# Patient Record
Sex: MALE | Race: WHITE | NOT HISPANIC OR LATINO | Employment: OTHER | URBAN - METROPOLITAN AREA
[De-identification: names, ages, dates, MRNs, and addresses within clinical notes are randomized per-mention and may not be internally consistent; named-entity substitution may affect disease eponyms.]

---

## 2017-09-17 ENCOUNTER — HOSPITAL ENCOUNTER (EMERGENCY)
Facility: HOSPITAL | Age: 82
Discharge: HOME | End: 2017-09-17
Attending: EMERGENCY MEDICINE
Payer: MEDICARE

## 2017-09-17 VITALS
RESPIRATION RATE: 18 BRPM | TEMPERATURE: 98 F | DIASTOLIC BLOOD PRESSURE: 76 MMHG | OXYGEN SATURATION: 92 % | WEIGHT: 187 LBS | HEIGHT: 68 IN | SYSTOLIC BLOOD PRESSURE: 167 MMHG | BODY MASS INDEX: 28.34 KG/M2 | HEART RATE: 56 BPM

## 2017-09-17 DIAGNOSIS — I95.1 ORTHOSTATIC HYPOTENSION: Primary | ICD-10-CM

## 2017-09-17 DIAGNOSIS — R55 NEAR SYNCOPE: ICD-10-CM

## 2017-09-17 LAB
ALBUMIN SERPL BCP-MCNC: 3.6 G/DL
ALP SERPL-CCNC: 63 U/L
ALT SERPL W/O P-5'-P-CCNC: 19 U/L
ANION GAP SERPL CALC-SCNC: 11 MMOL/L
AST SERPL-CCNC: 15 U/L
BASOPHILS # BLD AUTO: 0.02 K/UL
BASOPHILS NFR BLD: 0.2 %
BILIRUB SERPL-MCNC: 0.4 MG/DL
BILIRUB UR QL STRIP: NEGATIVE
BNP SERPL-MCNC: 33 PG/ML
BUN SERPL-MCNC: 17 MG/DL
CALCIUM SERPL-MCNC: 9.2 MG/DL
CHLORIDE SERPL-SCNC: 104 MMOL/L
CLARITY UR: CLEAR
CO2 SERPL-SCNC: 22 MMOL/L
COLOR UR: ABNORMAL
CREAT SERPL-MCNC: 1.2 MG/DL
DIFFERENTIAL METHOD: ABNORMAL
EOSINOPHIL # BLD AUTO: 0.3 K/UL
EOSINOPHIL NFR BLD: 2.8 %
ERYTHROCYTE [DISTWIDTH] IN BLOOD BY AUTOMATED COUNT: 13.4 %
EST. GFR  (AFRICAN AMERICAN): >60 ML/MIN/1.73 M^2
EST. GFR  (NON AFRICAN AMERICAN): 57 ML/MIN/1.73 M^2
GLUCOSE SERPL-MCNC: 116 MG/DL
GLUCOSE UR QL STRIP: NEGATIVE
HCT VFR BLD AUTO: 42 %
HGB BLD-MCNC: 14.6 G/DL
HGB UR QL STRIP: ABNORMAL
KETONES UR QL STRIP: NEGATIVE
LACTATE SERPL-SCNC: 1.4 MMOL/L
LEUKOCYTE ESTERASE UR QL STRIP: NEGATIVE
LYMPHOCYTES # BLD AUTO: 2.1 K/UL
LYMPHOCYTES NFR BLD: 20.9 %
MAGNESIUM SERPL-MCNC: 1.9 MG/DL
MCH RBC QN AUTO: 31.1 PG
MCHC RBC AUTO-ENTMCNC: 34.8 G/DL
MCV RBC AUTO: 90 FL
MICROSCOPIC COMMENT: NORMAL
MONOCYTES # BLD AUTO: 0.9 K/UL
MONOCYTES NFR BLD: 9 %
NEUTROPHILS # BLD AUTO: 6.6 K/UL
NEUTROPHILS NFR BLD: 67.1 %
NITRITE UR QL STRIP: NEGATIVE
PH UR STRIP: 6 [PH] (ref 5–8)
PLATELET # BLD AUTO: 243 K/UL
PMV BLD AUTO: 9 FL
POTASSIUM SERPL-SCNC: 3.8 MMOL/L
PROT SERPL-MCNC: 6.9 G/DL
PROT UR QL STRIP: NEGATIVE
RBC # BLD AUTO: 4.69 M/UL
RBC #/AREA URNS HPF: 1 /HPF (ref 0–4)
SODIUM SERPL-SCNC: 137 MMOL/L
SP GR UR STRIP: 1 (ref 1–1.03)
SQUAMOUS #/AREA URNS HPF: NORMAL /HPF
TROPONIN I SERPL DL<=0.01 NG/ML-MCNC: <0.006 NG/ML
URN SPEC COLLECT METH UR: ABNORMAL
UROBILINOGEN UR STRIP-ACNC: NEGATIVE EU/DL
WBC # BLD AUTO: 9.8 K/UL
WBC #/AREA URNS HPF: 0 /HPF (ref 0–5)

## 2017-09-17 PROCEDURE — 84484 ASSAY OF TROPONIN QUANT: CPT

## 2017-09-17 PROCEDURE — 83735 ASSAY OF MAGNESIUM: CPT

## 2017-09-17 PROCEDURE — 83880 ASSAY OF NATRIURETIC PEPTIDE: CPT

## 2017-09-17 PROCEDURE — 87086 URINE CULTURE/COLONY COUNT: CPT

## 2017-09-17 PROCEDURE — 81000 URINALYSIS NONAUTO W/SCOPE: CPT

## 2017-09-17 PROCEDURE — 93010 ELECTROCARDIOGRAM REPORT: CPT | Mod: ,,, | Performed by: INTERNAL MEDICINE

## 2017-09-17 PROCEDURE — 83605 ASSAY OF LACTIC ACID: CPT

## 2017-09-17 PROCEDURE — 85025 COMPLETE CBC W/AUTO DIFF WBC: CPT

## 2017-09-17 PROCEDURE — 93005 ELECTROCARDIOGRAM TRACING: CPT

## 2017-09-17 PROCEDURE — 80053 COMPREHEN METABOLIC PANEL: CPT

## 2017-09-17 PROCEDURE — 99285 EMERGENCY DEPT VISIT HI MDM: CPT

## 2017-09-17 NOTE — ED TRIAGE NOTES
Patient felt as if he was going to pass out. Had a similar experience 2 weeks ago following a new medication prescribed from the dermatologist. These meds have not been taken in 2 weeks but had a repeat experience tonight.

## 2017-09-17 NOTE — DISCHARGE INSTRUCTIONS
"Return to the Emergency Department of any acute worsening of your symptoms or for any other concern.     You should return to the ED for fever/chills, shortness of breath, chest pain, weakness or "passing out".     Pt should take all medications as prescribed.    Pt should follow up with PCP as soon as possible.    The risks associated with not taking your medications as prescribed and not following up with your Primary Care doctor or sub specialist includes worsening of your condition, pain, disability, loss of function or livelihood, and death      FLOYD Guevara M.D. 5:54 AM 9/17/2017      Our goal in the emergency department is to always give you outstanding care and exceptional service. You may receive a survey by mail or e-mail in the next week regarding your experience in our ED. We would greatly appreciate your completing and returning the survey. Your feedback provides us with a way to recognize our staff who give very good care and it helps us learn how to improve when your experience was below our aspiration of excellence.     "

## 2017-09-17 NOTE — ED PROVIDER NOTES
"Encounter Date: 2017    SCRIBE #1 NOTE: I, Rosa Lupe, am scribing for, and in the presence of,  Robert Guevara MD. I have scribed the following portions of the note - Other sections scribed: HPI/ROS.       History     Chief Complaint   Patient presents with    Near syncope     EMS reports pt had a near syncopal episode after feeling weak times 1 hour.  Pt denies chest pain.  Pt does report taking a nitro prior to calling EMS.     CC: Dizziness    HPI: 82 y.o. M with a PMHx of DM presents to the ED c/o acute onset of dizziness beginning PTA while standing in the bathroom. Pt states, "I was getting ready to walk out of the restroom when I felt like I was going to pass out." Pt sat himself down on a chair and felt the onset of dizziness again when he got up and walked to get a glass of water. No fall or LOC. Pt reports he also felt a similar dizziness once before after starting a new medication by his dermatologist. Pt has not taken that medication recently. Pt took a nitro prior to EMS arrival with no change in his dizziness. Pt was not actively dizzy while at rest in the exam room. Pt is concerned because his sister  of a sudden heart attack. Pt has not been hypoglycemic lately. Pt otherwise denies fever, chills, N/V/D, abdominal pain, CP, SOB, and any other symptoms.      The history is provided by the patient.     Review of patient's allergies indicates:  No Known Allergies  Past Medical History:   Diagnosis Date    Diabetes mellitus     High cholesterol     Hypertension     Prostate cancer      Past Surgical History:   Procedure Laterality Date    TOTAL KNEE ARTHROPLASTY       History reviewed. No pertinent family history.  Social History   Substance Use Topics    Smoking status: Former Smoker     Packs/day: 0.00     Types: Cigarettes    Smokeless tobacco: Never Used      Comment: pt states quit smoking 6 months ago    Alcohol use No     Review of Systems   Constitutional: Negative for " chills and fever.   HENT: Negative for congestion, rhinorrhea and sore throat.    Eyes: Negative for pain and visual disturbance.   Respiratory: Negative for cough and shortness of breath.    Cardiovascular: Negative for chest pain.   Gastrointestinal: Negative for abdominal pain, diarrhea, nausea and vomiting.   Genitourinary: Negative for dysuria and hematuria.   Musculoskeletal: Negative for back pain and neck pain.   Skin: Negative for rash and wound.   Neurological: Positive for dizziness. Negative for syncope and headaches.       Physical Exam     Initial Vitals [09/17/17 0134]   BP Pulse Resp Temp SpO2   (!) 172/82 86 18 97.8 °F (36.6 °C) 95 %      MAP       112         Physical Exam    Vitals reviewed.  Constitutional: He appears well-developed and well-nourished.   HENT:   Head: Normocephalic and atraumatic.   Eyes: EOM are normal. Pupils are equal, round, and reactive to light.   Neck: Normal range of motion. Neck supple.   Cardiovascular: Normal rate, regular rhythm, normal heart sounds and intact distal pulses.   Pulmonary/Chest: Breath sounds normal. No respiratory distress. He has no wheezes. He has no rhonchi. He has no rales.   Abdominal: Soft. Bowel sounds are normal. He exhibits no distension. There is no tenderness.   Musculoskeletal: Normal range of motion.   Neurological: He is alert and oriented to person, place, and time.   Skin: Skin is warm and dry. Capillary refill takes less than 2 seconds. No rash noted.   Psychiatric: He has a normal mood and affect.         ED Course   Procedures  Labs Reviewed   CBC W/ AUTO DIFFERENTIAL - Abnormal; Notable for the following:        Result Value    MCH 31.1 (*)     MPV 9.0 (*)     All other components within normal limits   COMPREHENSIVE METABOLIC PANEL - Abnormal; Notable for the following:     CO2 22 (*)     Glucose 116 (*)     eGFR if non  57 (*)     All other components within normal limits   URINALYSIS - Abnormal; Notable for the  following:     Occult Blood UA 2+ (*)     All other components within normal limits   CULTURE, URINE   TROPONIN I   B-TYPE NATRIURETIC PEPTIDE   MAGNESIUM   LACTIC ACID, PLASMA   URINALYSIS MICROSCOPIC             Medical Decision Making:   History:   Old Medical Records: I decided to obtain old medical records.  Initial Assessment:   Medical decision-making:    The patient received a medical screening exam. If performed, the EKG was independently evaluated by me and is pending final cardiology evaluation.  If performed, all radiographic studies were independently evaluated by me and are pending final radiology evaluation. If labs were ordered, they were reviewed. Vital signs are independently assessed by me.  If performed, the pulse oximetry was independently evaluated by me.  I decided to obtain the patient's past medical record.  If available, I reviewed the patient's past medical record, including most recent labs and radiology reports.      This is an emergent evaluation for patient who presented to the emergency department after a PRE-syncopal episode.  My differential diagnosis includes stroke, arrhythmia, pulmonary embolism, metabolic abnormalities, dehydration, orthostatic hypotension, and hypoglycemia.    Decided to obtain and review the patient's medical record.    Clinically the patient is IS dehydrated. Pt given IVF  The patient's orthostatics vital signs were ABnormal.  After IVF- NO SYMPTOMS.   The patient's labs did not show any severe metabolic derangements.   Patient was not hypoglycemic.  The patient has a Wells Score of zero- making PE less likely.  CT scan of the head was negative for ischemic stroke, intracranial hemorrhage, or skull fracture.  The EKG and cardiac monitor did not show any arrhythmias or sign of ischemia. Troponin is negative. No chest pain. Doubt ACS.     Patient is low risk for serious mortality and morbidity based upon the [place], syncope rule.  Patient does not have  congestive heart failure; patient does not have a hematocrit less than 30; there are no abnormal EKG findings; there is no shortness of breath; there is no systolic blood pressure less than 90.    I believe this patient stable for close outpatient work up and evaluation.     The results and physical exam findings were reviewed with the patient. Pt agrees with assessment, disposition and treatment plan and has no further questions or complaints at this time.    FLOYD Guevara M.D. 9:05 PM 9/17/2017                Scribe Attestation:   Scribe #1: I performed the above scribed service and the documentation accurately describes the services I performed. I attest to the accuracy of the note.    Attending Attestation:           Physician Attestation for Scribe:  Physician Attestation Statement for Scribe #1: I, Robert Guevara MD, reviewed documentation, as scribed by Rosa Andrade in my presence, and it is both accurate and complete.                 ED Course      Clinical Impression:   The primary encounter diagnosis was Orthostatic hypotension. A diagnosis of Near syncope was also pertinent to this visit.                           Robert Guevara MD  09/17/17 7249

## 2017-09-19 LAB — BACTERIA UR CULT: NORMAL

## 2017-12-14 ENCOUNTER — LAB VISIT (OUTPATIENT)
Dept: LAB | Facility: HOSPITAL | Age: 82
End: 2017-12-14
Attending: UROLOGY
Payer: MEDICARE

## 2017-12-14 DIAGNOSIS — Z85.46 HISTORY OF PROSTATE CANCER: ICD-10-CM

## 2017-12-14 LAB — COMPLEXED PSA SERPL-MCNC: 0.02 NG/ML

## 2017-12-14 PROCEDURE — 36415 COLL VENOUS BLD VENIPUNCTURE: CPT

## 2017-12-14 PROCEDURE — 84153 ASSAY OF PSA TOTAL: CPT

## 2018-01-02 ENCOUNTER — OFFICE VISIT (OUTPATIENT)
Dept: UROLOGY | Facility: CLINIC | Age: 83
End: 2018-01-02
Payer: MEDICARE

## 2018-01-02 VITALS
DIASTOLIC BLOOD PRESSURE: 72 MMHG | BODY MASS INDEX: 29.29 KG/M2 | HEIGHT: 69 IN | HEART RATE: 62 BPM | WEIGHT: 197.75 LBS | RESPIRATION RATE: 16 BRPM | SYSTOLIC BLOOD PRESSURE: 118 MMHG

## 2018-01-02 DIAGNOSIS — R35.1 NOCTURIA MORE THAN TWICE PER NIGHT: ICD-10-CM

## 2018-01-02 DIAGNOSIS — N52.9 ED (ERECTILE DYSFUNCTION) OF ORGANIC ORIGIN: ICD-10-CM

## 2018-01-02 DIAGNOSIS — Z85.46 HISTORY OF PROSTATE CANCER: Primary | ICD-10-CM

## 2018-01-02 PROCEDURE — 99213 OFFICE O/P EST LOW 20 MIN: CPT | Mod: S$GLB,,, | Performed by: UROLOGY

## 2018-01-02 PROCEDURE — 99999 PR PBB SHADOW E&M-EST. PATIENT-LVL III: CPT | Mod: PBBFAC,,, | Performed by: UROLOGY

## 2018-01-02 RX ORDER — LUBIPROSTONE 24 UG/1
24 CAPSULE, GELATIN COATED ORAL 2 TIMES DAILY WITH MEALS
Status: ON HOLD | COMMUNITY
Start: 2017-12-07 | End: 2022-08-17 | Stop reason: HOSPADM

## 2018-01-02 RX ORDER — SIMVASTATIN 20 MG/1
TABLET, FILM COATED ORAL
Status: ON HOLD | COMMUNITY
Start: 2017-10-30 | End: 2019-05-07 | Stop reason: HOSPADM

## 2018-01-02 RX ORDER — HYDROCHLOROTHIAZIDE 12.5 MG/1
CAPSULE ORAL
COMMUNITY
Start: 2017-12-20 | End: 2022-10-04 | Stop reason: SDUPTHER

## 2018-01-02 NOTE — PROGRESS NOTES
Subjective:       Patient ID: Juan Martínez Jr. is a 82 y.o. male who was last seen in this office Visit date not found    Chief Complaint:   Chief Complaint   Patient presents with    Prostate Cancer     annual visit with psa         History of Present Illness  Prostate Cancer  He underwent brachytherapy in 2003 by Dr. Brown.  He voids with a good stream.  He has nocturia x 1.  He denies hematuria, dysuria, pain, weight loss, or UTI.    ACTIVE MEDICAL ISSUES:  Patient Active Problem List   Diagnosis    Essential hypertension, benign    Type 2 diabetes mellitus    Hyperlipidemia    Body mass index 28.0-28.9, adult    History of prostate cancer    Orthostatic hypotension       ALLERGIES AND MEDICATIONS: updated and reviewed.  Review of patient's allergies indicates:  No Known Allergies  Current Outpatient Prescriptions   Medication Sig    AMITIZA 24 mcg Cap     aspirin (ECOTRIN) 81 MG EC tablet Take 81 mg by mouth once daily.    furosemide (LASIX) 40 MG tablet Take 1 tablet (40 mg total) by mouth once daily.    GENERLAC 10 gram/15 mL solution TAKE 30 ml BY MOUTH TWICE A DAY FOR 7 days    lisinopril 10 MG tablet Take 1 tablet (10 mg total) by mouth once daily.    metformin (GLUCOPHAGE) 1000 MG tablet Take 1 tablet (1,000 mg total) by mouth 2 (two) times daily with meals.    metoprolol succinate (TOPROL-XL) 25 MG 24 hr tablet Take 1 tablet (25 mg total) by mouth once daily.    ezetimibe (ZETIA) 10 mg tablet Take 1 tablet (10 mg total) by mouth every evening.    hydroCHLOROthiazide (MICROZIDE) 12.5 mg capsule     simvastatin (ZOCOR) 20 MG tablet      No current facility-administered medications for this visit.        Review of Systems   Constitutional: Negative for activity change, fatigue, fever and unexpected weight change.   HENT: Negative for congestion.    Eyes: Negative for redness.   Respiratory: Negative for chest tightness and shortness of breath.    Cardiovascular: Negative for chest pain  "and leg swelling.   Gastrointestinal: Negative for abdominal pain, constipation, diarrhea, nausea and vomiting.   Genitourinary: Negative for dysuria, flank pain, frequency, hematuria, penile pain, penile swelling, scrotal swelling, testicular pain and urgency.   Musculoskeletal: Negative for arthralgias and back pain.   Neurological: Negative for dizziness and light-headedness.   Psychiatric/Behavioral: Negative for behavioral problems and confusion. The patient is not nervous/anxious.    All other systems reviewed and are negative.      Objective:      Vitals:    01/02/18 1445   BP: 118/72   Pulse: 62   Resp: 16   Weight: 89.7 kg (197 lb 12 oz)   Height: 5' 9" (1.753 m)     Physical Exam   Nursing note and vitals reviewed.  Constitutional: He is oriented to person, place, and time. He appears well-developed and well-nourished.   HENT:   Head: Normocephalic.   Eyes: Conjunctivae are normal.   Neck: Normal range of motion. Neck supple. No tracheal deviation present. No thyromegaly present.   Cardiovascular: Normal rate and normal heart sounds.    Pulmonary/Chest: Effort normal and breath sounds normal. No respiratory distress. He has no wheezes.   Abdominal: Soft. Bowel sounds are normal. There is no hepatosplenomegaly. There is no tenderness. There is no rebound and no CVA tenderness. No hernia.   Musculoskeletal: Normal range of motion. He exhibits no edema or tenderness.   Lymphadenopathy:     He has no cervical adenopathy.   Neurological: He is alert and oriented to person, place, and time.   Skin: Skin is warm and dry. No rash noted. No erythema.     Psychiatric: He has a normal mood and affect. His behavior is normal. Judgment and thought content normal.       Urine dipstick shows negative for all components.  Micro exam: negative for WBC's or RBC's.      PSA DIAGNOSTIC 0.00 - 4.00 ng/mL 0.02    Comments: PSA Expected levels:   Hormonal Therapy: <0.05 ng/ml   Prostatectomy: <0.01 ng/ml   Radiation Therapy: " <1.00 ng/ml    Resulting Agency  [facility]   Narrative     1 year      Specimen Collected: 12/14/17 06:46   Last Resulted: 12/14/17 16:03          Assessment:       1. History of prostate cancer    2. Nocturia more than twice per night    3. ED (erectile dysfunction) of organic origin          Plan:       1. History of prostate cancer    - Prostate Specific Antigen, Diagnostic; Future    2. Nocturia more than twice per night  Limit evening fluids    3. ED (erectile dysfunction) of organic origin  stable            Return in about 1 year (around 1/2/2019) for Follow up, Review PSA.

## 2018-06-12 ENCOUNTER — HOSPITAL ENCOUNTER (OUTPATIENT)
Dept: WOUND CARE | Facility: HOSPITAL | Age: 83
Discharge: HOME | End: 2018-06-12
Attending: FAMILY MEDICINE
Payer: MEDICARE

## 2018-06-12 DIAGNOSIS — E11.621 DIABETIC ULCER OF TOE OF LEFT FOOT ASSOCIATED WITH TYPE 2 DIABETES MELLITUS, WITH FAT LAYER EXPOSED: ICD-10-CM

## 2018-06-12 DIAGNOSIS — L97.522 DIABETIC ULCER OF TOE OF LEFT FOOT ASSOCIATED WITH TYPE 2 DIABETES MELLITUS, WITH FAT LAYER EXPOSED: ICD-10-CM

## 2018-06-12 PROCEDURE — 99204 OFFICE O/P NEW MOD 45 MIN: CPT | Mod: ,,, | Performed by: FAMILY MEDICINE

## 2018-06-12 PROCEDURE — 99214 OFFICE O/P EST MOD 30 MIN: CPT | Performed by: FAMILY MEDICINE

## 2018-06-19 ENCOUNTER — HOSPITAL ENCOUNTER (OUTPATIENT)
Dept: WOUND CARE | Facility: HOSPITAL | Age: 83
Discharge: HOME | End: 2018-06-19
Attending: FAMILY MEDICINE
Payer: MEDICARE

## 2018-06-19 DIAGNOSIS — L97.522 DIABETIC ULCER OF TOE OF LEFT FOOT ASSOCIATED WITH TYPE 2 DIABETES MELLITUS, WITH FAT LAYER EXPOSED: Primary | ICD-10-CM

## 2018-06-19 DIAGNOSIS — E11.621 DIABETIC ULCER OF TOE OF LEFT FOOT ASSOCIATED WITH TYPE 2 DIABETES MELLITUS, WITH FAT LAYER EXPOSED: Primary | ICD-10-CM

## 2018-06-19 PROCEDURE — 99214 OFFICE O/P EST MOD 30 MIN: CPT | Mod: ,,, | Performed by: FAMILY MEDICINE

## 2018-06-19 PROCEDURE — 97602 WOUND(S) CARE NON-SELECTIVE: CPT | Performed by: FAMILY MEDICINE

## 2018-06-26 ENCOUNTER — HOSPITAL ENCOUNTER (OUTPATIENT)
Dept: WOUND CARE | Facility: HOSPITAL | Age: 83
Discharge: HOME | End: 2018-06-26
Attending: FAMILY MEDICINE
Payer: MEDICARE

## 2018-06-26 DIAGNOSIS — E11.621 DIABETIC ULCER OF TOE OF LEFT FOOT ASSOCIATED WITH TYPE 2 DIABETES MELLITUS, WITH FAT LAYER EXPOSED: Primary | ICD-10-CM

## 2018-06-26 DIAGNOSIS — L97.522 DIABETIC ULCER OF TOE OF LEFT FOOT ASSOCIATED WITH TYPE 2 DIABETES MELLITUS, WITH FAT LAYER EXPOSED: Primary | ICD-10-CM

## 2018-06-26 PROCEDURE — 99212 OFFICE O/P EST SF 10 MIN: CPT | Performed by: FAMILY MEDICINE

## 2018-06-26 PROCEDURE — 99213 OFFICE O/P EST LOW 20 MIN: CPT | Mod: ,,, | Performed by: FAMILY MEDICINE

## 2018-11-13 ENCOUNTER — HOSPITAL ENCOUNTER (OUTPATIENT)
Dept: PREADMISSION TESTING | Facility: HOSPITAL | Age: 83
Discharge: HOME | End: 2018-11-13
Attending: PLASTIC SURGERY
Payer: MEDICARE

## 2018-11-13 VITALS
BODY MASS INDEX: 27.4 KG/M2 | WEIGHT: 185 LBS | TEMPERATURE: 97 F | HEIGHT: 69 IN | DIASTOLIC BLOOD PRESSURE: 71 MMHG | OXYGEN SATURATION: 99 % | SYSTOLIC BLOOD PRESSURE: 147 MMHG | RESPIRATION RATE: 18 BRPM | HEART RATE: 66 BPM

## 2018-11-13 DIAGNOSIS — Z01.818 PREOP TESTING: Primary | ICD-10-CM

## 2018-11-13 LAB
ANION GAP SERPL CALC-SCNC: 8 MMOL/L
BASOPHILS # BLD AUTO: 0.03 K/UL
BASOPHILS NFR BLD: 0.3 %
BUN SERPL-MCNC: 16 MG/DL
CALCIUM SERPL-MCNC: 9.7 MG/DL
CHLORIDE SERPL-SCNC: 101 MMOL/L
CO2 SERPL-SCNC: 29 MMOL/L
CREAT SERPL-MCNC: 1.1 MG/DL
DIFFERENTIAL METHOD: ABNORMAL
EOSINOPHIL # BLD AUTO: 0.2 K/UL
EOSINOPHIL NFR BLD: 2 %
ERYTHROCYTE [DISTWIDTH] IN BLOOD BY AUTOMATED COUNT: 13.6 %
EST. GFR  (AFRICAN AMERICAN): >60 ML/MIN/1.73 M^2
EST. GFR  (NON AFRICAN AMERICAN): >60 ML/MIN/1.73 M^2
GLUCOSE SERPL-MCNC: 143 MG/DL
HCT VFR BLD AUTO: 44.7 %
HGB BLD-MCNC: 15.3 G/DL
LYMPHOCYTES # BLD AUTO: 2.2 K/UL
LYMPHOCYTES NFR BLD: 23.3 %
MCH RBC QN AUTO: 30.8 PG
MCHC RBC AUTO-ENTMCNC: 34.2 G/DL
MCV RBC AUTO: 90 FL
MONOCYTES # BLD AUTO: 0.8 K/UL
MONOCYTES NFR BLD: 8.5 %
NEUTROPHILS # BLD AUTO: 6.2 K/UL
NEUTROPHILS NFR BLD: 65.9 %
PLATELET # BLD AUTO: 279 K/UL
PMV BLD AUTO: 9.1 FL
POTASSIUM SERPL-SCNC: 4.1 MMOL/L
RBC # BLD AUTO: 4.96 M/UL
SODIUM SERPL-SCNC: 138 MMOL/L
WBC # BLD AUTO: 9.41 K/UL

## 2018-11-13 PROCEDURE — 80048 BASIC METABOLIC PNL TOTAL CA: CPT

## 2018-11-13 PROCEDURE — 85025 COMPLETE CBC W/AUTO DIFF WBC: CPT

## 2018-11-13 PROCEDURE — 93010 ELECTROCARDIOGRAM REPORT: CPT | Mod: ,,, | Performed by: INTERNAL MEDICINE

## 2018-11-13 PROCEDURE — 93005 ELECTROCARDIOGRAM TRACING: CPT

## 2018-11-13 NOTE — DISCHARGE INSTRUCTIONS
Your surgery is scheduled for____11/15/2018___________.    Call [phone] between 2 pm and 5 pm _11/14/2018_________ to find out your arrival time for the day of surgery.    Report to SAME DAY SURGERY UNIT at ______am on the 2nd floor of the hospital.  Use the front entrance of the hospital.  The front doors of the hospital open promptly at 5:30 am.    If you need wheelchair assistance, call [phone] from your cell phone,  or call 0 from the courtesy phone in the hospital lobby.    Important instructions:   Do not eat or drink after 12 midnight, including water.  It is okay to brush your teeth.  Do not have gum, candy or mints.     Take only these medications with a small swallow of water on the morning of your surgery.___metoprolol__________    Do not take any diabetic medication on the morning of surgery unless instructed to do so by your doctor or pre op nurse.    Stop taking Aspirin, Ibuprofen, Motrin and Aleve , Fish oil, and Vitamin E for at least 7 days before your surgery. You may use Tylenol unless otherwise instructed by your doctor.           Please shower the night before and the morning of your surgery.        Use Hibiclens soap to your surgery site if instructed by your pre op nurse.   If your surgery is on your abdomen, be sure to wash your naval.  Be sure to rinse off Hibiclens after it is on your skin for several minutes.  Do not use Hibiclens on your face or genitals. Please place clean linens on your bed the night before surgery. Please wear fresh clean clothing after each shower.     No shaving of procedural area at least 4-5 days before surgery due to increased risk of skin irritation and/or possible infection.     You may wear deodorant only.      Do not wear powder, body lotion or cologne.     Do not wear any jewelry or have any metal on your body.     Please bring any documents given to you by your doctor.     If you are going home on the same day of surgery, you must arrange for a  family member or a friend to drive you home.  Public transportation is prohibited.    You will not be able to drive home if you were given anesthesia or sedation.     Wear loose fitting clothes allowing for bandages.     Please leave money and valuables home.       You may bring your cell phone.     Call the doctor if fever or illness should occur before your surgery.    Call [phone] to contact us here at [facility] if needed.

## 2018-11-14 ENCOUNTER — ANESTHESIA EVENT (OUTPATIENT)
Dept: SURGERY | Facility: HOSPITAL | Age: 83
End: 2018-11-14
Payer: MEDICARE

## 2018-11-15 ENCOUNTER — ANESTHESIA (OUTPATIENT)
Dept: SURGERY | Facility: HOSPITAL | Age: 83
End: 2018-11-15
Payer: MEDICARE

## 2018-11-15 ENCOUNTER — HOSPITAL ENCOUNTER (OUTPATIENT)
Facility: HOSPITAL | Age: 83
Discharge: HOME | End: 2018-11-15
Attending: PLASTIC SURGERY | Admitting: PLASTIC SURGERY
Payer: MEDICARE

## 2018-11-15 VITALS
OXYGEN SATURATION: 94 % | SYSTOLIC BLOOD PRESSURE: 137 MMHG | DIASTOLIC BLOOD PRESSURE: 71 MMHG | RESPIRATION RATE: 17 BRPM | HEART RATE: 64 BPM | TEMPERATURE: 98 F

## 2018-11-15 DIAGNOSIS — C44.92 SCC (SQUAMOUS CELL CARCINOMA): Primary | ICD-10-CM

## 2018-11-15 LAB — POCT GLUCOSE: 134 MG/DL (ref 70–110)

## 2018-11-15 PROCEDURE — 71000033 HC RECOVERY, INTIAL HOUR: Performed by: PLASTIC SURGERY

## 2018-11-15 PROCEDURE — 71000015 HC POSTOP RECOV 1ST HR: Performed by: PLASTIC SURGERY

## 2018-11-15 PROCEDURE — 88331 PATH CONSLTJ SURG 1 BLK 1SPC: CPT | Mod: 59 | Performed by: PATHOLOGY

## 2018-11-15 PROCEDURE — 63600175 PHARM REV CODE 636 W HCPCS: Performed by: NURSE ANESTHETIST, CERTIFIED REGISTERED

## 2018-11-15 PROCEDURE — D9220A PRA ANESTHESIA: Mod: CRNA,,, | Performed by: NURSE ANESTHETIST, CERTIFIED REGISTERED

## 2018-11-15 PROCEDURE — 88331 PATH CONSLTJ SURG 1 BLK 1SPC: CPT | Mod: 26,,, | Performed by: PATHOLOGY

## 2018-11-15 PROCEDURE — 25000003 PHARM REV CODE 250: Performed by: NURSE ANESTHETIST, CERTIFIED REGISTERED

## 2018-11-15 PROCEDURE — 88305 TISSUE EXAM BY PATHOLOGIST: CPT | Performed by: PATHOLOGY

## 2018-11-15 PROCEDURE — 63600175 PHARM REV CODE 636 W HCPCS: Performed by: PLASTIC SURGERY

## 2018-11-15 PROCEDURE — D9220A PRA ANESTHESIA: Mod: ANES,,, | Performed by: ANESTHESIOLOGY

## 2018-11-15 PROCEDURE — 82962 GLUCOSE BLOOD TEST: CPT | Performed by: PLASTIC SURGERY

## 2018-11-15 PROCEDURE — 25000003 PHARM REV CODE 250: Performed by: ANESTHESIOLOGY

## 2018-11-15 PROCEDURE — 25000003 PHARM REV CODE 250: Performed by: PLASTIC SURGERY

## 2018-11-15 PROCEDURE — 88341 IMHCHEM/IMCYTCHM EA ADD ANTB: CPT | Mod: 26,,, | Performed by: PATHOLOGY

## 2018-11-15 PROCEDURE — 88342 IMHCHEM/IMCYTCHM 1ST ANTB: CPT | Performed by: PATHOLOGY

## 2018-11-15 PROCEDURE — 36000707: Performed by: PLASTIC SURGERY

## 2018-11-15 PROCEDURE — 88305 TISSUE EXAM BY PATHOLOGIST: CPT | Mod: 26,,, | Performed by: PATHOLOGY

## 2018-11-15 PROCEDURE — 88342 IMHCHEM/IMCYTCHM 1ST ANTB: CPT | Mod: 26,,, | Performed by: PATHOLOGY

## 2018-11-15 PROCEDURE — 36000706: Performed by: PLASTIC SURGERY

## 2018-11-15 PROCEDURE — 71000016 HC POSTOP RECOV ADDL HR: Performed by: PLASTIC SURGERY

## 2018-11-15 PROCEDURE — 37000008 HC ANESTHESIA 1ST 15 MINUTES: Performed by: PLASTIC SURGERY

## 2018-11-15 PROCEDURE — 37000009 HC ANESTHESIA EA ADD 15 MINS: Performed by: PLASTIC SURGERY

## 2018-11-15 RX ORDER — PHENYLEPHRINE HYDROCHLORIDE 10 MG/ML
INJECTION INTRAVENOUS
Status: DISCONTINUED | OUTPATIENT
Start: 2018-11-15 | End: 2018-11-15

## 2018-11-15 RX ORDER — SUCCINYLCHOLINE CHLORIDE 20 MG/ML
INJECTION INTRAMUSCULAR; INTRAVENOUS
Status: DISCONTINUED | OUTPATIENT
Start: 2018-11-15 | End: 2018-11-15

## 2018-11-15 RX ORDER — SODIUM CHLORIDE, SODIUM LACTATE, POTASSIUM CHLORIDE, CALCIUM CHLORIDE 600; 310; 30; 20 MG/100ML; MG/100ML; MG/100ML; MG/100ML
INJECTION, SOLUTION INTRAVENOUS CONTINUOUS
Status: DISCONTINUED | OUTPATIENT
Start: 2018-11-15 | End: 2018-11-16 | Stop reason: HOSPADM

## 2018-11-15 RX ORDER — ONDANSETRON 2 MG/ML
INJECTION INTRAMUSCULAR; INTRAVENOUS
Status: DISCONTINUED | OUTPATIENT
Start: 2018-11-15 | End: 2018-11-15

## 2018-11-15 RX ORDER — LIDOCAINE HYDROCHLORIDE AND EPINEPHRINE 10; 10 MG/ML; UG/ML
INJECTION, SOLUTION INFILTRATION; PERINEURAL
Status: DISCONTINUED | OUTPATIENT
Start: 2018-11-15 | End: 2018-11-15 | Stop reason: HOSPADM

## 2018-11-15 RX ORDER — LIDOCAINE HCL/PF 100 MG/5ML
SYRINGE (ML) INTRAVENOUS
Status: DISCONTINUED | OUTPATIENT
Start: 2018-11-15 | End: 2018-11-15

## 2018-11-15 RX ORDER — CEFAZOLIN SODIUM 2 G/50ML
2 SOLUTION INTRAVENOUS
Status: DISCONTINUED | OUTPATIENT
Start: 2018-11-15 | End: 2018-11-16 | Stop reason: HOSPADM

## 2018-11-15 RX ORDER — BACITRACIN 500 [USP'U]/G
OINTMENT TOPICAL
Status: DISCONTINUED | OUTPATIENT
Start: 2018-11-15 | End: 2018-11-15 | Stop reason: HOSPADM

## 2018-11-15 RX ORDER — LIDOCAINE HYDROCHLORIDE 10 MG/ML
1 INJECTION, SOLUTION EPIDURAL; INFILTRATION; INTRACAUDAL; PERINEURAL ONCE
Status: DISCONTINUED | OUTPATIENT
Start: 2018-11-15 | End: 2018-11-16 | Stop reason: HOSPADM

## 2018-11-15 RX ORDER — HYDROMORPHONE HYDROCHLORIDE 2 MG/ML
0.2 INJECTION, SOLUTION INTRAMUSCULAR; INTRAVENOUS; SUBCUTANEOUS EVERY 5 MIN PRN
Status: DISCONTINUED | OUTPATIENT
Start: 2018-11-15 | End: 2018-11-16 | Stop reason: HOSPADM

## 2018-11-15 RX ORDER — FENTANYL CITRATE 50 UG/ML
INJECTION, SOLUTION INTRAMUSCULAR; INTRAVENOUS
Status: DISCONTINUED | OUTPATIENT
Start: 2018-11-15 | End: 2018-11-15

## 2018-11-15 RX ORDER — PROPOFOL 10 MG/ML
VIAL (ML) INTRAVENOUS
Status: DISCONTINUED | OUTPATIENT
Start: 2018-11-15 | End: 2018-11-15

## 2018-11-15 RX ORDER — LIDOCAINE HYDROCHLORIDE 20 MG/ML
INJECTION, SOLUTION EPIDURAL; INFILTRATION; INTRACAUDAL; PERINEURAL
Status: DISCONTINUED | OUTPATIENT
Start: 2018-11-15 | End: 2018-11-15 | Stop reason: HOSPADM

## 2018-11-15 RX ORDER — LIDOCAINE HYDROCHLORIDE 20 MG/ML
JELLY TOPICAL
Status: DISCONTINUED | OUTPATIENT
Start: 2018-11-15 | End: 2018-11-15

## 2018-11-15 RX ORDER — EPHEDRINE SULFATE 50 MG/ML
INJECTION, SOLUTION INTRAVENOUS
Status: DISCONTINUED | OUTPATIENT
Start: 2018-11-15 | End: 2018-11-15

## 2018-11-15 RX ORDER — EPINEPHRINE 1 MG/ML
INJECTION, SOLUTION INTRACARDIAC; INTRAMUSCULAR; INTRAVENOUS; SUBCUTANEOUS
Status: DISCONTINUED | OUTPATIENT
Start: 2018-11-15 | End: 2018-11-15 | Stop reason: HOSPADM

## 2018-11-15 RX ORDER — SODIUM CHLORIDE 0.9 % (FLUSH) 0.9 %
3 SYRINGE (ML) INJECTION
Status: DISCONTINUED | OUTPATIENT
Start: 2018-11-15 | End: 2018-11-16 | Stop reason: HOSPADM

## 2018-11-15 RX ORDER — ROCURONIUM BROMIDE 10 MG/ML
INJECTION, SOLUTION INTRAVENOUS
Status: DISCONTINUED | OUTPATIENT
Start: 2018-11-15 | End: 2018-11-15

## 2018-11-15 RX ORDER — GLYCOPYRROLATE 0.2 MG/ML
INJECTION INTRAMUSCULAR; INTRAVENOUS
Status: DISCONTINUED | OUTPATIENT
Start: 2018-11-15 | End: 2018-11-15

## 2018-11-15 RX ORDER — BACITRACIN 50000 [IU]/1
INJECTION, POWDER, FOR SOLUTION INTRAMUSCULAR
Status: DISCONTINUED | OUTPATIENT
Start: 2018-11-15 | End: 2018-11-15 | Stop reason: HOSPADM

## 2018-11-15 RX ADMIN — PHENYLEPHRINE HYDROCHLORIDE 200 MCG: 10 INJECTION INTRAVENOUS at 08:11

## 2018-11-15 RX ADMIN — ROCURONIUM BROMIDE 7.5 MG: 10 INJECTION, SOLUTION INTRAVENOUS at 07:11

## 2018-11-15 RX ADMIN — SODIUM CHLORIDE, SODIUM LACTATE, POTASSIUM CHLORIDE, AND CALCIUM CHLORIDE: .6; .31; .03; .02 INJECTION, SOLUTION INTRAVENOUS at 06:11

## 2018-11-15 RX ADMIN — PHENYLEPHRINE HYDROCHLORIDE 100 MCG: 10 INJECTION INTRAVENOUS at 09:11

## 2018-11-15 RX ADMIN — CEFAZOLIN SODIUM 2 G: 2 SOLUTION INTRAVENOUS at 07:11

## 2018-11-15 RX ADMIN — PROPOFOL 40 MG: 10 INJECTION, EMULSION INTRAVENOUS at 08:11

## 2018-11-15 RX ADMIN — PHENYLEPHRINE HYDROCHLORIDE 200 MCG: 10 INJECTION INTRAVENOUS at 09:11

## 2018-11-15 RX ADMIN — EPHEDRINE SULFATE 15 MG: 50 INJECTION, SOLUTION INTRAMUSCULAR; INTRAVENOUS; SUBCUTANEOUS at 08:11

## 2018-11-15 RX ADMIN — PHENYLEPHRINE HYDROCHLORIDE 100 MCG: 10 INJECTION INTRAVENOUS at 10:11

## 2018-11-15 RX ADMIN — SUCCINYLCHOLINE CHLORIDE 30 MG: 20 INJECTION, SOLUTION INTRAMUSCULAR; INTRAVENOUS at 08:11

## 2018-11-15 RX ADMIN — PHENYLEPHRINE HYDROCHLORIDE 400 MCG: 10 INJECTION INTRAVENOUS at 08:11

## 2018-11-15 RX ADMIN — GLYCOPYRROLATE 0.2 MG: 0.2 INJECTION, SOLUTION INTRAMUSCULAR; INTRAVENOUS at 07:11

## 2018-11-15 RX ADMIN — LIDOCAINE HYDROCHLORIDE 2 ML: 20 JELLY TOPICAL at 07:11

## 2018-11-15 RX ADMIN — PROPOFOL 160 MG: 10 INJECTION, EMULSION INTRAVENOUS at 07:11

## 2018-11-15 RX ADMIN — FENTANYL CITRATE 50 MCG: 50 INJECTION INTRAMUSCULAR; INTRAVENOUS at 07:11

## 2018-11-15 RX ADMIN — WHITE PETROLATUM MINERAL OIL 1 TUBE: 150; 830 OINTMENT OPHTHALMIC at 07:11

## 2018-11-15 RX ADMIN — EPHEDRINE SULFATE 10 MG: 50 INJECTION, SOLUTION INTRAMUSCULAR; INTRAVENOUS; SUBCUTANEOUS at 09:11

## 2018-11-15 RX ADMIN — EPHEDRINE SULFATE 5 MG: 50 INJECTION, SOLUTION INTRAMUSCULAR; INTRAVENOUS; SUBCUTANEOUS at 09:11

## 2018-11-15 RX ADMIN — ONDANSETRON 4 MG: 2 INJECTION, SOLUTION INTRAMUSCULAR; INTRAVENOUS at 10:11

## 2018-11-15 RX ADMIN — LIDOCAINE HYDROCHLORIDE 85 MG: 20 INJECTION, SOLUTION INTRAVENOUS at 07:11

## 2018-11-15 RX ADMIN — SUCCINYLCHOLINE CHLORIDE 140 MG: 20 INJECTION, SOLUTION INTRAMUSCULAR; INTRAVENOUS at 07:11

## 2018-11-15 RX ADMIN — EPHEDRINE SULFATE 10 MG: 50 INJECTION, SOLUTION INTRAMUSCULAR; INTRAVENOUS; SUBCUTANEOUS at 08:11

## 2018-11-15 NOTE — H&P
History of Present Illness    84 yo F referred from Dr. 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 they have been present for about 6 months and have enlarged and bleed. 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     Active Problems   · Diabetes mellitus (E11.9)   · Angina pectoris (I20.9)   · Hyperlipidemia (E78.5)   · Hypertension (I10)    Current Meds    Medication Name Instruction   Amitiza 24 MCG Oral Capsule TAKE 1 CAPSULE TWICE DAILY WITH FOOD.   Diclofenac Sodium 1 % Transdermal Gel    HydroCHLOROthiazide 12.5 MG Oral Tablet TAKE 1 TABLET DAILY   MetFORMIN HCl - 500 MG Oral Tablet TAKE 1 TABLET EVERY 12 HOURS WITH FOOD.   Metoprolol Succinate ER 25 MG Oral Tablet Extended Release 24 Hour TAKE 1 TABLET DAILY.   Nitrostat 0.4 MG Sublingual Tablet Sublingual PLACE 1 TABLET UNDER THE TONGUE EVERY 5 MINUTES FOR UP TO 3 DOSES AS NEEDED FOR CHEST PAIN.CALL 911 IF PAIN PERSISTS.   Oxycodone-Acetaminophen 5-325 MG Oral Tablet TAKE 1 TABLET EVERY 4 HOURS PRN pain   Simvastatin 20 MG Oral Tablet TAKE 1 TABLET DAILY AS DIRECTED.   Zetia 10 MG Oral Tablet TAKE 1 TABLET DAILY.     Allergies   · No Known Drug Allergies    Review of Systems    Systemic: feeling fine, no fever and no chills.   Head: no headache.   Eyes: no vision problems.   Cardiovascular: no chest pain or discomfort. no dyspnea   Gastrointestinal: no vomiting and no abdominal pain.   Genitourinary: no urinary loss of control and no dysuria.   Psychological: no depression.   Musculoskeletal: no localized joint pain.   Skin: skin lesion(s):.      Vitals   Recorded: 13Nov2018 09:39AM   Systolic 165   Diastolic 81   Temperature 97.3 F   Respiration 16   O2 Saturation 99     Physical Exam    Constitutional: General Appearance: Well-Appearing. [id number] [id number]   Lungs: respiration rhythm and depth was normal and no abnormal breath sounds/voice sounds were heard. QjxtdWuhvdyb7Mfk  EqarzRegsyta4Ftbez   Cardiovascular: heart rate and rhythm were normal, the heart sounds were normal, no murmurs were heard and edema was not present. YprmzEzfgeno2Tbz HevjzEvfuhvq3Scdsp PnwgqWvcfuyp4Hxg KhnidDegaaqm1Pymzw   Abdomen: was not distended, abdominal palpation revealed no abnormalities, abdominal non-tender, no abdominal mass was palpated, the abdomen was soft, no hernia was discovered.   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

## 2018-11-15 NOTE — BRIEF OP NOTE
[facility]  Brief Operative Note     SUMMARY     Surgery Date: 11/15/2018     Surgeon(s) and Role:     * Alan Arzola MD - Primary    Assisting Surgeon: None    Pre-op Diagnosis:  SCC (squamous cell carcinoma) [C44.92] right dorsal hand; Atypical Fibrous Xanthoma left  Lateral neck    Post-op Diagnosis:  Post-Op Diagnosis Codes:     * SCC (squamous cell carcinoma) [C44.92];  Atypical Fibrous Xanthoma    Operative Procedure:  1. Excision Squamous cell carcinoma 4.5 x 2.5 cm right hand      2. Split thickness skin graft from left thigh to right hand      3. Excision Atypical Fibrous Xanthoma left neck 3 x 3 cm.      4. Local Tissue rearrangement 3 x 5 cm left neck  Dict [id number]          Anesthesia: General    Description of the findings of the procedure: The hand lesion was excised with margns, which were clear.  The neck lesion was excised and submitted for permanent sections.      Findings/Key Components: Skin graft to hand, local tissue rearrangement for neck wound    Estimated Blood Loss: Negligable          Specimens:   Specimen (12h ago, onward)    Start     Ordered    11/15/18 0923  Specimen to Pathology - Surgery  Once     Comments:  Left posterior neck, atypical Fibroxanthoma stitch at 12 oclock     Start Status   11/15/18 0923 Collected (11/15/18 0900)       11/15/18 1019    11/15/18 0852  Specimen to Pathology - Surgery  Once     Comments:  Right hand 12-3 stitch at 12; true blueRight hand 3-6 stitch at 3; true blueRight hand 6-9 stitch at 6; true blueRight hand 9-12 stitch at 9; true blueRight hand main lesion;deep margin only;stitch at 12     Start Status   11/15/18 0852 Collected (11/15/18 0840)       11/15/18 0857          Discharge Note    SUMMARY     Admit Date: 11/15/2018    Discharge Date and Time:  11/15/2018 10:36 AM    Hospital Course:  Patient had biopsy proven SCC of the dorsum of the right hand.  It was excised to negative margins and a split thickness graft was applied.   He had a second lesion on the right posterior-mid neck which was excised and closed with local tissue rearrangement.  The preoperative diagnosis after special stains was an atypical fibrous xanthoma.    Final Diagnosis: Post-Op Diagnosis Codes:     * SCC (squamous cell carcinoma) [C44.92]; Atypical Firbrous Xanthoma  Disposition: Home or Self Care    Follow Up/Patient Instructions:   May shower and shampoo tomorrow.  If dressing on neck becomes loose, it can be removed.  After removal, cover sutures with antibiotic ointment.  (Bacitracin)  Leave hand dressing in place.  Keep hand elevated in sling.  Leave or reinforce thigh dressing as needed.    Medications:  Reconciled Home Medications:      Medication List      CONTINUE taking these medications    AMITIZA 24 MCG Cap  Generic drug:  lubiprostone  24 mcg 2 (two) times daily with meals.     aspirin 81 MG EC tablet  Commonly known as:  ECOTRIN  Take 81 mg by mouth once daily.     ezetimibe 10 mg tablet  Commonly known as:  ZETIA  Take 1 tablet (10 mg total) by mouth every evening.     hydroCHLOROthiazide 12.5 mg capsule  Commonly known as:  MICROZIDE     metFORMIN 1000 MG tablet  Commonly known as:  GLUCOPHAGE  Take 1 tablet (1,000 mg total) by mouth 2 (two) times daily with meals.     metoprolol succinate 25 MG 24 hr tablet  Commonly known as:  TOPROL-XL  Take 1 tablet (25 mg total) by mouth once daily.     simvastatin 20 MG tablet  Commonly known as:  ZOCOR          Discharge Procedure Orders   Diet Adult Regular     Notify your health care provider if you experience any of the following:  temperature >100.4     Notify your health care provider if you experience any of the following:  persistent nausea and vomiting or diarrhea     Notify your health care provider if you experience any of the following:  severe uncontrolled pain     Notify your health care provider if you experience any of the following:  redness, tenderness, or signs of infection (pain, swelling,  redness, odor or green/yellow discharge around incision site)     Leave dressing on - Keep it clean, dry, and intact until clinic visit     Activity as tolerated     Follow-up Information     Alan Arzola MD On 11/20/2018.    Specialty:  Plastic Surgery  Contact information:  [address] [place] [address]  [address]  [place] [address] [phone]

## 2018-11-15 NOTE — OP NOTE
DATE OF PROCEDURE:  11/15/2018.    PREOPERATIVE DOAGNOSES:  1.  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  2.  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    POSTOPERATIVE DIAGNOSES:  1.  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  2.  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    OPERATIVE PROCEDURES:  1.  Closure of the post-excisional wound of the neck with local tissue   rearrangements (V-Y advancement flaps) 3 x 5 cm.  2.  Split thickness skin graft from the left thigh to the right hand.  The graft   size was 4.5 x 2.5 cm.  3.  Excision of malignant lesion of the hand (squamous cell carcinoma) and   atypical fibroxanthoma of the neck (3 cm x 3 cm).    SURGEON:  Alan Arzola M.D.    ASSISTANT:  Parminder.    The patient had excision by dermatologist of a lesion of the dorsum of the hand,   which proved to be a squamous cell carcinoma and completely excised.  He also   had a protrusive lesion of the posterior neck, which was shaved.  The neck   lesion on initial pathology was squamous cell carcinoma, but the initial   pathology on the neck lesion was a malignant tumor of unknown etiology and   special stains were required.  The addendum pathology report indicated that the   lesion proved to be and it was called atypical fibroxanthoma.    PROCEDURE IN DETAIL:  The patient was taken to the Operating Room, placed under   adequate general endotracheal anesthesia, turned to the right lateral decubitus   position.    The left thigh, left neck and right hand were then prepped with Betadine scrub   and solution, draped in a customary fashion.  The hand lesion was excised with a   5-mm margin.  It was excised in 5 pieces with the 12 to 3, 3 to 6, 6 to 9 and 9   to 12 and the  main tumor being submitted separately.  These were all tagged and   marked.  The lesion on the neck was then marked with a 1 cm margin in order to   attempt to prevent recurrence.    Incision was carried down through the skin and subcutaneous tissue on to the   superficial fascia of the neck and the lesion was tagged at 12 o'clock.  It was   submitted for permanent section only.  Hemostasis was achieved in both wounds.    The neck wound, which was 3 cm in diameter was dressed with local tissue   rearrangement (V-Y advancement flap).  This flap was 3 x 5 cm, it was incised   down to the deep fascia over the neck without interrupting the fascia and was   easily able to be moved into position to repair the defect.  It was then   repaired with a combination of 3-0 Vicryl in the deep tissue and 4-0 Vicryl on   the skin.  Tegaderm was applied.  A split-thickness skin graft with dermatome   set at 12 thousandth of an inch was then used to harvest a full-thickness graft   from the left lateral thigh, the donor site was closed with Adaptic and Tegaderm   and the skin graft was pie crusted and then applied to the dorsal surface of   the hand wound with a tie over bolster dressing.  Once the dressing had been   placed, a mild compressive dressing was placed on the hand and wrist and the   patient was placed in a sling.  The patient tolerated the procedure well.  The   pathologist said that the frozen sections on the hand lesion are negative.    Estimated blood loss is negligible.      CLD/IN  dd: 11/15/2018 10:50:21 (CST)  td: 11/15/2018 11:31:40 (CST)  Doc ID   #[id number]  Job ID #[id number]    CC:

## 2018-11-15 NOTE — ANESTHESIA PREPROCEDURE EVALUATION
11/15/2018  Juan Martínez Jr. is a 83 y.o., male.    Anesthesia Evaluation    I have reviewed the Patient Summary Reports.    I have reviewed the Nursing Notes.   I have reviewed the Medications.     Review of Systems  Anesthesia Hx:  No problems with previous Anesthesia Denies Hx of Anesthetic complications  Neg history of prior surgery. Denies Family Hx of Anesthesia complications.   Denies Personal Hx of Anesthesia complications.   Social:  No Alcohol Use, Former Smoker    Hematology/Oncology:  Hematology Normal   Oncology Normal     EENT/Dental:EENT/Dental Normal   Cardiovascular:   Exercise tolerance: good Hypertension    Pulmonary:  Pulmonary Normal    Renal/:  Renal/ Normal     Hepatic/GI:  Hepatic/GI Normal    Musculoskeletal:  Musculoskeletal Normal    Neurological:  Neurology Normal    Endocrine:   Diabetes, type 2    Dermatological:  Skin Normal    Psych:  Psychiatric Normal           Physical Exam  General:  Well nourished    Airway/Jaw/Neck:  Airway Findings: Mouth Opening: Normal General Airway Assessment: Adult  Mallampati: II  TM Distance: Normal, at least 6 cm         Dental:  DENTAL FINDINGS: Normal   Chest/Lungs:  Chest/Lungs Clear    Heart/Vascular:  Heart Findings: Normal Heart murmur: negative       Mental Status:  Mental Status Findings:  Cooperative, Alert and Oriented         Anesthesia Plan  Type of Anesthesia, risks & benefits discussed:  Anesthesia Type:  general  Patient's Preference:   Intra-op Monitoring Plan:   Intra-op Monitoring Plan Comments:   Post Op Pain Control Plan:   Post Op Pain Control Plan Comments:   Induction:   IV  Beta Blocker:  Patient is not currently on a Beta-Blocker (No further documentation required).       Informed Consent: Patient understands risks and agrees with Anesthesia plan.  Questions answered. Anesthesia consent signed with patient.  ASA  Score: 2     Day of Surgery Review of History & Physical:            Ready For Surgery From Anesthesia Perspective.

## 2018-11-15 NOTE — DISCHARGE INSTRUCTIONS
May shower and shampoo tomorrow.  If dressing on neck becomes loose, it can be removed.     After removal, cover sutures with antibiotic ointment.  (Bacitracin)  Leave hand dressing in place.      Keep hand elevated in sling.      Leave or reinforce thigh dressing as needed.       DIET: You may resume your home diet. If nausea is present, increase your diet gradually with fluids and bland foods    ACTIVITY LEVEL: You have received sedation or an anesthetic, you may feel sleepy for several hours. Rest until you are more awake. Gradually resume your normal activities    Medications: Pain medication should be taken only if needed and as directed. If antibiotics are prescribed, the medication should be taken until completed. You will be given an updated list of you medications.    No driving, alcoholic beverages or signing legal documents for next 24 hours or while taking pain medication.       CALL THE DOCTOR:    For any obvious bleeding (some dried blood over the incision is normal).      Redness, swelling, foul smell around incision or fever over 101.   Shortness of breath, Coughing up Bloody sputum, Pains or Swelling in your Calves .   Persistent pain or nausea not relieved by medication.    If any unusual problems or difficulties occur contact your doctor. If you cannot contact your doctor but feel your signs and symptoms warrant a physicians attention return to the emergency room.        Fall Prevention  Millions of people fall every year and injure themselves. You may have had anesthesia or sedation which may increase your risk of falling. You may have health issues that put you at an increased risk of falling.     Here are ways to reduce your risk of falling.  ·   · Make your home safe by keeping walkways clear of objects you may trip over.  · Use non-slip pads under rugs. Do not use area rugs or small throw rugs.  · Use non-slip mats in bathtubs and showers.  · Install handrails and lights on  staircases.  · Do not walk in poorly lit areas.  · Do not stand on chairs or wobbly ladders.  · Use caution when reaching overhead or looking upward. This position can cause a loss of balance.  · Be sure your shoes fit properly, have non-slip bottoms and are in good condition.   · Wear shoes both inside and out. Avoid going barefoot or wearing slippers.  · Be cautious when going up and down stairs, curbs, and when walking on uneven sidewalks.  · If your balance is poor, consider using a cane or walker.  · If your fall was related to alcohol use, stop or limit alcohol intake.   · If your fall was related to use of sleeping medicines, talk to your doctor about this. You may need to reduce your dosage at bedtime if you awaken during the night to go to the bathroom.    · To reduce the need for nighttime bathroom trips:  ¨ Avoid drinking fluids for several hours before going to bed  ¨ Empty your bladder before going to bed  ¨ Men can keep a urinal at the bedside  · Stay as active as you can. Balance, flexibility, strength, and endurance all come from exercise. They all play a role in preventing falls. Ask your healthcare provider which types of activity are right for you.  · Get your vision checked on a regular basis.  · If you have pets, know where they are before you stand up or walk so you don't trip over them.  · Use night lights.

## 2018-11-15 NOTE — TRANSFER OF CARE
Anesthesia Transfer of Care Note    Patient: Juan Martínez Jr.    Procedure(s) Performed: Procedure(s) (LRB):  EXCISION, CARCINOMA, SQUAMOUS CELL @ RIGHT HAND (Right)  APPLICATION, GRAFT, SKIN, FULL-THICKNESS, TO UPPER EXTREMITY VS STSG WITH FROZEN SECTIONS (Right)  EXCISION LEFT POST AURICULAR SQUAMOUS CELL CANCER  WITH LOCAL TISSUE ARRANGEMENT (Left)    Patient location: PACU    Anesthesia Type: general    Transport from OR: Transported from OR on room air with adequate spontaneous ventilation    Post pain: adequate analgesia    Post assessment: no apparent anesthetic complications    Post vital signs: stable    Level of consciousness: awake    Nausea/Vomiting: no nausea/vomiting    Complications: none    Transfer of care protocol was followed      Last vitals:   Visit Vitals  BP (!) 140/65 (BP Location: Left arm, Patient Position: Lying)   Pulse 78   Temp 36.7 °C (98.1 °F) (Axillary)   Resp 16   SpO2 100%

## 2018-11-19 NOTE — ANESTHESIA POSTPROCEDURE EVALUATION
Anesthesia Post Evaluation    Patient: Juan Martínez     Procedure(s) Performed: Procedure(s) (LRB):  EXCISION, CARCINOMA, SQUAMOUS CELL @ RIGHT HAND (Right)  APPLICATION, GRAFT, SKIN, FULL-THICKNESS, TO UPPER EXTREMITY VS STSG WITH FROZEN SECTIONS (Right)  EXCISION LEFT POST AURICULAR SQUAMOUS CELL CANCER  WITH LOCAL TISSUE ARRANGEMENT (Left)    Final Anesthesia Type: general  Patient location during evaluation: PACU  Patient participation: Yes- Able to Participate  Level of consciousness: awake and alert, oriented and awake  Post-procedure vital signs: reviewed and stable  Airway patency: patent  PONV status at discharge: No PONV  Anesthetic complications: no      Cardiovascular status: blood pressure returned to baseline  Respiratory status: unassisted, spontaneous ventilation and room air  Hydration status: euvolemic  Follow-up not needed.        Visit Vitals  /71 (BP Location: Left arm, Patient Position: Sitting)   Pulse 64   Temp 36.4 °C (97.6 °F) (Axillary)   Resp 17   SpO2 (!) 94%       Pain/Pablo Score: No Data Recorded

## 2018-12-17 ENCOUNTER — LAB VISIT (OUTPATIENT)
Dept: LAB | Facility: HOSPITAL | Age: 83
End: 2018-12-17
Attending: UROLOGY
Payer: MEDICARE

## 2018-12-17 ENCOUNTER — TELEPHONE (OUTPATIENT)
Dept: UROLOGY | Facility: CLINIC | Age: 83
End: 2018-12-17

## 2018-12-17 DIAGNOSIS — N40.0 BENIGN PROSTATIC HYPERPLASIA, UNSPECIFIED WHETHER LOWER URINARY TRACT SYMPTOMS PRESENT: Primary | ICD-10-CM

## 2018-12-17 DIAGNOSIS — N40.0 BENIGN PROSTATIC HYPERPLASIA, UNSPECIFIED WHETHER LOWER URINARY TRACT SYMPTOMS PRESENT: ICD-10-CM

## 2018-12-17 LAB — COMPLEXED PSA SERPL-MCNC: 0.01 NG/ML

## 2018-12-17 PROCEDURE — 84153 ASSAY OF PSA TOTAL: CPT

## 2018-12-17 PROCEDURE — 36415 COLL VENOUS BLD VENIPUNCTURE: CPT

## 2019-01-30 ENCOUNTER — OFFICE VISIT (OUTPATIENT)
Dept: UROLOGY | Facility: CLINIC | Age: 84
End: 2019-01-30
Payer: MEDICARE

## 2019-01-30 VITALS
BODY MASS INDEX: 28.33 KG/M2 | SYSTOLIC BLOOD PRESSURE: 130 MMHG | HEIGHT: 68 IN | HEART RATE: 100 BPM | DIASTOLIC BLOOD PRESSURE: 82 MMHG | WEIGHT: 186.94 LBS

## 2019-01-30 DIAGNOSIS — R35.1 NOCTURIA MORE THAN TWICE PER NIGHT: ICD-10-CM

## 2019-01-30 DIAGNOSIS — N52.9 ED (ERECTILE DYSFUNCTION) OF ORGANIC ORIGIN: ICD-10-CM

## 2019-01-30 DIAGNOSIS — Z85.46 HISTORY OF PROSTATE CANCER: Primary | Chronic | ICD-10-CM

## 2019-01-30 DIAGNOSIS — R33.9 INCOMPLETE EMPTYING OF BLADDER: ICD-10-CM

## 2019-01-30 LAB
BILIRUB SERPL-MCNC: NORMAL MG/DL
BLOOD URINE, POC: NORMAL
COLOR, POC UA: YELLOW
GLUCOSE UR QL STRIP: NORMAL
KETONES UR QL STRIP: NORMAL
LEUKOCYTE ESTERASE URINE, POC: NORMAL
NITRITE, POC UA: NORMAL
PH, POC UA: 6
PROTEIN, POC: NORMAL
SPECIFIC GRAVITY, POC UA: 1000
UROBILINOGEN, POC UA: NORMAL

## 2019-01-30 PROCEDURE — 99999 PR PBB SHADOW E&M-EST. PATIENT-LVL III: ICD-10-PCS | Mod: PBBFAC,,, | Performed by: UROLOGY

## 2019-01-30 PROCEDURE — 3079F DIAST BP 80-89 MM HG: CPT | Mod: CPTII,S$GLB,, | Performed by: UROLOGY

## 2019-01-30 PROCEDURE — 3075F PR MOST RECENT SYSTOLIC BLOOD PRESS GE 130-139MM HG: ICD-10-PCS | Mod: CPTII,S$GLB,, | Performed by: UROLOGY

## 2019-01-30 PROCEDURE — 99214 OFFICE O/P EST MOD 30 MIN: CPT | Mod: 25,S$GLB,, | Performed by: UROLOGY

## 2019-01-30 PROCEDURE — 99999 PR PBB SHADOW E&M-EST. PATIENT-LVL III: CPT | Mod: PBBFAC,,, | Performed by: UROLOGY

## 2019-01-30 PROCEDURE — 81001 POCT URINALYSIS, DIPSTICK OR TABLET REAGENT, AUTOMATED, WITH MICROSCOP: ICD-10-PCS | Mod: S$GLB,,, | Performed by: UROLOGY

## 2019-01-30 PROCEDURE — 81001 URINALYSIS AUTO W/SCOPE: CPT | Mod: S$GLB,,, | Performed by: UROLOGY

## 2019-01-30 PROCEDURE — 3075F SYST BP GE 130 - 139MM HG: CPT | Mod: CPTII,S$GLB,, | Performed by: UROLOGY

## 2019-01-30 PROCEDURE — 1101F PT FALLS ASSESS-DOCD LE1/YR: CPT | Mod: CPTII,S$GLB,, | Performed by: UROLOGY

## 2019-01-30 PROCEDURE — 99214 PR OFFICE/OUTPT VISIT, EST, LEVL IV, 30-39 MIN: ICD-10-PCS | Mod: 25,S$GLB,, | Performed by: UROLOGY

## 2019-01-30 PROCEDURE — 1101F PR PT FALLS ASSESS DOC 0-1 FALLS W/OUT INJ PAST YR: ICD-10-PCS | Mod: CPTII,S$GLB,, | Performed by: UROLOGY

## 2019-01-30 PROCEDURE — 3079F PR MOST RECENT DIASTOLIC BLOOD PRESSURE 80-89 MM HG: ICD-10-PCS | Mod: CPTII,S$GLB,, | Performed by: UROLOGY

## 2019-01-30 NOTE — PROGRESS NOTES
Subjective:       Patient ID: Juan Martínez Jr. is a 83 y.o. male who was last seen in this office Visit date not found    Chief Complaint:   Chief Complaint   Patient presents with    Prostate Cancer     annual visit with psa results     History of Present Illness  Prostate Cancer  He underwent brachytherapy in 2003 by Dr. Brown.  He voids with a good stream.  He has nocturia x 1.  He denies hematuria, dysuria, pain, weight loss, or UTI.  He has ED but does not want treatment.      ACTIVE MEDICAL ISSUES:  Patient Active Problem List   Diagnosis    Essential hypertension, benign    Type 2 diabetes mellitus    Hyperlipidemia    Body mass index 28.0-28.9, adult    History of prostate cancer    Orthostatic hypotension    Diabetic ulcer of toe of left foot associated with type 2 diabetes mellitus, with fat layer exposed    SCC (squamous cell carcinoma)       ALLERGIES AND MEDICATIONS: updated and reviewed.  Review of patient's allergies indicates:  No Known Allergies  Current Outpatient Medications   Medication Sig    AMITIZA 24 mcg Cap 24 mcg 2 (two) times daily with meals.     aspirin (ECOTRIN) 81 MG EC tablet Take 81 mg by mouth once daily.    hydroCHLOROthiazide (MICROZIDE) 12.5 mg capsule     metformin (GLUCOPHAGE) 1000 MG tablet Take 1 tablet (1,000 mg total) by mouth 2 (two) times daily with meals.    metoprolol succinate (TOPROL-XL) 25 MG 24 hr tablet Take 1 tablet (25 mg total) by mouth once daily.    simvastatin (ZOCOR) 20 MG tablet     ezetimibe (ZETIA) 10 mg tablet Take 1 tablet (10 mg total) by mouth every evening.     No current facility-administered medications for this visit.        Review of Systems   Constitutional: Negative for activity change, fatigue, fever and unexpected weight change.   HENT: Negative for congestion.    Eyes: Negative for redness.   Respiratory: Negative for chest tightness and shortness of breath.    Cardiovascular: Negative for chest pain and leg swelling.  "  Gastrointestinal: Negative for abdominal pain, constipation, diarrhea, nausea and vomiting.   Genitourinary: Negative for dysuria, flank pain, frequency, hematuria, penile pain, penile swelling, scrotal swelling, testicular pain and urgency.   Musculoskeletal: Negative for arthralgias and back pain.   Neurological: Negative for dizziness and light-headedness.   Psychiatric/Behavioral: Negative for behavioral problems and confusion. The patient is not nervous/anxious.    All other systems reviewed and are negative.      Objective:      Vitals:    01/30/19 1317   BP: 130/82   Pulse: 100   Weight: 84.8 kg (186 lb 15.2 oz)   Height: 5' 8" (1.727 m)     Physical Exam   Nursing note and vitals reviewed.  Constitutional: He is oriented to person, place, and time. He appears well-developed and well-nourished.   HENT:   Head: Normocephalic.   Eyes: Conjunctivae are normal.   Neck: Normal range of motion. Neck supple. No tracheal deviation present. No thyromegaly present.   Cardiovascular: Normal rate and normal heart sounds.    Pulmonary/Chest: Effort normal and breath sounds normal. No respiratory distress. He has no wheezes.   Abdominal: Soft. Bowel sounds are normal. There is no hepatosplenomegaly. There is no tenderness. There is no rebound and no CVA tenderness. No hernia.   Musculoskeletal: Normal range of motion. He exhibits no edema or tenderness.   Lymphadenopathy:     He has no cervical adenopathy.   Neurological: He is alert and oriented to person, place, and time.   Skin: Skin is warm and dry. No rash noted. No erythema.     Psychiatric: He has a normal mood and affect. His behavior is normal. Judgment and thought content normal.       Urine dipstick shows negative for all components.  Micro exam: negative for WBC's or RBC's.    PSA DIAGNOSTIC 0.00 - 4.00 ng/mL 0.01    Comment: PSA Expected levels:   Hormonal Therapy: <0.05 ng/ml   Prostatectomy: <0.01 ng/ml   Radiation Therapy: <1.00 ng/ml    Resulting Agency  " [facility]         Specimen Collected: 12/17/18 11:25   Last Resulted: 12/17/18 19:16            Assessment:       1. History of prostate cancer    2. Nocturia more than twice per night    3. Incomplete emptying of bladder    4. ED (erectile dysfunction) of organic origin          Plan:       1. History of prostate cancer  stable  - POCT urinalysis, dipstick or tablet reag  - Prostate Specific Antigen, Diagnostic; Future    2. Nocturia more than twice per night  Limit evening fluids    3. Incomplete emptying of bladder  stable    4. ED (erectile dysfunction) of organic origin  Declined treatment            Follow-up in about 1 year (around 1/30/2020) for Follow up, Review PSA.

## 2019-02-27 ENCOUNTER — TELEPHONE (OUTPATIENT)
Dept: OTOLARYNGOLOGY | Facility: CLINIC | Age: 84
End: 2019-02-27

## 2019-02-27 NOTE — TELEPHONE ENCOUNTER
Spoke with pt. Offered him an appt. With  for first available  04-11-19 and pt. refused. Appt. To far away will call back if he wants to make an appt.

## 2019-02-27 NOTE — TELEPHONE ENCOUNTER
----- Message from Jennifer Lalo sent at 2/27/2019 11:48 AM CST -----  Contact: pt  Name of Who is Calling: pt      What is the request in detail: pt is requesting to have an ear cleaning. Will be new patient. Has hearing aids. Nothing available to schedule. Call pt      Can the clinic reply by [facility]: no      [facility]: [phone]

## 2019-02-28 ENCOUNTER — TELEPHONE (OUTPATIENT)
Dept: OTOLARYNGOLOGY | Facility: CLINIC | Age: 84
End: 2019-02-28

## 2019-02-28 NOTE — TELEPHONE ENCOUNTER
Called patient to make him an appointment from a referral we received from his primary care doctor.  Patient said he will call to schedule when ready.

## 2019-05-06 ENCOUNTER — HOSPITAL ENCOUNTER (INPATIENT)
Facility: HOSPITAL | Age: 84
LOS: 1 days | Discharge: HOME | DRG: 282 | End: 2019-05-07
Attending: EMERGENCY MEDICINE | Admitting: HOSPITALIST
Payer: MEDICARE

## 2019-05-06 DIAGNOSIS — R07.9 CHEST PAIN: ICD-10-CM

## 2019-05-06 DIAGNOSIS — I10 ESSENTIAL HYPERTENSION, BENIGN: ICD-10-CM

## 2019-05-06 DIAGNOSIS — I21.4 NSTEMI (NON-ST ELEVATED MYOCARDIAL INFARCTION): Primary | ICD-10-CM

## 2019-05-06 DIAGNOSIS — R07.9 CHEST PAIN, UNSPECIFIED TYPE: ICD-10-CM

## 2019-05-06 DIAGNOSIS — E78.5 HYPERLIPIDEMIA, UNSPECIFIED HYPERLIPIDEMIA TYPE: ICD-10-CM

## 2019-05-06 DIAGNOSIS — R00.1 SINUS BRADYCARDIA: ICD-10-CM

## 2019-05-06 PROBLEM — I25.10 CAD (CORONARY ARTERY DISEASE): Status: ACTIVE | Noted: 2019-05-06

## 2019-05-06 LAB
ALBUMIN SERPL BCP-MCNC: 3.9 G/DL (ref 3.5–5.2)
ALP SERPL-CCNC: 72 U/L (ref 55–135)
ALT SERPL W/O P-5'-P-CCNC: 16 U/L (ref 10–44)
ANION GAP SERPL CALC-SCNC: 9 MMOL/L (ref 8–16)
AST SERPL-CCNC: 15 U/L (ref 10–40)
BASOPHILS # BLD AUTO: 0.01 K/UL (ref 0–0.2)
BASOPHILS # BLD AUTO: 0.02 K/UL (ref 0–0.2)
BASOPHILS NFR BLD: 0.1 % (ref 0–1.9)
BASOPHILS NFR BLD: 0.3 % (ref 0–1.9)
BILIRUB SERPL-MCNC: 0.6 MG/DL (ref 0.1–1)
BNP SERPL-MCNC: 94 PG/ML (ref 0–99)
BUN SERPL-MCNC: 13 MG/DL (ref 8–23)
CALCIUM SERPL-MCNC: 9.6 MG/DL (ref 8.7–10.5)
CHLORIDE SERPL-SCNC: 101 MMOL/L (ref 95–110)
CHOLEST SERPL-MCNC: 134 MG/DL (ref 120–199)
CHOLEST/HDLC SERPL: 3 {RATIO} (ref 2–5)
CO2 SERPL-SCNC: 28 MMOL/L (ref 23–29)
CREAT SERPL-MCNC: 1.2 MG/DL (ref 0.5–1.4)
DIFFERENTIAL METHOD: ABNORMAL
DIFFERENTIAL METHOD: ABNORMAL
EOSINOPHIL # BLD AUTO: 0.1 K/UL (ref 0–0.5)
EOSINOPHIL # BLD AUTO: 0.1 K/UL (ref 0–0.5)
EOSINOPHIL NFR BLD: 0.6 % (ref 0–8)
EOSINOPHIL NFR BLD: 1.8 % (ref 0–8)
ERYTHROCYTE [DISTWIDTH] IN BLOOD BY AUTOMATED COUNT: 13.7 % (ref 11.5–14.5)
ERYTHROCYTE [DISTWIDTH] IN BLOOD BY AUTOMATED COUNT: 13.7 % (ref 11.5–14.5)
EST. GFR  (AFRICAN AMERICAN): >60 ML/MIN/1.73 M^2
EST. GFR  (NON AFRICAN AMERICAN): 56 ML/MIN/1.73 M^2
GLUCOSE SERPL-MCNC: 238 MG/DL (ref 70–110)
HCT VFR BLD AUTO: 42.1 % (ref 40–54)
HCT VFR BLD AUTO: 44.2 % (ref 40–54)
HDLC SERPL-MCNC: 45 MG/DL (ref 40–75)
HDLC SERPL: 33.6 % (ref 20–50)
HGB BLD-MCNC: 14 G/DL (ref 14–18)
HGB BLD-MCNC: 15.1 G/DL (ref 14–18)
LDLC SERPL CALC-MCNC: 73 MG/DL (ref 63–159)
LYMPHOCYTES # BLD AUTO: 1.2 K/UL (ref 1–4.8)
LYMPHOCYTES # BLD AUTO: 1.5 K/UL (ref 1–4.8)
LYMPHOCYTES NFR BLD: 12.1 % (ref 18–48)
LYMPHOCYTES NFR BLD: 19.1 % (ref 18–48)
MCH RBC QN AUTO: 30.6 PG (ref 27–31)
MCH RBC QN AUTO: 31.3 PG (ref 27–31)
MCHC RBC AUTO-ENTMCNC: 33.3 G/DL (ref 32–36)
MCHC RBC AUTO-ENTMCNC: 34.2 G/DL (ref 32–36)
MCV RBC AUTO: 92 FL (ref 82–98)
MCV RBC AUTO: 92 FL (ref 82–98)
MONOCYTES # BLD AUTO: 0.5 K/UL (ref 0.3–1)
MONOCYTES # BLD AUTO: 0.8 K/UL (ref 0.3–1)
MONOCYTES NFR BLD: 6.9 % (ref 4–15)
MONOCYTES NFR BLD: 8.3 % (ref 4–15)
NEUTROPHILS # BLD AUTO: 5.5 K/UL (ref 1.8–7.7)
NEUTROPHILS # BLD AUTO: 7.5 K/UL (ref 1.8–7.7)
NEUTROPHILS NFR BLD: 71.9 % (ref 38–73)
NEUTROPHILS NFR BLD: 78.9 % (ref 38–73)
NONHDLC SERPL-MCNC: 89 MG/DL
PLATELET # BLD AUTO: 259 K/UL (ref 150–350)
PLATELET # BLD AUTO: 282 K/UL (ref 150–350)
PMV BLD AUTO: 9.3 FL (ref 9.2–12.9)
PMV BLD AUTO: 9.4 FL (ref 9.2–12.9)
POCT GLUCOSE: 120 MG/DL (ref 70–110)
POCT GLUCOSE: 128 MG/DL (ref 70–110)
POCT GLUCOSE: 152 MG/DL (ref 70–110)
POTASSIUM SERPL-SCNC: 3.8 MMOL/L (ref 3.5–5.1)
PROT SERPL-MCNC: 7.2 G/DL (ref 6–8.4)
RBC # BLD AUTO: 4.57 M/UL (ref 4.6–6.2)
RBC # BLD AUTO: 4.82 M/UL (ref 4.6–6.2)
SODIUM SERPL-SCNC: 138 MMOL/L (ref 136–145)
TRIGL SERPL-MCNC: 80 MG/DL (ref 30–150)
TROPONIN I SERPL DL<=0.01 NG/ML-MCNC: 0.07 NG/ML (ref 0–0.03)
TROPONIN I SERPL DL<=0.01 NG/ML-MCNC: 0.09 NG/ML (ref 0–0.03)
TROPONIN I SERPL DL<=0.01 NG/ML-MCNC: 0.1 NG/ML (ref 0–0.03)
TROPONIN I SERPL DL<=0.01 NG/ML-MCNC: 0.11 NG/ML (ref 0–0.03)
TROPONIN I SERPL DL<=0.01 NG/ML-MCNC: 0.11 NG/ML (ref 0–0.03)
WBC # BLD AUTO: 7.68 K/UL (ref 3.9–12.7)
WBC # BLD AUTO: 9.55 K/UL (ref 3.9–12.7)

## 2019-05-06 PROCEDURE — 93458 PR CATH PLACE/CORON ANGIO, IMG SUPER/INTERP,W LEFT HEART VENTRICULOGRAPHY: ICD-10-PCS | Mod: 26,,, | Performed by: INTERNAL MEDICINE

## 2019-05-06 PROCEDURE — 63600175 PHARM REV CODE 636 W HCPCS: Performed by: INTERNAL MEDICINE

## 2019-05-06 PROCEDURE — 93572 IV DOP VEL&/PRESS C FLO EA: CPT | Mod: 52 | Performed by: INTERNAL MEDICINE

## 2019-05-06 PROCEDURE — C1887 CATHETER, GUIDING: HCPCS | Performed by: INTERNAL MEDICINE

## 2019-05-06 PROCEDURE — 93571 IV DOP VEL&/PRESS C FLO 1ST: CPT | Mod: 26,52,LD, | Performed by: INTERNAL MEDICINE

## 2019-05-06 PROCEDURE — 93458 L HRT ARTERY/VENTRICLE ANGIO: CPT | Mod: 26,,, | Performed by: INTERNAL MEDICINE

## 2019-05-06 PROCEDURE — 94761 N-INVAS EAR/PLS OXIMETRY MLT: CPT

## 2019-05-06 PROCEDURE — C1725 CATH, TRANSLUMIN NON-LASER: HCPCS | Performed by: INTERNAL MEDICINE

## 2019-05-06 PROCEDURE — 99291 PR CRITICAL CARE, E/M 30-74 MINUTES: ICD-10-PCS | Mod: 25,,, | Performed by: INTERNAL MEDICINE

## 2019-05-06 PROCEDURE — 80053 COMPREHEN METABOLIC PANEL: CPT

## 2019-05-06 PROCEDURE — C1894 INTRO/SHEATH, NON-LASER: HCPCS | Performed by: INTERNAL MEDICINE

## 2019-05-06 PROCEDURE — 25000003 PHARM REV CODE 250: Performed by: INTERNAL MEDICINE

## 2019-05-06 PROCEDURE — 84484 ASSAY OF TROPONIN QUANT: CPT | Mod: 91

## 2019-05-06 PROCEDURE — 80061 LIPID PANEL: CPT

## 2019-05-06 PROCEDURE — 93572 IV DOP VEL&/PRESS C FLO EA: CPT | Mod: 26,52,RC, | Performed by: INTERNAL MEDICINE

## 2019-05-06 PROCEDURE — 85025 COMPLETE CBC W/AUTO DIFF WBC: CPT

## 2019-05-06 PROCEDURE — 25500020 PHARM REV CODE 255: Performed by: INTERNAL MEDICINE

## 2019-05-06 PROCEDURE — 99153 MOD SED SAME PHYS/QHP EA: CPT | Performed by: INTERNAL MEDICINE

## 2019-05-06 PROCEDURE — S5571 INSULIN DISPOS PEN 3 ML: HCPCS | Performed by: HOSPITALIST

## 2019-05-06 PROCEDURE — 83880 ASSAY OF NATRIURETIC PEPTIDE: CPT

## 2019-05-06 PROCEDURE — 99291 CRITICAL CARE FIRST HOUR: CPT | Mod: 25,,, | Performed by: INTERNAL MEDICINE

## 2019-05-06 PROCEDURE — C1769 GUIDE WIRE: HCPCS | Performed by: INTERNAL MEDICINE

## 2019-05-06 PROCEDURE — 93572 PR HEART FLOW RESERV MEASURE,ADDN VESSL: ICD-10-PCS | Mod: 26,52,RC, | Performed by: INTERNAL MEDICINE

## 2019-05-06 PROCEDURE — 93571 IV DOP VEL&/PRESS C FLO 1ST: CPT | Mod: 52 | Performed by: INTERNAL MEDICINE

## 2019-05-06 PROCEDURE — 21400001 HC TELEMETRY ROOM

## 2019-05-06 PROCEDURE — 93458 L HRT ARTERY/VENTRICLE ANGIO: CPT | Performed by: INTERNAL MEDICINE

## 2019-05-06 PROCEDURE — 93571 PR HEART FLOW RESERV MEASURE,INIT VESSL: ICD-10-PCS | Mod: 26,52,LD, | Performed by: INTERNAL MEDICINE

## 2019-05-06 PROCEDURE — C1760 CLOSURE DEV, VASC: HCPCS | Performed by: INTERNAL MEDICINE

## 2019-05-06 PROCEDURE — 99152 MOD SED SAME PHYS/QHP 5/>YRS: CPT | Performed by: INTERNAL MEDICINE

## 2019-05-06 PROCEDURE — 99152 PR MOD CONSCIOUS SEDATION, SAME PHYS, 5+ YRS, FIRST 15 MIN: ICD-10-PCS | Mod: ,,, | Performed by: INTERNAL MEDICINE

## 2019-05-06 PROCEDURE — 93005 ELECTROCARDIOGRAM TRACING: CPT

## 2019-05-06 PROCEDURE — 93010 ELECTROCARDIOGRAM REPORT: CPT | Mod: 59,,, | Performed by: INTERNAL MEDICINE

## 2019-05-06 PROCEDURE — 99285 EMERGENCY DEPT VISIT HI MDM: CPT | Mod: 25

## 2019-05-06 PROCEDURE — 25000003 PHARM REV CODE 250: Performed by: EMERGENCY MEDICINE

## 2019-05-06 PROCEDURE — 36415 COLL VENOUS BLD VENIPUNCTURE: CPT

## 2019-05-06 PROCEDURE — 93010 ELECTROCARDIOGRAM REPORT: CPT | Mod: ,,, | Performed by: INTERNAL MEDICINE

## 2019-05-06 PROCEDURE — 93010 EKG 12-LEAD: ICD-10-PCS | Mod: ,,, | Performed by: INTERNAL MEDICINE

## 2019-05-06 PROCEDURE — 99152 MOD SED SAME PHYS/QHP 5/>YRS: CPT | Mod: ,,, | Performed by: INTERNAL MEDICINE

## 2019-05-06 PROCEDURE — 63600175 PHARM REV CODE 636 W HCPCS: Performed by: HOSPITALIST

## 2019-05-06 RX ORDER — NAPROXEN SODIUM 220 MG/1
243 TABLET, FILM COATED ORAL
Status: COMPLETED | OUTPATIENT
Start: 2019-05-06 | End: 2019-05-06

## 2019-05-06 RX ORDER — SODIUM CHLORIDE 9 MG/ML
INJECTION, SOLUTION INTRAVENOUS CONTINUOUS
Status: DISCONTINUED | OUTPATIENT
Start: 2019-05-06 | End: 2019-05-07

## 2019-05-06 RX ORDER — ACETAMINOPHEN 325 MG/1
650 TABLET ORAL EVERY 4 HOURS PRN
Status: DISCONTINUED | OUTPATIENT
Start: 2019-05-06 | End: 2019-05-07 | Stop reason: HOSPADM

## 2019-05-06 RX ORDER — CEFAZOLIN SODIUM 1 G/50ML
SOLUTION INTRAVENOUS
Status: DISCONTINUED | OUTPATIENT
Start: 2019-05-06 | End: 2019-05-07

## 2019-05-06 RX ORDER — SODIUM CHLORIDE 0.9 % (FLUSH) 0.9 %
10 SYRINGE (ML) INJECTION
Status: DISCONTINUED | OUTPATIENT
Start: 2019-05-06 | End: 2019-05-07

## 2019-05-06 RX ORDER — INSULIN ASPART 100 [IU]/ML
1-10 INJECTION, SOLUTION INTRAVENOUS; SUBCUTANEOUS
Status: DISCONTINUED | OUTPATIENT
Start: 2019-05-06 | End: 2019-05-07 | Stop reason: HOSPADM

## 2019-05-06 RX ORDER — SIMVASTATIN 10 MG/1
20 TABLET, FILM COATED ORAL NIGHTLY
Status: DISCONTINUED | OUTPATIENT
Start: 2019-05-06 | End: 2019-05-06

## 2019-05-06 RX ORDER — PHENYLEPHRINE HCL IN 0.9% NACL 1 MG/10 ML
SYRINGE (ML) INTRAVENOUS
Status: DISCONTINUED | OUTPATIENT
Start: 2019-05-06 | End: 2019-05-06 | Stop reason: HOSPADM

## 2019-05-06 RX ORDER — ONDANSETRON 2 MG/ML
4 INJECTION INTRAMUSCULAR; INTRAVENOUS EVERY 8 HOURS PRN
Status: DISCONTINUED | OUTPATIENT
Start: 2019-05-06 | End: 2019-05-07 | Stop reason: HOSPADM

## 2019-05-06 RX ORDER — SODIUM CHLORIDE 9 MG/ML
INJECTION, SOLUTION INTRAVENOUS CONTINUOUS
Status: ACTIVE | OUTPATIENT
Start: 2019-05-06 | End: 2019-05-06

## 2019-05-06 RX ORDER — ENOXAPARIN SODIUM 100 MG/ML
40 INJECTION SUBCUTANEOUS EVERY 24 HOURS
Status: DISCONTINUED | OUTPATIENT
Start: 2019-05-06 | End: 2019-05-07 | Stop reason: HOSPADM

## 2019-05-06 RX ORDER — METOPROLOL SUCCINATE 25 MG/1
25 TABLET, EXTENDED RELEASE ORAL DAILY
Status: DISCONTINUED | OUTPATIENT
Start: 2019-05-07 | End: 2019-05-07

## 2019-05-06 RX ORDER — IBUPROFEN 200 MG
16 TABLET ORAL
Status: DISCONTINUED | OUTPATIENT
Start: 2019-05-06 | End: 2019-05-07 | Stop reason: HOSPADM

## 2019-05-06 RX ORDER — MIDAZOLAM HYDROCHLORIDE 1 MG/ML
INJECTION, SOLUTION INTRAMUSCULAR; INTRAVENOUS
Status: DISCONTINUED | OUTPATIENT
Start: 2019-05-06 | End: 2019-05-06 | Stop reason: HOSPADM

## 2019-05-06 RX ORDER — ISOSORBIDE MONONITRATE 30 MG/1
30 TABLET, EXTENDED RELEASE ORAL DAILY
Status: DISCONTINUED | OUTPATIENT
Start: 2019-05-07 | End: 2019-05-07 | Stop reason: HOSPADM

## 2019-05-06 RX ORDER — NITROGLYCERIN 20 MG/100ML
INJECTION INTRAVENOUS
Status: DISCONTINUED | OUTPATIENT
Start: 2019-05-06 | End: 2019-05-07

## 2019-05-06 RX ORDER — ASPIRIN 325 MG
325 TABLET ORAL
Status: DISCONTINUED | OUTPATIENT
Start: 2019-05-06 | End: 2019-05-06

## 2019-05-06 RX ORDER — FENTANYL CITRATE 50 UG/ML
INJECTION, SOLUTION INTRAMUSCULAR; INTRAVENOUS
Status: DISCONTINUED | OUTPATIENT
Start: 2019-05-06 | End: 2019-05-06 | Stop reason: HOSPADM

## 2019-05-06 RX ORDER — LIDOCAINE HYDROCHLORIDE AND EPINEPHRINE 10; 10 MG/ML; UG/ML
1 INJECTION, SOLUTION INFILTRATION; PERINEURAL ONCE
Status: DISCONTINUED | OUTPATIENT
Start: 2019-05-06 | End: 2019-05-07 | Stop reason: HOSPADM

## 2019-05-06 RX ORDER — MORPHINE SULFATE 10 MG/ML
3 INJECTION INTRAMUSCULAR; INTRAVENOUS; SUBCUTANEOUS
Status: DISCONTINUED | OUTPATIENT
Start: 2019-05-06 | End: 2019-05-07 | Stop reason: HOSPADM

## 2019-05-06 RX ORDER — NITROGLYCERIN 0.4 MG/1
0.4 TABLET SUBLINGUAL EVERY 5 MIN PRN
Status: DISCONTINUED | OUTPATIENT
Start: 2019-05-06 | End: 2019-05-07 | Stop reason: HOSPADM

## 2019-05-06 RX ORDER — ASPIRIN 81 MG/1
81 TABLET ORAL DAILY
Status: DISCONTINUED | OUTPATIENT
Start: 2019-05-07 | End: 2019-05-07 | Stop reason: HOSPADM

## 2019-05-06 RX ORDER — OXYCODONE HYDROCHLORIDE 5 MG/1
5 TABLET ORAL EVERY 4 HOURS PRN
Status: DISCONTINUED | OUTPATIENT
Start: 2019-05-06 | End: 2019-05-07 | Stop reason: HOSPADM

## 2019-05-06 RX ORDER — LIDOCAINE HYDROCHLORIDE 10 MG/ML
INJECTION, SOLUTION EPIDURAL; INFILTRATION; INTRACAUDAL; PERINEURAL
Status: DISCONTINUED | OUTPATIENT
Start: 2019-05-06 | End: 2019-05-06 | Stop reason: HOSPADM

## 2019-05-06 RX ORDER — GLUCAGON 1 MG
1 KIT INJECTION
Status: DISCONTINUED | OUTPATIENT
Start: 2019-05-06 | End: 2019-05-07 | Stop reason: HOSPADM

## 2019-05-06 RX ORDER — IBUPROFEN 200 MG
24 TABLET ORAL
Status: DISCONTINUED | OUTPATIENT
Start: 2019-05-06 | End: 2019-05-07 | Stop reason: HOSPADM

## 2019-05-06 RX ORDER — HEPARIN SODIUM 1000 [USP'U]/ML
INJECTION, SOLUTION INTRAVENOUS; SUBCUTANEOUS
Status: DISCONTINUED | OUTPATIENT
Start: 2019-05-06 | End: 2019-05-06 | Stop reason: HOSPADM

## 2019-05-06 RX ADMIN — TICAGRELOR 180 MG: 90 TABLET ORAL at 12:05

## 2019-05-06 RX ADMIN — SODIUM CHLORIDE: 0.9 INJECTION, SOLUTION INTRAVENOUS at 02:05

## 2019-05-06 RX ADMIN — ASPIRIN 81 MG 243 MG: 81 TABLET ORAL at 09:05

## 2019-05-06 RX ADMIN — INSULIN DETEMIR 15 UNITS: 100 INJECTION, SOLUTION SUBCUTANEOUS at 09:05

## 2019-05-06 NOTE — CONSULTS
[facility]  Cardiology  Consult Note    Patient Name: Juan Martínez Jr.  MRN: [id number]  Admission Date: 5/6/2019  Hospital Length of Stay: 0 days  Code Status: Full Code   Attending Provider: No att. providers found   Consulting Provider: Rashad Nieto MD  Primary Care Physician: Lissy Gavin MD  Principal Problem:<principal problem not specified>    Patient information was obtained from patient and ER records.     Inpatient consult to Cardiology  Consult performed by: Rashad Nieto MD  Consult ordered by: Solo Nash MD        Subjective:     Chief Complaint:  Chest pain     HPI:   Patient is a very pleasant 83-year-old man who states that for the past 2 weeks he had been having chest pains.  He states the chest pains were intermittent.  He tried to get into his cardiologist but could not get an appointment.  Every time they occurred they got relieved by nitroglycerin.  He had another episode of chest pain today morning at rest, felt like heaviness decided to come to the emergency room.  The in the emergency room 1st troponin was found to be elevated at 0.1.  He is currently chest pain-free.        Past Medical History:   Diagnosis Date    Diabetes mellitus     High cholesterol     Hypertension     Prostate cancer        Past Surgical History:   Procedure Laterality Date    APPLICATION, GRAFT, SKIN, FULL-THICKNESS, TO UPPER EXTREMITY VS STSG WITH FROZEN SECTIONS Right 11/15/2018    Performed by [facility] OR    EXCISION LEFT POST AURICULAR SQUAMOUS CELL CANCER  WITH LOCAL TISSUE ARRANGEMENT Left 11/15/2018    [facility] by [facility] at [facility] OR    EXCISION, CARCINOMA, SQUAMOUS CELL @ RIGHT HAND Right 11/15/2018    [facility] at [facility] OR    JOINT REPLACEMENT      TONSILLECTOMY      TOTAL KNEE ARTHROPLASTY         Review of patient's allergies indicates:  No Known Allergies    No current facility-administered medications on  file prior to encounter.      Current Outpatient Medications on File Prior to Encounter   Medication Sig    AMITIZA 24 mcg Cap 24 mcg 2 (two) times daily with meals.     aspirin (ECOTRIN) 81 MG EC tablet Take 81 mg by mouth once daily.    ezetimibe (ZETIA) 10 mg tablet Take 1 tablet (10 mg total) by mouth every evening.    hydroCHLOROthiazide (MICROZIDE) 12.5 mg capsule     metformin (GLUCOPHAGE) 1000 MG tablet Take 1 tablet (1,000 mg total) by mouth 2 (two) times daily with meals.    metoprolol succinate (TOPROL-XL) 25 MG 24 hr tablet Take 1 tablet (25 mg total) by mouth once daily.    simvastatin (ZOCOR) 20 MG tablet      Family History     None        Tobacco Use    Smoking status: Former Smoker     Packs/day: 0.00     Types: Cigarettes     Last attempt to quit: 2016     Years since quittin.7    Smokeless tobacco: Never Used    Tobacco comment: 2016   Substance and Sexual Activity    Alcohol use: No    Drug use: No    Sexual activity: Not Currently     Review of Systems   Constitution: Negative.   HENT: Negative.    Eyes: Negative.    Cardiovascular: Positive for chest pain.   Respiratory: Negative.    Endocrine: Negative.    Hematologic/Lymphatic: Negative.    Skin: Negative.    Musculoskeletal: Negative.    Gastrointestinal: Negative.    Genitourinary: Negative.    Neurological: Negative.    Psychiatric/Behavioral: Negative.    Allergic/Immunologic: Negative.      Objective:     Vital Signs (Most Recent):  Temp: 98.6 °F (37 °C) (19 1105)  Pulse: 73 (19 1105)  Resp: 18 (19 1105)  BP: (!) 188/78 (19 1101)  SpO2: 97 % (19 1105) Vital Signs (24h Range):  Temp:  [97.7 °F (36.5 °C)-98.6 °F (37 °C)] 98.6 °F (37 °C)  Pulse:  [63-89] 73  Resp:  [11-25] 18  SpO2:  [96 %-98 %] 97 %  BP: (126-188)/(60-78) 188/78     Weight: 81.6 kg (180 lb)  Body mass index is 26.58 kg/m².    SpO2: 97 %  O2 Device (Oxygen Therapy): room air    No intake or output data in the 24 hours  ending 05/06/19 1132    Lines/Drains/Airways     Peripheral Intravenous Line                 Peripheral IV - Single Lumen 05/06/19 0735 20 G Right Forearm less than 1 day                Physical Exam   Constitutional: He is oriented to person, place, and time. He appears well-developed and well-nourished.   HENT:   Head: Normocephalic.   Eyes: Pupils are equal, round, and reactive to light. Conjunctivae are normal.   Neck: Normal range of motion. Neck supple.   Cardiovascular: Normal rate, regular rhythm and normal heart sounds.   Pulmonary/Chest: Effort normal and breath sounds normal.   Abdominal: Soft. Bowel sounds are normal.   Neurological: He is alert and oriented to person, place, and time.   Skin: Skin is warm.   Nursing note and vitals reviewed.      Significant Labs:   BMP:   Recent Labs   Lab 05/06/19  0735   *      K 3.8      CO2 28   BUN 13   CREATININE 1.2   CALCIUM 9.6   , CMP   Recent Labs   Lab 05/06/19  0735      K 3.8      CO2 28   *   BUN 13   CREATININE 1.2   CALCIUM 9.6   PROT 7.2   ALBUMIN 3.9   BILITOT 0.6   ALKPHOS 72   AST 15   ALT 16   ANIONGAP 9   ESTGFRAFRICA >60   EGFRNONAA 56*   , CBC   Recent Labs   Lab 05/06/19  0735   WBC 7.68   HGB 15.1   HCT 44.2      , Lipid Panel No results for input(s): CHOL, HDL, LDLCALC, TRIG, CHOLHDL in the last 48 hours., Troponin   Recent Labs   Lab 05/06/19  0735 05/06/19  1045   TROPONINI 0.109* 0.095*    and All pertinent lab results from the last 24 hours have been reviewed.    Significant Imaging:  EKG:  Normal sinus rhythm, left axis deviation      Assessment and Plan:     Essential hypertension, benign  Currently hypertensive.  We will restart home meds after cardiac catheterization.    Hyperlipidemia  Statin    Type 2 diabetes mellitus  Management per primary care    NSTEMI (non-ST elevated myocardial infarction)  Patient with non-STEMI and chest pains for the past 2 weeks.  He has multiple risk factors  for coronary artery disease including dyslipidemia, hypertension, diabetes. Risks, benefits and alternatives of the catheterization procedure were discussed with the patient which include but are not limited to: bleeding, infection, death, heart attack, arrhythmia, kidney failure, stroke, need for emergency surgery, hematoma, pseudoaneurysm etc.  Patient understands and and agrees to proceed.  Patient has been given aspirin.  Give Brilinta 180 mg now.  Prepare for cardiac catheterization    History of prostate cancer            VTE Risk Mitigation (From admission, onward)        Ordered     enoxaparin injection 40 mg  Daily      05/06/19 1137     IP VTE HIGH RISK PATIENT  Once      05/06/19 1137     Place sequential compression device  Until discontinued      05/06/19 1137          Thank you for your consult. I will follow-up with patient. Please contact us if you have any additional questions.    Rashad Nieto MD  Cardiology   [facility]    Critical care time 45 mins

## 2019-05-06 NOTE — ASSESSMENT & PLAN NOTE
Pt with presentation and troponin consistent with NSTEMI  S/p ASA, statin and ticagrelor  Cardiology consulted and s/p LHC today remarkable for the following:  Dominance: Right Left  LM:  No significant stenosis  LAD:  Mid LAD 60-70% stenosis.  Distal LAD 50% stenosis.  IFR 0.92 (nonischemic).  Diagonal 1 is a tortuous vessel with mid 80% stenosis.  Will medically manage this diagonal stenosis  LCx :  Mild nonobstructive CAD  RCA:  50% distal RCA/proximal PDA lesion.  IFR 1 (nonischemic)  Will observe overnight on telemetry  Continue maximal medical management  Will f/u on ECHO, and change statin to lipitor as per Cardiology   Appreciate Cardiology recommendations  Possible discharge tomorrow if patient continues to do well

## 2019-05-06 NOTE — NURSING
Patient arrived to unit via stretcher with transport. Telemetry monitor in place. Sister at bedside. Patient in no apparent distress. Will continue to monitor.

## 2019-05-06 NOTE — ASSESSMENT & PLAN NOTE
Hold home regimen  Start basal + SSI  Will check HgbA1C  Glucose goal of 140-180 during hospital stay

## 2019-05-06 NOTE — Clinical Note
mid   first diagonal artery. Angiography performed post intervention of the left coronary arteries.

## 2019-05-06 NOTE — H&P
[facility] Hospital Medicine  History & Physical    Patient Name: Juan Martínez Jr.  MRN: [id number]  Admission Date: 5/6/2019  Attending Physician: Vicki Meeks MD   Primary Care Provider: Lissy Gavin MD         Patient information was obtained from patient, past medical records and ER records.     Subjective:     Principal Problem:NSTEMI (non-ST elevated myocardial infarction)    Chief Complaint:   Chief Complaint   Patient presents with    Chest Pain     states 3 episodes of chest pain this past month . States yesterday and this morning took NTG and it resolved. EKG done on arrival no STEMI per dr Oseguera        HPI: 83-year-old male with HTN, HLP, DM2, prostate cancer who presented with complaint of chest pain.  Patient states the pain was intermittent over the past 2 weeks, lasting for few minutes at a time, midsternal, partially relieved with nitroglycerin.  No reported shortness of breath, nausea and vomiting or diaphoresis.  In the ER, initial troponin was elevated at 0.109 and 2nd troponin was 0.113, EKG with no ST elevation.  Cardiology was consulted and patient was taken for emergent angiogram with noted coronary artery disease.  He was also treated with aspirin and ticagelor.    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      Current Outpatient Medications on File Prior to Encounter   Medication Sig    AMITIZA 24 mcg Cap 24 mcg 2 (two) times daily with meals.     aspirin (ECOTRIN) 81 MG EC tablet Take 81 mg by mouth once daily.    ezetimibe (ZETIA) 10 mg tablet Take 1 tablet (10 mg total) by mouth every evening.    hydroCHLOROthiazide (MICROZIDE) 12.5 mg capsule     metformin (GLUCOPHAGE) 1000 MG tablet Take 1 tablet (1,000 mg total) by mouth 2 (two) times daily with meals.    metoprolol succinate (TOPROL-XL) 25 MG 24 hr tablet Take 1 tablet (25 mg total) by mouth once daily.    simvastatin (ZOCOR) 20 MG tablet      Family History     Problem Relation (Age of Onset)    Heart disease Mother    Stroke Father        Tobacco Use    Smoking status: Former Smoker     Packs/day: 0.00     Types: Cigarettes     Last attempt to quit: 2016     Years since quittin.7    Smokeless tobacco: Never Used    Tobacco comment: 2016   Substance and Sexual Activity    Alcohol use: No    Drug use: No    Sexual activity: Not Currently     Review of Systems   Constitutional: Negative for chills and fever.   HENT: Negative for sore throat.    Respiratory: Negative for shortness of breath.    Cardiovascular: Positive for chest pain. Negative for palpitations and leg swelling.   Gastrointestinal: Negative for abdominal pain, nausea and vomiting.   Endocrine: Negative for polyuria.   Genitourinary: Negative for hematuria.   Musculoskeletal: Negative for back pain.   Skin: Negative for rash.   Neurological: Negative for syncope.   Hematological: Does not bruise/bleed easily.   Psychiatric/Behavioral: Negative for confusion.     Objective:     Vital Signs (Most Recent):  Temp: 97.5 °F (36.4 °C) (19 1601)  Pulse: (!) 47 (19 1601)  Resp: 16 (19 1601)  BP: (!) 170/70 (19 1601)  SpO2: 99 % (19  1601) Vital Signs (24h Range):  Temp:  [97.5 °F (36.4 °C)-98.6 °F (37 °C)] 97.5 °F (36.4 °C)  Pulse:  [40-89] 47  Resp:  [11-25] 16  SpO2:  [96 %-99 %] 99 %  BP: (102-188)/(52-78) 170/70     Weight: 81.6 kg (180 lb)  Body mass index is 26.58 kg/m².    Physical Exam   Constitutional: He appears well-developed and well-nourished. No distress.   HENT:   Head: Normocephalic and atraumatic.   Eyes: Pupils are equal, round, and reactive to light. EOM are normal.   Neck: Normal range of motion. Neck supple.   Cardiovascular: Regular rhythm.   Bradycardia   Pulmonary/Chest: Effort normal and breath sounds normal.   Abdominal: Soft. Bowel sounds are normal. He exhibits no distension and no mass. There is no tenderness. There is no guarding.   Musculoskeletal: Normal range of motion. He exhibits no edema.   Neurological: He is alert.   Skin: Skin is warm and dry. Capillary refill takes less than 2 seconds. He is not diaphoretic.   Psychiatric: He has a normal mood and affect. His behavior is normal. Thought content normal.         CRANIAL NERVES     CN III, IV, VI   Pupils are equal, round, and reactive to light.  Extraocular motions are normal.        Significant Labs: All pertinent labs within the past 24 hours have been reviewed.    Significant Imaging: I have reviewed and interpreted all pertinent imaging results/findings within the past 24 hours.    Assessment/Plan:     * NSTEMI (non-ST elevated myocardial infarction)  Pt with presentation and troponin consistent with NSTEMI  S/p ASA, statin and ticagrelor  Cardiology consulted and s/p LHC today remarkable for the following:  Dominance: Right Left  LM:  No significant stenosis  LAD:  Mid LAD 60-70% stenosis.  Distal LAD 50% stenosis.  IFR 0.92 (nonischemic).  Diagonal 1 is a tortuous vessel with mid 80% stenosis.  Will medically manage this diagonal stenosis  LCx :  Mild nonobstructive CAD  RCA:  50% distal RCA/proximal PDA lesion.  IFR 1 (nonischemic)  Will observe  overnight on telemetry  Continue maximal medical management  Will f/u on ECHO, and change statin to lipitor as per Cardiology   Appreciate Cardiology recommendations  Possible discharge tomorrow if patient continues to do well    CAD (coronary artery disease)  As discussed above under NSTEMI    Essential hypertension, benign  Continue metoprolol     Type 2 diabetes mellitus  Hold home regimen  Start basal + SSI  Will check HgbA1C  Glucose goal of 140-180 during hospital stay    Hyperlipidemia  Will change to atorvastatin    Chest pain  Resolved at this time  As discussed above    History of prostate cancer  Stable, no acute issues      VTE Risk Mitigation (From admission, onward)        Ordered     enoxaparin injection 40 mg  Daily      05/06/19 1137     IP VTE HIGH RISK PATIENT  Once      05/06/19 1137     Place sequential compression device  Until discontinued      05/06/19 1137             Vicki Meeks MD  Department of Hospital Medicine   [facility]

## 2019-05-06 NOTE — SUBJECTIVE & OBJECTIVE
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      Current Outpatient Medications on File Prior to Encounter   Medication Sig    AMITIZA 24 mcg Cap 24 mcg 2 (two) times daily with meals.     aspirin (ECOTRIN) 81 MG EC tablet Take 81 mg by mouth once daily.    ezetimibe (ZETIA) 10 mg tablet Take 1 tablet (10 mg total) by mouth every evening.    hydroCHLOROthiazide (MICROZIDE) 12.5 mg capsule     metformin (GLUCOPHAGE) 1000 MG tablet Take 1 tablet (1,000 mg total) by mouth 2 (two) times daily with meals.    metoprolol succinate (TOPROL-XL) 25 MG 24 hr tablet Take 1 tablet (25 mg total) by mouth once daily.    simvastatin (ZOCOR) 20 MG tablet      Family History     Problem Relation (Age of Onset)    Heart disease Mother    Stroke Father        Tobacco Use    Smoking status: Former Smoker     Packs/day: 0.00     Types: Cigarettes     Last attempt to quit: 2016     Years since quittin.7    Smokeless tobacco: Never Used    Tobacco comment: 2016   Substance and Sexual Activity    Alcohol use: No    Drug use: No    Sexual activity: Not Currently     Review of Systems   Constitutional: Negative for chills and fever.   HENT: Negative for  sore throat.    Respiratory: Negative for shortness of breath.    Cardiovascular: Positive for chest pain. Negative for palpitations and leg swelling.   Gastrointestinal: Negative for abdominal pain, nausea and vomiting.   Endocrine: Negative for polyuria.   Genitourinary: Negative for hematuria.   Musculoskeletal: Negative for back pain.   Skin: Negative for rash.   Neurological: Negative for syncope.   Hematological: Does not bruise/bleed easily.   Psychiatric/Behavioral: Negative for confusion.     Objective:     Vital Signs (Most Recent):  Temp: 97.5 °F (36.4 °C) (05/06/19 1601)  Pulse: (!) 47 (05/06/19 1601)  Resp: 16 (05/06/19 1601)  BP: (!) 170/70 (05/06/19 1601)  SpO2: 99 % (05/06/19 1601) Vital Signs (24h Range):  Temp:  [97.5 °F (36.4 °C)-98.6 °F (37 °C)] 97.5 °F (36.4 °C)  Pulse:  [40-89] 47  Resp:  [11-25] 16  SpO2:  [96 %-99 %] 99 %  BP: (102-188)/(52-78) 170/70     Weight: 81.6 kg (180 lb)  Body mass index is 26.58 kg/m².    Physical Exam   Constitutional: He appears well-developed and well-nourished. No distress.   HENT:   Head: Normocephalic and atraumatic.   Eyes: Pupils are equal, round, and reactive to light. EOM are normal.   Neck: Normal range of motion. Neck supple.   Cardiovascular: Regular rhythm.   Bradycardia   Pulmonary/Chest: Effort normal and breath sounds normal.   Abdominal: Soft. Bowel sounds are normal. He exhibits no distension and no mass. There is no tenderness. There is no guarding.   Musculoskeletal: Normal range of motion. He exhibits no edema.   Neurological: He is alert.   Skin: Skin is warm and dry. Capillary refill takes less than 2 seconds. He is not diaphoretic.   Psychiatric: He has a normal mood and affect. His behavior is normal. Thought content normal.         CRANIAL NERVES     CN III, IV, VI   Pupils are equal, round, and reactive to light.  Extraocular motions are normal.        Significant Labs: All pertinent labs within the past 24 hours have been  reviewed.    Significant Imaging: I have reviewed and interpreted all pertinent imaging results/findings within the past 24 hours.

## 2019-05-06 NOTE — ED TRIAGE NOTES
Pt reports having Right sided pain in ribs yesterday morning and having mid epigastric chest pain this morning.  Pt denies sob, f/c/n/v/d.  Denies that pain radiates to neck or extremity.  Placed on cardiac monitor, bp cuff, and pulse ox.  NAD, VSS.

## 2019-05-06 NOTE — HPI
Patient is a very pleasant 83-year-old man who states that for the past 2 weeks he had been having chest pains.  He states the chest pains were intermittent.  He tried to get into his cardiologist but could not get an appointment.  Every time they occurred they got relieved by nitroglycerin.  He had another episode of chest pain today morning at rest, felt like heaviness decided to come to the emergency room.  The in the emergency room 1st troponin was found to be elevated at 0.1.  He is currently chest pain-free.

## 2019-05-06 NOTE — HPI
83-year-old male with HTN, HLP, DM2, prostate cancer who presented with complaint of chest pain.  Patient states the pain was intermittent over the past 2 weeks, lasting for few minutes at a time, midsternal, partially relieved with nitroglycerin.  No reported shortness of breath, nausea and vomiting or diaphoresis.  In the ER, initial troponin was elevated at 0.109 and 2nd troponin was 0.113, EKG with no ST elevation.  Cardiology was consulted and patient was taken for emergent angiogram with noted coronary artery disease.  He was also treated with aspirin and ticagelor.

## 2019-05-06 NOTE — Clinical Note
Catheter is inserted into the ostium   left anterior descending. Hemodynamics performed. Angiography performed of the left coronary arteries in multiple views.

## 2019-05-06 NOTE — BRIEF OP NOTE
[facility]  Brief Operative Note     SUMMARY     Surgery Date: 5/6/2019     Surgeon(s) and Role:     * Rashad Nieto MD - Primary    Assisting Surgeon: None    Pre-op Diagnosis:  NSTEMI (non-ST elevated myocardial infarction) [I21.4]  Essential hypertension, benign [I10]    Post-op Diagnosis:  Post-Op Diagnosis Codes:     * NSTEMI (non-ST elevated myocardial infarction) [I21.4]     * Essential hypertension, benign [I10]    Procedure(s) (LRB):  Left heart cath (Left)  Fractional Flow [place] (FFR), Coronary    Anesthesia: RN IV Sedation    Description of the findings of the procedure:  No acute thrombotic lesion identified.  Coronary artery disease as described below    Findings/Key Components:  LVEDP: 12 mmHg      Dominance: Right Left  LM:  No significant stenosis  LAD:  Mid LAD 60-70% stenosis.  Distal LAD 50% stenosis.  IFR 0.92 (nonischemic).  Diagonal 1 is a tortuous vessel with mid 80% stenosis.  Will medically manage this diagonal stenosis  LCx :  Mild nonobstructive CAD  RCA:  50% distal RCA/proximal PDA lesion.  IFR 1 (nonischemic)    Hemostasis:    RFA Perclose    Impression / Plan  Coronary artery disease as described above.  Continue medical management with aggressive risk factor modification.  Continue aspirin 81 mg daily.  Change simvastatin to atorvastatin 80 mg daily.        Estimated Blood Loss:  Less than 20 cc         Specimens: None

## 2019-05-06 NOTE — ASSESSMENT & PLAN NOTE
Patient with non-STEMI and chest pains for the past 2 weeks.  He has multiple risk factors for coronary artery disease including dyslipidemia, hypertension, diabetes. Risks, benefits and alternatives of the catheterization procedure were discussed with the patient which include but are not limited to: bleeding, infection, death, heart attack, arrhythmia, kidney failure, stroke, need for emergency surgery, hematoma, pseudoaneurysm etc.  Patient understands and and agrees to proceed.  Patient has been given aspirin.  Give Brilinta 180 mg now.  Prepare for cardiac catheterization

## 2019-05-06 NOTE — SUBJECTIVE & OBJECTIVE
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      Current Outpatient Medications on File Prior to Encounter   Medication Sig    AMITIZA 24 mcg Cap 24 mcg 2 (two) times daily with meals.     aspirin (ECOTRIN) 81 MG EC tablet Take 81 mg by mouth once daily.    ezetimibe (ZETIA) 10 mg tablet Take 1 tablet (10 mg total) by mouth every evening.    hydroCHLOROthiazide (MICROZIDE) 12.5 mg capsule     metformin (GLUCOPHAGE) 1000 MG tablet Take 1 tablet (1,000 mg total) by mouth 2 (two) times daily with meals.    metoprolol succinate (TOPROL-XL) 25 MG 24 hr tablet Take 1 tablet (25 mg total) by mouth once daily.    simvastatin (ZOCOR) 20 MG tablet      Family History     None        Tobacco Use    Smoking status: Former Smoker     Packs/day: 0.00     Types: Cigarettes     Last attempt to quit: 2016     Years since quittin.7    Smokeless tobacco: Never Used    Tobacco comment: 2016   Substance and Sexual Activity    Alcohol use: No    Drug use: No    Sexual activity: Not Currently     Review of Systems   Constitution: Negative.   HENT: Negative.    Eyes: Negative.    Cardiovascular: Positive for chest pain.   Respiratory: Negative.     Endocrine: Negative.    Hematologic/Lymphatic: Negative.    Skin: Negative.    Musculoskeletal: Negative.    Gastrointestinal: Negative.    Genitourinary: Negative.    Neurological: Negative.    Psychiatric/Behavioral: Negative.    Allergic/Immunologic: Negative.      Objective:     Vital Signs (Most Recent):  Temp: 98.6 °F (37 °C) (05/06/19 1105)  Pulse: 73 (05/06/19 1105)  Resp: 18 (05/06/19 1105)  BP: (!) 188/78 (05/06/19 1101)  SpO2: 97 % (05/06/19 1105) Vital Signs (24h Range):  Temp:  [97.7 °F (36.5 °C)-98.6 °F (37 °C)] 98.6 °F (37 °C)  Pulse:  [63-89] 73  Resp:  [11-25] 18  SpO2:  [96 %-98 %] 97 %  BP: (126-188)/(60-78) 188/78     Weight: 81.6 kg (180 lb)  Body mass index is 26.58 kg/m².    SpO2: 97 %  O2 Device (Oxygen Therapy): room air    No intake or output data in the 24 hours ending 05/06/19 1132    Lines/Drains/Airways     Peripheral Intravenous Line                 Peripheral IV - Single Lumen 05/06/19 0735 20 G Right Forearm less than 1 day                Physical Exam   Constitutional: He is oriented to person, place, and time. He appears well-developed and well-nourished.   HENT:   Head: Normocephalic.   Eyes: Pupils are equal, round, and reactive to light. Conjunctivae are normal.   Neck: Normal range of motion. Neck supple.   Cardiovascular: Normal rate, regular rhythm and normal heart sounds.   Pulmonary/Chest: Effort normal and breath sounds normal.   Abdominal: Soft. Bowel sounds are normal.   Neurological: He is alert and oriented to person, place, and time.   Skin: Skin is warm.   Nursing note and vitals reviewed.      Significant Labs:   BMP:   Recent Labs   Lab 05/06/19  0735   *      K 3.8      CO2 28   BUN 13   CREATININE 1.2   CALCIUM 9.6   , CMP   Recent Labs   Lab 05/06/19  0735      K 3.8      CO2 28   *   BUN 13   CREATININE 1.2   CALCIUM 9.6   PROT 7.2   ALBUMIN 3.9   BILITOT 0.6   ALKPHOS 72   AST 15   ALT 16   ANIONGAP 9   ESTGFRAFRICA >60    EGFRNONAA 56*   , CBC   Recent Labs   Lab 05/06/19  0735   WBC 7.68   HGB 15.1   HCT 44.2      , Lipid Panel No results for input(s): CHOL, HDL, LDLCALC, TRIG, CHOLHDL in the last 48 hours., Troponin   Recent Labs   Lab 05/06/19  0735 05/06/19  1045   TROPONINI 0.109* 0.095*    and All pertinent lab results from the last 24 hours have been reviewed.    Significant Imaging:  EKG:  Normal sinus rhythm, left axis deviation

## 2019-05-06 NOTE — ED NOTES
Pt reports he took a nitro yesterday and another today for chest pain and reports that it seemed to help his chest pain.  Pt also reports that the nitro  in .

## 2019-05-06 NOTE — NURSING
Patient transported to cath lab with two cath lab nurses. Telemetry in place. Patient in no apparent distress, will continue to monitor upon return.

## 2019-05-06 NOTE — ED PROVIDER NOTES
Encounter Date: 5/6/2019    SCRIBE #1 NOTE: I, Eliane Camara, am scribing for, and in the presence of,  Solo Nash MD. I have scribed the following portions of the note - Other sections scribed: HPI, ROS, and PE.       History     Chief Complaint   Patient presents with    Chest Pain     states 3 episodes of chest pain this past month . States yesterday and this morning took NTG and it resolved. EKG done on arrival no STEMI per dr Oseguera     CC: Chest Pain    HPI: This 83 y.o male who has DM, Hypercholesteremia, HTN, and CAD presents to the ED for an evaluation for acute onset, episode of midsternal chest pain that began this morning.  Patient reports his chest pain was alleviated with taking nitroglycerin.  Patient reports 2 weeks ago, he had an episode of severe, burning midsternal chest pain, which he attributed to acid reflux.  He reports yesterday, while at [employer], he had an episode of right sided chest pain described as a dull ache.  He reports he initially attempted treatment for that episode with pain medication without relief and reports it was later relieved with taking nitroglycerin.  Patient reports attempting to schedule an appointment with his cardiologist.  Patient denies fever, chills, nausea, emesis, shortness of breath, diaphoresis, or any other associated symptoms.      The history is provided by the patient. No  was used.     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  WITH LOCAL TISSUE ARRANGEMENT Left 11/15/2018    Performed by [facility] at [facility] OR     EXCISION, CARCINOMA, SQUAMOUS CELL @ RIGHT HAND Right 11/15/2018    Performed by [facility] at [facility] OR    JOINT REPLACEMENT      TONSILLECTOMY      TOTAL KNEE ARTHROPLASTY       Family History   Problem Relation Age of Onset    Heart disease Mother     Stroke Father      Social History     Tobacco Use    Smoking status: Former Smoker     Packs/day: 0.00     Types: Cigarettes     Last attempt to quit: 2016     Years since quittin.7    Smokeless tobacco: Never Used    Tobacco comment: 2016   Substance Use Topics    Alcohol use: No    Drug use: No     Review of Systems   Constitutional: Negative for chills, diaphoresis and fever.   HENT: Negative for ear pain and sore throat.    Eyes: Negative for pain.   Respiratory: Negative for cough and shortness of breath.    Cardiovascular: Positive for chest pain. Negative for palpitations.   Gastrointestinal: Negative for abdominal pain, diarrhea, nausea and vomiting.   Genitourinary: Negative for dysuria.   Musculoskeletal: Negative for back pain.   Skin: Negative for rash.   Neurological: Negative for headaches.       Physical Exam     Initial Vitals [19 0723]   BP Pulse Resp Temp SpO2   (!) 140/75 89 16 97.7 °F (36.5 °C) 98 %      MAP       --         Physical Exam    Nursing note and vitals reviewed.  Constitutional: He is not diaphoretic. No distress.   HENT:   Head: Normocephalic and atraumatic.   Mouth/Throat: Oropharynx is clear and moist.   Eyes: Conjunctivae and EOM are normal. Pupils are equal, round, and reactive to light. No scleral icterus.   Neck: Normal range of motion. Neck supple. No JVD present.   Cardiovascular: Normal rate, regular rhythm and intact distal pulses.   Pulmonary/Chest: Breath sounds normal. No stridor. No respiratory distress.   Abdominal: Soft. Bowel sounds are normal. He exhibits no distension. There is no tenderness.   Musculoskeletal: Normal range of motion. He exhibits no edema or tenderness.    Neurological: He is alert and oriented to person, place, and time. He has normal strength. No cranial nerve deficit.   Skin: Skin is warm and dry. No rash noted.   Psychiatric: He has a normal mood and affect.         ED Course   Procedures  Labs Reviewed   CBC W/ AUTO DIFFERENTIAL - Abnormal; Notable for the following components:       Result Value    Mean Corpuscular Hemoglobin 31.3 (*)     All other components within normal limits   COMPREHENSIVE METABOLIC PANEL - Abnormal; Notable for the following components:    Glucose 238 (*)     eGFR if non  56 (*)     All other components within normal limits   TROPONIN I - Abnormal; Notable for the following components:    Troponin I 0.109 (*)     All other components within normal limits   TROPONIN I - Abnormal; Notable for the following components:    Troponin I 0.095 (*)     All other components within normal limits   B-TYPE NATRIURETIC PEPTIDE     EKG Readings: (Independently Interpreted)   Initial Reading: No STEMI. Rhythm: Normal Sinus Rhythm. Heart Rate: 93. Ectopy: No Ectopy. Conduction: Normal. ST Segments: Normal ST Segments. T Waves Flipped: AVL. Axis: Left Axis Deviation.     ECG Results          EKG 12-lead (In process)  Result time 05/06/19 14:07:27    In process by Interface, Lab In [facility] (05/06/19 14:07:27)                 Narrative:    Test Reason : R07.9,    Vent. Rate : 093 BPM     Atrial Rate : 093 BPM     P-R Int : 160 ms          QRS Dur : 090 ms      QT Int : 340 ms       P-R-T Axes : 072 -65 054 degrees     QTc Int : 422 ms    Normal sinus rhythm  Left axis deviation  Possible Anterior infarct ,age undetermined  Abnormal ECG  When compared with ECG of 13-NOV-2018 12:07,  Significant changes have occurred    Referred By: [email or web address]   SELF           Confirmed By:                   In process by Interface, Lab In [facility] (05/06/19 14:04:28)                 Narrative:    Test Reason : R07.9,    Vent. Rate : 093 BPM     Atrial Rate :  093 BPM     P-R Int : 160 ms          QRS Dur : 090 ms      QT Int : 340 ms       P-R-T Axes : 072 -65 054 degrees     QTc Int : 422 ms    Normal sinus rhythm  Left axis deviation  Possible Anterior infarct ,age undetermined  Abnormal ECG  When compared with ECG of 13-NOV-2018 12:07,  Significant changes have occurred    Referred By: [email or web address]   SELF           Confirmed By:                             Imaging Results          X-Ray Chest AP Portable (Final result)  Result time 05/06/19 10:10:35    Final result by Jenn Romero MD (05/06/19 10:10:35)                 Impression:      No acute abnormality.      Electronically signed by: Jenn Romero MD  Date:    05/06/2019  Time:    10:10             Narrative:    EXAMINATION:  XR CHEST AP PORTABLE    CLINICAL HISTORY:  Chest pain, unspecified    TECHNIQUE:  Single frontal view of the chest was performed.    COMPARISON:  None    FINDINGS:  The lungs are clear, with normal appearance of pulmonary vasculature and no pleural effusion or pneumothorax.    The cardiac silhouette is normal in size. The hilar and mediastinal contours are unremarkable.    Bones are intact.                              X-Rays:   Independently Interpreted Readings:   Chest X-Ray: No infiltrates.  No acute abnormalities.     Medical Decision Making:   History:   Old Medical Records: I decided to obtain old medical records.  Old Records Summarized: other records.       <> Summary of Records: Patient underwent nuclear stress test in 2013 that was clear of evidence of myocardial injury or ischemia  Differential Diagnosis:   GERD  Gastritis  Musculoskeletal pain  ACS  Anxiety  Pneumonia  Independently Interpreted Test(s):   I have ordered and independently interpreted X-rays - see prior notes.  I have ordered and independently interpreted EKG Reading(s) - see prior notes  Clinical Tests:   Lab Tests: Ordered and Reviewed  Radiological Study: Ordered and Reviewed  Medical Tests: Ordered and  Reviewed  ED Management:  Patient is afebrile and in no acute distress at time history and physical.  EKG is without definite acute ischemic changes.  Labs notable for troponin elevated at 0.109.  Patient given aspirin in the emergency department.    Consult: I have spoken with Dr. Nieto from the cardiology service regarding the patient's presentation and study results.  At this time, the recommendation is admission for serial troponins, cardiac monitoring patient will likely be taken to catheterization lab.    I spent a total of 60 minutes caring for and overseeing the critical care of this patient. Critical care time was spent treating or preventing major adverse outcome resulting from NSTEMI.  Patient was specifically observed and treated with physical exam, lab testing, interpretation lab testing, EKG, interpretation of EKG, aspirin, followed by discussion with Cardiology and admission to the hospital.     This chart was completed using dictation software, as a result there may be some transcription errors.             Scribe Attestation:   Scribe #1: I performed the above scribed service and the documentation accurately describes the services I performed. I attest to the accuracy of the note.    Attending Attestation:           Physician Attestation for Scribe:  Physician Attestation Statement for Scribe #1: I, Solo Nash MD, reviewed documentation, as scribed by Eliane Camara in my presence, and it is both accurate and complete.                    Clinical Impression:       ICD-10-CM ICD-9-CM   1. NSTEMI (non-ST elevated myocardial infarction) I21.4 410.70   2. Chest pain R07.9 786.50   3. Essential hypertension, benign I10 401.1   4. Chest pain, unspecified type R07.9 786.50   5. Hyperlipidemia, unspecified hyperlipidemia type E78.5 272.4   6. Sinus bradycardia R00.1 427.89         Disposition:   Disposition: Admitted  Condition: Fair                        Solo Nash MD  05/06/19 9403

## 2019-05-07 VITALS
RESPIRATION RATE: 20 BRPM | HEIGHT: 69 IN | WEIGHT: 180 LBS | OXYGEN SATURATION: 94 % | DIASTOLIC BLOOD PRESSURE: 63 MMHG | HEART RATE: 72 BPM | SYSTOLIC BLOOD PRESSURE: 137 MMHG | TEMPERATURE: 98 F | BODY MASS INDEX: 26.66 KG/M2

## 2019-05-07 PROBLEM — I21.4 NSTEMI (NON-ST ELEVATED MYOCARDIAL INFARCTION): Status: RESOLVED | Noted: 2019-05-06 | Resolved: 2019-05-07

## 2019-05-07 LAB
ANION GAP SERPL CALC-SCNC: 6 MMOL/L (ref 8–16)
AORTIC ROOT ANNULUS: 4.01 CM
AORTIC VALVE CUSP SEPERATION: 2.23 CM
AV INDEX (PROSTH): 0.84
AV MEAN GRADIENT: 2.51 MMHG
AV PEAK GRADIENT: 4.49 MMHG
AV VALVE AREA: 2.73 CM2
AV VELOCITY RATIO: 0.92
BASOPHILS # BLD AUTO: 0.01 K/UL (ref 0–0.2)
BASOPHILS NFR BLD: 0.1 % (ref 0–1.9)
BSA FOR ECHO PROCEDURE: 1.99 M2
BUN SERPL-MCNC: 13 MG/DL (ref 8–23)
CALCIUM SERPL-MCNC: 9.2 MG/DL (ref 8.7–10.5)
CHLORIDE SERPL-SCNC: 104 MMOL/L (ref 95–110)
CO2 SERPL-SCNC: 28 MMOL/L (ref 23–29)
CREAT SERPL-MCNC: 0.9 MG/DL (ref 0.5–1.4)
CV ECHO LV RWT: 0.37 CM
DIFFERENTIAL METHOD: NORMAL
DOP CALC AO PEAK VEL: 1.06 M/S
DOP CALC AO VTI: 20.9 CM
DOP CALC LVOT AREA: 3.23 CM2
DOP CALC LVOT DIAMETER: 2.03 CM
DOP CALC LVOT PEAK VEL: 0.97 M/S
DOP CALC LVOT STROKE VOLUME: 57.13 CM3
DOP CALCLVOT PEAK VEL VTI: 17.66 CM
E WAVE DECELERATION TIME: 439.11 MSEC
E/A RATIO: 0.59
E/E' RATIO: 7.07
ECHO LV POSTERIOR WALL: 1.03 CM (ref 0.6–1.1)
EOSINOPHIL # BLD AUTO: 0.1 K/UL (ref 0–0.5)
EOSINOPHIL NFR BLD: 1.4 % (ref 0–8)
ERYTHROCYTE [DISTWIDTH] IN BLOOD BY AUTOMATED COUNT: 13.9 % (ref 11.5–14.5)
EST. GFR  (AFRICAN AMERICAN): >60 ML/MIN/1.73 M^2
EST. GFR  (NON AFRICAN AMERICAN): >60 ML/MIN/1.73 M^2
ESTIMATED AVG GLUCOSE: 154 MG/DL (ref 68–131)
FRACTIONAL SHORTENING: 30 % (ref 28–44)
GLUCOSE SERPL-MCNC: 102 MG/DL (ref 70–110)
HBA1C MFR BLD HPLC: 7 % (ref 4–5.6)
HCT VFR BLD AUTO: 44.8 % (ref 40–54)
HGB BLD-MCNC: 14.7 G/DL (ref 14–18)
INTERVENTRICULAR SEPTUM: 0.83 CM (ref 0.6–1.1)
IVRT: 0.1 MSEC
LA MAJOR: 5.11 CM
LA MINOR: 5.11 CM
LA WIDTH: 3.33 CM
LEFT ATRIUM SIZE: 4.35 CM
LEFT ATRIUM VOLUME INDEX: 31.9 ML/M2
LEFT ATRIUM VOLUME: 62.92 CM3
LEFT INTERNAL DIMENSION IN SYSTOLE: 3.91 CM (ref 2.1–4)
LEFT VENTRICLE DIASTOLIC VOLUME INDEX: 76.81 ML/M2
LEFT VENTRICLE DIASTOLIC VOLUME: 151.71 ML
LEFT VENTRICLE MASS INDEX: 100.3 G/M2
LEFT VENTRICLE SYSTOLIC VOLUME INDEX: 33.5 ML/M2
LEFT VENTRICLE SYSTOLIC VOLUME: 66.15 ML
LEFT VENTRICULAR INTERNAL DIMENSION IN DIASTOLE: 5.57 CM (ref 3.5–6)
LEFT VENTRICULAR MASS: 198.09 G
LV LATERAL E/E' RATIO: 7.57
LV SEPTAL E/E' RATIO: 6.63
LYMPHOCYTES # BLD AUTO: 1.6 K/UL (ref 1–4.8)
LYMPHOCYTES NFR BLD: 19.9 % (ref 18–48)
MAGNESIUM SERPL-MCNC: 2 MG/DL (ref 1.6–2.6)
MCH RBC QN AUTO: 30.2 PG (ref 27–31)
MCHC RBC AUTO-ENTMCNC: 32.8 G/DL (ref 32–36)
MCV RBC AUTO: 92 FL (ref 82–98)
MONOCYTES # BLD AUTO: 0.9 K/UL (ref 0.3–1)
MONOCYTES NFR BLD: 10.6 % (ref 4–15)
MV PEAK A VEL: 0.9 M/S
MV PEAK E VEL: 0.53 M/S
NEUTROPHILS # BLD AUTO: 5.5 K/UL (ref 1.8–7.7)
NEUTROPHILS NFR BLD: 68 % (ref 38–73)
PISA TR MAX VEL: 1.34 M/S
PLATELET # BLD AUTO: 269 K/UL (ref 150–350)
PMV BLD AUTO: 9.3 FL (ref 9.2–12.9)
POCT GLUCOSE: 113 MG/DL (ref 70–110)
POCT GLUCOSE: 136 MG/DL (ref 70–110)
POTASSIUM SERPL-SCNC: 4 MMOL/L (ref 3.5–5.1)
PULM VEIN S/D RATIO: 1.74
PV PEAK D VEL: 0.27 M/S
PV PEAK S VEL: 0.47 M/S
PV PEAK VELOCITY: 0.94 CM/S
RA MAJOR: 4.62 CM
RA PRESSURE: 3 MMHG
RA WIDTH: 3.1 CM
RBC # BLD AUTO: 4.86 M/UL (ref 4.6–6.2)
RIGHT VENTRICULAR END-DIASTOLIC DIMENSION: 2.88 CM
RV TISSUE DOPPLER FREE WALL SYSTOLIC VELOCITY 1 (APICAL 4 CHAMBER VIEW): 16.52 M/S
SINUS: 3.64 CM
SODIUM SERPL-SCNC: 138 MMOL/L (ref 136–145)
STJ: 3.33 CM
TDI LATERAL: 0.07
TDI SEPTAL: 0.08
TDI: 0.08
TR MAX PG: 7.18 MMHG
TRICUSPID ANNULAR PLANE SYSTOLIC EXCURSION: 2.1 CM
TV REST PULMONARY ARTERY PRESSURE: 10 MMHG
WBC # BLD AUTO: 8.08 K/UL (ref 3.9–12.7)

## 2019-05-07 PROCEDURE — 83036 HEMOGLOBIN GLYCOSYLATED A1C: CPT

## 2019-05-07 PROCEDURE — 99233 SBSQ HOSP IP/OBS HIGH 50: CPT | Mod: ,,, | Performed by: INTERNAL MEDICINE

## 2019-05-07 PROCEDURE — 36415 COLL VENOUS BLD VENIPUNCTURE: CPT

## 2019-05-07 PROCEDURE — 25000003 PHARM REV CODE 250: Performed by: INTERNAL MEDICINE

## 2019-05-07 PROCEDURE — 80048 BASIC METABOLIC PNL TOTAL CA: CPT

## 2019-05-07 PROCEDURE — 99233 PR SUBSEQUENT HOSPITAL CARE,LEVL III: ICD-10-PCS | Mod: ,,, | Performed by: INTERNAL MEDICINE

## 2019-05-07 PROCEDURE — 85025 COMPLETE CBC W/AUTO DIFF WBC: CPT

## 2019-05-07 PROCEDURE — 83735 ASSAY OF MAGNESIUM: CPT

## 2019-05-07 RX ORDER — ISOSORBIDE MONONITRATE 30 MG/1
30 TABLET, EXTENDED RELEASE ORAL DAILY
Qty: 30 TABLET | Refills: 5 | Status: SHIPPED | OUTPATIENT
Start: 2019-05-07 | End: 2019-05-14

## 2019-05-07 RX ORDER — NITROGLYCERIN 0.4 MG/1
0.4 TABLET SUBLINGUAL EVERY 5 MIN PRN
Qty: 30 TABLET | Refills: 5 | Status: SHIPPED | OUTPATIENT
Start: 2019-05-07 | End: 2020-12-28

## 2019-05-07 RX ORDER — ATORVASTATIN CALCIUM 40 MG/1
40 TABLET, FILM COATED ORAL DAILY
Qty: 30 TABLET | Refills: 5 | Status: SHIPPED | OUTPATIENT
Start: 2019-05-07 | End: 2019-08-19 | Stop reason: SDUPTHER

## 2019-05-07 RX ADMIN — ASPIRIN 81 MG: 81 TABLET, COATED ORAL at 10:05

## 2019-05-07 RX ADMIN — ISOSORBIDE MONONITRATE 30 MG: 30 TABLET, EXTENDED RELEASE ORAL at 10:05

## 2019-05-07 NOTE — PROGRESS NOTES
[facility] Hospital Medicine  Progress Note    Patient Name: Juan Martínez Jr.  MRN: [id number]  Patient Class: IP- Inpatient   Admission Date: 5/6/2019  Length of Stay: 1 days  Attending Physician: Ming Miranda MD  Primary Care Provider: Lissy Gavin MD        Subjective:     Principal Problem:NSTEMI (non-ST elevated myocardial infarction)    HPI:  83-year-old male with HTN, HLP, DM2, prostate cancer who presented with complaint of chest pain.  Patient states the pain was intermittent over the past 2 weeks, lasting for few minutes at a time, midsternal, partially relieved with nitroglycerin.  No reported shortness of breath, nausea and vomiting or diaphoresis.  In the ER, initial troponin was elevated at 0.109 and 2nd troponin was 0.113, EKG with no ST elevation.  Cardiology was consulted and patient was taken for emergent angiogram with noted coronary artery disease.  He was also treated with aspirin and ticagelor.    Hospital Course:  83-year-old male with HTN, HLP, DM2, prostate cancer who presented with complaint of chest pain.  Patient states the pain was intermittent over the past 2 weeks, lasting for few minutes at a time, midsternal, partially relieved with nitroglycerin.  No reported shortness of breath, nausea and vomiting or diaphoresis.  In the ER, initial troponin was elevated at 0.109 and 2nd troponin was 0.113, EKG with no ST elevation.  Cardiology was consulted and patient was taken for emergent angiogram with noted coronary artery disease.  He was also treated with aspirin and ticagelor.  The patient was taken for [facility] on 5/6/19 without any intervention with plans for medical therapy. The rest of the hospital course was unremarkable. The patient will be discharged to home today. Activity as tolerated. Diet- low NA< ADA 2000 juve diet. Follow up with cards in one week       Interval History: No new issues. No CP. Would like to go home.       Review of Systems    Constitutional: Negative for activity change.   HENT: Negative for congestion.    Respiratory: Negative for chest tightness and shortness of breath.    Cardiovascular: Negative for chest pain.   Gastrointestinal: Negative for abdominal pain.   Genitourinary: Negative for difficulty urinating.     Objective:     Vital Signs (Most Recent):  Temp: 98.2 °F (36.8 °C) (05/07/19 0812)  Pulse: (!) 58 (05/07/19 0812)  Resp: 20 (05/07/19 0812)  BP: (!) 148/67 (05/07/19 0812)  SpO2: 98 % (05/07/19 0812) Vital Signs (24h Range):  Temp:  [97.5 °F (36.4 °C)-98.6 °F (37 °C)] 98.2 °F (36.8 °C)  Pulse:  [40-73] 58  Resp:  [16-25] 20  SpO2:  [96 %-99 %] 98 %  BP: (102-188)/(52-78) 148/67     Weight: 81.6 kg (180 lb)  Body mass index is 26.58 kg/m².    Intake/Output Summary (Last 24 hours) at 5/7/2019 1040  Last data filed at 5/7/2019 0850  Gross per 24 hour   Intake 580 ml   Output --   Net 580 ml      Physical Exam   Constitutional: He is oriented to person, place, and time. He appears well-developed and well-nourished. No distress.   HENT:   Head: Normocephalic and atraumatic.   Neurological: He is alert and oriented to person, place, and time.   Psychiatric: He has a normal mood and affect. His behavior is normal.   Nursing note and vitals reviewed.      Significant Labs:   BMP:   Recent Labs   Lab 05/07/19  0610         K 4.0      CO2 28   BUN 13   CREATININE 0.9   CALCIUM 9.2   MG 2.0     CBC:   Recent Labs   Lab 05/06/19  0735 05/06/19  2158 05/07/19  0610   WBC 7.68 9.55 8.08   HGB 15.1 14.0 14.7   HCT 44.2 42.1 44.8    259 269       Significant Imaging:    Assessment/Plan:      * NSTEMI (non-ST elevated myocardial infarction)  Pt with presentation and troponin consistent with NSTEMI  S/p ASA, statin and ticagrelor  Cardiology consulted and s/p LHC today remarkable for the following:  Dominance: Right Left  LM:  No significant stenosis  LAD:  Mid LAD 60-70% stenosis.  Distal LAD 50% stenosis.  IFR  0.92 (nonischemic).  Diagonal 1 is a tortuous vessel with mid 80% stenosis.  Will medically manage this diagonal stenosis  LCx :  Mild nonobstructive CAD  RCA:  50% distal RCA/proximal PDA lesion.  IFR 1 (nonischemic)  Will observe overnight on telemetry  Continue maximal medical management  Will f/u on ECHO, and change statin to lipitor as per Cardiology   Appreciate Cardiology recommendations  Possible discharge tomorrow if patient continues to do well    No intervention made. Med therapy. Will d/c to home.       CAD (coronary artery disease)  As discussed above under NSTEMI    Chest pain  Resolved at this time  As discussed above    History of prostate cancer  Stable, no acute issues    Hyperlipidemia  Will change to atorvastatin    Type 2 diabetes mellitus  Hold home regimen  Start basal + SSI  Will check HgbA1C  Glucose goal of 140-180 during hospital stay    Essential hypertension, benign  Continue metoprolol         VTE Risk Mitigation (From admission, onward)        Ordered     enoxaparin injection 40 mg  Daily      05/06/19 1137     IP VTE HIGH RISK PATIENT  Once      05/06/19 1137     Place sequential compression device  Until discontinued      05/06/19 1137        Will d/c to home.     Ming Ferreira MD  Department of Hospital Medicine   [facility]

## 2019-05-07 NOTE — ASSESSMENT & PLAN NOTE
Currently chest pain-free.  Coronary angiogram yesterday demonstrated a nonobstructive coronary artery disease in LAD and RCA and a  80% lesion in the diagonal.  No complications from the coronary angiogram.  Switched statin to atorvastatin 40 mg yesterday.  Will start him on Imdur 30 mg daily.  Follow-up in Cardiology Clinic with me.

## 2019-05-07 NOTE — NURSING
Patient arrived back to unit via wheelchair, no complaints, no acute distress noted. Will continue to monitor,.

## 2019-05-07 NOTE — ASSESSMENT & PLAN NOTE
Pt with presentation and troponin consistent with NSTEMI  S/p ASA, statin and ticagrelor  Cardiology consulted and s/p LHC today remarkable for the following:  Dominance: Right Left  LM:  No significant stenosis  LAD:  Mid LAD 60-70% stenosis.  Distal LAD 50% stenosis.  IFR 0.92 (nonischemic).  Diagonal 1 is a tortuous vessel with mid 80% stenosis.  Will medically manage this diagonal stenosis  LCx :  Mild nonobstructive CAD  RCA:  50% distal RCA/proximal PDA lesion.  IFR 1 (nonischemic)  Will observe overnight on telemetry  Continue maximal medical management  Will f/u on ECHO, and change statin to lipitor as per Cardiology   Appreciate Cardiology recommendations  Possible discharge tomorrow if patient continues to do well    No intervention made. Med therapy. Will d/c to home.

## 2019-05-07 NOTE — PROGRESS NOTES
Patient resting comfortably in bed skin warm and dry resp.unlabored,denies any discomfort at this time.Rt groin dressing dry and intact no bleeding noted at the site.will continue to monitor.

## 2019-05-07 NOTE — NURSING
Patient transported to cardiology via wheelchair for 2d echo. No complaints, no acute distress noted. Will continue to monitor upon return to unit.

## 2019-05-07 NOTE — PROGRESS NOTES
"Dr Nieto here to check on patient"s rt groin. No new order noted.Rt groin area remains dry and intact. Will continue to monitor.  "

## 2019-05-07 NOTE — NURSING
Report received from MABEL Jha. Patient resting comfortably, no complaints, no acute distress noted. Plan of care reviewed with patient. Instructed patient to call for assistance before ambulating, side rails up x3, bed alarm set, call light in reach, non skid socks in use. Patient verbalized understanding of instructions. Right groin site is clean, dry and intact. Patient denies pain or tenderness to site. Will continue to monitor.

## 2019-05-07 NOTE — PLAN OF CARE
Problem: Fall Injury Risk  Goal: Absence of Fall and Fall-Related Injury    Intervention: Identify and Manage Contributors to Fall Injury Risk     05/06/19 2000 05/07/19 0715   Identify and Manage Contributors to Fall Injury Risk   Self-Care Promotion BADL personal routines maintained;BADL personal objects within reach;independence encouraged  --    Manage Acute Allergic Reaction   Medication Review/Management  --  medications reviewed     Intervention: Promote Injury-Free Environment     05/07/19 1223   Optimize [place] and Functional Mobility   Environmental Safety Modification assistive device/personal items within reach;clutter free environment maintained;room organization consistent;room near unit station   Optimize Balance and Safe Activity   Safety Promotion/Fall Prevention bed alarm set;commode/urinal/bedpan at bedside;Fall Risk reviewed with patient/family;medications reviewed;side rails raised x 2;instructed to call staff for mobility         Problem: Diabetes Comorbidity  Goal: Blood Glucose Level Within Desired Range  Outcome: Ongoing (interventions implemented as appropriate)  Intervention: Maintain Glycemic Control     05/07/19 1223   Monitor and Manage Ketoacidosis   Glycemic Management blood glucose monitoring;supplemental insulin given;oral hydration promoted

## 2019-05-07 NOTE — NURSING
1540 Post Cath Lab patient with bradycardia (47-50), bleeding at right groin site, pressure held, then patient presented with vagal response (low blood pressure 84/48, lower heart rate ranging 34-37). NS bolus started, patients blood pressure and heart rate increased.       1600 Patient bleeding at site again, pressure held for 10 minutes, bleeding stopped, new dressing applied.       1640 Bleeding at right groin site noted. Patient states that his right foot feels numb. Left and right Dorsalis pedis palpated and audible with doppler. Dr. Nieto notified, he states that it is a slow capillary bleed, he ordered for a pressure dressing to be applied and he ordered epi/lidocaine mixture to inject at site.     1720 Medication arrived to unit, Dr. Nieto notified, he states he will come to floor to inject at site.

## 2019-05-07 NOTE — PROGRESS NOTES
[facility]  Cardiology  Progress Note    Patient Name: Juan Martínez Jr.  MRN: [id number]  Admission Date: 5/6/2019  Hospital Length of Stay: 1 days  Code Status: Full Code   Attending Physician: Ming Miranda MD   Primary Care Physician: Lissy Gavin MD  Expected Discharge Date:   Principal Problem:NSTEMI (non-ST elevated myocardial infarction)    Subjective:     Hospital Course:   5/7: chest pain free. Comfortable. Sinus renetta in 50- 60 bpm on tele. No pauses. No symptoms. No complications from cath. No bleeding / hematoma.     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      Current Outpatient Medications on File Prior to Encounter   Medication Sig    AMITIZA 24 mcg Cap 24 mcg 2 (two) times daily with meals.     aspirin (ECOTRIN) 81 MG EC tablet Take 81 mg by mouth once daily.    ezetimibe (ZETIA) 10 mg tablet Take 1 tablet (10 mg total) by mouth every evening.    hydroCHLOROthiazide (MICROZIDE) 12.5 mg capsule     metformin (GLUCOPHAGE) 1000 MG tablet Take 1 tablet (1,000 mg total) by mouth 2 (two) times daily with meals.    metoprolol succinate (TOPROL-XL) 25 MG 24 hr tablet Take 1 tablet (25 mg total) by mouth  once daily.    simvastatin (ZOCOR) 20 MG tablet      Family History     None        Tobacco Use    Smoking status: Former Smoker     Packs/day: 0.00     Types: Cigarettes     Last attempt to quit: 2016     Years since quittin.7    Smokeless tobacco: Never Used    Tobacco comment: 2016   Substance and Sexual Activity    Alcohol use: No    Drug use: No    Sexual activity: Not Currently     Review of Systems   Constitution: Negative.   HENT: Negative.    Eyes: Negative.    Respiratory: Negative.    Endocrine: Negative.    Hematologic/Lymphatic: Negative.    Skin: Negative.    Musculoskeletal: Negative.    Gastrointestinal: Negative.    Genitourinary: Negative.    Neurological: Negative.    Psychiatric/Behavioral: Negative.    Allergic/Immunologic: Negative.      Objective:     Vital Signs (Most Recent):  Temp: 98.2 °F (36.8 °C) (19)  Pulse: (!) 58 (19)  Resp: 20 (19)  BP: (!) 148/67 (19)  SpO2: 98 % (19) Vital Signs (24h Range):  Temp:  [97.5 °F (36.4 °C)-98.6 °F (37 °C)] 98.2 °F (36.8 °C)  Pulse:  [40-73] 58  Resp:  [16-25] 20  SpO2:  [96 %-99 %] 98 %  BP: (102-188)/(52-78) 148/67     Weight: 81.6 kg (180 lb)  Body mass index is 26.58 kg/m².    SpO2: 98 %  O2 Device (Oxygen Therapy): room air      Intake/Output Summary (Last 24 hours) at 2019 1034  Last data filed at 2019 0850  Gross per 24 hour   Intake 580 ml   Output --   Net 580 ml       Lines/Drains/Airways     Peripheral Intravenous Line                 Peripheral IV - Single Lumen 19 0735 20 G Right Forearm 1 day                Physical Exam   Constitutional: He is oriented to person, place, and time. He appears well-developed and well-nourished.   HENT:   Head: Normocephalic.   Eyes: Pupils are equal, round, and reactive to light. Conjunctivae are normal.   Neck: Normal range of motion. Neck supple.   Cardiovascular: Normal rate, regular rhythm and normal heart sounds.    Pulmonary/Chest: Effort normal and breath sounds normal.   Abdominal: Soft. Bowel sounds are normal.   Neurological: He is alert and oriented to person, place, and time.   Skin: Skin is warm.   Nursing note and vitals reviewed.      Significant Labs:   BMP:   Recent Labs   Lab 05/06/19  0735 05/07/19  0610   * 102    138   K 3.8 4.0    104   CO2 28 28   BUN 13 13   CREATININE 1.2 0.9   CALCIUM 9.6 9.2   MG  --  2.0   , CMP   Recent Labs   Lab 05/06/19  0735 05/07/19  0610    138   K 3.8 4.0    104   CO2 28 28   * 102   BUN 13 13   CREATININE 1.2 0.9   CALCIUM 9.6 9.2   PROT 7.2  --    ALBUMIN 3.9  --    BILITOT 0.6  --    ALKPHOS 72  --    AST 15  --    ALT 16  --    ANIONGAP 9 6*   ESTGFRAFRICA >60 >60   EGFRNONAA 56* >60   , CBC   Recent Labs   Lab 05/06/19  0735 05/06/19  2158 05/07/19  0610   WBC 7.68 9.55 8.08   HGB 15.1 14.0 14.7   HCT 44.2 42.1 44.8    259 269   , Lipid Panel   Recent Labs   Lab 05/06/19  1045   CHOL 134   HDL 45   LDLCALC 73.0   TRIG 80   CHOLHDL 33.6   , Troponin   Recent Labs   Lab 05/06/19  1045 05/06/19  1615 05/06/19  2158   TROPONINI 0.113*  0.095* 0.091* 0.075*    and All pertinent lab results from the last 24 hours have been reviewed.    Significant Imaging:  EKG:  Normal sinus rhythm, left axis deviation          Assessment and Plan:     Brief HPI:     * NSTEMI (non-ST elevated myocardial infarction)  Currently chest pain-free.  Coronary angiogram yesterday demonstrated a nonobstructive coronary artery disease in LAD and RCA and a  80% lesion in the diagonal.  No complications from the coronary angiogram.  Switched statin to atorvastatin 40 mg yesterday.  Will start him on Imdur 30 mg daily.  Follow-up in Cardiology Clinic with me.    Essential hypertension, benign  Currently hypertensive.  We will restart home meds after cardiac catheterization.    Hyperlipidemia  Statin    Type 2 diabetes mellitus  Management per primary  care    History of prostate cancer            VTE Risk Mitigation (From admission, onward)        Ordered     enoxaparin injection 40 mg  Daily      05/06/19 1137     IP VTE HIGH RISK PATIENT  Once      05/06/19 1137     Place sequential compression device  Until discontinued      05/06/19 1137          Rashad Nieto MD  Cardiology  [facility]

## 2019-05-07 NOTE — HOSPITAL COURSE
83-year-old male with HTN, HLP, DM2, prostate cancer who presented with complaint of chest pain.  Patient states the pain was intermittent over the past 2 weeks, lasting for few minutes at a time, midsternal, partially relieved with nitroglycerin.  No reported shortness of breath, nausea and vomiting or diaphoresis.  In the ER, initial troponin was elevated at 0.109 and 2nd troponin was 0.113, EKG with no ST elevation.  Cardiology was consulted and patient was taken for emergent angiogram with noted coronary artery disease.  He was also treated with aspirin and ticagelor.  The patient was taken for [facility] on 5/6/19 without any intervention with plans for medical therapy. The rest of the hospital course was unremarkable. The patient will be discharged to home today. Activity as tolerated. Diet- low NA< ADA 2000 juve diet. Follow up with cards in one week

## 2019-05-07 NOTE — SUBJECTIVE & OBJECTIVE
Interval History: No new issues. No CP. Would like to go home.       Review of Systems   Constitutional: Negative for activity change.   HENT: Negative for congestion.    Respiratory: Negative for chest tightness and shortness of breath.    Cardiovascular: Negative for chest pain.   Gastrointestinal: Negative for abdominal pain.   Genitourinary: Negative for difficulty urinating.     Objective:     Vital Signs (Most Recent):  Temp: 98.2 °F (36.8 °C) (05/07/19 0812)  Pulse: (!) 58 (05/07/19 0812)  Resp: 20 (05/07/19 0812)  BP: (!) 148/67 (05/07/19 0812)  SpO2: 98 % (05/07/19 0812) Vital Signs (24h Range):  Temp:  [97.5 °F (36.4 °C)-98.6 °F (37 °C)] 98.2 °F (36.8 °C)  Pulse:  [40-73] 58  Resp:  [16-25] 20  SpO2:  [96 %-99 %] 98 %  BP: (102-188)/(52-78) 148/67     Weight: 81.6 kg (180 lb)  Body mass index is 26.58 kg/m².    Intake/Output Summary (Last 24 hours) at 5/7/2019 1040  Last data filed at 5/7/2019 0850  Gross per 24 hour   Intake 580 ml   Output --   Net 580 ml      Physical Exam   Constitutional: He is oriented to person, place, and time. He appears well-developed and well-nourished. No distress.   HENT:   Head: Normocephalic and atraumatic.   Neurological: He is alert and oriented to person, place, and time.   Psychiatric: He has a normal mood and affect. His behavior is normal.   Nursing note and vitals reviewed.      Significant Labs:   BMP:   Recent Labs   Lab 05/07/19  0610         K 4.0      CO2 28   BUN 13   CREATININE 0.9   CALCIUM 9.2   MG 2.0     CBC:   Recent Labs   Lab 05/06/19  0735 05/06/19  2158 05/07/19  0610   WBC 7.68 9.55 8.08   HGB 15.1 14.0 14.7   HCT 44.2 42.1 44.8    259 269       Significant Imaging:

## 2019-05-07 NOTE — PROGRESS NOTES
Patient resting well no complaints at this time rt groin dressing dry and intact no drainage noted.

## 2019-05-07 NOTE — PLAN OF CARE
05/07/19 1125   Discharge Assessment   Assessment Type Discharge Planning Assessment  (pt with active d/c order prior to completion)

## 2019-05-07 NOTE — PROGRESS NOTES
WRITTEN HEALTHCARE DISCHARGE INFORMATION      Things that YOU are responsible for to Manage Your Care At Home:  1. Getting your prescriptions filled.  2. Taking you medications as directed. DO NOT MISS ANY DOSES!  3. Going to your follow-up doctor appointments. This is important because it allows the doctor to monitor your progress and to determine if any changes need to be made to your treatment plan.     If you are unable to make your follow up appointments, please call the number listed and reschedule this appointment.      After discharge, if you need assistance, you can call [facility] for 24/7 assistance at [phone]     If you are experience any signs or symptoms, Call your doctor or Call 911 and come to your nearest Emergency Room.     Thank you for choosing [facility] and allowing us to care for you.        You should receive a call from [facility] within 48-72 hours to help manage your care after discharge. Please try to make sure that you answer your phone for this important phone call.     Follow-up Information     Lissy Gavin MD On 6/25/2019.    Specialty:  Internal Medicine  Why:  PLEASE KEEP ALREADY SCHEDULED PCP FOLLOW UP FOR TUESDAY JUNE 25TH   Contact information:  [address] [place] [address]  [phone]             Rashad Nieto MD On 5/14/2019.    Specialties:  Cardiology, INTERVENTIONAL CARDIOLOGY  Why:  appointment scheduled for Tuesday May 14th at 10:40am  Contact information:  [address]  [address]  [place] [address]  [phone]

## 2019-05-07 NOTE — PROGRESS NOTES
Patient resting comfortably denies pain or discomfort,rt groin dressing dry and intact no bleeding noted .Will continue to monitor.

## 2019-05-07 NOTE — HOSPITAL COURSE
5/7: chest pain free. Comfortable. Sinus renetta in 50- 60 bpm on tele. No pauses. No symptoms. No complications from cath. No bleeding / hematoma.

## 2019-05-07 NOTE — MEDICAL/APP STUDENT
"[facility] Hospital Medicine  Progress Note    Patient Name: Juan Martínez Jr.  MRN: [id number]  Patient Class: IP- Inpatient   Admission Date: 5/6/2019  Length of Stay: 1 days  Attending Physician: Ming Miranda MD  Primary Care Provider: Lissy Gavin MD        Subjective:     Principal Problem:NSTEMI (non-ST elevated myocardial infarction)    HPI: Mr. Martínez is an 82 yo man with a history of HTN, T2DM, HLD and prostate cancer who presents with chest pain. He describes a sudden onset retrosternal pain associated with sensations of "burning" and "heaviness". The pain occurred while he was resting at home. He describes a similar episode on 5/5, which resolved after taking a "nitro" pill. He denies any associated syncope, nausea/vomiting, diaphoresis.    Hospital Course: In the ED, troponin was 0.113 and patient was taken for cardiac angiography. EKG did not reveal ST-segment elevation. While two lesions suggestive of coronary artery disease was appreciated, no flow-limiting stenosis was identified.    Interval History: Mr. Martínez tolerated cardiac angiography well and is experiencing no chest pain. Plan to update nitroglycerin prescription and change from simvastatin to atorvastatin.    Review of Systems   Constitutional: Negative.    HENT: Negative.    Eyes: Negative.    Respiratory: Negative.    Cardiovascular: Positive for chest pain.   Gastrointestinal: Negative.    Genitourinary: Negative.    Musculoskeletal: Negative.    Neurological: Negative.    Psychiatric/Behavioral: Negative.      Objective:     Vital Signs (Most Recent):  Temp: 98.2 °F (36.8 °C) (05/07/19 0812)  Pulse: (!) 58 (05/07/19 0812)  Resp: 20 (05/07/19 0812)  BP: (!) 148/67 (05/07/19 0812)  SpO2: 98 % (05/07/19 0812) Vital Signs (24h Range):  Temp:  [97.5 °F (36.4 °C)-98.6 °F (37 °C)] 98.2 °F (36.8 °C)  Pulse:  [40-73] 58  Resp:  [16-25] 20  SpO2:  [96 %-99 %] 98 %  BP: (102-188)/(52-78) 148/67     Weight: 81.6 kg " (180 lb)  Body mass index is 26.58 kg/m².    Intake/Output Summary (Last 24 hours) at 5/7/2019 1059  Last data filed at 5/7/2019 0850  Gross per 24 hour   Intake 580 ml   Output --   Net 580 ml      Physical Exam   Constitutional: He is oriented to person, place, and time.   Eyes: Pupils are equal, round, and reactive to light. EOM are normal.   Cardiovascular: Normal rate, regular rhythm and normal heart sounds.   Pulmonary/Chest: Effort normal and breath sounds normal.   Abdominal: Soft. Bowel sounds are normal.   Musculoskeletal: Normal range of motion.   Neurological: He is alert and oriented to person, place, and time.   Vitals reviewed.      Significant Labs: {Results:37020}    Significant Imaging: {Imaging Review:39974}    Assessment/Plan:      NSTEMI:  Findings from cardiac angiography  Dominance: Right Left  LM:  No significant stenosis  LAD:  Mid LAD 60-70% stenosis.  Distal LAD 50% stenosis.  IFR 0.92 (nonischemic).  Diagonal 1 is a tortuous vessel with mid 80% stenosis.  Will medically manage this diagonal stenosis  LCx :  Mild nonobstructive CAD  RCA:  50% distal RCA/proximal PDA lesion.  IFR 1 (nonischemic)  -substitute atorvastatin for simvastatin per cardiology recs  -renew nitroglycerin prescription    T2DM  -monitor a1c (7.3 6/19/18)    HTN  -continue metoprolol, hctz    HLD  -change from simvastatin to atorvastatin (as above)    Active Diagnoses:    Diagnosis Date Noted POA    CAD (coronary artery disease) [I25.10] 05/06/2019 Yes    History of prostate cancer [Z85.46] 11/20/2014 Not Applicable     Chronic    Essential hypertension, benign [I10] 09/25/2014 Yes    Type 2 diabetes mellitus [E11.9] 09/25/2014 Yes    Hyperlipidemia [E78.5] 09/25/2014 Yes      Problems Resolved During this Admission:    Diagnosis Date Noted Date Resolved POA    PRINCIPAL PROBLEM:  NSTEMI (non-ST elevated myocardial infarction) [I21.4] 05/06/2019 05/07/2019 Yes    Chest pain [R07.9]  05/07/2019 Yes     VTE Risk  Mitigation (From admission, onward)        Ordered     enoxaparin injection 40 mg  Daily      05/06/19 1137     IP VTE HIGH RISK PATIENT  Once      05/06/19 1137     Place sequential compression device  Until discontinued      05/06/19 1137             Naeem Brown  Department of Hospital Medicine   [facility]

## 2019-05-07 NOTE — DISCHARGE SUMMARY
[facility] Hospital Medicine  Discharge Summary      Patient Name: Juan Martínez Jr.  MRN: [id number]  Admission Date: 5/6/2019  Hospital Length of Stay: 1 days  Discharge Date and Time:  05/07/2019 10:44 AM  Attending Physician: Ming Miranda MD   Discharging Provider: Ming Miranda MD  Primary Care Provider: Lissy Gavin MD      HPI:   83-year-old male with HTN, HLP, DM2, prostate cancer who presented with complaint of chest pain.  Patient states the pain was intermittent over the past 2 weeks, lasting for few minutes at a time, midsternal, partially relieved with nitroglycerin.  No reported shortness of breath, nausea and vomiting or diaphoresis.  In the ER, initial troponin was elevated at 0.109 and 2nd troponin was 0.113, EKG with no ST elevation.  Cardiology was consulted and patient was taken for emergent angiogram with noted coronary artery disease.  He was also treated with aspirin and ticagelor.    Procedure(s) (LRB):  Left heart cath (Left)  Fractional Flow [place] (FFR), Coronary      Hospital Course:   83-year-old male with HTN, HLP, DM2, prostate cancer who presented with complaint of chest pain.  Patient states the pain was intermittent over the past 2 weeks, lasting for few minutes at a time, midsternal, partially relieved with nitroglycerin.  No reported shortness of breath, nausea and vomiting or diaphoresis.  In the ER, initial troponin was elevated at 0.109 and 2nd troponin was 0.113, EKG with no ST elevation.  Cardiology was consulted and patient was taken for emergent angiogram with noted coronary artery disease.  He was also treated with aspirin and ticagelor.  The patient was taken for [facility] on 5/6/19 without any intervention with plans for medical therapy. The rest of the hospital course was unremarkable. The patient will be discharged to home today. Activity as tolerated. Diet- low NA< ADA 2000 juve diet. Follow up with cards in one week        Consults:    Consults (From admission, onward)        Status Ordering Provider     Inpatient consult to Cardiology  Once     Provider:  Rashad Nieto MD    Completed JOSE J SEGURA     Inpatient consult to Cardiology  Once     Provider:  Rashad Nieto MD    Acknowledged JOSE J SEGURA            Final Active Diagnoses:    Diagnosis Date Noted POA    CAD (coronary artery disease) [I25.10] 05/06/2019 Yes    History of prostate cancer [Z85.46] 11/20/2014 Not Applicable     Chronic    Essential hypertension, benign [I10] 09/25/2014 Yes    Type 2 diabetes mellitus [E11.9] 09/25/2014 Yes    Hyperlipidemia [E78.5] 09/25/2014 Yes      Problems Resolved During this Admission:    Diagnosis Date Noted Date Resolved POA    PRINCIPAL PROBLEM:  NSTEMI (non-ST elevated myocardial infarction) [I21.4] 05/06/2019 05/07/2019 Yes    Chest pain [R07.9]  05/07/2019 Yes       Discharged Condition: good    Disposition: Home or Self Care    Follow Up:  Follow-up Information     Rashad Nieto MD.    Specialties:  Cardiology, INTERVENTIONAL CARDIOLOGY  Contact information:  [address]  [address] [place] [address]  [phone]                 Patient Instructions:   No discharge procedures on file.    Significant Diagnostic Studies:    Pending Diagnostic Studies:     Procedure Component Value Units Date/Time    EKG 12-LEAD starting tomorrow [[id number]]     Order Status:  Sent Lab Status:  No result     EKG 12-lead [[id number]] Collected:  05/06/19 1609    Order Status:  Sent Lab Status:  In process Updated:  05/07/19 0607    Narrative:       Test Reason : R00.1,    Vent. Rate : 050 BPM     Atrial Rate : 050 BPM     P-R Int : 182 ms          QRS Dur : 104 ms      QT Int : 444 ms       P-R-T Axes : 021 -48 018 degrees     QTc Int : 404 ms    Sinus bradycardia  Left anterior fascicular block  Abnormal ECG  When compared with ECG of 06-MAY-2019 07:19,  Significant changes have occurred    Referred By: [email or web address]   SELF           Confirmed  By:     Hemoglobin A1c if not done in past 3 months [[id number]] Collected:  05/07/19 0610    Order Status:  Sent Lab Status:  In process Updated:  05/07/19 0610    Specimen:  Blood     Transthoracic echo (TTE) 2D with Color Flow [[id number]] Resulted:  05/07/19 0950    Order Status:  Sent Lab Status:  In process Updated:  05/07/19 0951     BSA 1.99 m2      TDI SEPTAL 0.08     LV LATERAL E/E' RATIO 7.57     LV SEPTAL E/E' RATIO 6.63     AORTIC VALVE CUSP SEPERATION 2.23 cm      TDI LATERAL 0.07     PV PEAK VELOCITY 0.94 cm/s      LVIDD 5.57 cm      IVS 0.83 cm      PW 1.03 cm      Ao root annulus 4.01 cm      LVIDS 3.91 cm      FS 30 %      Sinus 3.64 cm      STJ 3.33 cm      LV mass 198.09 g      RVDD 2.88 cm      TAPSE 2.10 cm      RV S' 16.52 m/s      Left Ventricle Relative Wall Thickness 0.37 cm      AV mean gradient 2.51 mmHg      AV valve area 2.73 cm2      AV Velocity Ratio 0.92     AV index (prosthetic) 0.84     E/A ratio 0.59     Mean e' 0.08     E wave decelartion time 439.11 msec      IVRT 0.10 msec      Pulm vein S/D ratio 1.74     LVOT diameter 2.03 cm      LVOT area 3.23 cm2      LVOT peak bryan [id number] m/s      LVOT peak VTI 17.66 cm      Ao peak bryan 1.06 m/s      Ao VTI 20.90 cm      LVOT stroke volume 57.13 cm3      AV peak gradient 4.49 mmHg      E/E' ratio 7.07     MV Peak E Bryan 0.53 m/s      TR Max Bryan 1.34 m/s      MV Peak A Bryan 0.90 m/s      PV Peak S Bryan 0.47 m/s      PV Peak D Bryan 0.27 m/s      LV Systolic Volume 66.15 mL      LV Systolic Volume Index 33.5 mL/m2      LV Diastolic Volume 151.71 mL      LV Diastolic Volume Index 76.81 mL/m2      LV Mass Index 100.3 g/m2      Triscuspid Valve Regurgitation Peak Gradient 7.18 mmHg          Medications:  Reconciled Home Medications:      Medication List      START taking these medications    nitroGLYCERIN 0.4 MG SL tablet  Commonly known as:  NITROSTAT  Place 1 tablet (0.4 mg total) under the tongue every 5 (five) minutes as needed for Chest  pain.        CONTINUE taking these medications    AMITIZA 24 MCG Cap  Generic drug:  lubiprostone  24 mcg 2 (two) times daily with meals.     aspirin 81 MG EC tablet  Commonly known as:  ECOTRIN  Take 81 mg by mouth once daily.     ezetimibe 10 mg tablet  Commonly known as:  ZETIA  Take 1 tablet (10 mg total) by mouth every evening.     hydroCHLOROthiazide 12.5 mg capsule  Commonly known as:  MICROZIDE     metFORMIN 1000 MG tablet  Commonly known as:  GLUCOPHAGE  Take 1 tablet (1,000 mg total) by mouth 2 (two) times daily with meals.     metoprolol succinate 25 MG 24 hr tablet  Commonly known as:  TOPROL-XL  Take 1 tablet (25 mg total) by mouth once daily.     simvastatin 20 MG tablet  Commonly known as:  ZOCOR            Indwelling Lines/Drains at time of discharge:   Lines/Drains/Airways          None          Time spent on the discharge of patient:  < 30 minutes  Patient was seen and examined on the date of discharge and determined to be suitable for discharge.         Ming Ferreira MD  Department of Hospital Medicine  [facility]

## 2019-05-07 NOTE — NURSING
Patient arrived back to unit via wheelchair. No complaints, no acute distress noted. Will continue to monitor.

## 2019-05-07 NOTE — PLAN OF CARE
"   05/07/19 1222   Final Note   Assessment Type Final Discharge Note   Anticipated Discharge Disposition Home   Hospital Follow Up  Appt(s) scheduled? Yes   Discharge plans and expectations educations in teach back method with documentation complete? Yes   Right Care Referral Info   Post Acute Recommendation No Care   notified pts nurse Jessica that all CM needs have been addressed and can d/c from CM standpoint.    EDUCATION:  provided with educational information on NSTEMI.  Information reviewed and placed in :My Healthcare Packet" to be brought home to use as resource after discharge.  Information included:  signs and symptoms to look for and call the doctor if experiencing, and symptoms that may indicate a medical emergency: CALL 911.      All questions answered.  Teach back method used.    Patient stated, "macy go to follow up appointments and take medications as prescribed  ".        "

## 2019-05-07 NOTE — NURSING
End of shift bedside report given to MABEL Jha. Patient in no apparent distress.     12 hour chart check complete.

## 2019-05-09 ENCOUNTER — PATIENT OUTREACH (OUTPATIENT)
Dept: ADMINISTRATIVE | Facility: CLINIC | Age: 84
End: 2019-05-09

## 2019-05-09 NOTE — PATIENT INSTRUCTIONS
Discharge Instructions for Heart Attack  You have had a heart attack. Also known as acute myocardial infarction, or AMI, a heart attack occurs when a vessel supplying the heart with blood suddenly becomes blocked. Follow these guidelines for home care and lifestyle changes.  Home Care  Take your medications exactly as directed. Dont skip doses.  Remember that recovery after a heart attack takes time. Plan to rest for at least 4-8 weeks while you recover. Then return to normal activity when your doctor says its okay.  Ask your doctor about joining a heart rehabilitation program.  Tell your doctor if you are feeling depressed. Feelings of sadness are common after a heart attack, but it is important that you speak to someone if you are feeling overwhelmed by these feelings.  If you are having chest pain, call 911 for an ambulance. Do NOT drive yourself to the hospital.  Ask your family members to learn CPR.  Learn to take your own blood pressure and pulse. Keep a record of your results. Ask your doctor when you should seek emergency medical attention. He or she will tell you which blood pressure reading is dangerous.  Lifestyle Changes  Maintain a healthy weight. Get help to lose any extra pounds.  Cut back on salt.  Limit canned, dried, packaged, and fast foods.  Dont add salt to your food.  Season foods with herbs instead of salt when you cook.  Break the smoking habit. Enroll in a stop-smoking program to improve your chances of success.  Limit fatty foods.  Check your lipid levels regularly. (Your doctor can show you how to do this.)  Build up your activity according to your doctors recommendation.  Ask your doctor when its okay to resume sexual activity.  Tell your doctor about any erectile dysfunction (ED) medication you are taking. Some ED medications are not safe if you take certain heart medications.  Try to manage stress.  Follow-Up  Make a follow-up appointment as directed by our staff.    When to Seek  Medical Attention  Call 911 right away if you have:  Chest pain that is not relieved by medication.  Shortness of breath.  Otherwise, call your doctor immediately if you have:  Lightheadedness, dizziness, or fainting.  Feeling of irregular heartbeat or fast pulse.   © 2143-2470 [employer], [address] [place] [address]. All rights reserved. This information is not intended as a substitute for professional medical care. Always follow your healthcare professional's instructions.

## 2019-05-12 ENCOUNTER — HOSPITAL ENCOUNTER (OUTPATIENT)
Facility: HOSPITAL | Age: 84
Discharge: HOME | End: 2019-05-12
Attending: EMERGENCY MEDICINE | Admitting: EMERGENCY MEDICINE
Payer: MEDICARE

## 2019-05-12 VITALS
OXYGEN SATURATION: 96 % | TEMPERATURE: 98 F | SYSTOLIC BLOOD PRESSURE: 139 MMHG | BODY MASS INDEX: 26.6 KG/M2 | DIASTOLIC BLOOD PRESSURE: 65 MMHG | WEIGHT: 175.5 LBS | RESPIRATION RATE: 18 BRPM | HEART RATE: 59 BPM | HEIGHT: 68 IN

## 2019-05-12 DIAGNOSIS — E11.9 TYPE 2 DIABETES MELLITUS WITHOUT COMPLICATION, WITHOUT LONG-TERM CURRENT USE OF INSULIN: ICD-10-CM

## 2019-05-12 DIAGNOSIS — I16.0 HYPERTENSIVE URGENCY: ICD-10-CM

## 2019-05-12 DIAGNOSIS — I10 ESSENTIAL HYPERTENSION, BENIGN: ICD-10-CM

## 2019-05-12 DIAGNOSIS — E78.2 MIXED HYPERLIPIDEMIA: ICD-10-CM

## 2019-05-12 DIAGNOSIS — I25.10 CORONARY ARTERY DISEASE, ANGINA PRESENCE UNSPECIFIED, UNSPECIFIED VESSEL OR LESION TYPE, UNSPECIFIED WHETHER NATIVE OR TRANSPLANTED HEART: ICD-10-CM

## 2019-05-12 DIAGNOSIS — R07.9 CHEST PAIN: ICD-10-CM

## 2019-05-12 DIAGNOSIS — I20.0 UNSTABLE ANGINA: Primary | ICD-10-CM

## 2019-05-12 LAB
ALBUMIN SERPL BCP-MCNC: 3.6 G/DL (ref 3.5–5.2)
ALP SERPL-CCNC: 68 U/L (ref 55–135)
ALT SERPL W/O P-5'-P-CCNC: 17 U/L (ref 10–44)
ANION GAP SERPL CALC-SCNC: 9 MMOL/L (ref 8–16)
AST SERPL-CCNC: 16 U/L (ref 10–40)
BASOPHILS # BLD AUTO: 0.01 K/UL (ref 0–0.2)
BASOPHILS NFR BLD: 0.1 % (ref 0–1.9)
BILIRUB SERPL-MCNC: 0.3 MG/DL (ref 0.1–1)
BUN SERPL-MCNC: 13 MG/DL (ref 8–23)
CALCIUM SERPL-MCNC: 8.9 MG/DL (ref 8.7–10.5)
CHLORIDE SERPL-SCNC: 104 MMOL/L (ref 95–110)
CO2 SERPL-SCNC: 24 MMOL/L (ref 23–29)
CREAT SERPL-MCNC: 1.1 MG/DL (ref 0.5–1.4)
DIFFERENTIAL METHOD: ABNORMAL
EOSINOPHIL # BLD AUTO: 0.2 K/UL (ref 0–0.5)
EOSINOPHIL NFR BLD: 2.6 % (ref 0–8)
ERYTHROCYTE [DISTWIDTH] IN BLOOD BY AUTOMATED COUNT: 13.5 % (ref 11.5–14.5)
EST. GFR  (AFRICAN AMERICAN): >60 ML/MIN/1.73 M^2
EST. GFR  (NON AFRICAN AMERICAN): >60 ML/MIN/1.73 M^2
GLUCOSE SERPL-MCNC: 114 MG/DL (ref 70–110)
HCT VFR BLD AUTO: 40 % (ref 40–54)
HGB BLD-MCNC: 13.5 G/DL (ref 14–18)
LYMPHOCYTES # BLD AUTO: 1.8 K/UL (ref 1–4.8)
LYMPHOCYTES NFR BLD: 20.3 % (ref 18–48)
MAGNESIUM SERPL-MCNC: 1.9 MG/DL (ref 1.6–2.6)
MCH RBC QN AUTO: 30.8 PG (ref 27–31)
MCHC RBC AUTO-ENTMCNC: 33.8 G/DL (ref 32–36)
MCV RBC AUTO: 91 FL (ref 82–98)
MONOCYTES # BLD AUTO: 0.9 K/UL (ref 0.3–1)
MONOCYTES NFR BLD: 10 % (ref 4–15)
NEUTROPHILS # BLD AUTO: 5.8 K/UL (ref 1.8–7.7)
NEUTROPHILS NFR BLD: 67 % (ref 38–73)
PLATELET # BLD AUTO: 269 K/UL (ref 150–350)
PMV BLD AUTO: 9.1 FL (ref 9.2–12.9)
POCT GLUCOSE: 118 MG/DL (ref 70–110)
POTASSIUM SERPL-SCNC: 3.5 MMOL/L (ref 3.5–5.1)
PROT SERPL-MCNC: 6.5 G/DL (ref 6–8.4)
RBC # BLD AUTO: 4.38 M/UL (ref 4.6–6.2)
SODIUM SERPL-SCNC: 137 MMOL/L (ref 136–145)
TROPONIN I SERPL DL<=0.01 NG/ML-MCNC: 0.01 NG/ML (ref 0–0.03)
TROPONIN I SERPL DL<=0.01 NG/ML-MCNC: <0.006 NG/ML (ref 0–0.03)
WBC # BLD AUTO: 8.62 K/UL (ref 3.9–12.7)

## 2019-05-12 PROCEDURE — 93010 EKG 12-LEAD: ICD-10-PCS | Mod: ,,, | Performed by: INTERNAL MEDICINE

## 2019-05-12 PROCEDURE — G0378 HOSPITAL OBSERVATION PER HR: HCPCS

## 2019-05-12 PROCEDURE — 93005 ELECTROCARDIOGRAM TRACING: CPT

## 2019-05-12 PROCEDURE — 25000003 PHARM REV CODE 250: Performed by: NURSE PRACTITIONER

## 2019-05-12 PROCEDURE — 36415 COLL VENOUS BLD VENIPUNCTURE: CPT

## 2019-05-12 PROCEDURE — 85025 COMPLETE CBC W/AUTO DIFF WBC: CPT

## 2019-05-12 PROCEDURE — 25000003 PHARM REV CODE 250: Performed by: EMERGENCY MEDICINE

## 2019-05-12 PROCEDURE — 93010 ELECTROCARDIOGRAM REPORT: CPT | Mod: ,,, | Performed by: INTERNAL MEDICINE

## 2019-05-12 PROCEDURE — 99220 PR INITIAL OBSERVATION CARE,LEVL III: ICD-10-PCS | Mod: ,,, | Performed by: INTERNAL MEDICINE

## 2019-05-12 PROCEDURE — 80053 COMPREHEN METABOLIC PANEL: CPT

## 2019-05-12 PROCEDURE — 84484 ASSAY OF TROPONIN QUANT: CPT | Mod: 91

## 2019-05-12 PROCEDURE — 99220 PR INITIAL OBSERVATION CARE,LEVL III: CPT | Mod: ,,, | Performed by: INTERNAL MEDICINE

## 2019-05-12 PROCEDURE — 99285 EMERGENCY DEPT VISIT HI MDM: CPT | Mod: 25

## 2019-05-12 PROCEDURE — 83735 ASSAY OF MAGNESIUM: CPT

## 2019-05-12 PROCEDURE — 84484 ASSAY OF TROPONIN QUANT: CPT

## 2019-05-12 RX ORDER — ISOSORBIDE MONONITRATE 30 MG/1
30 TABLET, EXTENDED RELEASE ORAL DAILY
Status: DISCONTINUED | OUTPATIENT
Start: 2019-05-12 | End: 2019-05-12 | Stop reason: HOSPADM

## 2019-05-12 RX ORDER — ENOXAPARIN SODIUM 100 MG/ML
40 INJECTION SUBCUTANEOUS EVERY 24 HOURS
Status: DISCONTINUED | OUTPATIENT
Start: 2019-05-12 | End: 2019-05-12 | Stop reason: HOSPADM

## 2019-05-12 RX ORDER — NITROGLYCERIN 0.4 MG/1
0.4 TABLET SUBLINGUAL EVERY 5 MIN PRN
Status: DISCONTINUED | OUTPATIENT
Start: 2019-05-12 | End: 2019-05-12 | Stop reason: HOSPADM

## 2019-05-12 RX ORDER — METOPROLOL SUCCINATE 25 MG/1
25 TABLET, EXTENDED RELEASE ORAL DAILY
Status: DISCONTINUED | OUTPATIENT
Start: 2019-05-12 | End: 2019-05-12 | Stop reason: HOSPADM

## 2019-05-12 RX ORDER — HYDROCHLOROTHIAZIDE 12.5 MG/1
12.5 TABLET ORAL DAILY
Status: DISCONTINUED | OUTPATIENT
Start: 2019-05-12 | End: 2019-05-12 | Stop reason: HOSPADM

## 2019-05-12 RX ORDER — METOPROLOL TARTRATE 1 MG/ML
5 INJECTION, SOLUTION INTRAVENOUS EVERY 5 MIN PRN
Status: DISCONTINUED | OUTPATIENT
Start: 2019-05-12 | End: 2019-05-12

## 2019-05-12 RX ORDER — HYDRALAZINE HYDROCHLORIDE 20 MG/ML
10 INJECTION INTRAMUSCULAR; INTRAVENOUS EVERY 6 HOURS PRN
Status: DISCONTINUED | OUTPATIENT
Start: 2019-05-12 | End: 2019-05-12

## 2019-05-12 RX ORDER — ONDANSETRON 2 MG/ML
4 INJECTION INTRAMUSCULAR; INTRAVENOUS EVERY 8 HOURS PRN
Status: DISCONTINUED | OUTPATIENT
Start: 2019-05-12 | End: 2019-05-12 | Stop reason: HOSPADM

## 2019-05-12 RX ORDER — ACETAMINOPHEN 325 MG/1
650 TABLET ORAL EVERY 8 HOURS PRN
Status: DISCONTINUED | OUTPATIENT
Start: 2019-05-12 | End: 2019-05-12 | Stop reason: HOSPADM

## 2019-05-12 RX ORDER — SODIUM CHLORIDE 0.9 % (FLUSH) 0.9 %
10 SYRINGE (ML) INJECTION
Status: DISCONTINUED | OUTPATIENT
Start: 2019-05-12 | End: 2019-05-12 | Stop reason: HOSPADM

## 2019-05-12 RX ORDER — FAMOTIDINE 20 MG/1
20 TABLET, FILM COATED ORAL 2 TIMES DAILY
Status: DISCONTINUED | OUTPATIENT
Start: 2019-05-12 | End: 2019-05-12 | Stop reason: HOSPADM

## 2019-05-12 RX ORDER — HYDROCHLOROTHIAZIDE 12.5 MG/1
12.5 TABLET ORAL DAILY
Status: DISCONTINUED | OUTPATIENT
Start: 2019-05-12 | End: 2019-05-12

## 2019-05-12 RX ORDER — ISOSORBIDE MONONITRATE 30 MG/1
30 TABLET, EXTENDED RELEASE ORAL DAILY
Status: DISCONTINUED | OUTPATIENT
Start: 2019-05-12 | End: 2019-05-12

## 2019-05-12 RX ORDER — ASPIRIN 325 MG
325 TABLET ORAL
Status: COMPLETED | OUTPATIENT
Start: 2019-05-12 | End: 2019-05-12

## 2019-05-12 RX ORDER — METOPROLOL SUCCINATE 25 MG/1
25 TABLET, EXTENDED RELEASE ORAL DAILY
Status: DISCONTINUED | OUTPATIENT
Start: 2019-05-12 | End: 2019-05-12

## 2019-05-12 RX ORDER — AMLODIPINE BESYLATE 5 MG/1
5 TABLET ORAL
Status: COMPLETED | OUTPATIENT
Start: 2019-05-12 | End: 2019-05-12

## 2019-05-12 RX ADMIN — ASPIRIN 325 MG ORAL TABLET 325 MG: 325 PILL ORAL at 01:05

## 2019-05-12 RX ADMIN — FAMOTIDINE 20 MG: 20 TABLET ORAL at 08:05

## 2019-05-12 RX ADMIN — NITROGLYCERIN 1 INCH: 20 OINTMENT TOPICAL at 03:05

## 2019-05-12 RX ADMIN — ISOSORBIDE MONONITRATE 30 MG: 30 TABLET, EXTENDED RELEASE ORAL at 09:05

## 2019-05-12 RX ADMIN — HYDROCHLOROTHIAZIDE 12.5 MG: 12.5 TABLET ORAL at 09:05

## 2019-05-12 RX ADMIN — AMLODIPINE BESYLATE 5 MG: 5 TABLET ORAL at 03:05

## 2019-05-12 RX ADMIN — METOPROLOL SUCCINATE 25 MG: 25 TABLET, EXTENDED RELEASE ORAL at 09:05

## 2019-05-12 NOTE — ASSESSMENT & PLAN NOTE
Atypical in nature right-sided Ache nonradiating no associated symptoms with initial workup unremarkable.  Negative troponin 1 levels x2--- continue to trend troponin levels, Continuous cardiac monitoring, continue home Imdur   ASA 81 mg PO Daily  2D Echo  Cardiology consult

## 2019-05-12 NOTE — ASSESSMENT & PLAN NOTE
Patient's pressure was uncontrolled restart home meds including Imdur 30 mg daily HCTZ 12.5 mg daily metoprolol 25 mg daily  Monitor closely and adjust as warranted

## 2019-05-12 NOTE — CONSULTS
[facility]  Cardiology  Consult Note    Patient Name: Juan Martínez Jr.  MRN: [id number]  Admission Date: 5/12/2019  Hospital Length of Stay: 0 days  Code Status: Full Code   Attending Provider: Piyush Farfan MD   Consulting Provider: Deejay Duron MD  Primary Care Physician: Lissy Gavin MD  Principal Problem:Unstable angina    Patient information was obtained from patient and ER records.     Consults  Subjective:     Chief Complaint:  CP     HPI:   Patient presents with complaints of right-sided chest pain that started at 10:00 p.m. tonight while he is at rest.  States that was dull in nature.  Patient states he took 1 nitroglycerin with relief pain. Pain lasts less than 20 min.  Denies associated shortness of breath.  Denies radiation of pain. Denies abdominal pain.  Denies palpitations.  Denies dizziness.  Denies diaphoresis.  Patient was recently admitted and treated for NSTEMI.  He is currently under medical management.  He was told to report to the emergency room if he had a recurrence of chest pain. He is currently chest pain-free.  He had a cardiac catheterization 5/6/19:   Coronary Anatomy:  LM:  No significant stenosis  LAD:  Mid LAD 60-70% stenosis.  Distal LAD 50% stenosis.  IFR 0.92 (nonischemic).  Diagonal 1 is a tortuous vessel with mid 80% stenosis.  Will medically manage this diagonal stenosis  LCx :  Mild nonobstructive CAD  RCA:  50% distal RCA/proximal PDA lesion.  IFR 1 (nonischemic)       Currently CP free  Troponin negative x 2  EKG NSR LAD no acute STT changes    Saw Dr Nieto during recent admission     * NSTEMI (non-ST elevated myocardial infarction)  Currently chest pain-free.  Coronary angiogram yesterday demonstrated a nonobstructive coronary artery disease in LAD and RCA and a  80% lesion in the diagonal.  No complications from the coronary angiogram.  Switched statin to atorvastatin 40 mg yesterday.  Will start him on Imdur 30 mg daily.  Follow-up  in Cardiology Clinic with me.        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  WITH LOCAL TISSUE ARRANGEMENT Left 11/15/2018    Performed by [facility] at [facility] OR    EXCISION, CARCINOMA, SQUAMOUS CELL @ RIGHT HAND Right 11/15/2018    Performed by [facility] at [facility] OR    Fractional Flow [place] (FFR), Coronary  5/6/2019    Performed by [facility]ad [facility] at [facility] CATH LAB    JOINT REPLACEMENT      Left heart cath Left 5/6/2019    [facility] at [facility] CATH LAB    TONSILLECTOMY      TOTAL KNEE ARTHROPLASTY         Review of patient's allergies indicates:  No Known Allergies    No current facility-administered medications on file prior to encounter.      Current Outpatient Medications on File Prior to Encounter   Medication Sig    AMITIZA 24 mcg Cap 24 mcg 2 (two) times daily with meals.     aspirin (ECOTRIN) 81 MG EC tablet Take 81 mg by mouth once daily.    atorvastatin (LIPITOR) 40 MG tablet Take 1 tablet (40 mg total) by mouth once daily.    hydroCHLOROthiazide (MICROZIDE) 12.5 mg capsule     isosorbide mononitrate (IMDUR) 30 MG 24 hr tablet Take 1 tablet (30 mg total) by mouth once daily.    metformin (GLUCOPHAGE) 1000 MG tablet Take 1 tablet (1,000 mg total) by mouth 2 (two) times daily with meals.    metoprolol succinate (TOPROL-XL) 25 MG 24 hr tablet Take 1 tablet (25 mg total) by mouth once daily.    nitroGLYCERIN (NITROSTAT) 0.4 MG SL tablet Place 1 tablet (0.4 mg total) under the tongue every 5 (five) minutes as needed for Chest pain.     Family History     Problem Relation (Age of Onset)    Heart disease Mother     Stroke Father        Tobacco Use    Smoking status: Former Smoker     Packs/day: 0.00     Types: Cigarettes     Last attempt to quit: 2016     Years since quittin.7    Smokeless tobacco: Never Used    Tobacco comment: 2016   Substance and Sexual Activity    Alcohol use: No    Drug use: No    Sexual activity: Not Currently     Review of Systems   Constitution: Negative for decreased appetite.   HENT: Negative for ear discharge.    Eyes: Negative for blurred vision.   Endocrine: Negative for polyphagia.   Skin: Negative for nail changes.   Genitourinary: Negative for bladder incontinence.   Neurological: Negative for aphonia.   Psychiatric/Behavioral: Negative for hallucinations.   Allergic/Immunologic: Negative for hives.     Objective:     Vital Signs (Most Recent):  Temp: 97.5 °F (36.4 °C) (19 07)  Pulse: 71 (19 0930)  Resp: 18 (19)  BP: (!) 161/74 (19 07)  SpO2: 95 % (19) Vital Signs (24h Range):  Temp:  [97.5 °F (36.4 °C)-97.9 °F (36.6 °C)] 97.5 °F (36.4 °C)  Pulse:  [49-71] 71  Resp:  [16-24] 18  SpO2:  [93 %-97 %] 95 %  BP: (152-226)/(70-89) 161/74     Weight: 79.6 kg (175 lb 7.8 oz)  Body mass index is 26.68 kg/m².    SpO2: 95 %  O2 Device (Oxygen Therapy): room air    No intake or output data in the 24 hours ending 19 1104    Lines/Drains/Airways     Peripheral Intravenous Line                 Peripheral IV - Single Lumen 19 0140 20 G Left Forearm less than 1 day                Physical Exam   Constitutional: He is oriented to person, place, and time. He appears well-developed and well-nourished.   HENT:   Head: Normocephalic and atraumatic.   Eyes: Pupils are equal, round, and reactive to light. Conjunctivae are normal.   Neck: Normal range of motion. Neck supple.   Cardiovascular: Normal rate, normal heart sounds and intact distal pulses.   Pulmonary/Chest: Effort normal and breath sounds normal.   Abdominal: Soft. Bowel sounds  are normal.   Musculoskeletal: Normal range of motion.   Neurological: He is alert and oriented to person, place, and time.   Skin: Skin is warm and dry.       Significant Labs: All pertinent lab results from the last 24 hours have been reviewed.    Significant Imaging: Echocardiogram: 2D echo with color flow doppler: No results found for this or any previous visit.    Assessment and Plan:     * Unstable angina  Moderate CAD - medical Rx by recent [facility]. Current episode of CP somewhat atypical. No MI. Ok for d/c and f/u by Dr Nieto as scheduled    Hyperlipidemia  On statin    Type 2 diabetes mellitus  Per primary    Essential hypertension, benign  stable        VTE Risk Mitigation (From admission, onward)        Ordered     enoxaparin injection 40 mg  Daily      05/12/19 0823     IP VTE HIGH RISK PATIENT  Once      05/12/19 7160          Thank you for your consult. I will sign off. Please contact us if you have any additional questions.    Deejay Duron MD  Cardiology   [facility]

## 2019-05-12 NOTE — ASSESSMENT & PLAN NOTE
Continue home Lipitor -last lipid panel showed decent control      Results for ERIC HAGEN JR. (MRN [id number]) as of 5/12/2019 10:59   Ref. Range 5/6/2019 10:45   Cholesterol Latest Ref Range: 120 - 199 mg/dL 134   HDL Latest Ref Range: 40 - 75 mg/dL 45   Hdl/Cholesterol Ratio Latest Ref Range: 20.0 - 50.0 % 33.6   LDL Cholesterol Latest Ref Range: 63.0 - 159.0 mg/dL 73.0   Non-HDL Cholesterol Latest Units: mg/dL 89   Total Cholesterol/HDL Ratio Latest Ref Range: 2.0 - 5.0  3.0   Triglycerides Latest Ref Range: 30 - 150 mg/dL 80

## 2019-05-12 NOTE — DISCHARGE SUMMARY
[facility] Hospital Medicine  Discharge Summary      Patient Name: Juan Martínez Jr.  MRN: [id number]  Admission Date: 5/12/2019  Hospital Length of Stay: 0 days  Discharge Date and Time:  05/12/2019 1:34 PM  Attending Physician: Angela att. providers found   Discharging Provider: LA NEAN Umanzor  Primary Care Provider: Lissy Gavin MD      HPI:   Juan Martínez Jr. is a 83 y.o. male with PMHx including diabetes, hypertension, hyperlipidemia, prostate cancer.  Per patient he presented after he had 1 episode of right sided chest pain that felt like an ache that began at rest while he was watching TV in late evening.  Patient reports he called his sister who does have some cardiac disease and she advised him to take a nitroglycerin which he took and resolved after about 20 min.  He denies any other associated symptoms.  His sister devise and adjust come to the hospital.  Does not recall what he ate for dinner.  Patient denies any recent long trips, any lower extremity edema, upper respiratory infection, fever chills, headaches, numbness or tingling in any extremity, changes in bowel or bladder functions.  The patient denies any new episodes of the chest pain    His initial workup is essentially unchanged she has no white count or fever renal function electrolytes appear within normal limits, initial troponin negative; chest x-ray without acute processes.        * No surgery found *      Hospital Course:   Placed in observation for chest pain rule out. Patient's right sided chest achy pain was somewhat atypical and chart review noted recent workup up: His troponin 1 levels trended negative without new ischemic changes on EKG. Cardiology consulted due to risk factors - ok to follow up outpatient with Dr. Nieto as already scheduled. He is stable, has not had any new Chest pain since that time, vitals stable - discharge to home - fu in cardiology clinic    Heart Cath:  [facility] on 5/9/19 LM:  No  significant stenosis  LAD:  Mid LAD 60-70% stenosis.  Distal LAD 50% stenosis.  IFR 0.92 (nonischemic).  Diagonal 1 is a tortuous vessel with mid 80% stenosis.  Will medically manage this diagonal stenosis  LCx :  Mild nonobstructive CAD  RCA:  50% distal RCA/proximal PDA lesion.  IFR 1 (nonischemic)         Consults:   Consults (From admission, onward)        Status Ordering Provider     Inpatient consult to Cardiology  Once     Provider:  (Not yet assigned)    Acknowledged AR GASTON            Final Active Diagnoses:    Diagnosis Date Noted POA    PRINCIPAL PROBLEM:  Unstable angina [I20.0] 05/12/2019 Yes    Essential hypertension, benign [I10] 09/25/2014 Yes    Type 2 diabetes mellitus [E11.9] 09/25/2014 Yes    Hyperlipidemia [E78.5] 09/25/2014 Yes      Problems Resolved During this Admission:       Discharged Condition: stable    Disposition: Home or Self Care    Follow Up:  Follow-up Information     Lissy Gavin MD.    Specialty:  Internal Medicine  Contact information:  [address] [facility] [address]  [phone]             Rashad Nieto MD On 5/14/2019.    Specialties:  Cardiology, INTERVENTIONAL CARDIOLOGY  Why:  As already scheduled, For outpatient follow-up/post hospitalizaton  Contact information:  [address]  [address]  [facility] [address] [phone]                 Patient Instructions:      Diet Cardiac     Activity as tolerated       Significant Diagnostic Studies: Labs:   CMP   Recent Labs   Lab 05/12/19  0140      K 3.5      CO2 24   *   BUN 13   CREATININE 1.1   CALCIUM 8.9   PROT 6.5   ALBUMIN 3.6   BILITOT 0.3   ALKPHOS 68   AST 16   ALT 17   ANIONGAP 9   ESTGFRAFRICA >60   EGFRNONAA >60   , CBC   Recent Labs   Lab 05/12/19  0140   WBC 8.62   HGB 13.5*   HCT 40.0      , INR   Lab Results   Component Value Date    INR 1.0 01/30/2014    INR 0.9 11/20/2009   , Lipid Panel   Lab Results   Component Value Date    CHOL 134 05/06/2019    HDL 45  05/06/2019    LDLCALC 73.0 05/06/2019    TRIG 80 05/06/2019    CHOLHDL 33.6 05/06/2019   , Troponin   Recent Labs   Lab 05/12/19  0654   TROPONINI <0.006    and A1C:   Recent Labs   Lab 05/07/19  0610   HGBA1C 7.0*        Medications:  Reconciled Home Medications:      Medication List      CONTINUE taking these medications    AMITIZA 24 MCG Cap  Generic drug:  lubiprostone  24 mcg 2 (two) times daily with meals.     aspirin 81 MG EC tablet  Commonly known as:  ECOTRIN  Take 81 mg by mouth once daily.     atorvastatin 40 MG tablet  Commonly known as:  LIPITOR  Take 1 tablet (40 mg total) by mouth once daily.     hydroCHLOROthiazide 12.5 mg capsule  Commonly known as:  MICROZIDE     isosorbide mononitrate 30 MG 24 hr tablet  Commonly known as:  IMDUR  Take 1 tablet (30 mg total) by mouth once daily.     metFORMIN 1000 MG tablet  Commonly known as:  GLUCOPHAGE  Take 1 tablet (1,000 mg total) by mouth 2 (two) times daily with meals.     metoprolol succinate 25 MG 24 hr tablet  Commonly known as:  TOPROL-XL  Take 1 tablet (25 mg total) by mouth once daily.     nitroGLYCERIN 0.4 MG SL tablet  Commonly known as:  NITROSTAT  Place 1 tablet (0.4 mg total) under the tongue every 5 (five) minutes as needed for Chest pain.            Indwelling Lines/Drains at time of discharge:   Lines/Drains/Airways          None          Time spent on the discharge of patient: 35 minutes  Patient was seen and examined on the date of discharge and determined to be suitable for discharge.       GINNA Feliz, FNP-C  Hospitalist - Department of Hospital Medicine  [address], [place] [address]  Office [phone]; Pager [phone]

## 2019-05-12 NOTE — H&P
[facility] Medicine  History & Physical    Patient Name: Juan Martínze Jr.  MRN: [id number]  Admission Date: 5/12/2019  Attending Physician: Piyush Farfan MD   Primary Care Provider: Lissy Gavin MD         Patient information was obtained from patient and ER records.     Subjective:     Principal Problem:Unstable angina    Chief Complaint:   Chief Complaint   Patient presents with    Chest Pain     pt states shortly after 2200 hrs he had an episode of right sided chest pain. took one nitro and pain was releived. states he recently had a heart cath and was told he had some blockage and is treating it w/ meds        HPI: Juan Martínez Jr. is a 83 y.o. male with PMHx including diabetes, hypertension, hyperlipidemia, prostate cancer.  Per patient he presented after he had 1 episode of right sided chest pain that felt like an ache that began at rest while he was watching TV in late evening.  Patient reports he called his sister who does have some cardiac disease and she advised him to take a nitroglycerin which he took and resolved after about 20 min.  He denies any other associated symptoms.  His sister devise and adjust come to the hospital.  Does not recall what he ate for dinner.  Patient denies any recent long trips, any lower extremity edema, upper respiratory infection, fever chills, headaches, numbness or tingling in any extremity, changes in bowel or bladder functions.  The patient denies any new episodes of the chest pain    His initial workup is essentially unchanged she has no white count or fever renal function electrolytes appear within normal limits, initial troponin negative; chest x-ray without acute processes.        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 Left 11/15/2018    [facility] at [facility] OR    EXCISION, CARCINOMA, SQUAMOUS CELL @ RIGHT HAND Right 11/15/2018    [facility] at [facility] OR    Fractional Flow [place] (FFR), Coronary  2019    Performed by [facility]ad [facility]o [facility] at [facility] CATH LAB    JOINT REPLACEMENT      Left heart cath Left 2019    [facility] at [facility] CATH LAB    TONSILLECTOMY      TOTAL KNEE ARTHROPLASTY         Review of patient's allergies indicates:  No Known Allergies    No current facility-administered medications on file prior to encounter.      Current Outpatient Medications on File Prior to Encounter   Medication Sig    AMITIZA 24 mcg Cap 24 mcg 2 (two) times daily with meals.     aspirin (ECOTRIN) 81 MG EC tablet Take 81 mg by mouth once daily.    atorvastatin (LIPITOR) 40 MG tablet Take 1 tablet (40 mg total) by mouth once daily.    hydroCHLOROthiazide (MICROZIDE) 12.5 mg capsule     isosorbide mononitrate (IMDUR) 30 MG 24 hr tablet Take 1 tablet (30 mg total) by mouth once daily.    metformin (GLUCOPHAGE) 1000 MG tablet Take 1 tablet (1,000 mg total) by mouth 2 (two) times daily with meals.    metoprolol succinate (TOPROL-XL) 25 MG 24 hr tablet Take 1 tablet (25 mg total) by mouth once daily.    nitroGLYCERIN (NITROSTAT) 0.4 MG SL tablet Place 1 tablet (0.4 mg total) under the tongue every 5 (five) minutes as needed for Chest pain.     Family History     Problem Relation (Age of Onset)    Heart disease Mother    Stroke Father        Tobacco Use    Smoking status: Former Smoker     Packs/day: 0.00     Types: Cigarettes     Last attempt to quit: 2016     Years since quittin.7    Smokeless tobacco: Never Used    Tobacco comment: 2016   Substance and Sexual  Activity    Alcohol use: No    Drug use: No    Sexual activity: Not Currently     Review of Systems   Constitutional: Negative for appetite change, chills, diaphoresis and fever.   HENT: Negative for congestion, hearing loss, sore throat, tinnitus and trouble swallowing.    Eyes: Negative for photophobia, discharge, itching and visual disturbance.   Respiratory: Negative for apnea, cough, wheezing and stridor.    Cardiovascular: Positive for chest pain. Negative for palpitations and leg swelling.   Gastrointestinal: Negative for abdominal distention, abdominal pain, blood in stool, constipation, diarrhea and nausea.   Endocrine: Negative for polydipsia, polyphagia and polyuria.   Genitourinary: Negative for difficulty urinating, dysuria, flank pain and frequency.   Musculoskeletal: Negative for arthralgias, joint swelling and neck stiffness.   Skin: Negative for color change, rash and wound.   Neurological: Negative for dizziness, tremors, seizures, light-headedness, numbness and headaches.   Hematological: Negative for adenopathy.   Psychiatric/Behavioral: Negative for hallucinations and self-injury.     Objective:     Vital Signs (Most Recent):  Temp: 97.5 °F (36.4 °C) (05/12/19 0739)  Pulse: 71 (05/12/19 0930)  Resp: 18 (05/12/19 0739)  BP: (!) 161/74 (05/12/19 0739)  SpO2: 95 % (05/12/19 0739) Vital Signs (24h Range):  Temp:  [97.5 °F (36.4 °C)-97.9 °F (36.6 °C)] 97.5 °F (36.4 °C)  Pulse:  [49-71] 71  Resp:  [16-24] 18  SpO2:  [93 %-97 %] 95 %  BP: (152-226)/(70-89) 161/74     Weight: 79.6 kg (175 lb 7.8 oz)  Body mass index is 26.68 kg/m².    Physical Exam   Constitutional: He appears well-developed and well-nourished. He is cooperative.   HENT:   Head: Normocephalic and atraumatic.   Eyes: Conjunctivae and lids are normal.   Neck: Full passive range of motion without pain. Neck supple. No JVD present. No edema present. No thyroid mass present.   Cardiovascular: S1 normal, S2 normal and intact distal pulses.    No murmur heard.  Abdominal: Soft. Bowel sounds are normal. He exhibits no distension and no abdominal bruit. There is no splenomegaly or hepatomegaly. There is no tenderness. There is no CVA tenderness.   Lymphadenopathy:     He has no cervical adenopathy.     He has no axillary adenopathy.   Neurological: He is alert. He has normal reflexes. He displays no tremor. He displays no seizure activity.   Skin: Skin is warm, dry and intact.   Psychiatric: He has a normal mood and affect. His speech is normal. Thought content normal. Cognition and memory are normal.           Significant Labs:   CBC:   Recent Labs   Lab 05/12/19  0140   WBC 8.62   HGB 13.5*   HCT 40.0        CMP:   Recent Labs   Lab 05/12/19  0140      K 3.5      CO2 24   *   BUN 13   CREATININE 1.1   CALCIUM 8.9   PROT 6.5   ALBUMIN 3.6   BILITOT 0.3   ALKPHOS 68   AST 16   ALT 17   ANIONGAP 9   EGFRNONAA >60       Troponin:   Recent Labs   Lab 05/12/19  0140 05/12/19  0654   TROPONINI 0.012 <0.006       Significant Imaging:   Imaging Results          X-Ray Chest AP Portable (Final result)  Result time 05/12/19 02:15:10    Final result by Apoorva Suero MD (05/12/19 02:15:10)                 Impression:      No acute cardiopulmonary process identified.      Electronically signed by: Apoorva Suero MD  Date:    05/12/2019  Time:    02:15             Narrative:    EXAMINATION:  XR CHEST AP PORTABLE    CLINICAL HISTORY:  Chest Pain;    TECHNIQUE:  Single frontal view of the chest was performed.    COMPARISON:  05/06/2019.    FINDINGS:  Cardiac silhouette is normal in size.  Lungs are symmetrically expanded.  No evidence of focal consolidative process, pneumothorax, or significant effusion.  No acute osseous abnormality identified.                                  Assessment/Plan:     * Unstable angina  Atypical in nature right-sided Ache nonradiating no associated symptoms with initial workup unremarkable.  Negative troponin 1  levels x2--- continue to trend troponin levels, Continuous cardiac monitoring, continue home Imdur   ASA 81 mg PO Daily  2D Echo  Cardiology consult            Type 2 diabetes mellitus  His blood sugar appears decent [place] controlled on metformin.  Will hold metformin while in the hospital in use sliding scale correction POCT glucose monitoring      Essential hypertension, benign  Patient's pressure was uncontrolled restart home meds including Imdur 30 mg daily HCTZ 12.5 mg daily metoprolol 25 mg daily  Monitor closely and adjust as warranted      Hyperlipidemia  Continue home Lipitor -last lipid panel showed decent control      Results for ERIC HAGEN JR. (MRN [id number]) as of 5/12/2019 10:59   Ref. Range 5/6/2019 10:45   Cholesterol Latest Ref Range: 120 - 199 mg/dL 134   HDL Latest Ref Range: 40 - 75 mg/dL 45   Hdl/Cholesterol Ratio Latest Ref Range: 20.0 - 50.0 % 33.6   LDL Cholesterol Latest Ref Range: 63.0 - 159.0 mg/dL 73.0   Non-HDL Cholesterol Latest Units: mg/dL 89   Total Cholesterol/HDL Ratio Latest Ref Range: 2.0 - 5.0  3.0   Triglycerides Latest Ref Range: 30 - 150 mg/dL 80         VTE Risk Mitigation (From admission, onward)        Ordered     enoxaparin injection 40 mg  Daily      05/12/19 0853     IP VTE HIGH RISK PATIENT  Once      05/12/19 0411             GINNA Feliz, FNP-C  Hospitalist - Department of Hospital Medicine  [address] [place] [address]  Office [phone]; Pager [phone]

## 2019-05-12 NOTE — ASSESSMENT & PLAN NOTE
His blood sugar appears decent [place] controlled on metformin.  Will hold metformin while in the hospital in use sliding scale correction POCT glucose monitoring

## 2019-05-12 NOTE — ASSESSMENT & PLAN NOTE
Moderate CAD - medical Rx by recent [facility]. Current episode of CP somewhat atypical. No MI. Ok for d/c and f/u by Dr Nieto as scheduled

## 2019-05-12 NOTE — PLAN OF CARE
Problem: Fall Injury Risk  Goal: Absence of Fall and Fall-Related Injury    Intervention: Identify and Manage Contributors to Fall Injury Risk     05/12/19 0514   Manage Acute Allergic Reaction   Medication Review/Management high risk medications identified;medications reviewed   Identify and Manage Contributors to Fall Injury Risk   Self-Care Promotion independence encouraged     Intervention: Promote Injury-Free Environment     05/12/19 0514   Optimize [place] and Functional Mobility   Environmental Safety Modification assistive device/personal items within reach;clutter free environment maintained;lighting adjusted;room near unit station   Optimize Balance and Safe Activity   Safety Promotion/Fall Prevention Fall Risk reviewed with patient/family;nonskid shoes/socks when out of bed;side rails raised x 2         Problem: Pain (Acute Coronary Syndrome)  Goal: Absence of Cardiac-Related Pain    Intervention: Manage Cardiac Pain Symptoms     05/12/19 0515   Manage Acute Chest Pain   Chest Pain Intervention activity minimized;cardiac monitoring continued         Comments: Pt AAOx4 w/ no c/o pain during this shift. Pt VS elevated, but stable and educated about the importance him being monitored. Pt was SB on monitor and resting comfortably at bedside.

## 2019-05-12 NOTE — SUBJECTIVE & OBJECTIVE
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  WITH LOCAL TISSUE ARRANGEMENT Left 11/15/2018    [facility] at [facility] OR    EXCISION, CARCINOMA, SQUAMOUS CELL @ RIGHT HAND Right 11/15/2018    [facility] at [facility] OR    Fractional Flow [place] (FFR), Coronary  5/6/2019    [facility]formed [facility] at [facility] CATH LAB    JOINT REPLACEMENT      Left heart cath Left 5/6/2019    [facility] by [facility] at [facility] CATH LAB    TONSILLECTOMY      TOTAL KNEE ARTHROPLASTY         Review of patient's allergies indicates:  No Known Allergies    No current facility-administered medications on file prior to encounter.      Current Outpatient Medications on File Prior to Encounter   Medication Sig    AMITIZA 24 mcg Cap 24 mcg 2 (two) times daily with meals.     aspirin (ECOTRIN) 81 MG EC tablet Take 81 mg by mouth once daily.    atorvastatin (LIPITOR) 40 MG tablet Take 1 tablet (40 mg total) by mouth once daily.    hydroCHLOROthiazide (MICROZIDE) 12.5 mg capsule     isosorbide mononitrate (IMDUR) 30 MG 24 hr tablet Take 1 tablet (30 mg total) by mouth once daily.    metformin (GLUCOPHAGE) 1000 MG tablet Take 1 tablet (1,000 mg total) by mouth 2 (two) times daily with meals.    metoprolol succinate (TOPROL-XL) 25 MG 24 hr tablet Take 1 tablet (25 mg total) by mouth once daily.    nitroGLYCERIN (NITROSTAT) 0.4 MG SL tablet Place 1 tablet (0.4 mg total) under the tongue every 5 (five) minutes as needed for Chest pain.     Family History     Problem Relation (Age of Onset)    Heart disease Mother    Stroke Father        Tobacco Use     Smoking status: Former Smoker     Packs/day: 0.00     Types: Cigarettes     Last attempt to quit: 2016     Years since quittin.7    Smokeless tobacco: Never Used    Tobacco comment: 2016   Substance and Sexual Activity    Alcohol use: No    Drug use: No    Sexual activity: Not Currently     Review of Systems   Constitutional: Negative for appetite change, chills, diaphoresis and fever.   HENT: Negative for congestion, hearing loss, sore throat, tinnitus and trouble swallowing.    Eyes: Negative for photophobia, discharge, itching and visual disturbance.   Respiratory: Negative for apnea, cough, wheezing and stridor.    Cardiovascular: Positive for chest pain. Negative for palpitations and leg swelling.   Gastrointestinal: Negative for abdominal distention, abdominal pain, blood in stool, constipation, diarrhea and nausea.   Endocrine: Negative for polydipsia, polyphagia and polyuria.   Genitourinary: Negative for difficulty urinating, dysuria, flank pain and frequency.   Musculoskeletal: Negative for arthralgias, joint swelling and neck stiffness.   Skin: Negative for color change, rash and wound.   Neurological: Negative for dizziness, tremors, seizures, light-headedness, numbness and headaches.   Hematological: Negative for adenopathy.   Psychiatric/Behavioral: Negative for hallucinations and self-injury.     Objective:     Vital Signs (Most Recent):  Temp: 97.5 °F (36.4 °C) (19 0739)  Pulse: 71 (19 0930)  Resp: 18 (19 0739)  BP: (!) 161/74 (19 0739)  SpO2: 95 % (19 0739) Vital Signs (24h Range):  Temp:  [97.5 °F (36.4 °C)-97.9 °F (36.6 °C)] 97.5 °F (36.4 °C)  Pulse:  [49-71] 71  Resp:  [16-24] 18  SpO2:  [93 %-97 %] 95 %  BP: (152-226)/(70-89) 161/74     Weight: 79.6 kg (175 lb 7.8 oz)  Body mass index is 26.68 kg/m².    Physical Exam   Constitutional: He appears well-developed and well-nourished. He is cooperative.   HENT:   Head: Normocephalic and atraumatic.    Eyes: Conjunctivae and lids are normal.   Neck: Full passive range of motion without pain. Neck supple. No JVD present. No edema present. No thyroid mass present.   Cardiovascular: S1 normal, S2 normal and intact distal pulses.   No murmur heard.  Abdominal: Soft. Bowel sounds are normal. He exhibits no distension and no abdominal bruit. There is no splenomegaly or hepatomegaly. There is no tenderness. There is no CVA tenderness.   Lymphadenopathy:     He has no cervical adenopathy.     He has no axillary adenopathy.   Neurological: He is alert. He has normal reflexes. He displays no tremor. He displays no seizure activity.   Skin: Skin is warm, dry and intact.   Psychiatric: He has a normal mood and affect. His speech is normal. Thought content normal. Cognition and memory are normal.           Significant Labs:   CBC:   Recent Labs   Lab 05/12/19  0140   WBC 8.62   HGB 13.5*   HCT 40.0        CMP:   Recent Labs   Lab 05/12/19  0140      K 3.5      CO2 24   *   BUN 13   CREATININE 1.1   CALCIUM 8.9   PROT 6.5   ALBUMIN 3.6   BILITOT 0.3   ALKPHOS 68   AST 16   ALT 17   ANIONGAP 9   EGFRNONAA >60       Troponin:   Recent Labs   Lab 05/12/19  0140 05/12/19  0654   TROPONINI 0.012 <0.006       Significant Imaging:   Imaging Results          X-Ray Chest AP Portable (Final result)  Result time 05/12/19 02:15:10    Final result by Apoorva Suero MD (05/12/19 02:15:10)                 Impression:      No acute cardiopulmonary process identified.      Electronically signed by: Apoorva Suero MD  Date:    05/12/2019  Time:    02:15             Narrative:    EXAMINATION:  XR CHEST AP PORTABLE    CLINICAL HISTORY:  Chest Pain;    TECHNIQUE:  Single frontal view of the chest was performed.    COMPARISON:  05/06/2019.    FINDINGS:  Cardiac silhouette is normal in size.  Lungs are symmetrically expanded.  No evidence of focal consolidative process, pneumothorax, or significant effusion.  No acute  osseous abnormality identified.

## 2019-05-12 NOTE — NURSING
Pt arrived on unit from the ED dept via stretcher accompanied per transport. Pt AAOx4 with no c/o pain. Telemetry monitor in place. Saline lock in place to site is clear. Pt  Admission assessment in progress, pt informed of md orders, oriented to environment and safety maintained with bed low side rails up x2 with nurse call bell within reach

## 2019-05-12 NOTE — ED TRIAGE NOTES
Pt states shortly after 2200 he had an episode of right sided chest pain.  Took one nitro and pain was relieved.  Reports recent heart attack.

## 2019-05-12 NOTE — HPI
Patient presents with complaints of right-sided chest pain that started at 10:00 p.m. tonight while he is at rest.  States that was dull in nature.  Patient states he took 1 nitroglycerin with relief pain. Pain lasts less than 20 min.  Denies associated shortness of breath.  Denies radiation of pain. Denies abdominal pain.  Denies palpitations.  Denies dizziness.  Denies diaphoresis.  Patient was recently admitted and treated for NSTEMI.  He is currently under medical management.  He was told to report to the emergency room if he had a recurrence of chest pain. He is currently chest pain-free.  He had a cardiac catheterization 5/6/19:   Coronary Anatomy:  LM:  No significant stenosis  LAD:  Mid LAD 60-70% stenosis.  Distal LAD 50% stenosis.  IFR 0.92 (nonischemic).  Diagonal 1 is a tortuous vessel with mid 80% stenosis.  Will medically manage this diagonal stenosis  LCx :  Mild nonobstructive CAD  RCA:  50% distal RCA/proximal PDA lesion.  IFR 1 (nonischemic)       Currently CP free  Troponin negative x 2  EKG NSR LAD no acute STT changes    Saw Dr Nieto during recent admission     * NSTEMI (non-ST elevated myocardial infarction)  Currently chest pain-free.  Coronary angiogram yesterday demonstrated a nonobstructive coronary artery disease in LAD and RCA and a  80% lesion in the diagonal.  No complications from the coronary angiogram.  Switched statin to atorvastatin 40 mg yesterday.  Will start him on Imdur 30 mg daily.  Follow-up in Cardiology Clinic with me.

## 2019-05-12 NOTE — ED PROVIDER NOTES
Encounter Date: 5/12/2019       History     Chief Complaint   Patient presents with    Chest Pain     pt states shortly after 2200 hrs he had an episode of right sided chest pain. took one nitro and pain was releived. states he recently had a heart cath and was told he had some blockage and is treating it w/ meds     Patient presents with complaints of right-sided chest pain that started at 10:00 p.m. tonight while he is at rest.  States that was dull in nature.  Patient states he took 1 nitroglycerin with relief pain. Pain lasts less than 20 min.  Denies associated shortness of breath.  Denies radiation of pain. Denies abdominal pain.  Denies palpitations.  Denies dizziness.  Denies diaphoresis.  Patient was recently admitted and treated for NSTEMI.  He is currently under medical management.  He was told to report to the emergency room if he had a recurrence of chest pain. He is currently chest pain-free.  He had a cardiac catheterization:   Coronary Anatomy:  LM:  No significant stenosis  LAD:  Mid LAD 60-70% stenosis.  Distal LAD 50% stenosis.  IFR 0.92 (nonischemic).  Diagonal 1 is a tortuous vessel with mid 80% stenosis.  Will medically manage this diagonal stenosis  LCx :  Mild nonobstructive CAD  RCA:  50% distal RCA/proximal PDA lesion.  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  WITH LOCAL TISSUE ARRANGEMENT Left 11/15/2018    [facility] OR    EXCISION, CARCINOMA, SQUAMOUS CELL @ RIGHT HAND Right 11/15/2018    [facility]  at [facility] OR    Fractional Flow [place] (FFR), Coronary  2019    Performed by [facility]ad [facility]o [facility] at [facility] CATH LAB    JOINT REPLACEMENT      Left heart cath Left 2019    [facility] CATH LAB    TONSILLECTOMY      TOTAL KNEE ARTHROPLASTY       Family History   Problem Relation Age of Onset    Heart disease Mother     Stroke Father      Social History     Tobacco Use    Smoking status: Former Smoker     Packs/day: 0.00     Types: Cigarettes     Last attempt to quit: 2016     Years since quittin.7    Smokeless tobacco: Never Used    Tobacco comment: 2016   Substance Use Topics    Alcohol use: No    Drug use: No     Review of Systems   Constitutional: Negative for chills and fever.   HENT: Negative for congestion and sore throat.    Eyes: Negative.    Respiratory: Negative for cough and shortness of breath.    Cardiovascular: Positive for chest pain. Negative for palpitations.   Gastrointestinal: Negative for abdominal pain, diarrhea, nausea and vomiting.        NO melena or rectal bleeding   Genitourinary: Negative for dysuria, flank pain and frequency.   Musculoskeletal: Negative for back pain.   Skin: Negative for rash and wound.   Neurological: Negative for dizziness, syncope, facial asymmetry, speech difficulty, weakness, light-headedness, numbness and headaches.        No numbness   Psychiatric/Behavioral: Negative for confusion and hallucinations.   All other systems reviewed and are negative.      Physical Exam     Initial Vitals [19 0021]   BP Pulse Resp Temp SpO2   (!) 152/70 (!) 58 16 97.9 °F (36.6 °C) 97 %      MAP       --         Physical Exam    Nursing note and vitals reviewed.  Constitutional: He appears well-developed and well-nourished. He is not diaphoretic. No distress.   HENT:   Head: Normocephalic and atraumatic.   Nose: Nose normal.   Mouth/Throat: Oropharynx is clear and moist.   Eyes: Conjunctivae and EOM are normal. Pupils are  equal, round, and reactive to light. Right eye exhibits no discharge. Left eye exhibits no discharge.   Neck: Neck supple. No tracheal deviation present.   Cardiovascular: Normal rate, regular rhythm and normal heart sounds.   No murmur heard.  Pulmonary/Chest: Breath sounds normal. No stridor. No respiratory distress. He has no wheezes. He has no rhonchi. He has no rales. He exhibits no tenderness.   Abdominal: Soft. He exhibits no distension. There is no tenderness. There is no rebound and no guarding.   Musculoskeletal: Normal range of motion. He exhibits no edema or tenderness.   Neurological: He is alert and oriented to person, place, and time. He has normal strength.   Skin: Skin is warm and dry. No rash noted.   Psychiatric: He has a normal mood and affect. His behavior is normal. Judgment and thought content normal.         ED Course   Procedures  Labs Reviewed   CBC W/ AUTO DIFFERENTIAL - Abnormal; Notable for the following components:       Result Value    RBC 4.38 (*)     Hemoglobin 13.5 (*)     MPV 9.1 (*)     All other components within normal limits   COMPREHENSIVE METABOLIC PANEL - Abnormal; Notable for the following components:    Glucose 114 (*)     All other components within normal limits   TROPONIN I   MAGNESIUM     EKG Readings: (Independently Interpreted)   Initial Reading: No STEMI. Rhythm: Normal Sinus Rhythm. Heart Rate: 64. Ectopy: No Ectopy. Conduction: Normal. ST Segments: Normal ST Segments. T Waves: Normal. Axis: Left Axis Deviation.   No acute ischemic changes.  No significant change from 05/06/2019.       Imaging Results          X-Ray Chest AP Portable (Final result)  Result time 05/12/19 02:15:10    Final result by Apoorva Suero MD (05/12/19 02:15:10)                 Impression:      No acute cardiopulmonary process identified.      Electronically signed by: Apoorva Suero MD  Date:    05/12/2019  Time:    02:15             Narrative:    EXAMINATION:  XR CHEST AP  PORTABLE    CLINICAL HISTORY:  Chest Pain;    TECHNIQUE:  Single frontal view of the chest was performed.    COMPARISON:  05/06/2019.    FINDINGS:  Cardiac silhouette is normal in size.  Lungs are symmetrically expanded.  No evidence of focal consolidative process, pneumothorax, or significant effusion.  No acute osseous abnormality identified.                                 Medical Decision Making:   Clinical Tests:   Lab Tests: Ordered and Reviewed  The following lab test(s) were unremarkable: CBC and CMP  Radiological Study: Reviewed and Ordered  Medical Tests: Ordered and Reviewed  ED Management:  No changes in EKG.  No elevation in troponin at this time.  Patient remains chest pain free.  Heart score is 5.  Blood pressure severely elevated.  Will at of Norvasc to his regimen in place nitropaste.  Will admit for observation for serial cardiac enzymes.  Discussed with patient and family.    Additional MDM:   Heart Score:    History:          Moderately suspicious.  ECG:             Normal  Age:               >65 years  Risk factors: >= 3 risk factors or history of atherosclerotic disease  Troponin:       Less than or equal to normal limit  Final Score: 5                       Clinical Impression:       ICD-10-CM ICD-9-CM   1. Unstable angina I20.0 411.1   2. Chest pain R07.9 786.50   3. Hypertensive urgency I16.0 401.9         Disposition:   Disposition: Placed in Observation  Condition: Stable                        Kevin Perkins MD  05/12/19 0253       Kevin Perkins MD  05/12/19 0307

## 2019-05-12 NOTE — HPI
Juan Martínez Jr. is a 83 y.o. male with PMHx including diabetes, hypertension, hyperlipidemia, prostate cancer.  Per patient he presented after he had 1 episode of right sided chest pain that felt like an ache that began at rest while he was watching TV in late evening.  Patient reports he called his sister who does have some cardiac disease and she advised him to take a nitroglycerin which he took and resolved after about 20 min.  He denies any other associated symptoms.  His sister devise and adjust come to the hospital.  Does not recall what he ate for dinner.  Patient denies any recent long trips, any lower extremity edema, upper respiratory infection, fever chills, headaches, numbness or tingling in any extremity, changes in bowel or bladder functions.  The patient denies any new episodes of the chest pain    His initial workup is essentially unchanged she has no white count or fever renal function electrolytes appear within normal limits, initial troponin negative; chest x-ray without acute processes.

## 2019-05-12 NOTE — SUBJECTIVE & OBJECTIVE
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  WITH LOCAL TISSUE ARRANGEMENT Left 11/15/2018    [facility] OR    EXCISION, CARCINOMA, SQUAMOUS CELL @ RIGHT HAND Right 11/15/2018    [facility] at [facility] OR    Fractional Flow [place] (FFR), Coronary  5/6/2019    Performed [facility] at [facility] CATH LAB    JOINT REPLACEMENT      Left heart cath Left 5/6/2019    [facility] at [facility] CATH LAB    TONSILLECTOMY      TOTAL KNEE ARTHROPLASTY         Review of patient's allergies indicates:  No Known Allergies    No current facility-administered medications on file prior to encounter.      Current Outpatient Medications on File Prior to Encounter   Medication Sig    AMITIZA 24 mcg Cap 24 mcg 2 (two) times daily with meals.     aspirin (ECOTRIN) 81 MG EC tablet Take 81 mg by mouth once daily.    atorvastatin (LIPITOR) 40 MG tablet Take 1 tablet (40 mg total) by mouth once daily.    hydroCHLOROthiazide (MICROZIDE) 12.5 mg capsule     isosorbide mononitrate (IMDUR) 30 MG 24 hr tablet Take 1 tablet (30 mg total) by mouth once daily.    metformin (GLUCOPHAGE) 1000 MG tablet Take 1 tablet (1,000 mg total) by mouth 2 (two) times daily with meals.    metoprolol succinate (TOPROL-XL) 25 MG 24 hr tablet Take 1 tablet (25 mg total) by mouth once daily.    nitroGLYCERIN (NITROSTAT) 0.4 MG SL tablet Place 1 tablet (0.4 mg total) under the tongue every 5 (five) minutes as needed for Chest pain.     Family History     Problem Relation (Age of Onset)    Heart disease Mother    Stroke Father        Tobacco Use     Smoking status: Former Smoker     Packs/day: 0.00     Types: Cigarettes     Last attempt to quit: 2016     Years since quittin.7    Smokeless tobacco: Never Used    Tobacco comment: 2016   Substance and Sexual Activity    Alcohol use: No    Drug use: No    Sexual activity: Not Currently     Review of Systems   Constitution: Negative for decreased appetite.   HENT: Negative for ear discharge.    Eyes: Negative for blurred vision.   Endocrine: Negative for polyphagia.   Skin: Negative for nail changes.   Genitourinary: Negative for bladder incontinence.   Neurological: Negative for aphonia.   Psychiatric/Behavioral: Negative for hallucinations.   Allergic/Immunologic: Negative for hives.     Objective:     Vital Signs (Most Recent):  Temp: 97.5 °F (36.4 °C) (19 07)  Pulse: 71 (19 0930)  Resp: 18 (19 07)  BP: (!) 161/74 (19 07)  SpO2: 95 % (19) Vital Signs (24h Range):  Temp:  [97.5 °F (36.4 °C)-97.9 °F (36.6 °C)] 97.5 °F (36.4 °C)  Pulse:  [49-71] 71  Resp:  [16-24] 18  SpO2:  [93 %-97 %] 95 %  BP: (152-226)/(70-89) 161/74     Weight: 79.6 kg (175 lb 7.8 oz)  Body mass index is 26.68 kg/m².    SpO2: 95 %  O2 Device (Oxygen Therapy): room air    No intake or output data in the 24 hours ending 19 1104    Lines/Drains/Airways     Peripheral Intravenous Line                 Peripheral IV - Single Lumen 19 0140 20 G Left Forearm less than 1 day                Physical Exam   Constitutional: He is oriented to person, place, and time. He appears well-developed and well-nourished.   HENT:   Head: Normocephalic and atraumatic.   Eyes: Pupils are equal, round, and reactive to light. Conjunctivae are normal.   Neck: Normal range of motion. Neck supple.   Cardiovascular: Normal rate, normal heart sounds and intact distal pulses.   Pulmonary/Chest: Effort normal and breath sounds normal.   Abdominal: Soft. Bowel sounds are normal.   Musculoskeletal: Normal  range of motion.   Neurological: He is alert and oriented to person, place, and time.   Skin: Skin is warm and dry.       Significant Labs: All pertinent lab results from the last 24 hours have been reviewed.    Significant Imaging: Echocardiogram: 2D echo with color flow doppler: No results found for this or any previous visit.

## 2019-05-12 NOTE — HOSPITAL COURSE
Placed in observation for chest pain rule out. Patient's right sided chest achy pain was somewhat atypical and chart review noted recent workup up: His troponin 1 levels trended negative without new ischemic changes on EKG. Cardiology consulted due to risk factors - ok to follow up outpatient with Dr. Nieto as already scheduled. He is stable, has not had any new Chest pain since that time, vitals stable - discharge to home - fu in cardiology clinic    Heart Cath:  [facility] on 5/9/19 LM:  No significant stenosis  LAD:  Mid LAD 60-70% stenosis.  Distal LAD 50% stenosis.  IFR 0.92 (nonischemic).  Diagonal 1 is a tortuous vessel with mid 80% stenosis.  Will medically manage this diagonal stenosis  LCx :  Mild nonobstructive CAD  RCA:  50% distal RCA/proximal PDA lesion.  IFR 1 (nonischemic)

## 2019-05-14 ENCOUNTER — OFFICE VISIT (OUTPATIENT)
Dept: CARDIOLOGY | Facility: CLINIC | Age: 84
End: 2019-05-14
Payer: MEDICARE

## 2019-05-14 VITALS
SYSTOLIC BLOOD PRESSURE: 169 MMHG | DIASTOLIC BLOOD PRESSURE: 69 MMHG | HEIGHT: 68 IN | WEIGHT: 180.75 LBS | HEART RATE: 64 BPM | OXYGEN SATURATION: 98 % | BODY MASS INDEX: 27.39 KG/M2

## 2019-05-14 DIAGNOSIS — E78.2 MIXED HYPERLIPIDEMIA: ICD-10-CM

## 2019-05-14 DIAGNOSIS — I10 ESSENTIAL HYPERTENSION, BENIGN: ICD-10-CM

## 2019-05-14 DIAGNOSIS — I25.10 CORONARY ARTERY DISEASE INVOLVING NATIVE CORONARY ARTERY OF NATIVE HEART WITHOUT ANGINA PECTORIS: ICD-10-CM

## 2019-05-14 DIAGNOSIS — E11.9 TYPE 2 DIABETES MELLITUS WITHOUT COMPLICATION, WITHOUT LONG-TERM CURRENT USE OF INSULIN: Primary | ICD-10-CM

## 2019-05-14 PROCEDURE — 3078F PR MOST RECENT DIASTOLIC BLOOD PRESSURE < 80 MM HG: ICD-10-PCS | Mod: CPTII,S$GLB,, | Performed by: INTERNAL MEDICINE

## 2019-05-14 PROCEDURE — 3077F SYST BP >= 140 MM HG: CPT | Mod: CPTII,S$GLB,, | Performed by: INTERNAL MEDICINE

## 2019-05-14 PROCEDURE — 99214 PR OFFICE/OUTPT VISIT, EST, LEVL IV, 30-39 MIN: ICD-10-PCS | Mod: S$GLB,,, | Performed by: INTERNAL MEDICINE

## 2019-05-14 PROCEDURE — 3077F PR MOST RECENT SYSTOLIC BLOOD PRESSURE >= 140 MM HG: ICD-10-PCS | Mod: CPTII,S$GLB,, | Performed by: INTERNAL MEDICINE

## 2019-05-14 PROCEDURE — 3078F DIAST BP <80 MM HG: CPT | Mod: CPTII,S$GLB,, | Performed by: INTERNAL MEDICINE

## 2019-05-14 PROCEDURE — 99999 PR PBB SHADOW E&M-EST. PATIENT-LVL III: CPT | Mod: PBBFAC,,, | Performed by: INTERNAL MEDICINE

## 2019-05-14 PROCEDURE — 99999 PR PBB SHADOW E&M-EST. PATIENT-LVL III: ICD-10-PCS | Mod: PBBFAC,,, | Performed by: INTERNAL MEDICINE

## 2019-05-14 PROCEDURE — 99214 OFFICE O/P EST MOD 30 MIN: CPT | Mod: S$GLB,,, | Performed by: INTERNAL MEDICINE

## 2019-05-14 PROCEDURE — 1101F PR PT FALLS ASSESS DOC 0-1 FALLS W/OUT INJ PAST YR: ICD-10-PCS | Mod: CPTII,S$GLB,, | Performed by: INTERNAL MEDICINE

## 2019-05-14 PROCEDURE — 1101F PT FALLS ASSESS-DOCD LE1/YR: CPT | Mod: CPTII,S$GLB,, | Performed by: INTERNAL MEDICINE

## 2019-05-14 RX ORDER — ISOSORBIDE MONONITRATE 30 MG/1
60 TABLET, EXTENDED RELEASE ORAL DAILY
Qty: 30 TABLET | Refills: 5 | Status: SHIPPED | OUTPATIENT
Start: 2019-05-14 | End: 2019-05-14

## 2019-05-14 RX ORDER — AMLODIPINE BESYLATE 5 MG/1
5 TABLET ORAL DAILY
Qty: 90 TABLET | Refills: 3 | Status: ON HOLD | OUTPATIENT
Start: 2019-05-14 | End: 2019-08-10 | Stop reason: SDUPTHER

## 2019-05-14 NOTE — PROGRESS NOTES
CARDIOVASCULAR CONSULTATION    REASON FOR CONSULT:   Juan Martínez Jr. is a 83 y.o. male who presents for follow-up recent hospitalization for a non-STEMI..      HISTORY OF PRESENT ILLNESS:     Patient is 83-year-old man pleasant man with a past medical history significant for diabetes, dyslipidemia, hypertension, coronary artery disease who recently presented to the hospital with a non-STEMI.  Coronary angiogram performed at that time revealed nonischemic coronary artery disease of the LAD and RCA as well as 80% stenosis in his diagonal 1.  Which is being managed medically.  A week after that he developed some right-sided chest pain, different from his anginal pain which he had presented with his non-STEMI.  He states that right-sided chest pain resolved on its own in less than 20 min, but since this is all new for him he decided to come to the hospital to make sure everything was okay.  He was admitted and serial troponins did not reveal any elevation and he was discharged home.  He has not had any further episodes of chest pains.  Denies orthopnea, PND chest pains at rest or on exertion.  Denies typical claudication pain, has knee pains and atypical leg pain.      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  WITH LOCAL TISSUE ARRANGEMENT Left 11/15/2018    [facility]    EXCISION, CARCINOMA, SQUAMOUS CELL @ RIGHT HAND Right 11/15/2018    [facility]    Fractional Flow Baring (FFR), Coronary  5/6/2019    Performed by [facility]ad [facility]ieto [facility] at [facility] CATH  LAB    JOINT REPLACEMENT      Left heart cath Left 2019    [facility]    TONSILLECTOMY      TOTAL KNEE ARTHROPLASTY         ALLERGIES AND MEDICATION:   Review of patient's allergies indicates:  No Known Allergies     Medication List           Accurate as of 19 10:55 AM. If you have any questions, ask your nurse or doctor.               CONTINUE taking these medications    AMITIZA 24 MCG Cap  Generic drug:  lubiprostone     aspirin 81 MG EC tablet  Commonly known as:  ECOTRIN     atorvastatin 40 MG tablet  Commonly known as:  LIPITOR  Take 1 tablet (40 mg total) by mouth once daily.     hydroCHLOROthiazide 12.5 mg capsule  Commonly known as:  MICROZIDE     isosorbide mononitrate 30 MG 24 hr tablet  Commonly known as:  IMDUR  Take 1 tablet (30 mg total) by mouth once daily.     metFORMIN 1000 MG tablet  Commonly known as:  GLUCOPHAGE  Take 1 tablet (1,000 mg total) by mouth 2 (two) times daily with meals.     metoprolol succinate 25 MG 24 hr tablet  Commonly known as:  TOPROL-XL  Take 1 tablet (25 mg total) by mouth once daily.     nitroGLYCERIN 0.4 MG SL tablet  Commonly known as:  NITROSTAT  Place 1 tablet (0.4 mg total) under the tongue every 5 (five) minutes as needed for Chest pain.            SOCIAL HISTORY:     Social History     Socioeconomic History    Marital status:      Spouse name: Not on file    Number of children: Not on file    Years of education: Not on file    Highest education level: Not on file   Occupational History    Not on file   Social Needs    Financial resource strain: Not on file    Food insecurity:     Worry: Not on file     Inability: Not on file    Transportation needs:     Medical: Not on file     Non-medical: Not on file   Tobacco Use    Smoking status: Former Smoker     Packs/day: 0.00     Types: Cigarettes     Last attempt to quit: 2016     Years since quittin.7    Smokeless tobacco: Never Used    Tobacco  "comment: 8/12/2016   Substance and Sexual Activity    Alcohol use: No    Drug use: No    Sexual activity: Not Currently   Lifestyle    Physical activity:     Days per week: Not on file     Minutes per session: Not on file    Stress: Not on file   Relationships    Social connections:     Talks on phone: Not on file     Gets together: Not on file     Attends [employer] service: Not on file     Active member of club or organization: Not on file     Attends meetings of clubs or organizations: Not on file     Relationship status: Not on file   Other Topics Concern    Not on file   Social History Narrative    Not on file       FAMILY HISTORY:     Family History   Problem Relation Age of Onset    Heart disease Mother     Stroke Father        REVIEW OF SYSTEMS:   Review of Systems   Constitutional: Negative.    HENT: Negative.    Eyes: Negative.    Respiratory: Negative.    Cardiovascular: Negative.    Gastrointestinal: Negative.    Genitourinary: Negative.    Musculoskeletal: Positive for joint pain.   Skin: Negative.    Neurological: Negative.    Endo/Heme/Allergies: Negative.        A 10 point review of systems was performed and all the pertinent positives have been mentioned. Rest of review of systems was negative.        PHYSICAL EXAM:     Vitals:    05/14/19 1036   BP: (!) 169/69   Pulse: 64    Body mass index is 27.49 kg/m².  Weight: 82 kg (180 lb 12.4 oz)   Height: 5' 8" (172.7 cm)     Physical Exam   Constitutional: He is oriented to person, place, and time. He appears well-developed and well-nourished. He is active.   HENT:   Head: Normocephalic and atraumatic.   Right Ear: Hearing normal.   Left Ear: Hearing normal.   Nose: Nose normal.   Mouth/Throat: Mucous membranes are normal.   Eyes: Pupils are equal, round, and reactive to light. Conjunctivae and lids are normal.   Neck: Normal range of motion. Neck supple.   Cardiovascular: Normal rate, regular rhythm, normal heart sounds, intact distal pulses and " normal pulses.   Pulmonary/Chest: Effort normal and breath sounds normal.   Abdominal: Soft. Normal appearance. There is no tenderness.   Musculoskeletal: He exhibits no edema or deformity.   Neurological: He is alert and oriented to person, place, and time.   Skin: Skin is warm, dry and intact.   Psychiatric: He has a normal mood and affect. His speech is normal.   Nursing note and vitals reviewed.        DATA:     Laboratory:  CBC:  Recent Labs   Lab 05/06/19 2158 05/07/19  0610 05/12/19  0140   WHITE BLOOD CELL COUNT 9.55 8.08 8.62   HEMOGLOBIN 14.0 14.7 13.5 L   HEMATOCRIT 42.1 44.8 40.0   PLATELETS 259 269 269       CHEMISTRIES:  Recent Labs   Lab 09/17/17  0220  05/06/19  0735 05/07/19  0610 05/12/19  0140   GLUCOSE 116 H   < > 238 H 102 114 H   SODIUM 137   < > 138 138 137   POTASSIUM 3.8   < > 3.8 4.0 3.5   BUN BLD 17   < > 13 13 13   CREATININE 1.2   < > 1.2 0.9 1.1   EGFR IF  >60   < > >60 >60 >60   EGFR IF NON- 57 A   < > 56 A >60 >60   CALCIUM 9.2   < > 9.6 9.2 8.9   MAGNESIUM 1.9  --   --  2.0 1.9    < > = values in this interval not displayed.       CARDIAC BIOMARKERS:  Recent Labs   Lab 05/06/19 2158 05/12/19  0140 05/12/19  0654   TROPONIN I 0.075 H 0.012 <0.006       COAGS:        LIPIDS/LFTS:  Recent Labs   Lab 06/19/18  0810 05/06/19  0735 05/06/19  1045 05/12/19  0140   CHOLESTEROL  --   --  134  --    TRIGLYCERIDES  --   --  80  --    HDL  --   --  45  --    LDL CHOLESTEROL  --   --  73.0  --    NON-HDL CHOLESTEROL  --   --  89  --    AST 17 15  --  16   ALT 21 16  --  17       Hemoglobin A1C   Date Value Ref Range Status   05/07/2019 7.0 (H) 4.0 - 5.6 % Final     Comment:     ADA Screening Guidelines:  5.7-6.4%  Consistent with prediabetes  >or=6.5%  Consistent with diabetes  High levels of fetal hemoglobin interfere with the HbA1C  assay. Heterozygous hemoglobin variants (HbS, HgC, etc)do  not significantly interfere with this assay.   However, presence of  multiple variants may affect accuracy.     06/19/2018 7.3 (H) 4.0 - 5.6 % Final     Comment:     ADA Screening Guidelines:  5.7-6.4%  Consistent with prediabetes  >or=6.5%  Consistent with diabetes  High levels of fetal hemoglobin interfere with the HbA1C  assay. Heterozygous hemoglobin variants (HbS, HgC, etc)do  not significantly interfere with this assay.   However, presence of multiple variants may affect accuracy.         TSH        The ASCVD Risk score ([place] WEATHERS Jr., et al., 2013) failed to calculate for the following reasons:    The 2013 ASCVD risk score is only valid for ages 40 to 79    The patient has a prior MI or stroke diagnosis           Cardiovascular Testing:    EKG: (personally reviewed tracing)  May 2019:  Normal sinus rhythm, left axis deviation.  Abnormal EKG.    2D echocardiogram May 2019:  · Normal left ventricular systolic function. The estimated ejection fraction is 65%  · Normal LV diastolic function.  · Mild left atrial enlargement.  · Normal central venous pressure (3 mm Hg).  · The estimated PA systolic pressure is 10 mm Hg       Coronary angiogram 6th May 2019:    No acute thrombotic lesion identified.  Coronary artery disease as described below  LVEDP: 12 mmHg     Coronary Anatomy:  LM:  No significant stenosis  LAD:  Mid LAD 60-70% stenosis.  Distal LAD 50% stenosis.  IFR 0.92 (nonischemic).  Diagonal 1 is a tortuous vessel with mid 80% stenosis.  Will medically manage this diagonal stenosis  LCx :  Mild nonobstructive CAD  RCA:  50% distal RCA/proximal PDA lesion.  IFR 1 (nonischemic)     Impression / Plan  Coronary artery disease as described above.  Continue medical management with aggressive risk factor modification.  Continue aspirin 81 mg daily. Change simvastatin to atorvastatin 80 mg daily.        ASSESSMENT AND PLAN     Patient Active Problem List   Diagnosis    Essential hypertension, benign    Type 2 diabetes mellitus    Hyperlipidemia    Body mass index 28.0-28.9, adult     History of prostate cancer    Orthostatic hypotension    Diabetic ulcer of toe of left foot associated with type 2 diabetes mellitus, with fat layer exposed    SCC (squamous cell carcinoma)    CAD (coronary artery disease)    Unstable angina       1.  Patient with coronary artery disease, now angina free.  Being managed medically.  Increase Imdur to 60 mg daily.    2.  Hypertension:  Uncontrolled.  Increase Imdur to 60 mg daily and start Norvasc 5 mg daily.  Titrate up as needed for appropriate blood pressure control.  Continue hydrochlorothiazide and metoprolol.    3.  Cardiovascular screening with rest and stress ABIs and carotid ultrasound        Thank you very much for involving me in the care of your patient.  Please do not hesitate to contact me if there are any questions.      Rashad Nieto MD, FACC, [facility]  Interventional Cardiologist, [facility].           This note was dictated with the help of speech recognition software.  There might be un-intended errors and/or substitutions.

## 2019-05-22 RX ORDER — ISOSORBIDE MONONITRATE 60 MG/1
60 TABLET, EXTENDED RELEASE ORAL DAILY
Qty: 90 TABLET | Refills: 3 | Status: SHIPPED | OUTPATIENT
Start: 2019-05-22 | End: 2020-04-15 | Stop reason: SDUPTHER

## 2019-05-28 ENCOUNTER — HOSPITAL ENCOUNTER (OUTPATIENT)
Dept: CARDIOLOGY | Facility: HOSPITAL | Age: 84
Discharge: HOME | End: 2019-05-28
Attending: INTERNAL MEDICINE
Payer: MEDICARE

## 2019-05-28 DIAGNOSIS — E78.2 MIXED HYPERLIPIDEMIA: ICD-10-CM

## 2019-05-28 DIAGNOSIS — E11.9 TYPE 2 DIABETES MELLITUS WITHOUT COMPLICATION, WITHOUT LONG-TERM CURRENT USE OF INSULIN: ICD-10-CM

## 2019-05-28 DIAGNOSIS — I10 ESSENTIAL HYPERTENSION, BENIGN: ICD-10-CM

## 2019-05-28 DIAGNOSIS — I25.10 CORONARY ARTERY DISEASE INVOLVING NATIVE CORONARY ARTERY OF NATIVE HEART WITHOUT ANGINA PECTORIS: ICD-10-CM

## 2019-05-28 LAB
LEFT ABI: 1.57
LEFT ARM BP: 160 MMHG
LEFT CBA DIAS: 10 CM/S
LEFT CBA SYS: 61 CM/S
LEFT CCA DIST DIAS: 9 CM/S
LEFT CCA DIST SYS: 78 CM/S
LEFT CCA MID DIAS: 8 CM/S
LEFT CCA MID SYS: 82 CM/S
LEFT CCA PROX DIAS: 8 CM/S
LEFT CCA PROX SYS: 72 CM/S
LEFT DORSALIS PEDIS: 254 MMHG
LEFT ECA DIAS: 5 CM/S
LEFT ECA SYS: 96 CM/S
LEFT ICA DIST DIAS: 18 CM/S
LEFT ICA DIST SYS: 71 CM/S
LEFT ICA MID DIAS: 13 CM/S
LEFT ICA MID SYS: 87 CM/S
LEFT ICA PROX DIAS: 13 CM/S
LEFT ICA PROX SYS: 84 CM/S
LEFT POSTERIOR TIBIAL: 254 MMHG
LEFT TBI: 0.5
LEFT TOE PRESSURE: 81 MMHG
LEFT VERTEBRAL DIAS: 9 CM/S
LEFT VERTEBRAL SYS: 34 CM/S
OHS CV CAROTID RIGHT ICA EDV HIGHEST: 15
OHS CV CAROTID ULTRASOUND LEFT ICA/CCA RATIO: 1.06
OHS CV CAROTID ULTRASOUND RIGHT ICA/CCA RATIO: 1.08
OHS CV PV CAROTID LEFT HIGHEST CCA: 82
OHS CV PV CAROTID LEFT HIGHEST ICA: 87
OHS CV PV CAROTID RIGHT HIGHEST CCA: 74
OHS CV PV CAROTID RIGHT HIGHEST ICA: 80
OHS CV US CAROTID LEFT HIGHEST EDV: 18
RIGHT ABI: 1.57
RIGHT ARM BP: 162 MMHG
RIGHT CBA DIAS: 9 CM/S
RIGHT CBA SYS: 49 CM/S
RIGHT CCA DIST DIAS: 10 CM/S
RIGHT CCA DIST SYS: 69 CM/S
RIGHT CCA MID DIAS: 6 CM/S
RIGHT CCA MID SYS: 74 CM/S
RIGHT CCA PROX DIAS: 7 CM/S
RIGHT CCA PROX SYS: 65 CM/S
RIGHT DORSALIS PEDIS: 254 MMHG
RIGHT ECA DIAS: 5 CM/S
RIGHT ECA SYS: 110 CM/S
RIGHT ICA DIST DIAS: 15 CM/S
RIGHT ICA DIST SYS: 70 CM/S
RIGHT ICA MID DIAS: 12 CM/S
RIGHT ICA MID SYS: 80 CM/S
RIGHT ICA PROX DIAS: 11 CM/S
RIGHT ICA PROX SYS: 77 CM/S
RIGHT POSTERIOR TIBIAL: 254 MMHG
RIGHT TBI: 0.45
RIGHT TOE PRESSURE: 73 MMHG
RIGHT VERTEBRAL DIAS: 10 CM/S
RIGHT VERTEBRAL SYS: 36 CM/S

## 2019-05-28 PROCEDURE — 93880 EXTRACRANIAL BILAT STUDY: CPT | Mod: 50

## 2019-05-28 PROCEDURE — 93880 CV US DOPPLER CAROTID (CUPID ONLY): ICD-10-PCS | Mod: 26,,, | Performed by: INTERNAL MEDICINE

## 2019-05-28 PROCEDURE — 93922 UPR/L XTREMITY ART 2 LEVELS: CPT | Mod: 50

## 2019-05-28 PROCEDURE — 93922 CV US ANKLE BRACHIAL INDICES WO STRESS W TOE TRACINGS (CUPID ONLY): ICD-10-PCS | Mod: 26,,, | Performed by: INTERNAL MEDICINE

## 2019-05-28 PROCEDURE — 93922 UPR/L XTREMITY ART 2 LEVELS: CPT | Mod: 26,,, | Performed by: INTERNAL MEDICINE

## 2019-05-28 PROCEDURE — 93880 EXTRACRANIAL BILAT STUDY: CPT | Mod: 26,,, | Performed by: INTERNAL MEDICINE

## 2019-06-12 ENCOUNTER — OFFICE VISIT (OUTPATIENT)
Dept: CARDIOLOGY | Facility: CLINIC | Age: 84
End: 2019-06-12
Payer: MEDICARE

## 2019-06-12 VITALS
WEIGHT: 185.19 LBS | BODY MASS INDEX: 28.07 KG/M2 | OXYGEN SATURATION: 96 % | HEART RATE: 61 BPM | SYSTOLIC BLOOD PRESSURE: 120 MMHG | DIASTOLIC BLOOD PRESSURE: 70 MMHG | RESPIRATION RATE: 16 BRPM | HEIGHT: 68 IN

## 2019-06-12 DIAGNOSIS — I73.9 PAD (PERIPHERAL ARTERY DISEASE): Primary | ICD-10-CM

## 2019-06-12 PROCEDURE — 1101F PT FALLS ASSESS-DOCD LE1/YR: CPT | Mod: CPTII,S$GLB,, | Performed by: INTERNAL MEDICINE

## 2019-06-12 PROCEDURE — 1101F PR PT FALLS ASSESS DOC 0-1 FALLS W/OUT INJ PAST YR: ICD-10-PCS | Mod: CPTII,S$GLB,, | Performed by: INTERNAL MEDICINE

## 2019-06-12 PROCEDURE — 3078F PR MOST RECENT DIASTOLIC BLOOD PRESSURE < 80 MM HG: ICD-10-PCS | Mod: CPTII,S$GLB,, | Performed by: INTERNAL MEDICINE

## 2019-06-12 PROCEDURE — 99999 PR PBB SHADOW E&M-EST. PATIENT-LVL III: ICD-10-PCS | Mod: PBBFAC,,, | Performed by: INTERNAL MEDICINE

## 2019-06-12 PROCEDURE — 99214 PR OFFICE/OUTPT VISIT, EST, LEVL IV, 30-39 MIN: ICD-10-PCS | Mod: S$GLB,,, | Performed by: INTERNAL MEDICINE

## 2019-06-12 PROCEDURE — 99214 OFFICE O/P EST MOD 30 MIN: CPT | Mod: S$GLB,,, | Performed by: INTERNAL MEDICINE

## 2019-06-12 PROCEDURE — 99999 PR PBB SHADOW E&M-EST. PATIENT-LVL III: CPT | Mod: PBBFAC,,, | Performed by: INTERNAL MEDICINE

## 2019-06-12 PROCEDURE — 3074F SYST BP LT 130 MM HG: CPT | Mod: CPTII,S$GLB,, | Performed by: INTERNAL MEDICINE

## 2019-06-12 PROCEDURE — 3074F PR MOST RECENT SYSTOLIC BLOOD PRESSURE < 130 MM HG: ICD-10-PCS | Mod: CPTII,S$GLB,, | Performed by: INTERNAL MEDICINE

## 2019-06-12 PROCEDURE — 3078F DIAST BP <80 MM HG: CPT | Mod: CPTII,S$GLB,, | Performed by: INTERNAL MEDICINE

## 2019-06-12 NOTE — PATIENT INSTRUCTIONS
Understanding Coronary Artery Disease (CAD)    To understand coronary artery disease (CAD), you need to know how your heart works. Your heart is a muscle that pumps blood throughout your body. To work right, your heart needs a steady supply of oxygen. It gets this oxygen from blood supplied by the coronary arteries.     Healthy artery   Healthy artery. When a coronary artery is healthy and has no blockages, blood flows through easily. Healthy arteries can easily supply the oxygen-rich blood your heart needs.     Damaged artery   Damaged artery. Coronary artery disease begins when damage to the artery lining leads to the buildup of fat-like substances and cholesterol along the artery wall. This is called plaque. This damage could be caused by things like high blood pressure or smoking. This plaque buildup begins to narrow the arteries carrying blood to the heart. This is called atherosclerosis,     Narrowed artery   Narrowed artery. As more plaque builds up, your artery has trouble supplying blood to your heart muscle when it needs it most, such as during exercise. You may not feel any symptoms when this happens. Or you may feel angina--pressure, tightness, achiness, or pain in your chest, jaw, neck, back, or arm.     Blocked artery   Blocked artery. A piece of plaque may break off and completely block the artery. Or a blood clot may plug the narrowed artery. When this happens, blood flow is blocked from reaching the heart. Without oxygen-rich blood, part of the heart muscle becomes damaged and stops working. You may feel crushing pressure or pain in or around your chest. This is a heart attack (acute myocardial infarction, or AMI) and is a medical emergency.     Date Last Reviewed: 3/28/2016  © 8638-6927 [employer]. [address] [place] [address], [place] [address]. All rights reserved. This information is not intended as a substitute for professional medical care. Always follow your healthcare professional's  instructions.

## 2019-06-12 NOTE — PROGRESS NOTES
CARDIOVASCULAR CONSULTATION    REASON FOR CONSULT:   Juan Martínez Jr. is a 83 y.o. male who presents for follow-up recent hospitalization for a non-STEMI..      HISTORY OF PRESENT ILLNESS:   Patient is here for follow-up today.  Denies any chest pains at rest on exertion, orthopnea, PND.  Denies any claudication symptoms.  Denies any dyspnea at rest on exertion    Note from clinic visit in May 2019:  Patient is 83-year-old man pleasant man with a past medical history significant for diabetes, dyslipidemia, hypertension, coronary artery disease who recently presented to the hospital with a non-STEMI.  Coronary angiogram performed at that time revealed nonischemic coronary artery disease of the LAD and RCA as well as 80% stenosis in his diagonal 1.  Which is being managed medically.  A week after that he developed some right-sided chest pain, different from his anginal pain which he had presented with his non-STEMI.  He states that right-sided chest pain resolved on its own in less than 20 min, but since this is all new for him he decided to come to the hospital to make sure everything was okay.  He was admitted and serial troponins did not reveal any elevation and he was discharged home.  He has not had any further episodes of chest pains.  Denies orthopnea, PND chest pains at rest or on exertion.  Denies typical claudication pain, has knee pains and atypical leg pain.      PAST MEDICAL HISTORY:     Past Medical History:   Diagnosis Date    Diabetes mellitus     High cholesterol     Hypertension     Prostate cancer        PAST SURGICAL HISTORY:     Past Surgical History:   Procedure Laterality Date    APPLICATION, GRAFT, SKIN, FULL-THICKNESS, TO UPPER EXTREMITY VS STSG WITH FROZEN SECTIONS Right 11/15/2018    Performed [facility] at [facility] OR    EXCISION LEFT POST AURICULAR SQUAMOUS CELL CANCER  WITH LOCAL TISSUE ARRANGEMENT Left 11/15/2018    Per[facility] at [facility] OR     EXCISION, CARCINOMA, SQUAMOUS CELL @ RIGHT HAND Right 11/15/2018    Per[facility] by [facility] at [facility]    Fractional Flow [place] (FFR), Coronary  5/6/2019    Performed by Rashad Nieto MD at [facility] CATH LAB    JOINT REPLACEMENT      Left heart cath Left 5/6/2019    [facility] at [facility] CATH LAB    TONSILLECTOMY      TOTAL KNEE ARTHROPLASTY         ALLERGIES AND MEDICATION:   Review of patient's allergies indicates:  No Known Allergies     Medication List           Accurate as of 6/12/19  1:19 PM. If you have any questions, ask your nurse or doctor.               CONTINUE taking these medications    AMITIZA 24 MCG Cap  Generic drug:  lubiprostone     amLODIPine 5 MG tablet  Commonly known as:  NORVASC  Take 1 tablet (5 mg total) by mouth once daily.     aspirin 81 MG EC tablet  Commonly known as:  ECOTRIN     atorvastatin 40 MG tablet  Commonly known as:  LIPITOR  Take 1 tablet (40 mg total) by mouth once daily.     hydroCHLOROthiazide 12.5 mg capsule  Commonly known as:  MICROZIDE     isosorbide mononitrate 60 MG 24 hr tablet  Commonly known as:  IMDUR  Take 1 tablet (60 mg total) by mouth once daily.     metFORMIN 1000 MG tablet  Commonly known as:  GLUCOPHAGE  Take 1 tablet (1,000 mg total) by mouth 2 (two) times daily with meals.     metoprolol succinate 25 MG 24 hr tablet  Commonly known as:  TOPROL-XL  Take 1 tablet (25 mg total) by mouth once daily.     nitroGLYCERIN 0.4 MG SL tablet  Commonly known as:  NITROSTAT  Place 1 tablet (0.4 mg total) under the tongue every 5 (five) minutes as needed for Chest pain.            SOCIAL HISTORY:     Social History     Socioeconomic History    Marital status:      Spouse name: Not on file    Number of children: Not on file    Years of education: Not on file    Highest education level: Not on file   Occupational History    Not on file   Social Needs    Financial resource strain: Not on file    Food insecurity:     Worry:  "Not on file     Inability: Not on file    Transportation needs:     Medical: Not on file     Non-medical: Not on file   Tobacco Use    Smoking status: Former Smoker     Packs/day: 0.00     Types: Cigarettes     Last attempt to quit: 2016     Years since quittin.8    Smokeless tobacco: Never Used    Tobacco comment: 2016   Substance and Sexual Activity    Alcohol use: No    Drug use: No    Sexual activity: Not Currently   Lifestyle    Physical activity:     Days per week: Not on file     Minutes per session: Not on file    Stress: Not on file   Relationships    Social connections:     Talks on phone: Not on file     Gets together: Not on file     Attends [employer] service: Not on file     Active member of club or organization: Not on file     Attends meetings of clubs or organizations: Not on file     Relationship status: Not on file   Other Topics Concern    Not on file   Social History Narrative    Not on file       FAMILY HISTORY:     Family History   Problem Relation Age of Onset    Heart disease Mother     Stroke Father        REVIEW OF SYSTEMS:   Review of Systems   Constitutional: Negative.    HENT: Negative.    Eyes: Negative.    Respiratory: Negative.    Cardiovascular: Negative.    Gastrointestinal: Negative.    Genitourinary: Negative.    Musculoskeletal: Positive for joint pain.   Skin: Negative.    Neurological: Negative.    Endo/Heme/Allergies: Negative.        A 10 point review of systems was performed and all the pertinent positives have been mentioned. Rest of review of systems was negative.        PHYSICAL EXAM:     Vitals:    19 1307   BP: 120/70   Pulse: 61   Resp: 16    Body mass index is 28.16 kg/m².  Weight: 84 kg (185 lb 3 oz)   Height: 5' 8" (172.7 cm)     Physical Exam   Constitutional: He is oriented to person, place, and time. He appears well-developed and well-nourished. He is active.   HENT:   Head: Normocephalic and atraumatic.   Right Ear: Hearing " normal.   Left Ear: Hearing normal.   Nose: Nose normal.   Mouth/Throat: Mucous membranes are normal.   Eyes: Pupils are equal, round, and reactive to light. Conjunctivae and lids are normal.   Neck: Normal range of motion. Neck supple.   Cardiovascular: Normal rate, regular rhythm, normal heart sounds, intact distal pulses and normal pulses.   Pulmonary/Chest: Effort normal and breath sounds normal.   Abdominal: Soft. Normal appearance. There is no tenderness.   Musculoskeletal: He exhibits no edema or deformity.   Neurological: He is alert and oriented to person, place, and time.   Skin: Skin is warm, dry and intact.   Psychiatric: He has a normal mood and affect. His speech is normal.   Nursing note and vitals reviewed.        DATA:     Laboratory:  CBC:  Recent Labs   Lab 05/06/19 2158 05/07/19  0610 05/12/19  0140   WHITE BLOOD CELL COUNT 9.55 8.08 8.62   HEMOGLOBIN 14.0 14.7 13.5 L   HEMATOCRIT 42.1 44.8 40.0   PLATELETS 259 269 269       CHEMISTRIES:  Recent Labs   Lab 09/17/17  0220  05/06/19  0735 05/07/19  0610 05/12/19  0140   GLUCOSE 116 H   < > 238 H 102 114 H   SODIUM 137   < > 138 138 137   POTASSIUM 3.8   < > 3.8 4.0 3.5   BUN BLD 17   < > 13 13 13   CREATININE 1.2   < > 1.2 0.9 1.1   EGFR IF  >60   < > >60 >60 >60   EGFR IF NON- 57 A   < > 56 A >60 >60   CALCIUM 9.2   < > 9.6 9.2 8.9   MAGNESIUM 1.9  --   --  2.0 1.9    < > = values in this interval not displayed.       CARDIAC BIOMARKERS:  Recent Labs   Lab 05/06/19 2158 05/12/19  0140 05/12/19  0654   TROPONIN I 0.075 H 0.012 <0.006       COAGS:        LIPIDS/LFTS:  Recent Labs   Lab 06/19/18  0810 05/06/19  0735 05/06/19  1045 05/12/19  0140   CHOLESTEROL  --   --  134  --    TRIGLYCERIDES  --   --  80  --    HDL  --   --  45  --    LDL CHOLESTEROL  --   --  73.0  --    NON-HDL CHOLESTEROL  --   --  89  --    AST 17 15  --  16   ALT 21 16  --  17       Hemoglobin A1C   Date Value Ref Range Status   05/07/2019 7.0  (H) 4.0 - 5.6 % Final     Comment:     ADA Screening Guidelines:  5.7-6.4%  Consistent with prediabetes  >or=6.5%  Consistent with diabetes  High levels of fetal hemoglobin interfere with the HbA1C  assay. Heterozygous hemoglobin variants (HbS, HgC, etc)do  not significantly interfere with this assay.   However, presence of multiple variants may affect accuracy.     06/19/2018 7.3 (H) 4.0 - 5.6 % Final     Comment:     ADA Screening Guidelines:  5.7-6.4%  Consistent with prediabetes  >or=6.5%  Consistent with diabetes  High levels of fetal hemoglobin interfere with the HbA1C  assay. Heterozygous hemoglobin variants (HbS, HgC, etc)do  not significantly interfere with this assay.   However, presence of multiple variants may affect accuracy.         TSH        The ASCVD Risk score (Merasilvestre WEATHERS Jr., et al., 2013) failed to calculate for the following reasons:    The 2013 ASCVD risk score is only valid for ages 40 to 79    The patient has a prior MI or stroke diagnosis           Cardiovascular Testing:    EKG: (personally reviewed tracing)  May 2019:  Normal sinus rhythm, left axis deviation.  Abnormal EKG.    2D echocardiogram May 2019:  · Normal left ventricular systolic function. The estimated ejection fraction is 65%  · Normal LV diastolic function.  · Mild left atrial enlargement.  · Normal central venous pressure (3 mm Hg).  · The estimated PA systolic pressure is 10 mm Hg       Coronary angiogram 6th May 2019:    No acute thrombotic lesion identified.  Coronary artery disease as described below  LVEDP: 12 mmHg    Carotid ultrasound May 28, 2019:    · There is 20-39% right Internal Carotid Stenosis.  · There is 20-39% left Internal Carotid Stenosis.     ABIs May 28, 2019:    Noncompressible LOW bilaterally.  Mildly decrease TBI bilaterally.  Normal PVR waveforms bilaterally.       Coronary Anatomy:  LM:  No significant stenosis  LAD:  Mid LAD 60-70% stenosis.  Distal LAD 50% stenosis.  IFR 0.92 (nonischemic).  Diagonal  1 is a tortuous vessel with mid 80% stenosis.  Will medically manage this diagonal stenosis  LCx :  Mild nonobstructive CAD  RCA:  50% distal RCA/proximal PDA lesion.  IFR 1 (nonischemic)     Impression / Plan  Coronary artery disease as described above.  Continue medical management with aggressive risk factor modification.  Continue aspirin 81 mg daily. Change simvastatin to atorvastatin 80 mg daily.        ASSESSMENT AND PLAN     Patient Active Problem List   Diagnosis    Essential hypertension, benign    Type 2 diabetes mellitus    Hyperlipidemia    Body mass index 28.0-28.9, adult    History of prostate cancer    Orthostatic hypotension    Diabetic ulcer of toe of left foot associated with type 2 diabetes mellitus, with fat layer exposed    SCC (squamous cell carcinoma)    CAD (coronary artery disease)    Unstable angina       1.  Patient with coronary artery disease, now angina free.  Being managed medically.  Continue current medical therapy.    2.  Hypertension:  Controlled at current therapy..    3.  Cardiovascular screening with rest and stress ABIs and carotid ultrasound performed.  ABIs demonstrated noncompressible ABIs bilaterally, mildly decreased ABIs bilaterally with normal PVR waveforms bilaterally.  I will get a peripheral ultrasound for further evaluation of his abnormal TBI    4.  Dyslipidemia:  Continue medical management with atorvastatin      Thank you very much for involving me in the care of your patient.  Please do not hesitate to contact me if there are any questions.      Rashad Nieto MD, FACC, [facility]  Interventional Cardiologist, [facility].     Follow-up in 2 months      This note was dictated with the help of speech recognition software.  There might be un-intended errors and/or substitutions.

## 2019-07-10 ENCOUNTER — HOSPITAL ENCOUNTER (OUTPATIENT)
Dept: CARDIOLOGY | Facility: HOSPITAL | Age: 84
Discharge: HOME | End: 2019-07-10
Attending: INTERNAL MEDICINE
Payer: MEDICARE

## 2019-07-10 DIAGNOSIS — I73.9 PAD (PERIPHERAL ARTERY DISEASE): ICD-10-CM

## 2019-07-10 LAB
LEFT ANT TIBIAL SYS PSV: 155 CM/S
LEFT CFA PSV: 89 CM/S
LEFT EXTERNAL ILIAC PSV: 126 CM/S
LEFT PERONEAL SYS PSV: 76 CM/S
LEFT POPLITEAL PSV: 123 CM/S
LEFT POST TIBIAL SYS PSV: 41 CM/S
LEFT PROFUNDA SYS PSV: 111 CM/S
LEFT SUPER FEMORAL DIST SYS PSV: 77 CM/S
LEFT SUPER FEMORAL MID SYS PSV: 117 CM/S
LEFT SUPER FEMORAL OSTIAL SYS PSV: 160 CM/S
LEFT SUPER FEMORAL PROX SYS PSV: 120 CM/S
LEFT TIB/PER TRUNK SYS PSV: 82 CM/S
RIGHT ANT TIBIAL SYS PSV: 212 CM/S
RIGHT CFA PSV: 110 CM/S
RIGHT EXTERNAL ILLIAC PSV: 119 CM/S
RIGHT PERONEAL SYS PSV: 54 CM/S
RIGHT POPLITEAL PSV: 75 CM/S
RIGHT POST TIBIAL SYS PSV: 85 CM/S
RIGHT PROFUNDA SYS PSV: 44 CM/S
RIGHT SUPER FEMORAL DIST SYS PSV: 91 CM/S
RIGHT SUPER FEMORAL MID SYS PSV: 96 CM/S
RIGHT SUPER FEMORAL OSTIAL SYS PSV: 123 CM/S
RIGHT SUPER FEMORAL PROX SYS PSV: 189 CM/S
RIGHT TIB/PER TRUNK SYS PSV: 163 CM/S

## 2019-07-10 PROCEDURE — 93925 LOWER EXTREMITY STUDY: CPT | Mod: 50

## 2019-07-10 PROCEDURE — 93925 CV US DOPPLER ARTERIAL LEGS BILATERAL (CUPID ONLY): ICD-10-PCS | Mod: 26,,, | Performed by: INTERNAL MEDICINE

## 2019-07-10 PROCEDURE — 93925 LOWER EXTREMITY STUDY: CPT | Mod: 26,,, | Performed by: INTERNAL MEDICINE

## 2019-08-09 ENCOUNTER — HOSPITAL ENCOUNTER (OUTPATIENT)
Facility: HOSPITAL | Age: 84
Discharge: HOME | End: 2019-08-10
Attending: EMERGENCY MEDICINE | Admitting: EMERGENCY MEDICINE
Payer: MEDICARE

## 2019-08-09 DIAGNOSIS — R00.2 PALPITATIONS: Primary | ICD-10-CM

## 2019-08-09 DIAGNOSIS — R79.89 ELEVATED TROPONIN: ICD-10-CM

## 2019-08-09 DIAGNOSIS — R07.9 CHEST PAIN: ICD-10-CM

## 2019-08-09 DIAGNOSIS — I10 ESSENTIAL HYPERTENSION, BENIGN: ICD-10-CM

## 2019-08-09 DIAGNOSIS — R45.89 FEELING ANXIOUS: ICD-10-CM

## 2019-08-09 LAB
ALBUMIN SERPL BCP-MCNC: 3.5 G/DL (ref 3.5–5.2)
ALP SERPL-CCNC: 70 U/L (ref 55–135)
ALT SERPL W/O P-5'-P-CCNC: 19 U/L (ref 10–44)
ANION GAP SERPL CALC-SCNC: 11 MMOL/L (ref 8–16)
AST SERPL-CCNC: 14 U/L (ref 10–40)
BASOPHILS # BLD AUTO: 0.01 K/UL (ref 0–0.2)
BASOPHILS NFR BLD: 0.1 % (ref 0–1.9)
BILIRUB SERPL-MCNC: 0.4 MG/DL (ref 0.1–1)
BUN SERPL-MCNC: 15 MG/DL (ref 8–23)
CALCIUM SERPL-MCNC: 8.7 MG/DL (ref 8.7–10.5)
CHLORIDE SERPL-SCNC: 101 MMOL/L (ref 95–110)
CO2 SERPL-SCNC: 24 MMOL/L (ref 23–29)
CREAT SERPL-MCNC: 1.1 MG/DL (ref 0.5–1.4)
DIFFERENTIAL METHOD: ABNORMAL
EOSINOPHIL # BLD AUTO: 0.2 K/UL (ref 0–0.5)
EOSINOPHIL NFR BLD: 2.5 % (ref 0–8)
ERYTHROCYTE [DISTWIDTH] IN BLOOD BY AUTOMATED COUNT: 13.7 % (ref 11.5–14.5)
EST. GFR  (AFRICAN AMERICAN): >60 ML/MIN/1.73 M^2
EST. GFR  (NON AFRICAN AMERICAN): >60 ML/MIN/1.73 M^2
GLUCOSE SERPL-MCNC: 156 MG/DL (ref 70–110)
HCT VFR BLD AUTO: 40.2 % (ref 40–54)
HGB BLD-MCNC: 13.4 G/DL (ref 14–18)
LYMPHOCYTES # BLD AUTO: 1.6 K/UL (ref 1–4.8)
LYMPHOCYTES NFR BLD: 23.2 % (ref 18–48)
MCH RBC QN AUTO: 30.4 PG (ref 27–31)
MCHC RBC AUTO-ENTMCNC: 33.3 G/DL (ref 32–36)
MCV RBC AUTO: 91 FL (ref 82–98)
MONOCYTES # BLD AUTO: 0.8 K/UL (ref 0.3–1)
MONOCYTES NFR BLD: 12.1 % (ref 4–15)
NEUTROPHILS # BLD AUTO: 4.2 K/UL (ref 1.8–7.7)
NEUTROPHILS NFR BLD: 62.4 % (ref 38–73)
PLATELET # BLD AUTO: 256 K/UL (ref 150–350)
PMV BLD AUTO: 8.8 FL (ref 9.2–12.9)
POTASSIUM SERPL-SCNC: 3.4 MMOL/L (ref 3.5–5.1)
PROT SERPL-MCNC: 6.3 G/DL (ref 6–8.4)
RBC # BLD AUTO: 4.41 M/UL (ref 4.6–6.2)
SODIUM SERPL-SCNC: 136 MMOL/L (ref 136–145)
WBC # BLD AUTO: 6.71 K/UL (ref 3.9–12.7)

## 2019-08-09 PROCEDURE — 84484 ASSAY OF TROPONIN QUANT: CPT

## 2019-08-09 PROCEDURE — 99285 EMERGENCY DEPT VISIT HI MDM: CPT | Mod: 25

## 2019-08-09 PROCEDURE — 93010 EKG 12-LEAD: ICD-10-PCS | Mod: ,,, | Performed by: INTERNAL MEDICINE

## 2019-08-09 PROCEDURE — 85025 COMPLETE CBC W/AUTO DIFF WBC: CPT

## 2019-08-09 PROCEDURE — 83880 ASSAY OF NATRIURETIC PEPTIDE: CPT

## 2019-08-09 PROCEDURE — 93010 ELECTROCARDIOGRAM REPORT: CPT | Mod: ,,, | Performed by: INTERNAL MEDICINE

## 2019-08-09 PROCEDURE — 80053 COMPREHEN METABOLIC PANEL: CPT

## 2019-08-09 PROCEDURE — 93005 ELECTROCARDIOGRAM TRACING: CPT

## 2019-08-09 PROCEDURE — 25000003 PHARM REV CODE 250: Performed by: EMERGENCY MEDICINE

## 2019-08-09 RX ORDER — NAPROXEN SODIUM 220 MG/1
162 TABLET, FILM COATED ORAL
Status: COMPLETED | OUTPATIENT
Start: 2019-08-09 | End: 2019-08-09

## 2019-08-09 RX ADMIN — ASPIRIN 81 MG 162 MG: 81 TABLET ORAL at 11:08

## 2019-08-10 VITALS
OXYGEN SATURATION: 95 % | RESPIRATION RATE: 18 BRPM | BODY MASS INDEX: 26.86 KG/M2 | WEIGHT: 177.25 LBS | HEIGHT: 68 IN | HEART RATE: 91 BPM | SYSTOLIC BLOOD PRESSURE: 130 MMHG | TEMPERATURE: 98 F | DIASTOLIC BLOOD PRESSURE: 80 MMHG

## 2019-08-10 PROBLEM — R00.2 PALPITATIONS: Status: ACTIVE | Noted: 2019-08-10

## 2019-08-10 PROBLEM — R79.89 ELEVATED TROPONIN: Status: ACTIVE | Noted: 2019-08-10

## 2019-08-10 LAB
ALBUMIN SERPL BCP-MCNC: 3.8 G/DL (ref 3.5–5.2)
ALP SERPL-CCNC: 90 U/L (ref 55–135)
ALT SERPL W/O P-5'-P-CCNC: 22 U/L (ref 10–44)
ANION GAP SERPL CALC-SCNC: 9 MMOL/L (ref 8–16)
AST SERPL-CCNC: 17 U/L (ref 10–40)
BASOPHILS # BLD AUTO: 0.02 K/UL (ref 0–0.2)
BASOPHILS NFR BLD: 0.3 % (ref 0–1.9)
BILIRUB SERPL-MCNC: 0.5 MG/DL (ref 0.1–1)
BNP SERPL-MCNC: 50 PG/ML (ref 0–99)
BUN SERPL-MCNC: 12 MG/DL (ref 8–23)
CALCIUM SERPL-MCNC: 9 MG/DL (ref 8.7–10.5)
CHLORIDE SERPL-SCNC: 103 MMOL/L (ref 95–110)
CO2 SERPL-SCNC: 25 MMOL/L (ref 23–29)
CREAT SERPL-MCNC: 0.9 MG/DL (ref 0.5–1.4)
DIFFERENTIAL METHOD: ABNORMAL
EOSINOPHIL # BLD AUTO: 0.1 K/UL (ref 0–0.5)
EOSINOPHIL NFR BLD: 1.6 % (ref 0–8)
ERYTHROCYTE [DISTWIDTH] IN BLOOD BY AUTOMATED COUNT: 13.9 % (ref 11.5–14.5)
EST. GFR  (AFRICAN AMERICAN): >60 ML/MIN/1.73 M^2
EST. GFR  (NON AFRICAN AMERICAN): >60 ML/MIN/1.73 M^2
GLUCOSE SERPL-MCNC: 139 MG/DL (ref 70–110)
HCT VFR BLD AUTO: 44.1 % (ref 40–54)
HGB BLD-MCNC: 14.8 G/DL (ref 14–18)
LYMPHOCYTES # BLD AUTO: 1.7 K/UL (ref 1–4.8)
LYMPHOCYTES NFR BLD: 22.7 % (ref 18–48)
MCH RBC QN AUTO: 30.3 PG (ref 27–31)
MCHC RBC AUTO-ENTMCNC: 33.6 G/DL (ref 32–36)
MCV RBC AUTO: 90 FL (ref 82–98)
MONOCYTES # BLD AUTO: 0.8 K/UL (ref 0.3–1)
MONOCYTES NFR BLD: 10.1 % (ref 4–15)
NEUTROPHILS # BLD AUTO: 4.9 K/UL (ref 1.8–7.7)
NEUTROPHILS NFR BLD: 65.3 % (ref 38–73)
PLATELET # BLD AUTO: 275 K/UL (ref 150–350)
PMV BLD AUTO: 8.7 FL (ref 9.2–12.9)
POCT GLUCOSE: 114 MG/DL (ref 70–110)
POTASSIUM SERPL-SCNC: 3.5 MMOL/L (ref 3.5–5.1)
PROT SERPL-MCNC: 6.8 G/DL (ref 6–8.4)
RBC # BLD AUTO: 4.89 M/UL (ref 4.6–6.2)
SODIUM SERPL-SCNC: 137 MMOL/L (ref 136–145)
TROPONIN I SERPL DL<=0.01 NG/ML-MCNC: 0.02 NG/ML (ref 0–0.03)
TROPONIN I SERPL DL<=0.01 NG/ML-MCNC: 0.17 NG/ML (ref 0–0.03)
TROPONIN I SERPL DL<=0.01 NG/ML-MCNC: 0.25 NG/ML (ref 0–0.03)
TROPONIN I SERPL DL<=0.01 NG/ML-MCNC: 0.3 NG/ML (ref 0–0.03)
TROPONIN I SERPL DL<=0.01 NG/ML-MCNC: <0.006 NG/ML (ref 0–0.03)
WBC # BLD AUTO: 7.5 K/UL (ref 3.9–12.7)

## 2019-08-10 PROCEDURE — G0378 HOSPITAL OBSERVATION PER HR: HCPCS

## 2019-08-10 PROCEDURE — 82962 GLUCOSE BLOOD TEST: CPT

## 2019-08-10 PROCEDURE — 25000003 PHARM REV CODE 250: Performed by: PHYSICIAN ASSISTANT

## 2019-08-10 PROCEDURE — 84484 ASSAY OF TROPONIN QUANT: CPT | Mod: 91

## 2019-08-10 PROCEDURE — 99220 PR INITIAL OBSERVATION CARE,LEVL III: ICD-10-PCS | Mod: ,,, | Performed by: INTERNAL MEDICINE

## 2019-08-10 PROCEDURE — 80053 COMPREHEN METABOLIC PANEL: CPT

## 2019-08-10 PROCEDURE — 25000003 PHARM REV CODE 250: Performed by: INTERNAL MEDICINE

## 2019-08-10 PROCEDURE — 85025 COMPLETE CBC W/AUTO DIFF WBC: CPT

## 2019-08-10 PROCEDURE — 36415 COLL VENOUS BLD VENIPUNCTURE: CPT

## 2019-08-10 PROCEDURE — 99220 PR INITIAL OBSERVATION CARE,LEVL III: CPT | Mod: ,,, | Performed by: INTERNAL MEDICINE

## 2019-08-10 PROCEDURE — 25000003 PHARM REV CODE 250: Performed by: EMERGENCY MEDICINE

## 2019-08-10 PROCEDURE — 84484 ASSAY OF TROPONIN QUANT: CPT

## 2019-08-10 RX ORDER — ASPIRIN 81 MG/1
81 TABLET ORAL DAILY
Status: DISCONTINUED | OUTPATIENT
Start: 2019-08-10 | End: 2019-08-11 | Stop reason: HOSPADM

## 2019-08-10 RX ORDER — HYDROCHLOROTHIAZIDE 12.5 MG/1
12.5 TABLET ORAL DAILY
Status: DISCONTINUED | OUTPATIENT
Start: 2019-08-10 | End: 2019-08-11 | Stop reason: HOSPADM

## 2019-08-10 RX ORDER — CLOPIDOGREL BISULFATE 75 MG/1
75 TABLET ORAL DAILY
Qty: 30 TABLET | Refills: 11 | Status: SHIPPED | OUTPATIENT
Start: 2019-08-11 | End: 2019-09-23 | Stop reason: SDUPTHER

## 2019-08-10 RX ORDER — ISOSORBIDE MONONITRATE 30 MG/1
60 TABLET, EXTENDED RELEASE ORAL DAILY
Status: DISCONTINUED | OUTPATIENT
Start: 2019-08-10 | End: 2019-08-11 | Stop reason: HOSPADM

## 2019-08-10 RX ORDER — SODIUM CHLORIDE 0.9 % (FLUSH) 0.9 %
10 SYRINGE (ML) INJECTION
Status: DISCONTINUED | OUTPATIENT
Start: 2019-08-10 | End: 2019-08-11 | Stop reason: HOSPADM

## 2019-08-10 RX ORDER — CLOPIDOGREL BISULFATE 75 MG/1
75 TABLET ORAL DAILY
Status: DISCONTINUED | OUTPATIENT
Start: 2019-08-11 | End: 2019-08-11 | Stop reason: HOSPADM

## 2019-08-10 RX ORDER — AMLODIPINE BESYLATE 5 MG/1
5 TABLET ORAL DAILY
Qty: 90 TABLET | Refills: 3 | Status: SHIPPED | OUTPATIENT
Start: 2019-08-10 | End: 2019-09-23 | Stop reason: SDUPTHER

## 2019-08-10 RX ORDER — LUBIPROSTONE 24 UG/1
24 CAPSULE ORAL 2 TIMES DAILY WITH MEALS
Status: DISCONTINUED | OUTPATIENT
Start: 2019-08-10 | End: 2019-08-11 | Stop reason: HOSPADM

## 2019-08-10 RX ORDER — ATORVASTATIN CALCIUM 40 MG/1
40 TABLET, FILM COATED ORAL DAILY
Status: DISCONTINUED | OUTPATIENT
Start: 2019-08-10 | End: 2019-08-11 | Stop reason: HOSPADM

## 2019-08-10 RX ORDER — ONDANSETRON 2 MG/ML
4 INJECTION INTRAMUSCULAR; INTRAVENOUS EVERY 8 HOURS PRN
Status: DISCONTINUED | OUTPATIENT
Start: 2019-08-10 | End: 2019-08-11 | Stop reason: HOSPADM

## 2019-08-10 RX ORDER — AMLODIPINE BESYLATE 5 MG/1
5 TABLET ORAL DAILY
Status: DISCONTINUED | OUTPATIENT
Start: 2019-08-10 | End: 2019-08-11 | Stop reason: HOSPADM

## 2019-08-10 RX ORDER — AMLODIPINE BESYLATE 5 MG/1
5 TABLET ORAL DAILY
Qty: 30 TABLET | Refills: 1 | Status: SHIPPED | OUTPATIENT
Start: 2019-08-10 | End: 2019-09-23 | Stop reason: SDUPTHER

## 2019-08-10 RX ORDER — HYDRALAZINE HYDROCHLORIDE 20 MG/ML
10 INJECTION INTRAMUSCULAR; INTRAVENOUS EVERY 8 HOURS PRN
Status: DISCONTINUED | OUTPATIENT
Start: 2019-08-10 | End: 2019-08-11 | Stop reason: HOSPADM

## 2019-08-10 RX ORDER — CLOPIDOGREL 300 MG/1
600 TABLET, FILM COATED ORAL ONCE
Status: COMPLETED | OUTPATIENT
Start: 2019-08-10 | End: 2019-08-10

## 2019-08-10 RX ORDER — METOPROLOL SUCCINATE 25 MG/1
25 TABLET, EXTENDED RELEASE ORAL DAILY
Status: DISCONTINUED | OUTPATIENT
Start: 2019-08-10 | End: 2019-08-11 | Stop reason: HOSPADM

## 2019-08-10 RX ORDER — ACETAMINOPHEN 325 MG/1
650 TABLET ORAL EVERY 8 HOURS PRN
Status: DISCONTINUED | OUTPATIENT
Start: 2019-08-10 | End: 2019-08-11 | Stop reason: HOSPADM

## 2019-08-10 RX ORDER — CLOPIDOGREL BISULFATE 75 MG/1
75 TABLET ORAL DAILY
Qty: 30 TABLET | Refills: 1 | Status: SHIPPED | OUTPATIENT
Start: 2019-08-10 | End: 2019-09-23 | Stop reason: SDUPTHER

## 2019-08-10 RX ADMIN — LUBIPROSTONE 24 MCG: 24 CAPSULE, GELATIN COATED ORAL at 06:08

## 2019-08-10 RX ADMIN — AMLODIPINE BESYLATE 5 MG: 5 TABLET ORAL at 09:08

## 2019-08-10 RX ADMIN — ASPIRIN 81 MG: 81 TABLET, COATED ORAL at 09:08

## 2019-08-10 RX ADMIN — METOPROLOL SUCCINATE 25 MG: 25 TABLET, EXTENDED RELEASE ORAL at 12:08

## 2019-08-10 RX ADMIN — ISOSORBIDE MONONITRATE 60 MG: 30 TABLET, EXTENDED RELEASE ORAL at 09:08

## 2019-08-10 RX ADMIN — HYDROCHLOROTHIAZIDE 12.5 MG: 12.5 TABLET ORAL at 09:08

## 2019-08-10 RX ADMIN — CLOPIDOGREL BISULFATE 600 MG: 300 TABLET, FILM COATED ORAL at 12:08

## 2019-08-10 RX ADMIN — ATORVASTATIN CALCIUM 40 MG: 40 TABLET, FILM COATED ORAL at 09:08

## 2019-08-10 NOTE — ASSESSMENT & PLAN NOTE
Currently CP free. LHC from 5/6/19 with Moderate 50% LAD and RCA. 80% D1 - medical Rx. 3rd troponin elevated at 0.16. Likely demand ischemia from HTN but given CAD will load plavix. Ok for d/c and OV with Dr Nieto 8/15/19 as scheduled

## 2019-08-10 NOTE — ASSESSMENT & PLAN NOTE
Reports a throbbing feeling in his head/chest and noticed BP high. No complaints of chest pain/SOB. Initial troponin negative. Repeat 0.169.   -continue aspirin  -troponin trend  -Echo  -Telemetry  -Cardiology consulted

## 2019-08-10 NOTE — HPI
HPI: Juan Martínez Jr. 83 y.o. male with HTN, HLD, CAD presents to the hospital with a chief complaint of chest pain/headache. He reports last night he experienced a throbbing sensation in her headache and a throbbing sensation in her chest. He checked his BP and noticed it was high so he called EMS. Theses symptoms resolved and he has no complaints currently. He is taking all prescribed medications, though per last cardiology note he was to start amlodipine 5mg, but he did not know and has not been taking. He has been managed medically for CAD since May. He denies fever, nausea, SOB, N/V, abdominal pain, dysuria syncope.     Currently denies CP  3rd troponin increased to 0.16  EKG NSR LAFB - no acute STT changes    Followed by Dr Nieto - last seen 6/12/19  Note from clinic visit in May 2019:  Patient is 83-year-old man pleasant man with a past medical history significant for diabetes, dyslipidemia, hypertension, coronary artery disease who recently presented to the hospital with a non-STEMI.  Coronary angiogram performed at that time revealed nonischemic coronary artery disease of the LAD and RCA as well as 80% stenosis in his diagonal 1.  Which is being managed medically.  A week after that he developed some right-sided chest pain, different from his anginal pain which he had presented with his non-STEMI.  He states that right-sided chest pain resolved on its own in less than 20 min, but since this is all new for him he decided to come to the hospital to make sure everything was okay.  He was admitted and serial troponins did not reveal any elevation and he was discharged home.  He has not had any further episodes of chest pains.  Denies orthopnea, PND chest pains at rest or on exertion.  Denies typical claudication pain, has knee pains and atypical leg pain.    2D echocardiogram May 2019:  · Normal left ventricular systolic function. The estimated ejection fraction is 65%  · Normal LV diastolic  function.  · Mild left atrial enlargement.  · Normal central venous pressure (3 mm Hg).  · The estimated PA systolic pressure is 10 mm Hg        Coronary angiogram 6th May 2019:     No acute thrombotic lesion identified.  Coronary artery disease as described below  LVEDP: 12 mmHg     Carotid ultrasound May 28, 2019:     · There is 20-39% right Internal Carotid Stenosis.  · There is 20-39% left Internal Carotid Stenosis.     ABIs May 28, 2019:     Noncompressible LOW bilaterally.  Mildly decrease TBI bilaterally.  Normal PVR waveforms bilaterally.        Coronary Anatomy:  LM:  No significant stenosis  LAD:  Mid LAD 60-70% stenosis.  Distal LAD 50% stenosis.  IFR 0.92 (nonischemic).  Diagonal 1 is a tortuous vessel with mid 80% stenosis.  Will medically manage this diagonal stenosis  LCx :  Mild nonobstructive CAD  RCA:  50% distal RCA/proximal PDA lesion.  IFR 1 (nonischemic)     Impression / Plan  Coronary artery disease as described above.  Continue medical management with aggressive risk factor modification.  Continue aspirin 81 mg daily. Change simvastatin to atorvastatin 80 mg daily.

## 2019-08-10 NOTE — HPI
Juan Martínez Jr. 83 y.o. male with HTN, HLD, CAD presents to the hospital with a chief complaint of chest pain/headache. He reports last night he experienced a throbbing sensation in her headache and a throbbing sensation in her chest. He checked his BP and noticed it was high so he called EMS. Theses symptoms resolved and he has no complaints currently. He is taking all prescribed medications, though per last cardiology note he was to start amlodipine 5mg, but he did not know and has not been taking. He has been managed medically for CAD since May. He denies fever, nausea, SOB, N/V, abdominal pain, dysuria syncope.    In the ED, troponin negative, chest x-ray without acute process, and EKG of NSR.       5/2019 [facility]  Coronary Anatomy:  LM:  No significant stenosis  LAD:  Mid LAD 60-70% stenosis.  Distal LAD 50% stenosis.  IFR 0.92 (nonischemic).  Diagonal 1 is a tortuous vessel with mid 80% stenosis.  Will medically manage this diagonal stenosis  LCx :  Mild nonobstructive CAD  RCA:  50% distal RCA/proximal PDA lesion.  IFR 1 (nonischemic)     Impression / Plan  Coronary artery disease as described above.  Continue medical management with aggressive risk factor modification.  Continue aspirin 81 mg daily. Change simvastatin to atorvastatin 80 mg daily.

## 2019-08-10 NOTE — NURSING
Received report from PACO Arango RN. Pt SSOx4 with no c/o pain and informed of NPO status. Telemetry monitor in place. Saline lock in place to site is clear. Pt informed of plan of care and safety maintained with bed low side rails up x2 with nurse call bell within reach and bed alarm set

## 2019-08-10 NOTE — ED PROVIDER NOTES
"Encounter Date: 8/9/2019    SCRIBE #1 NOTE: I, Sue Powers, am scribing for, and in the presence of, Chloe Anderson MD.       History     Chief Complaint   Patient presents with    Anxiety     pt states did not "feel well" he put his hand to his head and felt heart beating in temples. stating " I knew then something was wrong. checked his blood pressure an d it was high so he thought this might be his heart so he called for ambulance. on arrival pt denies any pain.     83-year-old male with PMHx of CAD, HTN, HLD, DM, and prostate cancer presents to ED with complaints of elevated BP, "feeling funny" and palpitations. Patient reports "feeling funny" and decided to come to ED after checking his BP was elevated.  Currently he denies symptoms. He reported to triage nurse that he felt his heart beat in his temples, he denies having headache. He states when he felt funny, he put his hands to his temples and could feel his heart rate.  He notes having mild MI in May that was treated with medications and wanted to come for precautionary reasons. He notes having some pain in left-arm 3 days ago but he feels this is due to Hx of shoulder problems. He denies any pain, sob, light-headedness, fever, chills, chest pain, one-sided numbness or weakness, or abnormal appetite. Patient denies heat exposure today. He also denies smoking but notes Hx of smoking. He reports taking ASA this morning.    The history is provided by the patient. No  was used.     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  WITH " LOCAL TISSUE ARRANGEMENT Left 11/15/2018    [facility] at [facility] OR    EXCISION, CARCINOMA, SQUAMOUS CELL @ RIGHT HAND Right 11/15/2018    [facility] at [facility] OR    Fractional Flow [place] (FFR), Coronary  2019    Performed [facility]o [facility] at [facility] CATH LAB    JOINT REPLACEMENT      Left heart cath Left 2019    Performed by [facility] at [facility] CATH LAB    TONSILLECTOMY      TOTAL KNEE ARTHROPLASTY       Family History   Problem Relation Age of Onset    Heart disease Mother     Stroke Father      Social History     Tobacco Use    Smoking status: Former Smoker     Packs/day: 0.00     Types: Cigarettes     Last attempt to quit: 2016     Years since quittin.9    Smokeless tobacco: Never Used    Tobacco comment: 2016   Substance Use Topics    Alcohol use: No    Drug use: No     Review of Systems   Constitutional: Negative for appetite change, chills and fever.   HENT: Negative for congestion.    Respiratory: Negative for cough and shortness of breath.    Cardiovascular: Positive for palpitations. Negative for chest pain.   Gastrointestinal: Negative for abdominal pain, nausea and vomiting.   Genitourinary: Negative for dysuria.   Musculoskeletal: Positive for arthralgias (Left shoulder pain several days ago).   Skin: Negative for rash.   Allergic/Immunologic: Negative for immunocompromised state.   Neurological: Negative for weakness, light-headedness, numbness and headaches.       Physical Exam     Initial Vitals [19 2241]   BP Pulse Resp Temp SpO2   132/76 74 18 98.3 °F (36.8 °C) 97 %      MAP       --         Physical Exam    Nursing note and vitals reviewed.  Constitutional: He appears well-developed and well-nourished. He is not diaphoretic. No distress.   HENT:   Mouth/Throat: Oropharynx is clear and moist.   Eyes: Pupils are equal, round, and reactive to light.   Neck: Neck supple.   Cardiovascular: Normal rate and  regular rhythm.   Pulmonary/Chest: Breath sounds normal. No respiratory distress.   Abdominal: Soft. Bowel sounds are normal.   Musculoskeletal: He exhibits no edema.   Neurological: He is alert and oriented to person, place, and time. He has normal strength. No cranial nerve deficit or sensory deficit. GCS score is 15. GCS eye subscore is 4. GCS verbal subscore is 5. GCS motor subscore is 6.   Skin: Skin is warm and dry.   Psychiatric: He has a normal mood and affect. His speech is normal.         ED Course   Procedures  Labs Reviewed   CBC W/ AUTO DIFFERENTIAL - Abnormal; Notable for the following components:       Result Value    RBC 4.41 (*)     Hemoglobin 13.4 (*)     MPV 8.8 (*)     All other components within normal limits   COMPREHENSIVE METABOLIC PANEL - Abnormal; Notable for the following components:    Potassium 3.4 (*)     Glucose 156 (*)     All other components within normal limits   TROPONIN I   B-TYPE NATRIURETIC PEPTIDE   TROPONIN I        ECG Results          EKG 12-lead (Preliminary result)  Result time 08/10/19 02:27:00    ED Interpretation by Chloe Anderson MD (08/10/19 02:27:00)    Normal sinus rhythm, rate 64 beats per minute, no STEMI.  Normal SD interval.   milliseconds.                            Imaging Results          X-Ray Chest PA And Lateral (Final result)  Result time 08/09/19 23:25:23    Final result by Rochelle Merrill MD (08/09/19 23:25:23)                 Impression:      No acute intrathoracic abnormality.      Electronically signed by: Rochelle Merrill  Date:    08/09/2019  Time:    23:25             Narrative:    EXAMINATION:  CHEST PA AND LATERAL    CLINICAL HISTORY:  Chest Pain;    TECHNIQUE:  PA and lateral chest radiograph    COMPARISON:  05/12/2019    FINDINGS:  The cardiac silhouette is within normal limits.   There is no focal consolidation, pneumothorax, or pleural effusion.                                 Medical Decision Making:   Initial Assessment:    83-year-old male with CAD presents after episode of having palpitations and feeling funny.  Patient was evaluated earlier this year, had a left heart catheterization that showed coronary artery disease, he had a lesion of up to 80% in his left anterior descending artery.  He has been medically managed.  On exam tonight, he denies complaints on my evaluation and has a nonfocal neurologic exam.  Unclear etiology of his symptoms, however given his cardiac history, I plan to obtain a cardiac workup.  Given onset of his symptoms, I do plan to admit patient for observation of his initial troponin is negative.            Scribe Attestation:   Scribe #1: I performed the above scribed service and the documentation accurately describes the services I performed. I attest to the accuracy of the note.            ED Course as of Aug 10 0325   Sat Aug 10, 2019   0228 Labs reviewed within acceptable limits, glucose 156, troponin negative. Given patient's cardiac history, will admit to trend troponin.    []   0229 MCH: 30.4 []      ED Course User Index  [] Chloe Anderson MD     Clinical Impression:       ICD-10-CM ICD-9-CM   1. Palpitations R00.2 785.1   2. Chest pain R07.9 786.50   3. Feeling anxious R45.89 300.00         Disposition:   Disposition: Discharged  Condition: Stable                        Chloe Anderson MD  08/10/19 0325

## 2019-08-10 NOTE — ASSESSMENT & PLAN NOTE
Poorly controlled. Per last cardiology note 5/2019 was to be started on 5mg amlodipine. Patient has not been taking. Continue home imdur, metoprolol, hctz, restart amlodipine. PRN hydralazine

## 2019-08-10 NOTE — PLAN OF CARE
Problem: Adult Inpatient Plan of Care  Goal: Plan of Care Review  Outcome: Ongoing (interventions implemented as appropriate)     08/10/19 3638   Plan of Care Review   Plan of Care Reviewed With patient   Progress no change

## 2019-08-10 NOTE — SUBJECTIVE & OBJECTIVE
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  WITH LOCAL TISSUE ARRANGEMENT Left 11/15/2018    [facility] at [facility] OR    EXCISION, CARCINOMA, SQUAMOUS CELL @ RIGHT HAND Right 11/15/2018    Performed by [facility] at [facility] OR    Fractional Flow [place] (FFR), Coronary  5/6/2019    Performed by [facility] at [facility] CATH LAB    JOINT REPLACEMENT      Left heart cath Left 5/6/2019    Per[facility] by [facility] at [facility] CATH LAB    TONSILLECTOMY      TOTAL KNEE ARTHROPLASTY         Review of patient's allergies indicates:  No Known Allergies    No current facility-administered medications on file prior to encounter.      Current Outpatient Medications on File Prior to Encounter   Medication Sig    AMITIZA 24 mcg Cap 24 mcg 2 (two) times daily with meals.     aspirin (ECOTRIN) 81 MG EC tablet Take 81 mg by mouth once daily.    atorvastatin (LIPITOR) 40 MG tablet Take 1 tablet (40 mg total) by mouth once daily.    hydroCHLOROthiazide (MICROZIDE) 12.5 mg capsule     isosorbide mononitrate (IMDUR) 60 MG 24 hr tablet Take 1 tablet (60 mg total) by mouth once daily.    metformin (GLUCOPHAGE) 1000 MG tablet Take 1 tablet (1,000 mg total) by mouth 2 (two) times daily with meals.    metoprolol succinate (TOPROL-XL) 25 MG 24 hr tablet Take 1 tablet (25 mg total) by mouth once daily.    amLODIPine (NORVASC) 5 MG tablet Take 1 tablet (5 mg total) by mouth once daily.    nitroGLYCERIN (NITROSTAT) 0.4 MG SL tablet Place 1 tablet (0.4 mg total) under the tongue every 5 (five) minutes as needed for Chest pain.     Family History     Problem  Relation (Age of Onset)    Heart disease Mother    Stroke Father        Tobacco Use    Smoking status: Former Smoker     Packs/day: 0.00     Types: Cigarettes     Last attempt to quit: 2016     Years since quittin.9    Smokeless tobacco: Never Used    Tobacco comment: 2016   Substance and Sexual Activity    Alcohol use: No    Drug use: No    Sexual activity: Not Currently     Review of Systems   Constitutional: Negative for chills and fever.   HENT: Negative for nosebleeds and tinnitus.    Eyes: Negative for photophobia and visual disturbance.   Respiratory: Negative for shortness of breath and wheezing.    Cardiovascular: Positive for palpitations. Negative for chest pain and leg swelling.   Gastrointestinal: Negative for abdominal distention, nausea and vomiting.   Genitourinary: Negative for dysuria, flank pain and hematuria.   Musculoskeletal: Negative for gait problem and joint swelling.   Skin: Negative for rash and wound.   Neurological: Positive for headaches. Negative for seizures and syncope.     Objective:     Vital Signs (Most Recent):  Temp: 98 °F (36.7 °C) (08/10/19 0824)  Pulse: 83 (08/10/19 0824)  Resp: 18 (08/10/19 0824)  BP: (!) 196/89 (08/10/19 0824)  SpO2: 96 % (08/10/19 0824) Vital Signs (24h Range):  Temp:  [98 °F (36.7 °C)-98.4 °F (36.9 °C)] 98 °F (36.7 °C)  Pulse:  [53-83] 83  Resp:  [17-20] 18  SpO2:  [96 %-98 %] 96 %  BP: (132-196)/(70-89) 196/89     Weight: 80.4 kg (177 lb 4 oz)  Body mass index is 26.95 kg/m².    Physical Exam   Constitutional: He is oriented to person, place, and time. He appears well-developed and well-nourished. No distress.   HENT:   Head: Normocephalic and atraumatic.   Right Ear: External ear normal.   Left Ear: External ear normal.   Eyes: Conjunctivae and EOM are normal. Right eye exhibits no discharge. Left eye exhibits no discharge.   Neck: Normal range of motion. No thyromegaly present.   Cardiovascular: Normal rate and regular rhythm.   No  murmur heard.  Pulmonary/Chest: Effort normal and breath sounds normal. No respiratory distress. He exhibits no tenderness.   Abdominal: Soft. Bowel sounds are normal. He exhibits no distension and no mass. There is no tenderness.   Musculoskeletal: He exhibits no edema or deformity.   Neurological: He is alert and oriented to person, place, and time.   Skin: Skin is warm and dry.   Psychiatric: He has a normal mood and affect. His behavior is normal.   Nursing note and vitals reviewed.        CRANIAL NERVES     CN III, IV, VI   Extraocular motions are normal.        Significant Labs:   CBC:   Recent Labs   Lab 08/09/19  2310 08/10/19  0740   WBC 6.71 7.50   HGB 13.4* 14.8   HCT 40.2 44.1    275     CMP:   Recent Labs   Lab 08/09/19  2310 08/10/19  0740    137   K 3.4* 3.5    103   CO2 24 25   * 139*   BUN 15 12   CREATININE 1.1 0.9   CALCIUM 8.7 9.0   PROT 6.3 6.8   ALBUMIN 3.5 3.8   BILITOT 0.4 0.5   ALKPHOS 70 90   AST 14 17   ALT 19 22   ANIONGAP 11 9   EGFRNONAA >60 >60     Cardiac Markers:   Recent Labs   Lab 08/09/19  2310   BNP 50     Troponin:   Recent Labs   Lab 08/09/19  2310 08/10/19  0155 08/10/19  0740   TROPONINI <0.006 0.024 0.169*       Significant Imaging:   Imaging Results          X-Ray Chest PA And Lateral (Final result)  Result time 08/09/19 23:25:23    Final result by Rochelle Merrill MD (08/09/19 23:25:23)                 Impression:      No acute intrathoracic abnormality.      Electronically signed by: Rochelle Merrill  Date:    08/09/2019  Time:    23:25             Narrative:    EXAMINATION:  CHEST PA AND LATERAL    CLINICAL HISTORY:  Chest Pain;    TECHNIQUE:  PA and lateral chest radiograph    COMPARISON:  05/12/2019    FINDINGS:  The cardiac silhouette is within normal limits.   There is no focal consolidation, pneumothorax, or pleural effusion.

## 2019-08-10 NOTE — H&P
"[facility] Medicine  History & Physical    Patient Name: Juan Martínez Jr.  MRN: [id number]  Admission Date: 8/9/2019  Attending Physician: Gena Gardner MD   Primary Care Provider: Lissy Gavin MD         Patient information was obtained from patient, past medical records and ER records.     Subjective:     Principal Problem:Palpitations    Chief Complaint:   Chief Complaint   Patient presents with    Anxiety     pt states did not "feel well" he put his hand to his head and felt heart beating in temples. stating " I knew then something was wrong. checked his blood pressure an d it was high so he thought this might be his heart so he called for ambulance. on arrival pt denies any pain.        HPI: Juan Martínez Jr. 83 y.o. male with HTN, HLD, CAD presents to the hospital with a chief complaint of chest pain/headache. He reports last night he experienced a throbbing sensation in her headache and a throbbing sensation in her chest. He checked his BP and noticed it was high so he called EMS. Theses symptoms resolved and he has no complaints currently. He is taking all prescribed medications, though per last cardiology note he was to start amlodipine 5mg, but he did not know and has not been taking. He has been managed medically for CAD since May. He denies fever, nausea, SOB, N/V, abdominal pain, dysuria syncope.    In the ED, troponin negative, chest x-ray without acute process, and EKG of NSR.       5/2019 [facility]  Coronary Anatomy:  LM:  No significant stenosis  LAD:  Mid LAD 60-70% stenosis.  Distal LAD 50% stenosis.  IFR 0.92 (nonischemic).  Diagonal 1 is a tortuous vessel with mid 80% stenosis.  Will medically manage this diagonal stenosis  LCx :  Mild nonobstructive CAD  RCA:  50% distal RCA/proximal PDA lesion.  IFR 1 (nonischemic)     Impression / Plan  Coronary artery disease as described above.  Continue medical management with aggressive risk factor modification. "  Continue aspirin 81 mg daily. Change simvastatin to atorvastatin 80 mg daily.    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  WITH LOCAL TISSUE ARRANGEMENT Left 11/15/2018    Performed by [facility] at [facility] OR    EXCISION, CARCINOMA, SQUAMOUS CELL @ RIGHT HAND Right 11/15/2018    Performed by [facility] at [facility] OR    Fractional Flow [place] (FFR), Coronary  5/6/2019    Performed by [facility] at [facility] CATH LAB    JOINT REPLACEMENT      Left heart cath Left 5/6/2019    Performed by [facility] at [facility] CATH LAB    TONSILLECTOMY      TOTAL KNEE ARTHROPLASTY         Review of patient's allergies indicates:  No Known Allergies    No current facility-administered medications on file prior to encounter.      Current Outpatient Medications on File Prior to Encounter   Medication Sig    AMITIZA 24 mcg Cap 24 mcg 2 (two) times daily with meals.     aspirin (ECOTRIN) 81 MG EC tablet Take 81 mg by mouth once daily.    atorvastatin (LIPITOR) 40 MG tablet Take 1 tablet (40 mg total) by mouth once daily.    hydroCHLOROthiazide (MICROZIDE) 12.5 mg capsule     isosorbide mononitrate (IMDUR) 60 MG 24 hr tablet Take 1 tablet (60 mg total) by mouth once daily.    metformin (GLUCOPHAGE) 1000 MG tablet Take 1 tablet (1,000 mg total) by mouth 2 (two) times daily with meals.    metoprolol succinate (TOPROL-XL) 25 MG 24 hr tablet Take 1 tablet (25 mg total) by mouth once daily.    amLODIPine (NORVASC) 5 MG tablet Take 1 tablet (5 mg total) by mouth once daily.    nitroGLYCERIN (NITROSTAT) 0.4 MG SL tablet Place 1 tablet (0.4 mg total) under the  tongue every 5 (five) minutes as needed for Chest pain.     Family History     Problem Relation (Age of Onset)    Heart disease Mother    Stroke Father        Tobacco Use    Smoking status: Former Smoker     Packs/day: 0.00     Types: Cigarettes     Last attempt to quit: 2016     Years since quittin.9    Smokeless tobacco: Never Used    Tobacco comment: 2016   Substance and Sexual Activity    Alcohol use: No    Drug use: No    Sexual activity: Not Currently     Review of Systems   Constitutional: Negative for chills and fever.   HENT: Negative for nosebleeds and tinnitus.    Eyes: Negative for photophobia and visual disturbance.   Respiratory: Negative for shortness of breath and wheezing.    Cardiovascular: Positive for palpitations. Negative for chest pain and leg swelling.   Gastrointestinal: Negative for abdominal distention, nausea and vomiting.   Genitourinary: Negative for dysuria, flank pain and hematuria.   Musculoskeletal: Negative for gait problem and joint swelling.   Skin: Negative for rash and wound.   Neurological: Positive for headaches. Negative for seizures and syncope.     Objective:     Vital Signs (Most Recent):  Temp: 98 °F (36.7 °C) (08/10/19 0824)  Pulse: 83 (08/10/19 0824)  Resp: 18 (08/10/19 0824)  BP: (!) 196/89 (08/10/19 0824)  SpO2: 96 % (08/10/19 0824) Vital Signs (24h Range):  Temp:  [98 °F (36.7 °C)-98.4 °F (36.9 °C)] 98 °F (36.7 °C)  Pulse:  [53-83] 83  Resp:  [17-20] 18  SpO2:  [96 %-98 %] 96 %  BP: (132-196)/(70-89) 196/89     Weight: 80.4 kg (177 lb 4 oz)  Body mass index is 26.95 kg/m².    Physical Exam   Constitutional: He is oriented to person, place, and time. He appears well-developed and well-nourished. No distress.   HENT:   Head: Normocephalic and atraumatic.   Right Ear: External ear normal.   Left Ear: External ear normal.   Eyes: Conjunctivae and EOM are normal. Right eye exhibits no discharge. Left eye exhibits no discharge.   Neck: Normal range of  motion. No thyromegaly present.   Cardiovascular: Normal rate and regular rhythm.   No murmur heard.  Pulmonary/Chest: Effort normal and breath sounds normal. No respiratory distress. He exhibits no tenderness.   Abdominal: Soft. Bowel sounds are normal. He exhibits no distension and no mass. There is no tenderness.   Musculoskeletal: He exhibits no edema or deformity.   Neurological: He is alert and oriented to person, place, and time.   Skin: Skin is warm and dry.   Psychiatric: He has a normal mood and affect. His behavior is normal.   Nursing note and vitals reviewed.        CRANIAL NERVES     CN III, IV, VI   Extraocular motions are normal.        Significant Labs:   CBC:   Recent Labs   Lab 08/09/19  2310 08/10/19  0740   WBC 6.71 7.50   HGB 13.4* 14.8   HCT 40.2 44.1    275     CMP:   Recent Labs   Lab 08/09/19  2310 08/10/19  0740    137   K 3.4* 3.5    103   CO2 24 25   * 139*   BUN 15 12   CREATININE 1.1 0.9   CALCIUM 8.7 9.0   PROT 6.3 6.8   ALBUMIN 3.5 3.8   BILITOT 0.4 0.5   ALKPHOS 70 90   AST 14 17   ALT 19 22   ANIONGAP 11 9   EGFRNONAA >60 >60     Cardiac Markers:   Recent Labs   Lab 08/09/19 2310   BNP 50     Troponin:   Recent Labs   Lab 08/09/19 2310 08/10/19  0155 08/10/19  0740   TROPONINI <0.006 0.024 0.169*       Significant Imaging:   Imaging Results          X-Ray Chest PA And Lateral (Final result)  Result time 08/09/19 23:25:23    Final result by Rochelle Merrill MD (08/09/19 23:25:23)                 Impression:      No acute intrathoracic abnormality.      Electronically signed by: Rochelle Merrill  Date:    08/09/2019  Time:    23:25             Narrative:    EXAMINATION:  CHEST PA AND LATERAL    CLINICAL HISTORY:  Chest Pain;    TECHNIQUE:  PA and lateral chest radiograph    COMPARISON:  05/12/2019    FINDINGS:  The cardiac silhouette is within normal limits.   There is no focal consolidation, pneumothorax, or pleural effusion.                                   Assessment/Plan:     * Palpitations  Reports a throbbing feeling in his head/chest and noticed BP high. No complaints of chest pain/SOB. Initial troponin negative. Repeat 0.169.   -continue aspirin  -troponin trend  -Echo  -Telemetry  -Cardiology consulted      Elevated troponin  Initial two troponin negative. Third up to 0.169. Will consult cardiology. Denies any chest pain/SOB.     CAD (coronary artery disease)  Continue home aspirin/statin, imdur, metoprolol.     Hyperlipidemia  Continue home statin    Type 2 diabetes mellitus  Lab Results   Component Value Date    LABA1C 6.9 (H) 07/06/2015    HGBA1C 7.0 (H) 05/07/2019     BG of 139 on admit. Sliding scale insulin, diabetic diet, POCT glucose checks, hypoglycemic protocol.    Essential hypertension, benign  Poorly controlled. Per last cardiology note 5/2019 was to be started on 5mg amlodipine. Patient has not been taking. Continue home imdur, metoprolol, hctz, restart amlodipine. PRN hydralazine      VTE Risk Mitigation (From admission, onward)        Ordered     Place SAVANAH hose  Until discontinued      08/10/19 1024     IP VTE HIGH RISK PATIENT  Once      08/10/19 0351     Place sequential compression device  Until discontinued      08/10/19 0351        VTE: SAVANAH/SCD  Code: Full  Diet: NPO  Dispo: pending cardiology recommendation     Jono Murrieta PA-C  Department of Hospital Medicine   [facility]

## 2019-08-10 NOTE — CONSULTS
[facility]  Cardiology  Consult Note    Patient Name: Juan Martínez Jr.  MRN: [id number]  Admission Date: 8/9/2019  Hospital Length of Stay: 0 days  Code Status: Full Code   Attending Provider: Gena Gardner MD   Consulting Provider: Deejay Duron MD  Primary Care Physician: Lissy Gavin MD  Principal Problem:Palpitations    Patient information was obtained from patient and ER records.     Consults  Subjective:     Chief Complaint:  Elevated troponin     HPI: Jaun Martínez Jr. 83 y.o. male with HTN, HLD, CAD presents to the hospital with a chief complaint of chest pain/headache. He reports last night he experienced a throbbing sensation in her headache and a throbbing sensation in her chest. He checked his BP and noticed it was high so he called EMS. Theses symptoms resolved and he has no complaints currently. He is taking all prescribed medications, though per last cardiology note he was to start amlodipine 5mg, but he did not know and has not been taking. He has been managed medically for CAD since May. He denies fever, nausea, SOB, N/V, abdominal pain, dysuria syncope.     Currently denies CP  3rd troponin increased to 0.16  EKG NSR LAFB - no acute STT changes    Followed by Dr Nieto - last seen 6/12/19  Note from clinic visit in May 2019:  Patient is 83-year-old man pleasant man with a past medical history significant for diabetes, dyslipidemia, hypertension, coronary artery disease who recently presented to the hospital with a non-STEMI.  Coronary angiogram performed at that time revealed nonischemic coronary artery disease of the LAD and RCA as well as 80% stenosis in his diagonal 1.  Which is being managed medically.  A week after that he developed some right-sided chest pain, different from his anginal pain which he had presented with his non-STEMI.  He states that right-sided chest pain resolved on its own in less than 20 min, but since this is all new for him he decided  to come to the hospital to make sure everything was okay.  He was admitted and serial troponins did not reveal any elevation and he was discharged home.  He has not had any further episodes of chest pains.  Denies orthopnea, PND chest pains at rest or on exertion.  Denies typical claudication pain, has knee pains and atypical leg pain.    2D echocardiogram May 2019:  · Normal left ventricular systolic function. The estimated ejection fraction is 65%  · Normal LV diastolic function.  · Mild left atrial enlargement.  · Normal central venous pressure (3 mm Hg).  · The estimated PA systolic pressure is 10 mm Hg        Coronary angiogram 6th May 2019:     No acute thrombotic lesion identified.  Coronary artery disease as described below  LVEDP: 12 mmHg     Carotid ultrasound May 28, 2019:     · There is 20-39% right Internal Carotid Stenosis.  · There is 20-39% left Internal Carotid Stenosis.     ABIs May 28, 2019:     Noncompressible LOW bilaterally.  Mildly decrease TBI bilaterally.  Normal PVR waveforms bilaterally.        Coronary Anatomy:  LM:  No significant stenosis  LAD:  Mid LAD 60-70% stenosis.  Distal LAD 50% stenosis.  IFR 0.92 (nonischemic).  Diagonal 1 is a tortuous vessel with mid 80% stenosis.  Will medically manage this diagonal stenosis  LCx :  Mild nonobstructive CAD  RCA:  50% distal RCA/proximal PDA lesion.  IFR 1 (nonischemic)     Impression / Plan  Coronary artery disease as described above.  Continue medical management with aggressive risk factor modification.  Continue aspirin 81 mg daily. Change simvastatin to atorvastatin 80 mg daily.          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 Left 11/15/2018    [facility] at [facility] OR    EXCISION, CARCINOMA, SQUAMOUS CELL @ RIGHT HAND Right 11/15/2018    Performed by [facility] at [facility] OR    Fractional Flow [place] (FFR), Coronary  2019    Performed by [facility] at [facility] CATH LAB    JOINT REPLACEMENT      Left heart cath Left 2019    Performed by [facility] at [facility] CATH LAB    TONSILLECTOMY      TOTAL KNEE ARTHROPLASTY         Review of patient's allergies indicates:  No Known Allergies    No current facility-administered medications on file prior to encounter.      Current Outpatient Medications on File Prior to Encounter   Medication Sig    AMITIZA 24 mcg Cap 24 mcg 2 (two) times daily with meals.     aspirin (ECOTRIN) 81 MG EC tablet Take 81 mg by mouth once daily.    atorvastatin (LIPITOR) 40 MG tablet Take 1 tablet (40 mg total) by mouth once daily.    hydroCHLOROthiazide (MICROZIDE) 12.5 mg capsule     isosorbide mononitrate (IMDUR) 60 MG 24 hr tablet Take 1 tablet (60 mg total) by mouth once daily.    metformin (GLUCOPHAGE) 1000 MG tablet Take 1 tablet (1,000 mg total) by mouth 2 (two) times daily with meals.    metoprolol succinate (TOPROL-XL) 25 MG 24 hr tablet Take 1 tablet (25 mg total) by mouth once daily.    amLODIPine (NORVASC) 5 MG tablet Take 1 tablet (5 mg total) by mouth once daily.    nitroGLYCERIN (NITROSTAT) 0.4 MG SL tablet Place 1 tablet (0.4 mg total) under the tongue every 5 (five) minutes as needed for Chest pain.     Family History     Problem Relation (Age of Onset)    Heart disease Mother    Stroke Father        Tobacco Use    Smoking status: Former Smoker     Packs/day: 0.00     Types: Cigarettes     Last attempt to quit: 2016     Years since quittin.9    Smokeless tobacco: Never Used    Tobacco comment: 2016   Substance and Sexual Activity    Alcohol use: No    Drug use:  No    Sexual activity: Not Currently     Review of Systems   Constitution: Negative for decreased appetite.   HENT: Negative for ear discharge.    Eyes: Negative for blurred vision.   Endocrine: Negative for polyphagia.   Skin: Negative for nail changes.   Genitourinary: Negative for bladder incontinence.   Neurological: Negative for aphonia.   Psychiatric/Behavioral: Negative for hallucinations.   Allergic/Immunologic: Negative for hives.     Objective:     Vital Signs (Most Recent):  Temp: 98 °F (36.7 °C) (08/10/19 0824)  Pulse: 83 (08/10/19 0824)  Resp: 18 (08/10/19 0824)  BP: (!) 196/89 (08/10/19 0824)  SpO2: 96 % (08/10/19 0824) Vital Signs (24h Range):  Temp:  [98 °F (36.7 °C)-98.4 °F (36.9 °C)] 98 °F (36.7 °C)  Pulse:  [53-83] 83  Resp:  [17-20] 18  SpO2:  [96 %-98 %] 96 %  BP: (132-196)/(70-89) 196/89     Weight: 80.4 kg (177 lb 4 oz)  Body mass index is 26.95 kg/m².    SpO2: 96 %  O2 Device (Oxygen Therapy): room air    No intake or output data in the 24 hours ending 08/10/19 1131    Lines/Drains/Airways     Peripheral Intravenous Line                 Peripheral IV - Single Lumen 08/10/19 0229 18 G Left Forearm less than 1 day                Physical Exam   Constitutional: He is oriented to person, place, and time. He appears well-developed and well-nourished.   HENT:   Head: Normocephalic and atraumatic.   Eyes: Pupils are equal, round, and reactive to light. Conjunctivae are normal.   Neck: Normal range of motion. Neck supple.   Cardiovascular: Normal rate, normal heart sounds and intact distal pulses.   Pulmonary/Chest: Effort normal and breath sounds normal.   Abdominal: Soft. Bowel sounds are normal.   Musculoskeletal: Normal range of motion.   Neurological: He is alert and oriented to person, place, and time.   Skin: Skin is warm and dry.       Significant Labs: All pertinent lab results from the last 24 hours have been reviewed.    Significant Imaging: Echocardiogram: 2D echo with color flow  doppler: No results found for this or any previous visit.    Assessment and Plan:     CAD (coronary artery disease)  Currently CP free. LHC from 5/6/19 with Moderate 50% LAD and RCA. 80% D1 - medical Rx. 3rd troponin elevated at 0.16. Likely demand ischemia from HTN but given CAD will load plavix. Ok for d/c and OV with Dr Nieto 8/15/19 as scheduled    Hyperlipidemia  On statin    Type 2 diabetes mellitus  Per primary    Essential hypertension, benign  Poorly controlled. Resume norvasc which he has not been taking        VTE Risk Mitigation (From admission, onward)        Ordered     Place SAVANAH hose  Until discontinued      08/10/19 1024     IP VTE HIGH RISK PATIENT  Once      08/10/19 0351     Place sequential compression device  Until discontinued      08/10/19 0351          Thank you for your consult. I will sign off. Please contact us if you have any additional questions.    Deejay Duron MD  Cardiology   [facility]

## 2019-08-10 NOTE — PLAN OF CARE
Problem: Fall Injury Risk  Goal: Absence of Fall and Fall-Related Injury    Intervention: Identify and Manage Contributors to Fall Injury Risk     08/10/19 1809   Manage Acute Allergic Reaction   Medication Review/Management medications reviewed;high risk medications identified   Identify and Manage Contributors to Fall Injury Risk   Self-Care Promotion independence encouraged     Intervention: Promote Injury-Free Environment     08/10/19 1809   Optimize [place] and Functional Mobility   Environmental Safety Modification assistive device/personal items within reach;clutter free environment maintained   Optimize Balance and Safe Activity   Safety Promotion/Fall Prevention assistive device/personal item within reach;Fall Risk reviewed with patient/family;side rails raised x 2         Comments: Pt has had no c/o discomfort and VS have remained stable. Pt troponins draw during shift have continued to increase and pt informed of necessary monitoring. Pt free of falls this shift and rested comfortably at bedside. Pt was able tolerated some of diet.

## 2019-08-10 NOTE — ASSESSMENT & PLAN NOTE
Lab Results   Component Value Date    LABA1C 6.9 (H) 07/06/2015    HGBA1C 7.0 (H) 05/07/2019     BG of 139 on admit. Sliding scale insulin, diabetic diet, POCT glucose checks, hypoglycemic protocol.

## 2019-08-10 NOTE — SUBJECTIVE & OBJECTIVE
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  WITH LOCAL TISSUE ARRANGEMENT Left 11/15/2018    Performed by [facility] at [facility] OR    EXCISION, CARCINOMA, SQUAMOUS CELL @ RIGHT HAND Right 11/15/2018    Performed by [facility] at [facility] OR    Fractional Flow [place] (FFR), Coronary  5/6/2019    Performed by [facility] at [facility] CATH LAB    JOINT REPLACEMENT      Left heart cath Left 5/6/2019    Performed by [facility] at [facility] CATH LAB    TONSILLECTOMY      TOTAL KNEE ARTHROPLASTY         Review of patient's allergies indicates:  No Known Allergies    No current facility-administered medications on file prior to encounter.      Current Outpatient Medications on File Prior to Encounter   Medication Sig    AMITIZA 24 mcg Cap 24 mcg 2 (two) times daily with meals.     aspirin (ECOTRIN) 81 MG EC tablet Take 81 mg by mouth once daily.    atorvastatin (LIPITOR) 40 MG tablet Take 1 tablet (40 mg total) by mouth once daily.    hydroCHLOROthiazide (MICROZIDE) 12.5 mg capsule     isosorbide mononitrate (IMDUR) 60 MG 24 hr tablet Take 1 tablet (60 mg total) by mouth once daily.    metformin (GLUCOPHAGE) 1000 MG tablet Take 1 tablet (1,000 mg total) by mouth 2 (two) times daily with meals.    metoprolol succinate (TOPROL-XL) 25 MG 24 hr tablet Take 1 tablet (25 mg total) by mouth once daily.    amLODIPine (NORVASC) 5 MG tablet Take 1 tablet (5 mg total) by mouth once daily.    nitroGLYCERIN (NITROSTAT) 0.4 MG SL tablet Place 1 tablet (0.4 mg total) under the tongue every 5 (five) minutes as needed for Chest pain.     Family History     Problem  Relation (Age of Onset)    Heart disease Mother    Stroke Father        Tobacco Use    Smoking status: Former Smoker     Packs/day: 0.00     Types: Cigarettes     Last attempt to quit: 2016     Years since quittin.9    Smokeless tobacco: Never Used    Tobacco comment: 2016   Substance and Sexual Activity    Alcohol use: No    Drug use: No    Sexual activity: Not Currently     Review of Systems   Constitution: Negative for decreased appetite.   HENT: Negative for ear discharge.    Eyes: Negative for blurred vision.   Endocrine: Negative for polyphagia.   Skin: Negative for nail changes.   Genitourinary: Negative for bladder incontinence.   Neurological: Negative for aphonia.   Psychiatric/Behavioral: Negative for hallucinations.   Allergic/Immunologic: Negative for hives.     Objective:     Vital Signs (Most Recent):  Temp: 98 °F (36.7 °C) (08/10/19 0824)  Pulse: 83 (08/10/19 0824)  Resp: 18 (08/10/19 0824)  BP: (!) 196/89 (08/10/19 0824)  SpO2: 96 % (08/10/19 0824) Vital Signs (24h Range):  Temp:  [98 °F (36.7 °C)-98.4 °F (36.9 °C)] 98 °F (36.7 °C)  Pulse:  [53-83] 83  Resp:  [17-20] 18  SpO2:  [96 %-98 %] 96 %  BP: (132-196)/(70-89) 196/89     Weight: 80.4 kg (177 lb 4 oz)  Body mass index is 26.95 kg/m².    SpO2: 96 %  O2 Device (Oxygen Therapy): room air    No intake or output data in the 24 hours ending 08/10/19 1131    Lines/Drains/Airways     Peripheral Intravenous Line                 Peripheral IV - Single Lumen 08/10/19 0229 18 G Left Forearm less than 1 day                Physical Exam   Constitutional: He is oriented to person, place, and time. He appears well-developed and well-nourished.   HENT:   Head: Normocephalic and atraumatic.   Eyes: Pupils are equal, round, and reactive to light. Conjunctivae are normal.   Neck: Normal range of motion. Neck supple.   Cardiovascular: Normal rate, normal heart sounds and intact distal pulses.   Pulmonary/Chest: Effort normal and breath sounds  normal.   Abdominal: Soft. Bowel sounds are normal.   Musculoskeletal: Normal range of motion.   Neurological: He is alert and oriented to person, place, and time.   Skin: Skin is warm and dry.       Significant Labs: All pertinent lab results from the last 24 hours have been reviewed.    Significant Imaging: Echocardiogram: 2D echo with color flow doppler: No results found for this or any previous visit.

## 2019-08-10 NOTE — ASSESSMENT & PLAN NOTE
Initial two troponin negative. Third up to 0.169. Will consult cardiology. Denies any chest pain/SOB.

## 2019-08-10 NOTE — ED TRIAGE NOTES
"Pt presents to ED with c/o "not feeling right." Pt states "I just felt kind of funny." He states he felt near his temples and felt throbbing which didn't seem right. He reports he had a heart attack in May and is being treated with medication. He was "feeling funny" tonight and considered taking a nitro but decided to come here instead. He denies having chest pain. Denies shortness of breath. Denies all other complaints other than "feeling funny." Pt in no acute distress.   "

## 2019-08-10 NOTE — NURSING
[facility] INPATIENT ENCOUNTER  HEMATOLOGY/ONCOLOGY DAILY PROGRESS NOTE    ADMISSION DATE:  2/23/2019  DATE:  3/1/2019  CURRENT HOSPITAL DAY:  Hospital Day: 7  ATTENDING PHYSICIAN:  Georgina Worley MD  CODE STATUS:  No Resuscitation with Maximal Medical Support    CHIEF COMPLAINT: Nausea and vomiting    ACTIVE PROBLEMS:  Patient Active Problem List   Diagnosis   • Osteoarthrosis, unspecified whether generalized or localized, pelvic region and thigh   • Hip joint replacement by other means   • Essential hypertension, benign   • Hyperlipidemia   • Mild diastolic dysfunction   • Presbyopia   • Unspecified hypothyroidism   • Open angle with borderline findings, high risk   • Hyponatremia   • Nuclear senile cataract of both eyes   • Regular astigmatism of both eyes   • Rectal cancer (CMS/HCC)   • Murmur        INTERVAL HISTORY:  Christine Lopez is a 83 year old female patient admitted with Orthostatic hypotension [I95.1]  Other ascites [R18.8]  Generalized weakness [R53.1]  Anemia due to GI blood loss [D50.0]  Syncope, unspecified syncope type [R55]  Diarrhea, unspecified type [R19.7]      Thinks her moderate diarrhea is improved over the last few days.  No associated fevers.    MEDICATIONS / ALLERGIES:  Medications and allergies were reviewed and updated.    HISTORIES:  I have reviewed the past medical history, family history, and social history listed in the medical record as obtained by my nursing staff and support staff and agree with their documentation.    REVIEW OF SYSTEMS:  Constitutional: Negative for fever, chills, change in appetite or fatigue.  Skin: Negative for rash or wounds.   HEENT: Negative for eye drainage, rhinorrhea, ear pain, sore throat or neck pain.  Respiratory: Negative for cough, wheezing or shortness of breath.    Cardiovascular: Negative for chest pain, chest pressure, palpitations or diaphoresis.   Gastrointestinal: Negative for nausea, vomiting, diarrhea, abdominal pain, black or tarry  Pt left via w/c accompanied by transport en route to echo test   stools.  Genitourinary: Negative for dysuria, urgency, frequency, hematuria or flank pain.  Extremities: Negative for joint swelling or joint pain.  Neurologic: Negative for change in sensory or motor function.  Negative for headache, change in gait, vertigo, vision or speech.  Endocrine: Negative for heat or cold intolerance, weight loss or gain.  Hematological: Negative for bleeding, bruising or lymphadenopathy.  Psychiatric: Negative for change in affect, anxiety, depression, insomnia, mentation or sleep disturbance.    OBJECTIVE:  VITAL SIGNS:  Vital Last Value 24 Hour Range   Temperature 98.1 °F (36.7 °C) (03/01/19 0814) Temp  Min: 98.1 °F (36.7 °C)  Max: 102.4 °F (39.1 °C)   Pulse 82 (03/01/19 0814) Pulse  Min: 65  Max: 98   Respiratory 18 (03/01/19 0814) Resp  Min: 16  Max: 20   Non-Invasive  Blood Pressure 92/41 (03/01/19 0814) BP  Min: 79/46  Max: 133/60   Pulse Oximetry 97 % (03/01/19 0814) SpO2  Min: 91 %  Max: 100 %     Vital Today Admitted   Weight 79.8 kg (02/28/19 0336) Weight: 81.1 kg (02/23/19 1800)   Height N/A Height: 5' 1\" (154.9 cm) (02/23/19 1800)   BMI N/A BMI (Calculated): 33.78 (02/23/19 1800)     INTAKE/OUTPUT:  I/O last 3 completed shifts:  In: 750 [I.V.:250; IV Piggyback:500]  Out: 450 [Urine:450]    PHYSICAL EXAM:  General: The patient is alert, well-developed, well-nourished, no distress.  Skin: Warm, normal color, normal texture, normal turgor and without rash.  Head: Normocephalic, atraumatic.  Neck: Supple with no significant adenopathy.  Eyes: Normal conjunctivae and sclerae. Pupils equal, round, reactive to light and accommodation. Extraocular movements intact.  ENT: Mucous membranes are moist. Normal nose,mouth and throat.  Cardiovascular: Regular rate and rhythm, no murmurs, gallops or rubs.  Respiratory: Normal respiratory effort. Clear to auscultation. No wheezes, rales, or rhonchi.  Abdomen: Abdomen is soft and non-tender with active bowel sounds. There is no detected  hepatosplenomegaly, masses, or ascites.  Extremities: No clubbing, no cyanosis, no edema. Normal muscle tone and development bilaterally.  Lymph: No cervical, supraclavicular, axillary, inguinal lymphadenopathy.  Neurologic: Motor strength normal. Coordination normal. No tremor noted.  Psychiatric: Cooperative. Appropriate mood and affect. Normal judgment.    LABORATORY DATA:  Recent Labs   Lab 03/01/19  0647   WBC 9.0   RBC 2.72*   HGB 8.6*   HCT 25.7*   MCV 94.5   *   ANEUT 6.9   ALYMS 1.1   GUILLE 0.8   AEOS 0.1   ABASO 0.0     Recent Labs   Lab 03/01/19  0647   GLUCOSE 129*   SODIUM 138   POTASSIUM 4.3   CHLORIDE 106   CO2 17*   BUN 35*   CREATININE 1.78*   CALCIUM 7.6*   MG 1.8   TOTPROTEIN 4.7*   ALBUMIN 2.0*   AST 15   ALKPT 220*   GPT 15     Recent Labs   Lab 03/01/19  0647  02/26/19  0505   Anion Gap 19   < > 16   GLOBULIN 2.7   < >  --    LDH  --   --  178   TOTAL BILIRUBIN 1.3*   < >  --     < > = values in this interval not displayed.       IMAGING STUDIES:  EGD with large varices  KUB neg, personally viewed    ASSESSMENT / PLAN:  1. Rectal cancer: She has completed 7 cycles of xeloda, avastin.        She then has received 4 cycles of mFOLFOX6, last given on 1/21/19, with a dose reduction and a good radiographic response. Avastin held due to possible cardiomyopathy.      We will continue to observe for toxicities.       2. Ascites:  Path negative x 3.  Likely due to diastolic dysfunction. Will retap prn. Will have pleur-x for next time needed.     3. Diarrhea:  C. Diff neg.  Other sources negative.  Appreciate GI input.    4. Anemia:  Likely variceal bleed.  Banding yesterday.  Pleurx for ascites today.     The patient indicated understanding of the diagnosis and agreed with the plan of care.

## 2019-08-10 NOTE — HOSPITAL COURSE
Juan Martínez . 83 y.o. male placed in observation for palpitations. In the ED, troponin negative, chest x-ray without acute process, and EKG of NSR. His troponin peaked at 0.3. The patient was seen by cardiology who recommended he be loaded with plavix. Troponin decreased to 0.253. His troponin elevation likely due to demand as he had no further chest pain or SOB. His BP was noted to be increased to 196/89. With resuming his home medications including amlodipine, which he had not been taking his BP decreased to normotensive range. He was cleared for discharge by cardiology. His symptoms may have been related to uncontrolled BP as they resolved with BP control. He will follow up with Cardiology on 8/15. His echo had an EF of 65%. His prescription for plavix was electronically sent to  [facility] as his current pharmacy was closed on 8/11/2019. He was also given a prescription for amlodipine as he was unsure if he still had any refills. At discharge, patient was chest pain free.

## 2019-08-10 NOTE — PLAN OF CARE
"   08/10/19 0744   Discharge Assessment   Assessment Type Discharge Planning Assessment   Confirmed/corrected address and phone number on facesheet? Yes   Assessment information obtained from? Patient   Prior to hospitilization cognitive status: Alert/Oriented   Prior to hospitalization functional status: Independent   Current cognitive status: Alert/Oriented   Current Functional Status: Independent   Facility Arrived From: Home    Lives With alone   Able to Return to Prior Arrangements yes   Is patient able to care for self after discharge? Yes   Who are your caregiver(s) and their phone number(s)? Jennifer Daughter [phone]    Patient's perception of discharge disposition admitted as an inpatient   Readmission Within the Last 30 Days no previous admission in last 30 days   Patient currently being followed by outpatient case management? No   Patient currently receives any other outside agency services? No   Equipment Currently Used at Home none   Do you have any problems affording any of your prescribed medications? No   Is the patient taking medications as prescribed? yes   Does the patient have transportation home? Yes   Transportation Anticipated family or friend will provide   Dialysis Name and Scheduled days N/A    Does the patient receive services at the [facility]? No   Discharge Plan A Home with family   Discharge Plan B Home with family;Home Health   DME Needed Upon Discharge  other (see comments)  (TBD )   Patient/Family in Agreement with Plan yes     SWto patient's room to discuss Helping the patient manage care at home.   TN/SW role explained to pt.  Patient identified using two identifiers:  Name and date of birth.    SW's name and contact info placed on white board.     Person who will help at home if needed:      Preferred pharmacy:   [facility] [place] - [address]  [address]  [place] [address]  Phone: [phone] Fax: [phone]      "Help " "at home Questions" discussed and placed in "My Health Packet" and placed at bedside.           "

## 2019-08-11 LAB
AORTIC ROOT ANNULUS: 2.13 CM
AORTIC VALVE CUSP SEPERATION: 1 CM
AV INDEX (PROSTH): 0.88
AV LVOT PEAK GRADIENT: 4 MMHG
AV MEAN GRADIENT: 3 MMHG
AV PEAK GRADIENT: 6 MMHG
AV VALVE AREA: 3.04 CM2
AV VELOCITY RATIO: 0.83
BSA FOR ECHO PROCEDURE: 1.98 M2
CV ECHO LV RWT: 0.48 CM
DOP CALC AO PEAK VEL: 1.2 M/S
DOP CALC AO VTI: 18.7 CM
DOP CALC LVOT AREA: 3.5 CM2
DOP CALC LVOT DIAMETER: 2.1 CM
DOP CALC LVOT PEAK VEL: 1 M/S
DOP CALC LVOT STROKE VOLUME: 56.77 CM3
DOP CALCLVOT PEAK VEL VTI: 16.4 CM
E WAVE DECELERATION TIME: 48 MSEC
E/A RATIO: 0.47
ECHO EF ESTIMATED: 61 %
ECHO LV POSTERIOR WALL: 1.2 CM (ref 0.6–1.1)
FRACTIONAL SHORTENING: 32 % (ref 28–44)
INTERVENTRICULAR SEPTUM: 0.9 CM (ref 0.6–1.1)
IVRT: 83 MSEC
LA MAJOR: 5.1 CM
LA MINOR: 5.1 CM
LA WIDTH: 3.9 CM
LEFT ATRIUM SIZE: 2.9 CM
LEFT ATRIUM VOLUME INDEX: 25.3 ML/M2
LEFT ATRIUM VOLUME: 49.03 CM3
LEFT INTERNAL DIMENSION IN SYSTOLE: 3.4 CM (ref 2.1–4)
LEFT VENTRICLE MASS INDEX: 100 G/M2
LEFT VENTRICULAR INTERNAL DIMENSION IN DIASTOLE: 5 CM (ref 3.5–6)
LEFT VENTRICULAR MASS: 194.38 G
MV PEAK A VEL: 0.9 M/S
MV PEAK E VEL: 0.42 M/S
MV STENOSIS PRESSURE HALF TIME: 48 MS
MV VALVE AREA P 1/2 METHOD: 4.58 CM2
RA MAJOR: 4.5 CM
RA PRESSURE: 3 MMHG
RA WIDTH: 2.5 CM
RIGHT VENTRICULAR END-DIASTOLIC DIMENSION: 3.1 CM
SINUS: 3.6 CM
STJ: 3.4 CM
TV PEAK E VEL: 0.48 M/S

## 2019-08-11 NOTE — DISCHARGE SUMMARY
[facility] Hospital Medicine  Discharge Summary      Patient Name: Juan Martínez Jr.  MRN: [id number]  Admission Date: 8/9/2019  Hospital Length of Stay: 0 days  Discharge Date and Time:  08/10/2019 10:56 PM  Attending Physician: Gena Gardner MD  Discharging Provider: Jono Murrieta PA-C  Primary Care Provider: Lissy Gavin MD      HPI:   Juan Martínez Jr. 83 y.o. male with HTN, HLD, CAD presents to the hospital with a chief complaint of chest pain/headache. He reports last night he experienced a throbbing sensation in her headache and a throbbing sensation in her chest. He checked his BP and noticed it was high so he called EMS. Theses symptoms resolved and he has no complaints currently. He is taking all prescribed medications, though per last cardiology note he was to start amlodipine 5mg, but he did not know and has not been taking. He has been managed medically for CAD since May. He denies fever, nausea, SOB, N/V, abdominal pain, dysuria syncope.    In the ED, troponin negative, chest x-ray without acute process, and EKG of NSR.       5/2019 [facility]  Coronary Anatomy:  LM:  No significant stenosis  LAD:  Mid LAD 60-70% stenosis.  Distal LAD 50% stenosis.  IFR 0.92 (nonischemic).  Diagonal 1 is a tortuous vessel with mid 80% stenosis.  Will medically manage this diagonal stenosis  LCx :  Mild nonobstructive CAD  RCA:  50% distal RCA/proximal PDA lesion.  IFR 1 (nonischemic)     Impression / Plan  Coronary artery disease as described above.  Continue medical management with aggressive risk factor modification.  Continue aspirin 81 mg daily. Change simvastatin to atorvastatin 80 mg daily.    * No surgery found *      Hospital Course:   Juan Martínez Jr. 83 y.o. male placed in observation for palpitations. In the ED, troponin negative, chest x-ray without acute process, and EKG of NSR. His troponin peaked at 0.3. The patient was seen by cardiology who recommended he be loaded  with plavix. Troponin decreased to 0.253. His troponin elevation likely due to demand as he had no further chest pain or SOB. His BP was noted to be increased to 196/89. With resuming his home medications including amlodipine, which he had not been taking his BP decreased to normotensive range. He was cleared for discharge by cardiology. His symptoms may have been related to uncontrolled BP as they resolved with BP control. He will follow up with Cardiology on 8/15. His echo had an EF of 65%. His prescription for plavix was electronically sent to  [facility] as his current pharmacy was closed on 8/11/2019. He was also given a prescription for amlodipine as he was unsure if he still had any refills. At discharge, patient was chest pain free.      Consults:   Consults (From admission, onward)        Status Ordering Provider     Inpatient consult to Cardiology  Once     Provider:  Deejay Duron MD    Acknowledged ADRIAN MORROW new Assessment & Plan notes have been filed under this hospital service since the last note was generated.  Service: Hospital Medicine    Final Active Diagnoses:    Diagnosis Date Noted POA    PRINCIPAL PROBLEM:  Palpitations [R00.2] 08/10/2019 Yes    Elevated troponin [R74.8] 08/10/2019 Unknown    CAD (coronary artery disease) [I25.10] 05/06/2019 Yes    Essential hypertension, benign [I10] 09/25/2014 Yes    Hyperlipidemia [E78.5] 09/25/2014 Yes    Type 2 diabetes mellitus [E11.9] 09/25/2014 Yes      Problems Resolved During this Admission:       Discharged Condition: stable    Disposition: Home or Self Care    Follow Up:  Follow-up Information     Rashad Nieto MD On 8/15/2019.    Specialties:  Cardiology, INTERVENTIONAL CARDIOLOGY  Why:  Outpatient Services, Cardiology, follow-up appointment @ 1:00PM.   Contact information:  [address]  [address]  [place] [id number] [phone]             Schedule an appointment as soon as possible for a visit with Lissy Gavin  MD.    Specialty:  Internal Medicine  Why:  Outpatient Services, Please schedule appointment.   Contact information:  [place] [address] [phone]                 Patient Instructions:      Diet Cardiac     Diet diabetic     Notify your health care provider if you experience any of the following:  temperature >100.4     Notify your health care provider if you experience any of the following:  persistent nausea and vomiting or diarrhea     Notify your health care provider if you experience any of the following:  increased confusion or weakness     Notify your health care provider if you experience any of the following:  persistent dizziness, light-headedness, or visual disturbances     Activity as tolerated       Significant Diagnostic Studies: Labs:   BMP:   Recent Labs   Lab 08/09/19  2310 08/10/19  0740   * 139*    137   K 3.4* 3.5    103   CO2 24 25   BUN 15 12   CREATININE 1.1 0.9   CALCIUM 8.7 9.0   , CMP   Recent Labs   Lab 08/09/19  2310 08/10/19  0740    137   K 3.4* 3.5    103   CO2 24 25   * 139*   BUN 15 12   CREATININE 1.1 0.9   CALCIUM 8.7 9.0   PROT 6.3 6.8   ALBUMIN 3.5 3.8   BILITOT 0.4 0.5   ALKPHOS 70 90   AST 14 17   ALT 19 22   ANIONGAP 11 9   ESTGFRAFRICA >60 >60   EGFRNONAA >60 >60   , CBC   Recent Labs   Lab 08/09/19  2310 08/10/19  0740   WBC 6.71 7.50   HGB 13.4* 14.8   HCT 40.2 44.1    275    and Troponin   Recent Labs   Lab 08/10/19  1952   TROPONINI 0.253*       Pending Diagnostic Studies:     Procedure Component Value Units Date/Time    Transthoracic echo (TTE) 2D with Color Flow [[id number]] Resulted:  08/10/19 1605    Order Status:  Sent Lab Status:  In process Updated:  08/10/19 1614     BSA 1.98 m2      LVIDD 5.00 cm      IVS 0.09 cm      PW 1.20 cm      LVIDS 3.40 cm      FS 32 %      LV mass 103.65 g      TV peak E christiana 0.48 m/s      Left Ventricle Relative Wall Thickness 0.48 cm      AV LVOT peak gradient 4 mmHg      E  wave decelartion time 48.00 msec      IVRT 83.00 msec      TR Max Bryan 4.86 m/s      MV Peak A Bryan 0.90 m/s      LV Mass Index 53 g/m2      Triscuspid Valve Regurgitation Peak Gradient 94 mmHg      MV Peak E Bryan 0.42 m/s      E/A ratio 0.47     LA WIDTH 3.90 cm      AORTIC VALVE CUSP SEPERATION 1 cm      LA volume 49.03 cm3      LA size 2.90 cm      RVDD 3.10 cm      AV mean gradient 3 mmHg      AV valve area 3.04 cm2      AV Velocity Ratio 0.83     AV index (prosthetic) 0.88     LVOT diameter 2.10 cm      LVOT area 3.5 cm2      LVOT peak bryan 1.0 m/s      LVOT peak VTI 16.40 cm      Ao peak bryan 1.2 m/s      Ao VTI 18.70 cm      LVOT stroke volume 56.77 cm3      AV peak gradient 6 mmHg      LA Volume Index 25.3 mL/m2      Echo EF Estimated 61 %      RA Major Axis 4.50 cm      Left Atrium Minor Axis 5.10 cm      Left Atrium Major Axis 5.10 cm      RA Width 2.50 cm      Ao root annulus 2.13 cm      Sinus 3.60 cm      STJ 3.40 cm      MV valve area p 1/2 method 4.58 cm2      MV stenosis pressure 1/2 time 48 ms     Narrative:       · Concentric left ventricular remodeling.       Impression:                Medications:  Reconciled Home Medications:      Medication List      START taking these medications    * clopidogrel 75 mg tablet  Commonly known as:  PLAVIX  Take 1 tablet (75 mg total) by mouth once daily.     * clopidogrel 75 mg tablet  Commonly known as:  PLAVIX  Take 1 tablet (75 mg total) by mouth once daily.  Start taking on:  8/11/2019         * This list has 2 medication(s) that are the same as other medications prescribed for you. Read the directions carefully, and ask your doctor or other care provider to review them with you.            CHANGE how you take these medications    * amLODIPine 5 MG tablet  Commonly known as:  NORVASC  Take 1 tablet (5 mg total) by mouth once daily.  What changed:  Another medication with the same name was added. Make sure you understand how and when to take each.     * amLODIPine  5 MG tablet  Commonly known as:  NORVASC  Take 1 tablet (5 mg total) by mouth once daily.  What changed:  You were already taking a medication with the same name, and this prescription was added. Make sure you understand how and when to take each.         * This list has 2 medication(s) that are the same as other medications prescribed for you. Read the directions carefully, and ask your doctor or other care provider to review them with you.            CONTINUE taking these medications    AMITIZA 24 MCG Cap  Generic drug:  lubiprostone  24 mcg 2 (two) times daily with meals.     aspirin 81 MG EC tablet  Commonly known as:  ECOTRIN  Take 81 mg by mouth once daily.     atorvastatin 40 MG tablet  Commonly known as:  LIPITOR  Take 1 tablet (40 mg total) by mouth once daily.     hydroCHLOROthiazide 12.5 mg capsule  Commonly known as:  MICROZIDE     isosorbide mononitrate 60 MG 24 hr tablet  Commonly known as:  IMDUR  Take 1 tablet (60 mg total) by mouth once daily.     metFORMIN 1000 MG tablet  Commonly known as:  GLUCOPHAGE  Take 1 tablet (1,000 mg total) by mouth 2 (two) times daily with meals.     metoprolol succinate 25 MG 24 hr tablet  Commonly known as:  TOPROL-XL  Take 1 tablet (25 mg total) by mouth once daily.     nitroGLYCERIN 0.4 MG SL tablet  Commonly known as:  NITROSTAT  Place 1 tablet (0.4 mg total) under the tongue every 5 (five) minutes as needed for Chest pain.            Indwelling Lines/Drains at time of discharge:   Lines/Drains/Airways          None          Time spent on the discharge of patient: 36 minutes  Patient was seen and examined on the date of discharge and determined to be suitable for discharge.         Jono Murrieta PA-C  Department of Hospital Medicine  [facility]

## 2019-08-15 ENCOUNTER — OFFICE VISIT (OUTPATIENT)
Dept: CARDIOLOGY | Facility: CLINIC | Age: 84
End: 2019-08-15
Payer: MEDICARE

## 2019-08-15 VITALS
RESPIRATION RATE: 16 BRPM | OXYGEN SATURATION: 99 % | BODY MASS INDEX: 26.75 KG/M2 | DIASTOLIC BLOOD PRESSURE: 76 MMHG | SYSTOLIC BLOOD PRESSURE: 122 MMHG | WEIGHT: 175.94 LBS | HEART RATE: 97 BPM

## 2019-08-15 DIAGNOSIS — E78.2 MIXED HYPERLIPIDEMIA: Primary | ICD-10-CM

## 2019-08-15 DIAGNOSIS — I25.10 CORONARY ARTERY DISEASE INVOLVING NATIVE CORONARY ARTERY OF NATIVE HEART WITHOUT ANGINA PECTORIS: ICD-10-CM

## 2019-08-15 PROCEDURE — 99214 PR OFFICE/OUTPT VISIT, EST, LEVL IV, 30-39 MIN: ICD-10-PCS | Mod: S$GLB,,, | Performed by: INTERNAL MEDICINE

## 2019-08-15 PROCEDURE — 99999 PR PBB SHADOW E&M-EST. PATIENT-LVL III: CPT | Mod: PBBFAC,,, | Performed by: INTERNAL MEDICINE

## 2019-08-15 PROCEDURE — 3078F PR MOST RECENT DIASTOLIC BLOOD PRESSURE < 80 MM HG: ICD-10-PCS | Mod: CPTII,S$GLB,, | Performed by: INTERNAL MEDICINE

## 2019-08-15 PROCEDURE — 3074F SYST BP LT 130 MM HG: CPT | Mod: CPTII,S$GLB,, | Performed by: INTERNAL MEDICINE

## 2019-08-15 PROCEDURE — 99214 OFFICE O/P EST MOD 30 MIN: CPT | Mod: S$GLB,,, | Performed by: INTERNAL MEDICINE

## 2019-08-15 PROCEDURE — 1101F PT FALLS ASSESS-DOCD LE1/YR: CPT | Mod: CPTII,S$GLB,, | Performed by: INTERNAL MEDICINE

## 2019-08-15 PROCEDURE — 1101F PR PT FALLS ASSESS DOC 0-1 FALLS W/OUT INJ PAST YR: ICD-10-PCS | Mod: CPTII,S$GLB,, | Performed by: INTERNAL MEDICINE

## 2019-08-15 PROCEDURE — 99999 PR PBB SHADOW E&M-EST. PATIENT-LVL III: ICD-10-PCS | Mod: PBBFAC,,, | Performed by: INTERNAL MEDICINE

## 2019-08-15 PROCEDURE — 3074F PR MOST RECENT SYSTOLIC BLOOD PRESSURE < 130 MM HG: ICD-10-PCS | Mod: CPTII,S$GLB,, | Performed by: INTERNAL MEDICINE

## 2019-08-15 PROCEDURE — 3078F DIAST BP <80 MM HG: CPT | Mod: CPTII,S$GLB,, | Performed by: INTERNAL MEDICINE

## 2019-08-15 NOTE — PROGRESS NOTES
CARDIOVASCULAR CONSULTATION    REASON FOR CONSULT:   Juan Martínez Jr. is a 83 y.o. male who presents for follow-up recent hospitalization for a non-STEMI..      HISTORY OF PRESENT ILLNESS:     Notes from August 2019:   Patient is here for follow-up today.  Was recently admitted for hypertensive urgency.  Did not have any chest pains during the hypertensive urgency.  Troponins were found to be mildly elevated.  Was evaluated by Cardiology and Norvasc was restarted.  His blood pressure has been well controlled since then.  Denies any chest pains at rest on exertion, orthopnea, PND, swelling of feet.      Notes from July 2019  Patient is here for follow-up today.  Denies any chest pains at rest on exertion, orthopnea, PND.  Denies any claudication symptoms.  Denies any dyspnea at rest on exertion    Note from clinic visit in May 2019:  Patient is 83-year-old man pleasant man with a past medical history significant for diabetes, dyslipidemia, hypertension, coronary artery disease who recently presented to the hospital with a non-STEMI.  Coronary angiogram performed at that time revealed nonischemic coronary artery disease of the LAD and RCA as well as 80% stenosis in his diagonal 1.  Which is being managed medically.  A week after that he developed some right-sided chest pain, different from his anginal pain which he had presented with his non-STEMI.  He states that right-sided chest pain resolved on its own in less than 20 min, but since this is all new for him he decided to come to the hospital to make sure everything was okay.  He was admitted and serial troponins did not reveal any elevation and he was discharged home.  He has not had any further episodes of chest pains.  Denies orthopnea, PND chest pains at rest or on exertion.  Denies typical claudication pain, has knee pains and atypical leg pain.      PAST MEDICAL HISTORY:     Past Medical History:   Diagnosis Date    Diabetes mellitus     High cholesterol      Hypertension     Prostate cancer        PAST SURGICAL HISTORY:     Past Surgical History:   Procedure Laterality Date    APPLICATION, GRAFT, SKIN, FULL-THICKNESS, TO UPPER EXTREMITY VS STSG WITH FROZEN SECTIONS Right 11/15/2018    [facility]    EXCISION LEFT POST AURICULAR SQUAMOUS CELL CANCER  WITH LOCAL TISSUE ARRANGEMENT Left 11/15/2018    [facility]    EXCISION, CARCINOMA, SQUAMOUS CELL @ RIGHT HAND Right 11/15/2018    [facility]    Fractional Flow [place] (FFR), Coronary  5/6/2019    [facility] CATH LAB    JOINT REPLACEMENT      Left heart cath Left 5/6/2019    [facility] CATH LAB    TONSILLECTOMY      TOTAL KNEE ARTHROPLASTY         ALLERGIES AND MEDICATION:   Review of patient's allergies indicates:  No Known Allergies     Medication List           Accurate as of 8/15/19  1:25 PM. If you have any questions, ask your nurse or doctor.               CONTINUE taking these medications    AMITIZA 24 MCG Cap  Generic drug:  lubiprostone     * amLODIPine 5 MG tablet  Commonly known as:  NORVASC  Take 1 tablet (5 mg total) by mouth once daily.     * amLODIPine 5 MG tablet  Commonly known as:  NORVASC  Take 1 tablet (5 mg total) by mouth once daily.     aspirin 81 MG EC tablet  Commonly known as:  ECOTRIN     atorvastatin 40 MG tablet  Commonly known as:  LIPITOR  Take 1 tablet (40 mg total) by mouth once daily.     * clopidogrel 75 mg tablet  Commonly known as:  PLAVIX  Take 1 tablet (75 mg total) by mouth once daily.     * clopidogrel 75 mg tablet  Commonly known as:  PLAVIX  Take 1 tablet (75 mg total) by mouth once daily.     hydroCHLOROthiazide 12.5 mg capsule  Commonly known as:  MICROZIDE     isosorbide mononitrate 60 MG 24 hr tablet  Commonly known as:  IMDUR  Take 1 tablet (60 mg total) by mouth once daily.     metFORMIN 1000 MG  tablet  Commonly known as:  GLUCOPHAGE  Take 1 tablet (1,000 mg total) by mouth 2 (two) times daily with meals.     metoprolol succinate 25 MG 24 hr tablet  Commonly known as:  TOPROL-XL  Take 1 tablet (25 mg total) by mouth once daily.     nitroGLYCERIN 0.4 MG SL tablet  Commonly known as:  NITROSTAT  Place 1 tablet (0.4 mg total) under the tongue every 5 (five) minutes as needed for Chest pain.         * This list has 4 medication(s) that are the same as other medications prescribed for you. Read the directions carefully, and ask your doctor or other care provider to review them with you.                SOCIAL HISTORY:     Social History     Socioeconomic History    Marital status:      Spouse name: Not on file    Number of children: Not on file    Years of education: Not on file    Highest education level: Not on file   Occupational History    Not on file   Social Needs    Financial resource strain: Not on file    Food insecurity:     Worry: Not on file     Inability: Not on file    Transportation needs:     Medical: Not on file     Non-medical: Not on file   Tobacco Use    Smoking status: Former Smoker     Packs/day: 0.00     Types: Cigarettes     Last attempt to quit: 8/12/2016     Years since quitting: 3.0    Smokeless tobacco: Never Used    Tobacco comment: 8/12/2016   Substance and Sexual Activity    Alcohol use: No    Drug use: No    Sexual activity: Not Currently   Lifestyle    Physical activity:     Days per week: Not on file     Minutes per session: Not on file    Stress: Not on file   Relationships    Social connections:     Talks on phone: Not on file     Gets together: Not on file     Attends [employer] service: Not on file     Active member of club or organization: Not on file     Attends meetings of clubs or organizations: Not on file     Relationship status: Not on file   Other Topics Concern    Not on file   Social History Narrative    Not on file       FAMILY HISTORY:      Family History   Problem Relation Age of Onset    Heart disease Mother     Stroke Father        REVIEW OF SYSTEMS:   Review of Systems   Constitutional: Negative.    HENT: Negative.    Eyes: Negative.    Respiratory: Negative.    Cardiovascular: Negative.    Gastrointestinal: Negative.    Genitourinary: Negative.    Musculoskeletal: Positive for joint pain.   Skin: Negative.    Neurological: Negative.    Endo/Heme/Allergies: Negative.        A 10 point review of systems was performed and all the pertinent positives have been mentioned. Rest of review of systems was negative.        PHYSICAL EXAM:     Vitals:    08/15/19 1305   BP: 122/76   Pulse: 97   Resp: 16    Body mass index is 26.75 kg/m².  Weight: 79.8 kg (175 lb 14.8 oz)         Physical Exam   Constitutional: He is oriented to person, place, and time. He appears well-developed and well-nourished. He is active.   HENT:   Head: Normocephalic and atraumatic.   Right Ear: Hearing normal.   Left Ear: Hearing normal.   Nose: Nose normal.   Mouth/Throat: Mucous membranes are normal.   Eyes: Pupils are equal, round, and reactive to light. Conjunctivae and lids are normal.   Neck: Normal range of motion. Neck supple.   Cardiovascular: Normal rate, regular rhythm, normal heart sounds, intact distal pulses and normal pulses.   Pulmonary/Chest: Effort normal and breath sounds normal.   Abdominal: Soft. Normal appearance. There is no tenderness.   Musculoskeletal: He exhibits no edema or deformity.   Neurological: He is alert and oriented to person, place, and time.   Skin: Skin is warm, dry and intact.   Psychiatric: He has a normal mood and affect. His speech is normal.   Nursing note and vitals reviewed.        DATA:     Laboratory:  CBC:  Recent Labs   Lab 05/12/19  0140 08/09/19  2310 08/10/19  0740   WBC 8.62 6.71 7.50   Hemoglobin 13.5 L 13.4 L 14.8   Hematocrit 40.0 40.2 44.1   Platelets 269 256 275       CHEMISTRIES:  Recent Labs   Lab 09/17/17  0220   05/07/19  0610 05/12/19  0140 08/09/19  2310 08/10/19  0740   Glucose 116 H   < > 102 114 H 156 H 139 H   Sodium 137   < > 138 137 136 137   Potassium 3.8   < > 4.0 3.5 3.4 L 3.5   BUN, Bld 17   < > 13 13 15 12   Creatinine 1.2   < > 0.9 1.1 1.1 0.9   eGFR if  >60   < > >60 >60 >60 >60   eGFR if non  57 A   < > >60 >60 >60 >60   Calcium 9.2   < > 9.2 8.9 8.7 9.0   Magnesium 1.9  --  2.0 1.9  --   --     < > = values in this interval not displayed.       CARDIAC BIOMARKERS:  Recent Labs   Lab 08/10/19  0740 08/10/19  1333 08/10/19  1952   Troponin I 0.169 H 0.300 H 0.253 H       COAGS:        LIPIDS/LFTS:  Recent Labs   Lab 05/06/19  1045 05/12/19  0140 08/09/19  2310 08/10/19  0740   Cholesterol 134  --   --   --    Triglycerides 80  --   --   --    HDL 45  --   --   --    LDL Cholesterol 73.0  --   --   --    Non-HDL Cholesterol 89  --   --   --    AST  --  16 14 17   ALT  --  17 19 22       Hemoglobin A1C   Date Value Ref Range Status   05/07/2019 7.0 (H) 4.0 - 5.6 % Final     Comment:     ADA Screening Guidelines:  5.7-6.4%  Consistent with prediabetes  >or=6.5%  Consistent with diabetes  High levels of fetal hemoglobin interfere with the HbA1C  assay. Heterozygous hemoglobin variants (HbS, HgC, etc)do  not significantly interfere with this assay.   However, presence of multiple variants may affect accuracy.     06/19/2018 7.3 (H) 4.0 - 5.6 % Final     Comment:     ADA Screening Guidelines:  5.7-6.4%  Consistent with prediabetes  >or=6.5%  Consistent with diabetes  High levels of fetal hemoglobin interfere with the HbA1C  assay. Heterozygous hemoglobin variants (HbS, HgC, etc)do  not significantly interfere with this assay.   However, presence of multiple variants may affect accuracy.         TSH        The ASCVD Risk score (Merasilvestre WEATHERS Jr., et al., 2013) failed to calculate for the following reasons:    The 2013 ASCVD risk score is only valid for ages 40 to 79    The patient has a  prior MI or stroke diagnosis           Cardiovascular Testing:    EKG: (personally reviewed tracing)  May 2019:  Normal sinus rhythm, left axis deviation.  Abnormal EKG.    2D echocardiogram May 2019:  · Normal left ventricular systolic function. The estimated ejection fraction is 65%  · Normal LV diastolic function.  · Mild left atrial enlargement.  · Normal central venous pressure (3 mm Hg).  · The estimated PA systolic pressure is 10 mm Hg       Coronary angiogram 6th May 2019:    No acute thrombotic lesion identified.  Coronary artery disease as described below  LVEDP: 12 mmHg    Carotid ultrasound May 28, 2019:    · There is 20-39% right Internal Carotid Stenosis.  · There is 20-39% left Internal Carotid Stenosis.     ABIs May 28, 2019:    Noncompressible LOW bilaterally.  Mildly decrease TBI bilaterally.  Normal PVR waveforms bilaterally.       Coronary Anatomy:  LM:  No significant stenosis  LAD:  Mid LAD 60-70% stenosis.  Distal LAD 50% stenosis.  IFR 0.92 (nonischemic).  Diagonal 1 is a tortuous vessel with mid 80% stenosis.  Will medically manage this diagonal stenosis  LCx :  Mild nonobstructive CAD  RCA:  50% distal RCA/proximal PDA lesion.  IFR 1 (nonischemic)     Impression / Plan  Coronary artery disease as described above.  Continue medical management with aggressive risk factor modification.  Continue aspirin 81 mg daily. Change simvastatin to atorvastatin 80 mg daily.        ASSESSMENT AND PLAN     Patient Active Problem List   Diagnosis    Essential hypertension, benign    Type 2 diabetes mellitus    Hyperlipidemia    Body mass index 28.0-28.9, adult    History of prostate cancer    Orthostatic hypotension    Diabetic ulcer of toe of left foot associated with type 2 diabetes mellitus, with fat layer exposed    SCC (squamous cell carcinoma)    CAD (coronary artery disease)    Unstable angina    Palpitations    Elevated troponin       1.  Patient with coronary artery disease, now angina  free.  Being managed medically.  Continue current medical therapy.    2.  Hypertension:  Controlled at current therapy..    3.  Cardiovascular screening with rest and stress ABIs and carotid ultrasound performed.  ABIs demonstrated noncompressible ABIs bilaterally, mildly decreased ABIs bilaterally with normal PVR waveforms bilaterally.  I got a peripheral ultrasound for further evaluation of his abnormal TBI.  Carotid ultrasound did not show any flow restriction in the right or the left lower extremity.    4.  Dyslipidemia:  Continue medical management with atorvastatin      Thank you very much for involving me in the care of your patient.  Please do not hesitate to contact me if there are any questions.      Rashad Nieto MD, FACC, [facility]  Interventional Cardiologist, [facility].     Follow-up in 3 months      This note was dictated with the help of speech recognition software.  There might be un-intended errors and/or substitutions.

## 2019-08-19 RX ORDER — ATORVASTATIN CALCIUM 40 MG/1
40 TABLET, FILM COATED ORAL DAILY
Qty: 90 TABLET | Refills: 5 | Status: SHIPPED | OUTPATIENT
Start: 2019-08-19 | End: 2019-08-21 | Stop reason: SDUPTHER

## 2019-08-22 RX ORDER — ATORVASTATIN CALCIUM 40 MG/1
40 TABLET, FILM COATED ORAL DAILY
Qty: 90 TABLET | Refills: 3 | Status: SHIPPED | OUTPATIENT
Start: 2019-08-22 | End: 2020-07-09 | Stop reason: SDUPTHER

## 2019-09-23 RX ORDER — CLOPIDOGREL BISULFATE 75 MG/1
75 TABLET ORAL DAILY
Qty: 30 TABLET | Refills: 11 | Status: SHIPPED | OUTPATIENT
Start: 2019-09-23 | End: 2020-08-10 | Stop reason: SDUPTHER

## 2019-09-23 RX ORDER — AMLODIPINE BESYLATE 5 MG/1
5 TABLET ORAL DAILY
Qty: 30 TABLET | Refills: 11 | Status: SHIPPED | OUTPATIENT
Start: 2019-09-23 | End: 2019-10-30

## 2019-10-30 ENCOUNTER — OFFICE VISIT (OUTPATIENT)
Dept: CARDIOLOGY | Facility: CLINIC | Age: 84
End: 2019-10-30
Payer: MEDICARE

## 2019-10-30 VITALS
DIASTOLIC BLOOD PRESSURE: 85 MMHG | WEIGHT: 178.56 LBS | OXYGEN SATURATION: 98 % | RESPIRATION RATE: 16 BRPM | SYSTOLIC BLOOD PRESSURE: 188 MMHG | BODY MASS INDEX: 27.15 KG/M2 | HEART RATE: 76 BPM

## 2019-10-30 DIAGNOSIS — I25.10 CORONARY ARTERY DISEASE INVOLVING NATIVE CORONARY ARTERY OF NATIVE HEART WITHOUT ANGINA PECTORIS: Primary | ICD-10-CM

## 2019-10-30 DIAGNOSIS — R00.2 PALPITATIONS: ICD-10-CM

## 2019-10-30 PROCEDURE — 3079F PR MOST RECENT DIASTOLIC BLOOD PRESSURE 80-89 MM HG: ICD-10-PCS | Mod: CPTII,S$GLB,, | Performed by: INTERNAL MEDICINE

## 2019-10-30 PROCEDURE — 3077F PR MOST RECENT SYSTOLIC BLOOD PRESSURE >= 140 MM HG: ICD-10-PCS | Mod: CPTII,S$GLB,, | Performed by: INTERNAL MEDICINE

## 2019-10-30 PROCEDURE — 99214 OFFICE O/P EST MOD 30 MIN: CPT | Mod: S$GLB,,, | Performed by: INTERNAL MEDICINE

## 2019-10-30 PROCEDURE — 1101F PR PT FALLS ASSESS DOC 0-1 FALLS W/OUT INJ PAST YR: ICD-10-PCS | Mod: CPTII,S$GLB,, | Performed by: INTERNAL MEDICINE

## 2019-10-30 PROCEDURE — 99999 PR PBB SHADOW E&M-EST. PATIENT-LVL III: ICD-10-PCS | Mod: PBBFAC,,, | Performed by: INTERNAL MEDICINE

## 2019-10-30 PROCEDURE — 99214 PR OFFICE/OUTPT VISIT, EST, LEVL IV, 30-39 MIN: ICD-10-PCS | Mod: S$GLB,,, | Performed by: INTERNAL MEDICINE

## 2019-10-30 PROCEDURE — 3077F SYST BP >= 140 MM HG: CPT | Mod: CPTII,S$GLB,, | Performed by: INTERNAL MEDICINE

## 2019-10-30 PROCEDURE — 1101F PT FALLS ASSESS-DOCD LE1/YR: CPT | Mod: CPTII,S$GLB,, | Performed by: INTERNAL MEDICINE

## 2019-10-30 PROCEDURE — 3079F DIAST BP 80-89 MM HG: CPT | Mod: CPTII,S$GLB,, | Performed by: INTERNAL MEDICINE

## 2019-10-30 PROCEDURE — 99999 PR PBB SHADOW E&M-EST. PATIENT-LVL III: CPT | Mod: PBBFAC,,, | Performed by: INTERNAL MEDICINE

## 2019-10-30 RX ORDER — METOPROLOL SUCCINATE 50 MG/1
50 TABLET, EXTENDED RELEASE ORAL DAILY
Qty: 90 TABLET | Refills: 3 | Status: SHIPPED | OUTPATIENT
Start: 2019-10-30 | End: 2020-04-02 | Stop reason: SDUPTHER

## 2019-10-30 RX ORDER — AMLODIPINE BESYLATE 10 MG/1
10 TABLET ORAL DAILY
Qty: 90 TABLET | Refills: 3 | Status: SHIPPED | OUTPATIENT
Start: 2019-10-30 | End: 2020-05-21 | Stop reason: SDUPTHER

## 2019-10-30 NOTE — PROGRESS NOTES
CARDIOVASCULAR CONSULTATION    REASON FOR CONSULT:   Juan Martínez Jr. is a 84 y.o. male who presents for follow-up recent hospitalization for a non-STEMI..      HISTORY OF PRESENT ILLNESS:     Notes from August 2019:   Patient is here for follow-up today.  Was recently admitted for hypertensive urgency.  Did not have any chest pains during the hypertensive urgency.  Troponins were found to be mildly elevated.  Was evaluated by Cardiology and Norvasc was restarted.  His blood pressure has been well controlled since then.  Denies any chest pains at rest on exertion, orthopnea, PND, swelling of feet.      Notes from July 2019  Patient is here for follow-up today.  Denies any chest pains at rest on exertion, orthopnea, PND.  Denies any claudication symptoms.  Denies any dyspnea at rest on exertion    Note from clinic visit in May 2019:  Patient is 83-year-old man pleasant man with a past medical history significant for diabetes, dyslipidemia, hypertension, coronary artery disease who recently presented to the hospital with a non-STEMI.  Coronary angiogram performed at that time revealed nonischemic coronary artery disease of the LAD and RCA as well as 80% stenosis in his diagonal 1.  Which is being managed medically.  A week after that he developed some right-sided chest pain, different from his anginal pain which he had presented with his non-STEMI.  He states that right-sided chest pain resolved on its own in less than 20 min, but since this is all new for him he decided to come to the hospital to make sure everything was okay.  He was admitted and serial troponins did not reveal any elevation and he was discharged home.  He has not had any further episodes of chest pains.  Denies orthopnea, PND chest pains at rest or on exertion.  Denies typical claudication pain, has knee pains and atypical leg pain.      Notes from October 2019:  Patient here for follow-up.  States that had a brief episode of right-sided chest  pain which lasted a few seconds to few minutes and then went away.  This was different than the anginal pain he had in the past.  Denies any orthopnea, PND, swelling of feet.  States that he tried telling is DrRoni that this he felt was related to his lungs, and an checks x-ray done yesterday.  Denies any recent fevers or chills.    PAST MEDICAL HISTORY:     Past Medical History:   Diagnosis Date    Diabetes mellitus     High cholesterol     Hypertension     Prostate cancer        PAST SURGICAL HISTORY:     Past Surgical History:   Procedure Laterality Date    APPLICATION OF FULL-THICKNESS SKIN GRAFT (FTSG) TO UPPER EXTREMITY Right 11/15/2018    Procedure: APPLICATION, GRAFT, SKIN, FULL-THICKNESS, TO UPPER EXTREMITY VS STSG WITH FROZEN SECTIONS;  Surgeon: Alan Arzola MD;  Location: [facility] OR;  Service: Plastics;  Laterality: Right;    EXCISION OF SQUAMOUS CELL CARCINOMA Right 11/15/2018    Procedure: EXCISION, CARCINOMA, SQUAMOUS CELL @ RIGHT HAND;  Surgeon: Alan Arzola MD;  Location: [facility] OR;  Service: Plastics;  Laterality: Right;  RN PREOP 11/13/2018--NEED H/P    JOINT REPLACEMENT      LEFT HEART CATHETERIZATION Left 5/6/2019    Procedure: Left heart cath;  Surgeon: Rashad Nieto MD;  Location: [facility] CATH LAB;  Service: Cardiology;  Laterality: Left;    TONSILLECTOMY      TOTAL KNEE ARTHROPLASTY         ALLERGIES AND MEDICATION:   Review of patient's allergies indicates:  No Known Allergies     Medication List           Accurate as of October 30, 2019 10:36 AM. If you have any questions, ask your nurse or doctor.               CONTINUE taking these medications    AMITIZA 24 MCG Cap  Generic drug:  lubiprostone     amLODIPine 5 MG tablet  Commonly known as:  NORVASC  Take 1 tablet (5 mg total) by mouth once daily.     aspirin 81 MG EC tablet  Commonly known as:  ECOTRIN     atorvastatin 40 MG tablet  Commonly known as:  LIPITOR  Take 1 tablet (40 mg total) by mouth once daily.     clopidogrel 75  mg tablet  Commonly known as:  PLAVIX  Take 1 tablet (75 mg total) by mouth once daily.     hydroCHLOROthiazide 12.5 mg capsule  Commonly known as:  MICROZIDE     isosorbide mononitrate 60 MG 24 hr tablet  Commonly known as:  IMDUR  Take 1 tablet (60 mg total) by mouth once daily.     metFORMIN 1000 MG tablet  Commonly known as:  GLUCOPHAGE  Take 1 tablet (1,000 mg total) by mouth 2 (two) times daily with meals.     metoprolol succinate 25 MG 24 hr tablet  Commonly known as:  TOPROL-XL  Take 1 tablet (25 mg total) by mouth once daily.     nitroGLYCERIN 0.4 MG SL tablet  Commonly known as:  NITROSTAT  Place 1 tablet (0.4 mg total) under the tongue every 5 (five) minutes as needed for Chest pain.            SOCIAL HISTORY:     Social History     Socioeconomic History    Marital status:      Spouse name: Not on file    Number of children: Not on file    Years of education: Not on file    Highest education level: Not on file   Occupational History    Not on file   Social Needs    Financial resource strain: Not on file    Food insecurity:     Worry: Not on file     Inability: Not on file    Transportation needs:     Medical: Not on file     Non-medical: Not on file   Tobacco Use    Smoking status: Former Smoker     Packs/day: 0.00     Types: Cigarettes     Last attempt to quit: 8/12/2016     Years since quitting: 3.2    Smokeless tobacco: Never Used    Tobacco comment: 8/12/2016   Substance and Sexual Activity    Alcohol use: No    Drug use: No    Sexual activity: Not Currently   Lifestyle    Physical activity:     Days per week: Not on file     Minutes per session: Not on file    Stress: Not on file   Relationships    Social connections:     Talks on phone: Not on file     Gets together: Not on file     Attends Congregation service: Not on file     Active member of club or organization: Not on file     Attends meetings of clubs or organizations: Not on file     Relationship status: Not on file    Other Topics Concern    Not on file   Social History Narrative    Not on file       FAMILY HISTORY:     Family History   Problem Relation Age of Onset    Heart disease Mother     Stroke Father        REVIEW OF SYSTEMS:   Review of Systems   Constitutional: Negative.    HENT: Negative.    Eyes: Negative.    Respiratory: Negative.    Cardiovascular: Negative.    Gastrointestinal: Negative.    Genitourinary: Negative.    Musculoskeletal: Positive for joint pain.   Skin: Negative.    Neurological: Negative.    Endo/Heme/Allergies: Negative.        A 10 point review of systems was performed and all the pertinent positives have been mentioned. Rest of review of systems was negative.        PHYSICAL EXAM:     Vitals:    10/30/19 1031   BP: (!) 188/85   Pulse: 76   Resp: 16    Body mass index is 27.15 kg/m².  Weight: 81 kg (178 lb 9.2 oz)         Physical Exam   Constitutional: He is oriented to person, place, and time. He appears well-developed and well-nourished. He is active.   HENT:   Head: Normocephalic and atraumatic.   Right Ear: Hearing normal.   Left Ear: Hearing normal.   Nose: Nose normal.   Mouth/Throat: Mucous membranes are normal.   Eyes: Pupils are equal, round, and reactive to light. Conjunctivae and lids are normal.   Neck: Normal range of motion. Neck supple.   Cardiovascular: Normal rate, regular rhythm, normal heart sounds, intact distal pulses and normal pulses.   Pulmonary/Chest: Effort normal and breath sounds normal.   Abdominal: Soft. Normal appearance. There is no tenderness.   Musculoskeletal: He exhibits no edema or deformity.   Neurological: He is alert and oriented to person, place, and time.   Skin: Skin is warm, dry and intact.   Psychiatric: He has a normal mood and affect. His speech is normal.   Nursing note and vitals reviewed.        DATA:     Laboratory:  CBC:  Recent Labs   Lab 05/12/19  0140 08/09/19  2310 08/10/19  0740   WBC 8.62 6.71 7.50   Hemoglobin 13.5 L 13.4 L 14.8    Hematocrit 40.0 40.2 44.1   Platelets 269 256 275       CHEMISTRIES:  Recent Labs   Lab 09/17/17  0220  05/07/19  0610 05/12/19  0140 08/09/19  2310 08/10/19  0740   Glucose 116 H   < > 102 114 H 156 H 139 H   Sodium 137   < > 138 137 136 137   Potassium 3.8   < > 4.0 3.5 3.4 L 3.5   BUN, Bld 17   < > 13 13 15 12   Creatinine 1.2   < > 0.9 1.1 1.1 0.9   eGFR if  >60   < > >60 >60 >60 >60   eGFR if non  57 A   < > >60 >60 >60 >60   Calcium 9.2   < > 9.2 8.9 8.7 9.0   Magnesium 1.9  --  2.0 1.9  --   --     < > = values in this interval not displayed.       CARDIAC BIOMARKERS:  Recent Labs   Lab 08/10/19  0740 08/10/19  1333 08/10/19  1952   Troponin I 0.169 H 0.300 H 0.253 H       COAGS:        LIPIDS/LFTS:  Recent Labs   Lab 05/06/19  1045 05/12/19  0140 08/09/19  2310 08/10/19  0740   Cholesterol 134  --   --   --    Triglycerides 80  --   --   --    HDL 45  --   --   --    LDL Cholesterol 73.0  --   --   --    Non-HDL Cholesterol 89  --   --   --    AST  --  16 14 17   ALT  --  17 19 22       Hemoglobin A1C   Date Value Ref Range Status   05/07/2019 7.0 (H) 4.0 - 5.6 % Final     Comment:     ADA Screening Guidelines:  5.7-6.4%  Consistent with prediabetes  >or=6.5%  Consistent with diabetes  High levels of fetal hemoglobin interfere with the HbA1C  assay. Heterozygous hemoglobin variants (HbS, HgC, etc)do  not significantly interfere with this assay.   However, presence of multiple variants may affect accuracy.     06/19/2018 7.3 (H) 4.0 - 5.6 % Final     Comment:     ADA Screening Guidelines:  5.7-6.4%  Consistent with prediabetes  >or=6.5%  Consistent with diabetes  High levels of fetal hemoglobin interfere with the HbA1C  assay. Heterozygous hemoglobin variants (HbS, HgC, etc)do  not significantly interfere with this assay.   However, presence of multiple variants may affect accuracy.         TSH        The ASCVD Risk score (Merasilvestre WEATHERS Jr., et al., 2013) failed to calculate for  the following reasons:    The 2013 ASCVD risk score is only valid for ages 40 to 79    The patient has a prior MI or stroke diagnosis           Cardiovascular Testing:    EKG: (personally reviewed tracing)  May 2019:  Normal sinus rhythm, left axis deviation.  Abnormal EKG.    2D echocardiogram May 2019:  · Normal left ventricular systolic function. The estimated ejection fraction is 65%  · Normal LV diastolic function.  · Mild left atrial enlargement.  · Normal central venous pressure (3 mm Hg).  · The estimated PA systolic pressure is 10 mm Hg       Coronary angiogram 6th May 2019:    No acute thrombotic lesion identified.  Coronary artery disease as described below  LVEDP: 12 mmHg    Carotid ultrasound May 28, 2019:    · There is 20-39% right Internal Carotid Stenosis.  · There is 20-39% left Internal Carotid Stenosis.     ABIs May 28, 2019:    Noncompressible LOW bilaterally.  Mildly decrease TBI bilaterally.  Normal PVR waveforms bilaterally.       Coronary Anatomy:  LM:  No significant stenosis  LAD:  Mid LAD 60-70% stenosis.  Distal LAD 50% stenosis.  IFR 0.92 (nonischemic).  Diagonal 1 is a tortuous vessel with mid 80% stenosis.  Will medically manage this diagonal stenosis  LCx :  Mild nonobstructive CAD  RCA:  50% distal RCA/proximal PDA lesion.  IFR 1 (nonischemic)     Impression / Plan  Coronary artery disease as described above.  Continue medical management with aggressive risk factor modification.  Continue aspirin 81 mg daily. Change simvastatin to atorvastatin 80 mg daily.        ASSESSMENT AND PLAN     Patient Active Problem List   Diagnosis    Essential hypertension, benign    Type 2 diabetes mellitus    Hyperlipidemia    Body mass index 28.0-28.9, adult    History of prostate cancer    Orthostatic hypotension    Diabetic ulcer of toe of left foot associated with type 2 diabetes mellitus, with fat layer exposed    SCC (squamous cell carcinoma)    CAD (coronary artery disease)    Unstable  angina    Palpitations    Elevated troponin       1.  Patient with coronary artery disease, now angina free.  Being managed medically.  Continue current medical therapy.    2.  Hypertension:  Uncontrolled.  Increase Norvasc to 10 mg daily and Toprol-XL to 50 mg daily.    3.  Cardiovascular screening with rest and stress ABIs and carotid ultrasound performed.  ABIs demonstrated noncompressible ABIs bilaterally, mildly decreased ABIs bilaterally with normal PVR waveforms bilaterally.  I got a peripheral ultrasound for further evaluation of his abnormal TBI.  Carotid ultrasound did not show any flow restriction in the right or the left lower extremity.    4.  Dyslipidemia:  Continue medical management with atorvastatin    5.  Preoperative clearance for lithotripsy.  Patient here for preop clearance prior for his lithotripsy.  He had angiogram done this year which showed nonobstructive coronary artery disease in his major arteries and tortuous diagonal 1 with some disease.  He has been angina free.  His chest pain is not his anginal pain as per the patient.  No further ischemic workup is needed at the current time.  Patient may proceed for lithotripsy at low to intermediate risk of coronary ischemia.  Continue beta-blockers to keep heart rate around 60 beats per minute pre, zack and postoperative.      Thank you very much for involving me in the care of your patient.  Please do not hesitate to contact me if there are any questions.      Rashad Nieto MD, FACC, [facility]  Interventional Cardiologist, [facility].     Follow-up in 3 months      This note was dictated with the help of speech recognition software.  There might be un-intended errors and/or substitutions.

## 2019-10-30 NOTE — LETTER
October 30, 2019    Juan Martínez Jr.  [address]  [place] [address]             [facility] - Cardiology  [address]  [facility] [address]  Phone: [phone]   Juan Martínez Jr. was seen by me on 10/30/2019 and is cleared for urology procedure with low to intermediate risk.    If you have any questions or concerns, please don't hesitate to call.    Sincerely,    Rashad Nieto

## 2019-11-04 ENCOUNTER — TELEPHONE (OUTPATIENT)
Dept: CARDIOLOGY | Facility: CLINIC | Age: 84
End: 2019-11-04

## 2019-11-04 NOTE — TELEPHONE ENCOUNTER
----- Message from Opal Beck sent at 11/4/2019  8:05 AM CST -----  Pt have Two pills  Metoprolol 50Mg And Amlodipine 10mg from Dr Nieto One  he was supposed to be taking twice a day but both bottles say once a day and he don't know which one Dr Nieto said to take twice daily. Pt would like for you to contact his cell number   [phone]

## 2019-11-22 ENCOUNTER — HOSPITAL ENCOUNTER (EMERGENCY)
Facility: HOSPITAL | Age: 84
Discharge: HOME | End: 2019-11-22
Attending: EMERGENCY MEDICINE
Payer: MEDICARE

## 2019-11-22 VITALS
WEIGHT: 176 LBS | TEMPERATURE: 97 F | RESPIRATION RATE: 16 BRPM | DIASTOLIC BLOOD PRESSURE: 79 MMHG | HEIGHT: 68 IN | OXYGEN SATURATION: 98 % | BODY MASS INDEX: 26.67 KG/M2 | HEART RATE: 62 BPM | SYSTOLIC BLOOD PRESSURE: 169 MMHG

## 2019-11-22 DIAGNOSIS — R07.9 CHEST PAIN: ICD-10-CM

## 2019-11-22 DIAGNOSIS — J84.10 LUNG GRANULOMA: Primary | ICD-10-CM

## 2019-11-22 LAB
ALBUMIN SERPL BCP-MCNC: 4.3 G/DL (ref 3.5–5.2)
ALP SERPL-CCNC: 76 U/L (ref 55–135)
ALT SERPL W/O P-5'-P-CCNC: 22 U/L (ref 10–44)
ANION GAP SERPL CALC-SCNC: 13 MMOL/L (ref 8–16)
AST SERPL-CCNC: 22 U/L (ref 10–40)
BASOPHILS # BLD AUTO: 0.04 K/UL (ref 0–0.2)
BASOPHILS NFR BLD: 0.5 % (ref 0–1.9)
BILIRUB SERPL-MCNC: 1 MG/DL (ref 0.1–1)
BNP SERPL-MCNC: 29 PG/ML (ref 0–99)
BUN SERPL-MCNC: 12 MG/DL (ref 8–23)
CALCIUM SERPL-MCNC: 9.7 MG/DL (ref 8.7–10.5)
CHLORIDE SERPL-SCNC: 95 MMOL/L (ref 95–110)
CO2 SERPL-SCNC: 24 MMOL/L (ref 23–29)
CREAT SERPL-MCNC: 1.2 MG/DL (ref 0.5–1.4)
DIFFERENTIAL METHOD: ABNORMAL
EOSINOPHIL # BLD AUTO: 0.1 K/UL (ref 0–0.5)
EOSINOPHIL NFR BLD: 1.5 % (ref 0–8)
ERYTHROCYTE [DISTWIDTH] IN BLOOD BY AUTOMATED COUNT: 13.2 % (ref 11.5–14.5)
EST. GFR  (AFRICAN AMERICAN): >60 ML/MIN/1.73 M^2
EST. GFR  (NON AFRICAN AMERICAN): 56 ML/MIN/1.73 M^2
GLUCOSE SERPL-MCNC: 193 MG/DL (ref 70–110)
HCT VFR BLD AUTO: 43.7 % (ref 40–54)
HGB BLD-MCNC: 15.1 G/DL (ref 14–18)
IMM GRANULOCYTES # BLD AUTO: 0.03 K/UL (ref 0–0.04)
IMM GRANULOCYTES NFR BLD AUTO: 0.4 % (ref 0–0.5)
LYMPHOCYTES # BLD AUTO: 1.2 K/UL (ref 1–4.8)
LYMPHOCYTES NFR BLD: 15.6 % (ref 18–48)
MCH RBC QN AUTO: 30.9 PG (ref 27–31)
MCHC RBC AUTO-ENTMCNC: 34.6 G/DL (ref 32–36)
MCV RBC AUTO: 89 FL (ref 82–98)
MONOCYTES # BLD AUTO: 0.9 K/UL (ref 0.3–1)
MONOCYTES NFR BLD: 11 % (ref 4–15)
NEUTROPHILS # BLD AUTO: 5.6 K/UL (ref 1.8–7.7)
NEUTROPHILS NFR BLD: 71 % (ref 38–73)
NRBC BLD-RTO: 0 /100 WBC
PLATELET # BLD AUTO: 318 K/UL (ref 150–350)
PMV BLD AUTO: 8.7 FL (ref 9.2–12.9)
POCT GLUCOSE: 188 MG/DL (ref 70–110)
POTASSIUM SERPL-SCNC: 3.6 MMOL/L (ref 3.5–5.1)
PROT SERPL-MCNC: 7.4 G/DL (ref 6–8.4)
RBC # BLD AUTO: 4.89 M/UL (ref 4.6–6.2)
SODIUM SERPL-SCNC: 132 MMOL/L (ref 136–145)
TROPONIN I SERPL DL<=0.01 NG/ML-MCNC: 0.01 NG/ML (ref 0–0.03)
TROPONIN I SERPL DL<=0.01 NG/ML-MCNC: 0.01 NG/ML (ref 0–0.03)
WBC # BLD AUTO: 7.93 K/UL (ref 3.9–12.7)

## 2019-11-22 PROCEDURE — 99285 EMERGENCY DEPT VISIT HI MDM: CPT | Mod: 25

## 2019-11-22 PROCEDURE — 63600175 PHARM REV CODE 636 W HCPCS: Performed by: EMERGENCY MEDICINE

## 2019-11-22 PROCEDURE — 80053 COMPREHEN METABOLIC PANEL: CPT

## 2019-11-22 PROCEDURE — 93010 ELECTROCARDIOGRAM REPORT: CPT | Mod: ,,, | Performed by: INTERNAL MEDICINE

## 2019-11-22 PROCEDURE — 82962 GLUCOSE BLOOD TEST: CPT

## 2019-11-22 PROCEDURE — 96374 THER/PROPH/DIAG INJ IV PUSH: CPT

## 2019-11-22 PROCEDURE — 25000003 PHARM REV CODE 250: Performed by: EMERGENCY MEDICINE

## 2019-11-22 PROCEDURE — 83880 ASSAY OF NATRIURETIC PEPTIDE: CPT

## 2019-11-22 PROCEDURE — 93010 EKG 12-LEAD: ICD-10-PCS | Mod: ,,, | Performed by: INTERNAL MEDICINE

## 2019-11-22 PROCEDURE — 84484 ASSAY OF TROPONIN QUANT: CPT

## 2019-11-22 PROCEDURE — 93005 ELECTROCARDIOGRAM TRACING: CPT

## 2019-11-22 PROCEDURE — 96375 TX/PRO/DX INJ NEW DRUG ADDON: CPT | Mod: 59

## 2019-11-22 PROCEDURE — S0028 INJECTION, FAMOTIDINE, 20 MG: HCPCS | Performed by: EMERGENCY MEDICINE

## 2019-11-22 PROCEDURE — 85025 COMPLETE CBC W/AUTO DIFF WBC: CPT

## 2019-11-22 RX ORDER — FAMOTIDINE 10 MG/ML
20 INJECTION INTRAVENOUS
Status: COMPLETED | OUTPATIENT
Start: 2019-11-22 | End: 2019-11-22

## 2019-11-22 RX ORDER — ONDANSETRON 2 MG/ML
4 INJECTION INTRAMUSCULAR; INTRAVENOUS
Status: COMPLETED | OUTPATIENT
Start: 2019-11-22 | End: 2019-11-22

## 2019-11-22 RX ORDER — DICYCLOMINE HYDROCHLORIDE 10 MG/1
10 CAPSULE ORAL
Status: ON HOLD | COMMUNITY
Start: 2019-09-24 | End: 2022-08-17 | Stop reason: HOSPADM

## 2019-11-22 RX ORDER — ONDANSETRON 4 MG/1
4 TABLET, FILM COATED ORAL EVERY 6 HOURS PRN
Qty: 12 TABLET | Refills: 0 | Status: ON HOLD | OUTPATIENT
Start: 2019-11-22 | End: 2022-08-17 | Stop reason: HOSPADM

## 2019-11-22 RX ORDER — ALBUTEROL SULFATE 90 UG/1
2 AEROSOL, METERED RESPIRATORY (INHALATION)
COMMUNITY
Start: 2019-11-13 | End: 2020-11-12

## 2019-11-22 RX ORDER — DEXTROSE 4 G
TABLET,CHEWABLE ORAL
COMMUNITY

## 2019-11-22 RX ORDER — ESCITALOPRAM OXALATE 10 MG/1
10 TABLET ORAL
COMMUNITY
Start: 2019-11-13 | End: 2023-02-13

## 2019-11-22 RX ADMIN — ONDANSETRON HYDROCHLORIDE 4 MG: 2 SOLUTION INTRAMUSCULAR; INTRAVENOUS at 10:11

## 2019-11-22 RX ADMIN — FAMOTIDINE 20 MG: 10 INJECTION INTRAVENOUS at 10:11

## 2019-11-22 NOTE — ED NOTES
85y/o M to ED for chest pain in right chest for over a week.   Patient identifiers verified and correct for Juan Martínez .    LOC: The patient is awake, alert and aware of environment with an appropriate affect, the patient is oriented x 3 and speaking appropriately.  APPEARANCE: Patient resting comfortably and in no acute distress, patient is clean and well groomed, patient's clothing is properly fastened.  SKIN: The skin is warm and dry, color consistent with ethnicity, patient has normal skin turgor and moist mucus membranes, skin intact, no breakdown or bruising noted.  MUSCULOSKELETAL: Patient moving all extremities spontaneously, no obvious swelling or deformities noted.  RESPIRATORY: Airway is open and patent, respirations are spontaneous, patient has a normal effort and rate, no accessory muscle use noted, bilateral breath sounds *clear.  CARDIAC: Patient has a normal rate and regular rhythm, no periphreal edema noted, capillary refill < 3 seconds.  ABDOMEN: Soft and non tender to palpation, no distention noted, normoactive bowel sounds present in all four quadrants.  NEUROLOGIC:  eyes open spontaneously, behavior appropriate to situation, follows commands, facial expression symmetrical, bilateral hand grasp equal and even, purposeful motor response noted.  Safety measures in place. plan of care discussed. Family at bedside.

## 2019-11-22 NOTE — DISCHARGE INSTRUCTIONS
The exact cause of her symptoms is unclear at this time.  Emergency department testing does not suggest an acute heart attack as the cause of your symptoms. 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  It is very important that you continue to follow this up with your primary physician to further evaluate the significance of your lung granuloma.  You should continue your medications as you have been prescribed.  You should also schedule close follow-up with your cardiologist.  Return to the emergency department for fever, persistent or worsening chest pain or breathing difficulty or any new, worsening or concerning symptoms.    Thank you for coming to our Emergency Department today. It is important to remember that some problems are difficult to diagnose and may not be found during your first visit. Be sure to follow up with your primary care doctor and review any labs/imaging that was performed with them. If you do not have a primary care doctor, you may contact the one listed on your discharge paperwork or you may also call the [facility] Appointment Desk at [phone] to schedule an appointment with one.     All medications may potentially have side effects and it is impossible to predict which medications may give you side effects. If you feel that you are having a negative effect of any medication you should immediately stop taking them and seek medical attention.    Return to the ER with any questions/concerns, new/concerning symptoms, worsening or failure to improve. Do not drive or make any important decisions for 24 hours if you have received any pain medications, sedatives or mood altering drugs during your ER visit.

## 2019-11-22 NOTE — ED PROVIDER NOTES
Encounter Date: 11/22/2019    SCRIBE #1 NOTE: I, Sue Flower, am scribing for, and in the presence of,  Solo Nash MD. I have scribed the entire note.       History     Chief Complaint   Patient presents with    Chest Pain     Reports intermittent right sided chest pain since this past tuesday.      CC: chest pain    HPI:  This is a 84 y.o. male with a PMHx of HTN, DM, high cholesterol, and prostate cancer who presents to the Emergency Department with a cc of intermittent aching right-sided chest pain x2 days. The episodes last for about 3-4 hours. He had an episode this morning, but he is asymptomatic in the ED now. He denies nausea, diarrhea, urinary complaints, or shortness of breath. He denies eliciting, exacerbating, or alleviating factors. He states he had a CAT scan 3 days ago after a benign CXR, but he hasn't seen his results yet. He states tests have revealed 2 blockages in his heart, but they are managed with medication and he has not required stent placement.    The history is provided by the patient.     Review of patient's allergies indicates:  No Known Allergies  Past Medical History:   Diagnosis Date    Diabetes mellitus     High cholesterol     Hypertension     Prostate cancer      Past Surgical History:   Procedure Laterality Date    APPLICATION OF FULL-THICKNESS SKIN GRAFT (FTSG) TO UPPER EXTREMITY Right 11/15/2018    Procedure: APPLICATION, GRAFT, SKIN, FULL-THICKNESS, TO UPPER EXTREMITY VS STSG WITH FROZEN SECTIONS;  Surgeon: Alan Arzola MD;  Location: [facility] OR;  Service: Plastics;  Laterality: Right;    EXCISION OF SQUAMOUS CELL CARCINOMA Right 11/15/2018    Procedure: EXCISION, CARCINOMA, SQUAMOUS CELL @ RIGHT HAND;  Surgeon: Alan Arzola MD;  Location: [facility] OR;  Service: Plastics;  Laterality: Right;  RN PREOP 11/13/2018--NEED H/P    JOINT REPLACEMENT      LEFT HEART CATHETERIZATION Left 5/6/2019    Procedure: Left heart cath;  Surgeon: Rashad Nieto MD;  Location:  [facility] CATH LAB;  Service: Cardiology;  Laterality: Left;    TONSILLECTOMY      TOTAL KNEE ARTHROPLASTY       Family History   Problem Relation Age of Onset    Heart disease Mother     Stroke Father      Social History     Tobacco Use    Smoking status: Former Smoker     Packs/day: 0.00     Types: Cigarettes     Last attempt to quit: 8/12/2016     Years since quitting: 3.2    Smokeless tobacco: Never Used    Tobacco comment: 8/12/2016   Substance Use Topics    Alcohol use: No    Drug use: No     Review of Systems   Constitutional: Negative for fever.   HENT: Negative for sore throat.    Respiratory: Negative for shortness of breath.    Cardiovascular: Positive for chest pain.   Gastrointestinal: Negative for diarrhea and nausea.   Genitourinary: Negative for dysuria.        Negative for urinary complaints.   Musculoskeletal: Negative for back pain.   Skin: Negative for rash.   Neurological: Negative for weakness.   Hematological: Does not bruise/bleed easily.       Physical Exam     Initial Vitals [11/22/19 0825]   BP Pulse Resp Temp SpO2   111/62 98 20 97.8 °F (36.6 °C) 97 %      MAP       --         Physical Exam    Nursing note and vitals reviewed.  Constitutional: He is not diaphoretic. No distress.   HENT:   Head: Normocephalic and atraumatic.   Mouth/Throat: Oropharynx is clear and moist.   Eyes: Conjunctivae and EOM are normal. Pupils are equal, round, and reactive to light. No scleral icterus.   Neck: Normal range of motion. Neck supple. No JVD present.   Cardiovascular: Normal rate, regular rhythm and intact distal pulses.   Pulmonary/Chest: No stridor. No respiratory distress.   Mild coarse right sided breath sounds.   Abdominal: Soft. Bowel sounds are normal. He exhibits no distension. There is no tenderness.   Musculoskeletal: Normal range of motion. He exhibits no edema or tenderness.   Neurological: He is alert and oriented to person, place, and time. He has normal strength. No cranial nerve  deficit or sensory deficit.   Skin: Skin is warm and dry. No rash noted.   Psychiatric: He has a normal mood and affect.         ED Course   Procedures  Labs Reviewed   CBC W/ AUTO DIFFERENTIAL - Abnormal; Notable for the following components:       Result Value    MPV 8.7 (*)     Lymph% 15.6 (*)     All other components within normal limits   COMPREHENSIVE METABOLIC PANEL - Abnormal; Notable for the following components:    Sodium 132 (*)     Glucose 193 (*)     eGFR if non  56 (*)     All other components within normal limits   POCT GLUCOSE - Abnormal; Notable for the following components:    POCT Glucose 188 (*)     All other components within normal limits   TROPONIN I   B-TYPE NATRIURETIC PEPTIDE   TROPONIN I     EKG Readings: (Independently Interpreted)   Initial Reading: No STEMI. Rhythm: Normal Sinus Rhythm. Heart Rate: 97. ST Segments: Normal ST Segments. T Waves Flipped: AVL.   Time of EK     ECG Results          EKG 12-lead (In process)  Result time 19 12:15:45    In process by Interface, Lab In [facility] (19 12:15:45)                 Narrative:    Test Reason : R07.9,    Vent. Rate : 068 BPM     Atrial Rate : 068 BPM     P-R Int : 166 ms          QRS Dur : 098 ms      QT Int : 384 ms       P-R-T Axes : 029 -60 062 degrees     QTc Int : 408 ms    Normal sinus rhythm  Left anterior fascicular block  Abnormal ECG  When compared with ECG of 2019 08:18,  No significant change was found    Referred By: [email or web address]   SELF           Confirmed By:                             Imaging Results          X-Ray Chest PA And Lateral (Final result)  Result time 19 09:03:36    Final result by Duran Spring MD (19 09:03:36)                 Impression:      No acute findings.      Electronically signed by: Duran Spring MD  Date:    2019  Time:    09:03             Narrative:    EXAMINATION:  XR CHEST PA AND LATERAL    CLINICAL HISTORY:  Chest Pain;    TECHNIQUE:  PA  and lateral views of the chest were performed.    COMPARISON:  08/09/2019    FINDINGS:  The cardiomediastinal contour is within normal limits.  The lungs are clear.  No pneumothorax.  No pleural effusions.                              X-Rays:   Independently Interpreted Readings:   Chest X-Ray: No infiltrates.  No acute abnormalities.     Medical Decision Making:   History:   Old Medical Records: I decided to obtain old medical records.  Old Records Summarized: records from clinic visits.       <> Summary of Records: Recent cardiology follow-up after admission for hypertensive urgency.  Recent PMD visit with CT scan demonstrating granuloma of the right lung.  Initial Assessment:   Patient with history of CAD presents for evaluation of intermittent right-sided chest pain.  Patient reports that he recently had a CT scan to further evaluate the symptoms by his primary physician and is anxious about the results.  He states he came to the emergency department because he did not want to go through the week and not knowing about the results of his CT scan.  He denies current pain.  EKG does not suggest STEMI.  Patient has had a recent admission with ACS evaluation as well as outpatient cardiology follow-up.  Differential Diagnosis:   Pulmonary granuloma, Muskuloskeletal pain, ACS, Pneumonia, low clinical suspicion of PE given lack shortness of breath, tachycardia, hypoxia or tachypnea..  Independently Interpreted Test(s):   I have ordered and independently interpreted X-rays - see prior notes.  I have ordered and independently interpreted EKG Reading(s) - see prior notes  Clinical Tests:   Lab Tests: Ordered and Reviewed  Radiological Study: Ordered and Reviewed  Medical Tests: Ordered and Reviewed  ED Management:  Labs within acceptable limits with negative troponin x2.  EKG does not have any definite acute ischemic changes and is comparable to prior.  Chest x-ray does not suggest pneumonia.  Findings of workup discussed  extensively with patient and wife at the bedside.  Patient and wife do not believe patient's symptoms are cardiac in etiology.  I have low clinical suspicion of ACS in this patient.  Given patient's comorbidities he has been offered observation for further cardiac evaluation however patient and wife at the bedside declined.  Via shared decision making patient is stable for discharge to follow up with his primary physician as well as Cardiology. counseled on supportive care, appropriate medication usage, concerning symptoms for which to return to ER and the importance of follow up. Understanding and agreement with treatment plan was expressed.   This chart was completed using dictation software, as a result there may be some transcription errors.             Scribe Attestation:   Scribe #1: I performed the above scribed service and the documentation accurately describes the services I performed. I attest to the accuracy of the note.                          Clinical Impression:     1. Lung granuloma    2. Chest pain            Disposition:   Disposition: Discharged  Condition: Stable    Scribe attestation: I, Solo Nash , personally performed the services described in this documentation. All medical record entries made by the scribe were at my direction and in my presence.  I have reviewed the chart and agree that the record reflects my personal performance and is accurate and complete.                   Solo Nash MD  11/22/19 5022

## 2019-12-12 ENCOUNTER — OFFICE VISIT (OUTPATIENT)
Dept: CARDIOLOGY | Facility: CLINIC | Age: 84
End: 2019-12-12
Payer: MEDICARE

## 2019-12-12 VITALS
OXYGEN SATURATION: 98 % | WEIGHT: 175.5 LBS | BODY MASS INDEX: 26.68 KG/M2 | DIASTOLIC BLOOD PRESSURE: 70 MMHG | HEART RATE: 72 BPM | RESPIRATION RATE: 16 BRPM | SYSTOLIC BLOOD PRESSURE: 154 MMHG

## 2019-12-12 DIAGNOSIS — I25.10 CORONARY ARTERY DISEASE INVOLVING NATIVE CORONARY ARTERY OF NATIVE HEART WITHOUT ANGINA PECTORIS: Primary | ICD-10-CM

## 2019-12-12 DIAGNOSIS — I20.0 UNSTABLE ANGINA: ICD-10-CM

## 2019-12-12 DIAGNOSIS — R00.2 PALPITATIONS: ICD-10-CM

## 2019-12-12 DIAGNOSIS — E78.2 MIXED HYPERLIPIDEMIA: ICD-10-CM

## 2019-12-12 DIAGNOSIS — I10 ESSENTIAL HYPERTENSION, BENIGN: ICD-10-CM

## 2019-12-12 PROCEDURE — 1159F PR MEDICATION LIST DOCUMENTED IN MEDICAL RECORD: ICD-10-PCS | Mod: S$GLB,,, | Performed by: INTERNAL MEDICINE

## 2019-12-12 PROCEDURE — 3078F DIAST BP <80 MM HG: CPT | Mod: CPTII,S$GLB,, | Performed by: INTERNAL MEDICINE

## 2019-12-12 PROCEDURE — 3077F SYST BP >= 140 MM HG: CPT | Mod: CPTII,S$GLB,, | Performed by: INTERNAL MEDICINE

## 2019-12-12 PROCEDURE — 3078F PR MOST RECENT DIASTOLIC BLOOD PRESSURE < 80 MM HG: ICD-10-PCS | Mod: CPTII,S$GLB,, | Performed by: INTERNAL MEDICINE

## 2019-12-12 PROCEDURE — 1126F PR PAIN SEVERITY QUANTIFIED, NO PAIN PRESENT: ICD-10-PCS | Mod: S$GLB,,, | Performed by: INTERNAL MEDICINE

## 2019-12-12 PROCEDURE — 99999 PR PBB SHADOW E&M-EST. PATIENT-LVL III: CPT | Mod: PBBFAC,,, | Performed by: INTERNAL MEDICINE

## 2019-12-12 PROCEDURE — 99214 PR OFFICE/OUTPT VISIT, EST, LEVL IV, 30-39 MIN: ICD-10-PCS | Mod: S$GLB,,, | Performed by: INTERNAL MEDICINE

## 2019-12-12 PROCEDURE — 1101F PT FALLS ASSESS-DOCD LE1/YR: CPT | Mod: CPTII,S$GLB,, | Performed by: INTERNAL MEDICINE

## 2019-12-12 PROCEDURE — 1126F AMNT PAIN NOTED NONE PRSNT: CPT | Mod: S$GLB,,, | Performed by: INTERNAL MEDICINE

## 2019-12-12 PROCEDURE — 1101F PR PT FALLS ASSESS DOC 0-1 FALLS W/OUT INJ PAST YR: ICD-10-PCS | Mod: CPTII,S$GLB,, | Performed by: INTERNAL MEDICINE

## 2019-12-12 PROCEDURE — 99214 OFFICE O/P EST MOD 30 MIN: CPT | Mod: S$GLB,,, | Performed by: INTERNAL MEDICINE

## 2019-12-12 PROCEDURE — 3077F PR MOST RECENT SYSTOLIC BLOOD PRESSURE >= 140 MM HG: ICD-10-PCS | Mod: CPTII,S$GLB,, | Performed by: INTERNAL MEDICINE

## 2019-12-12 PROCEDURE — 99999 PR PBB SHADOW E&M-EST. PATIENT-LVL III: ICD-10-PCS | Mod: PBBFAC,,, | Performed by: INTERNAL MEDICINE

## 2019-12-12 PROCEDURE — 1159F MED LIST DOCD IN RCRD: CPT | Mod: S$GLB,,, | Performed by: INTERNAL MEDICINE

## 2019-12-12 RX ORDER — METOPROLOL SUCCINATE 25 MG/1
25 TABLET, EXTENDED RELEASE ORAL DAILY
Qty: 90 TABLET | Refills: 3 | Status: SHIPPED | OUTPATIENT
Start: 2019-12-12 | End: 2020-04-02

## 2019-12-12 NOTE — PROGRESS NOTES
CARDIOVASCULAR CONSULTATION    REASON FOR CONSULT:   Juan Martínez Jr. is a 84 y.o. male who presents for follow-up recent hospitalization for a non-STEMI..      HISTORY OF PRESENT ILLNESS:     Notes from August 2019:   Patient is here for follow-up today.  Was recently admitted for hypertensive urgency.  Did not have any chest pains during the hypertensive urgency.  Troponins were found to be mildly elevated.  Was evaluated by Cardiology and Norvasc was restarted.  His blood pressure has been well controlled since then.  Denies any chest pains at rest on exertion, orthopnea, PND, swelling of feet.      Notes from July 2019  Patient is here for follow-up today.  Denies any chest pains at rest on exertion, orthopnea, PND.  Denies any claudication symptoms.  Denies any dyspnea at rest on exertion    Note from clinic visit in May 2019:  Patient is 83-year-old man pleasant man with a past medical history significant for diabetes, dyslipidemia, hypertension, coronary artery disease who recently presented to the hospital with a non-STEMI.  Coronary angiogram performed at that time revealed nonischemic coronary artery disease of the LAD and RCA as well as 80% stenosis in his diagonal 1.  Which is being managed medically.  A week after that he developed some right-sided chest pain, different from his anginal pain which he had presented with his non-STEMI.  He states that right-sided chest pain resolved on its own in less than 20 min, but since this is all new for him he decided to come to the hospital to make sure everything was okay.  He was admitted and serial troponins did not reveal any elevation and he was discharged home.  He has not had any further episodes of chest pains.  Denies orthopnea, PND chest pains at rest or on exertion.  Denies typical claudication pain, has knee pains and atypical leg pain.      Notes from October 2019:  Patient here for follow-up.  States that had a brief episode of right-sided chest  pain which lasted a few seconds to few minutes and then went away.  This was different than the anginal pain he had in the past.  Denies any orthopnea, PND, swelling of feet.  States that he tried telling is  that this he felt was related to his lungs, and an checks x-ray done yesterday.  Denies any recent fevers or chills.    Notes from December 2019:  Patient here for follow-up.  Denies any chest pains at rest on exertion, orthopnea, PND, swelling of feet.  Blood pressure mildly elevated at clinic.  At home states blood pressure stays around 140-145 mm systolic.  I will increase his Toprol-XL to 75 mg daily.    PAST MEDICAL HISTORY:     Past Medical History:   Diagnosis Date    Diabetes mellitus     High cholesterol     Hypertension     Prostate cancer        PAST SURGICAL HISTORY:     Past Surgical History:   Procedure Laterality Date    APPLICATION OF FULL-THICKNESS SKIN GRAFT (FTSG) TO UPPER EXTREMITY Right 11/15/2018    Procedure: APPLICATION, GRAFT, SKIN, FULL-THICKNESS, TO UPPER EXTREMITY VS STSG WITH FROZEN SECTIONS;  Surgeon: Alan Arzola MD;  Location: [facility] OR;  Service: Plastics;  Laterality: Right;    EXCISION OF SQUAMOUS CELL CARCINOMA Right 11/15/2018    Procedure: EXCISION, CARCINOMA, SQUAMOUS CELL @ RIGHT HAND;  Surgeon: Alan Arzola MD;  Location: [facility] OR;  Service: Plastics;  Laterality: Right;  RN PREOP 11/13/2018--NEED H/P    JOINT REPLACEMENT      LEFT HEART CATHETERIZATION Left 5/6/2019    Procedure: Left heart cath;  Surgeon: Rashad Nieto MD;  Location: [facility] CATH LAB;  Service: Cardiology;  Laterality: Left;    TONSILLECTOMY      TOTAL KNEE ARTHROPLASTY         ALLERGIES AND MEDICATION:   Review of patient's allergies indicates:  No Known Allergies     Medication List           Accurate as of December 12, 2019 10:18 AM. If you have any questions, ask your nurse or doctor.               CONTINUE taking these medications    albuterol 90 mcg/actuation inhaler  Commonly  known as:  PROVENTIL/VENTOLIN HFA     Amitiza 24 MCG Cap  Generic drug:  lubiprostone     amLODIPine 10 MG tablet  Commonly known as:  NORVASC  Take 1 tablet (10 mg total) by mouth once daily.     aspirin 81 MG EC tablet  Commonly known as:  ECOTRIN     atorvastatin 40 MG tablet  Commonly known as:  LIPITOR  Take 1 tablet (40 mg total) by mouth once daily.     blood-glucose meter Misc     clopidogrel 75 mg tablet  Commonly known as:  PLAVIX  Take 1 tablet (75 mg total) by mouth once daily.     dicyclomine 10 MG capsule  Commonly known as:  BENTYL     escitalopram oxalate 10 MG tablet  Commonly known as:  LEXAPRO     hydroCHLOROthiazide 12.5 mg capsule  Commonly known as:  MICROZIDE     isosorbide mononitrate 60 MG 24 hr tablet  Commonly known as:  IMDUR  Take 1 tablet (60 mg total) by mouth once daily.     LANCETS & BLOOD GLUCOSE STRIPS MISC     metFORMIN 1000 MG tablet  Commonly known as:  GLUCOPHAGE  Take 1 tablet (1,000 mg total) by mouth 2 (two) times daily with meals.     metoprolol succinate 50 MG 24 hr tablet  Commonly known as:  TOPROL-XL  Take 1 tablet (50 mg total) by mouth once daily.     nitroGLYCERIN 0.4 MG SL tablet  Commonly known as:  NITROSTAT  Place 1 tablet (0.4 mg total) under the tongue every 5 (five) minutes as needed for Chest pain.     ondansetron 4 MG tablet  Commonly known as:  ZOFRAN  Take 1 tablet (4 mg total) by mouth every 6 (six) hours as needed for Nausea.            SOCIAL HISTORY:     Social History     Socioeconomic History    Marital status:      Spouse name: Not on file    Number of children: Not on file    Years of education: Not on file    Highest education level: Not on file   Occupational History    Not on file   Social Needs    Financial resource strain: Not on file    Food insecurity:     Worry: Not on file     Inability: Not on file    Transportation needs:     Medical: Not on file     Non-medical: Not on file   Tobacco Use    Smoking status: Former Smoker      Packs/day: 0.00     Types: Cigarettes     Last attempt to quit: 8/12/2016     Years since quitting: 3.3    Smokeless tobacco: Never Used    Tobacco comment: 8/12/2016   Substance and Sexual Activity    Alcohol use: No    Drug use: No    Sexual activity: Not Currently   Lifestyle    Physical activity:     Days per week: Not on file     Minutes per session: Not on file    Stress: Not on file   Relationships    Social connections:     Talks on phone: Not on file     Gets together: Not on file     Attends [employer] service: Not on file     Active member of club or organization: Not on file     Attends meetings of clubs or organizations: Not on file     Relationship status: Not on file   Other Topics Concern    Not on file   Social History Narrative    Not on file       FAMILY HISTORY:     Family History   Problem Relation Age of Onset    Heart disease Mother     Stroke Father        REVIEW OF SYSTEMS:   Review of Systems   Constitutional: Negative.    HENT: Negative.    Eyes: Negative.    Respiratory: Negative.    Cardiovascular: Negative.    Gastrointestinal: Negative.    Genitourinary: Negative.    Musculoskeletal: Positive for joint pain.   Skin: Negative.    Neurological: Negative.    Endo/Heme/Allergies: Negative.        A 10 point review of systems was performed and all the pertinent positives have been mentioned. Rest of review of systems was negative.        PHYSICAL EXAM:     Vitals:    12/12/19 0953   BP: (!) 154/70   Pulse: 72   Resp: 16    Body mass index is 26.68 kg/m².  Weight: 79.6 kg (175 lb 7.8 oz)         Physical Exam   Constitutional: He is oriented to person, place, and time. He appears well-developed and well-nourished. He is active.   HENT:   Head: Normocephalic and atraumatic.   Right Ear: Hearing normal.   Left Ear: Hearing normal.   Nose: Nose normal.   Mouth/Throat: Mucous membranes are normal.   Eyes: Pupils are equal, round, and reactive to light. Conjunctivae and lids are  normal.   Neck: Normal range of motion. Neck supple.   Cardiovascular: Normal rate, regular rhythm, normal heart sounds, intact distal pulses and normal pulses.   Pulmonary/Chest: Effort normal and breath sounds normal.   Abdominal: Soft. Normal appearance. There is no tenderness.   Musculoskeletal: He exhibits no edema or deformity.   Neurological: He is alert and oriented to person, place, and time.   Skin: Skin is warm, dry and intact.   Psychiatric: He has a normal mood and affect. His speech is normal.   Nursing note and vitals reviewed.        DATA:     Laboratory:  CBC:  Recent Labs   Lab 08/09/19  2310 08/10/19  0740 11/22/19  0830   WBC 6.71 7.50 7.93   Hemoglobin 13.4 L 14.8 15.1   Hematocrit 40.2 44.1 43.7   Platelets 256 275 318       CHEMISTRIES:  Recent Labs   Lab 09/17/17  0220  05/07/19  0610 05/12/19  0140 08/09/19  2310 08/10/19  0740 11/22/19  0830   Glucose 116 H   < > 102 114 H 156 H 139 H 193 H   Sodium 137   < > 138 137 136 137 132 L   Potassium 3.8   < > 4.0 3.5 3.4 L 3.5 3.6   BUN, Bld 17   < > 13 13 15 12 12   Creatinine 1.2   < > 0.9 1.1 1.1 0.9 1.2   eGFR if African American >60   < > >60 >60 >60 >60 >60   eGFR if non  57 A   < > >60 >60 >60 >60 56 A   Calcium 9.2   < > 9.2 8.9 8.7 9.0 9.7   Magnesium 1.9  --  2.0 1.9  --   --   --     < > = values in this interval not displayed.       CARDIAC BIOMARKERS:  Recent Labs   Lab 08/10/19  1952 11/22/19  0830 11/22/19  1050   Troponin I 0.253 H 0.013 0.013       COAGS:        LIPIDS/LFTS:  Recent Labs   Lab 05/06/19  1045  08/09/19  2310 08/10/19  0740 11/22/19  0830   Cholesterol 134  --   --   --   --    Triglycerides 80  --   --   --   --    HDL 45  --   --   --   --    LDL Cholesterol 73.0  --   --   --   --    Non-HDL Cholesterol 89  --   --   --   --    AST  --    < > 14 17 22   ALT  --    < > 19 22 22    < > = values in this interval not displayed.       Hemoglobin A1C   Date Value Ref Range Status   05/07/2019 7.0 (H)  4.0 - 5.6 % Final     Comment:     ADA Screening Guidelines:  5.7-6.4%  Consistent with prediabetes  >or=6.5%  Consistent with diabetes  High levels of fetal hemoglobin interfere with the HbA1C  assay. Heterozygous hemoglobin variants (HbS, HgC, etc)do  not significantly interfere with this assay.   However, presence of multiple variants may affect accuracy.     06/19/2018 7.3 (H) 4.0 - 5.6 % Final     Comment:     ADA Screening Guidelines:  5.7-6.4%  Consistent with prediabetes  >or=6.5%  Consistent with diabetes  High levels of fetal hemoglobin interfere with the HbA1C  assay. Heterozygous hemoglobin variants (HbS, HgC, etc)do  not significantly interfere with this assay.   However, presence of multiple variants may affect accuracy.         TSH        The ASCVD Risk score (Mera WEATHERS Jr., et al., 2013) failed to calculate for the following reasons:    The 2013 ASCVD risk score is only valid for ages 40 to 79    The patient has a prior MI or stroke diagnosis           Cardiovascular Testing:    EKG: (personally reviewed tracing)  May 2019:  Normal sinus rhythm, left axis deviation.  Abnormal EKG.    2D echocardiogram May 2019:  · Normal left ventricular systolic function. The estimated ejection fraction is 65%  · Normal LV diastolic function.  · Mild left atrial enlargement.  · Normal central venous pressure (3 mm Hg).  · The estimated PA systolic pressure is 10 mm Hg       Coronary angiogram 6th May 2019:    No acute thrombotic lesion identified.  Coronary artery disease as described below  LVEDP: 12 mmHg    Carotid ultrasound May 28, 2019:    · There is 20-39% right Internal Carotid Stenosis.  · There is 20-39% left Internal Carotid Stenosis.     ABIs May 28, 2019:    Noncompressible LOW bilaterally.  Mildly decrease TBI bilaterally.  Normal PVR waveforms bilaterally.       Coronary Anatomy:  LM:  No significant stenosis  LAD:  Mid LAD 60-70% stenosis.  Distal LAD 50% stenosis.  IFR 0.92 (nonischemic).  Diagonal 1  is a tortuous vessel with mid 80% stenosis.  Will medically manage this diagonal stenosis  LCx :  Mild nonobstructive CAD  RCA:  50% distal RCA/proximal PDA lesion.  IFR 1 (nonischemic)     Impression / Plan  Coronary artery disease as described above.  Continue medical management with aggressive risk factor modification.  Continue aspirin 81 mg daily. Change simvastatin to atorvastatin 80 mg daily.        ASSESSMENT AND PLAN     Patient Active Problem List   Diagnosis    Essential hypertension, benign    Type 2 diabetes mellitus    Hyperlipidemia    Body mass index 28.0-28.9, adult    History of prostate cancer    Orthostatic hypotension    Diabetic ulcer of toe of left foot associated with type 2 diabetes mellitus, with fat layer exposed    SCC (squamous cell carcinoma)    CAD (coronary artery disease)    Unstable angina    Palpitations    Elevated troponin       1.  Patient with coronary artery disease, now angina free.  Being managed medically.  Continue current medical therapy.    2.  Hypertension:  Increase Toprol-XL to 75 mg daily.  Continue rest of antihypertensives.    3.  Cardiovascular screening with rest and stress ABIs and carotid ultrasound performed.  ABIs demonstrated noncompressible ABIs bilaterally, mildly decreased ABIs bilaterally with normal PVR waveforms bilaterally.  I got a peripheral ultrasound for further evaluation of his abnormal TBI.  Carotid ultrasound did not show any flow restriction in the right or the left lower extremity.    4.  Dyslipidemia:  Continue medical management with atorvastatin    5.  Preoperative clearance for lithotripsy.  Patient here for preop clearance prior for his lithotripsy.  He had angiogram done this year which showed nonobstructive coronary artery disease in his major arteries and tortuous diagonal 1 with some disease.  He has been angina free.  His chest pain is not his anginal pain as per the patient.  No further ischemic workup is needed at the  current time.  Patient may proceed for lithotripsy at low to intermediate risk of coronary ischemia.  Continue beta-blockers to keep heart rate around 60 beats per minute pre, zack and postoperative.    6.  Preoperative for EGD:  Patient may undergo EGD at low risk for coronary ischemia.  May hold Plavix as needed for procedures.      Thank you very much for involving me in the care of your patient.  Please do not hesitate to contact me if there are any questions.      Rashad Nieto MD, FACC, [facility]  Interventional Cardiologist, [facility].     Follow-up in 4 months      This note was dictated with the help of speech recognition software.  There might be un-intended errors and/or substitutions.

## 2020-01-31 ENCOUNTER — OFFICE VISIT (OUTPATIENT)
Dept: CARDIOLOGY | Facility: CLINIC | Age: 85
End: 2020-01-31
Payer: MEDICARE

## 2020-01-31 VITALS
BODY MASS INDEX: 28.02 KG/M2 | WEIGHT: 184.88 LBS | HEART RATE: 67 BPM | OXYGEN SATURATION: 97 % | DIASTOLIC BLOOD PRESSURE: 68 MMHG | SYSTOLIC BLOOD PRESSURE: 164 MMHG | HEIGHT: 68 IN

## 2020-01-31 DIAGNOSIS — R07.9 CHEST PAIN, UNSPECIFIED TYPE: Primary | ICD-10-CM

## 2020-01-31 DIAGNOSIS — Z76.89 ESTABLISHING CARE WITH NEW DOCTOR, ENCOUNTER FOR: ICD-10-CM

## 2020-01-31 PROCEDURE — 1126F AMNT PAIN NOTED NONE PRSNT: CPT | Mod: S$GLB,,, | Performed by: INTERNAL MEDICINE

## 2020-01-31 PROCEDURE — 93000 ELECTROCARDIOGRAM COMPLETE: CPT | Mod: S$GLB,,, | Performed by: INTERNAL MEDICINE

## 2020-01-31 PROCEDURE — 1101F PR PT FALLS ASSESS DOC 0-1 FALLS W/OUT INJ PAST YR: ICD-10-PCS | Mod: CPTII,S$GLB,, | Performed by: INTERNAL MEDICINE

## 2020-01-31 PROCEDURE — 3077F SYST BP >= 140 MM HG: CPT | Mod: CPTII,S$GLB,, | Performed by: INTERNAL MEDICINE

## 2020-01-31 PROCEDURE — 1159F MED LIST DOCD IN RCRD: CPT | Mod: S$GLB,,, | Performed by: INTERNAL MEDICINE

## 2020-01-31 PROCEDURE — 1101F PT FALLS ASSESS-DOCD LE1/YR: CPT | Mod: CPTII,S$GLB,, | Performed by: INTERNAL MEDICINE

## 2020-01-31 PROCEDURE — 3078F DIAST BP <80 MM HG: CPT | Mod: CPTII,S$GLB,, | Performed by: INTERNAL MEDICINE

## 2020-01-31 PROCEDURE — 93000 EKG 12-LEAD: ICD-10-PCS | Mod: S$GLB,,, | Performed by: INTERNAL MEDICINE

## 2020-01-31 PROCEDURE — 3078F PR MOST RECENT DIASTOLIC BLOOD PRESSURE < 80 MM HG: ICD-10-PCS | Mod: CPTII,S$GLB,, | Performed by: INTERNAL MEDICINE

## 2020-01-31 PROCEDURE — 1159F PR MEDICATION LIST DOCUMENTED IN MEDICAL RECORD: ICD-10-PCS | Mod: S$GLB,,, | Performed by: INTERNAL MEDICINE

## 2020-01-31 PROCEDURE — 99214 OFFICE O/P EST MOD 30 MIN: CPT | Mod: S$GLB,,, | Performed by: INTERNAL MEDICINE

## 2020-01-31 PROCEDURE — 99999 PR PBB SHADOW E&M-EST. PATIENT-LVL III: CPT | Mod: PBBFAC,,, | Performed by: INTERNAL MEDICINE

## 2020-01-31 PROCEDURE — 99214 PR OFFICE/OUTPT VISIT, EST, LEVL IV, 30-39 MIN: ICD-10-PCS | Mod: S$GLB,,, | Performed by: INTERNAL MEDICINE

## 2020-01-31 PROCEDURE — 99999 PR PBB SHADOW E&M-EST. PATIENT-LVL III: ICD-10-PCS | Mod: PBBFAC,,, | Performed by: INTERNAL MEDICINE

## 2020-01-31 PROCEDURE — 3077F PR MOST RECENT SYSTOLIC BLOOD PRESSURE >= 140 MM HG: ICD-10-PCS | Mod: CPTII,S$GLB,, | Performed by: INTERNAL MEDICINE

## 2020-01-31 PROCEDURE — 1126F PR PAIN SEVERITY QUANTIFIED, NO PAIN PRESENT: ICD-10-PCS | Mod: S$GLB,,, | Performed by: INTERNAL MEDICINE

## 2020-01-31 NOTE — PROGRESS NOTES
CARDIOVASCULAR CONSULTATION    REASON FOR CONSULT:   Juan Martínez Jr. is a 84 y.o. male who presents for follow-up recent hospitalization for a non-STEMI..      HISTORY OF PRESENT ILLNESS:     Notes from August 2019:   Patient is here for follow-up today.  Was recently admitted for hypertensive urgency.  Did not have any chest pains during the hypertensive urgency.  Troponins were found to be mildly elevated.  Was evaluated by Cardiology and Norvasc was restarted.  His blood pressure has been well controlled since then.  Denies any chest pains at rest on exertion, orthopnea, PND, swelling of feet.      Notes from July 2019  Patient is here for follow-up today.  Denies any chest pains at rest on exertion, orthopnea, PND.  Denies any claudication symptoms.  Denies any dyspnea at rest on exertion    Note from clinic visit in May 2019:  Patient is 83-year-old man pleasant man with a past medical history significant for diabetes, dyslipidemia, hypertension, coronary artery disease who recently presented to the hospital with a non-STEMI.  Coronary angiogram performed at that time revealed nonischemic coronary artery disease of the LAD and RCA as well as 80% stenosis in his diagonal 1.  Which is being managed medically.  A week after that he developed some right-sided chest pain, different from his anginal pain which he had presented with his non-STEMI.  He states that right-sided chest pain resolved on its own in less than 20 min, but since this is all new for him he decided to come to the hospital to make sure everything was okay.  He was admitted and serial troponins did not reveal any elevation and he was discharged home.  He has not had any further episodes of chest pains.  Denies orthopnea, PND chest pains at rest or on exertion.  Denies typical claudication pain, has knee pains and atypical leg pain.      Notes from October 2019:  Patient here for follow-up.  States that had a brief episode of right-sided chest  pain which lasted a few seconds to few minutes and then went away.  This was different than the anginal pain he had in the past.  Denies any orthopnea, PND, swelling of feet.  States that he tried telling is  that this he felt was related to his lungs, and an checks x-ray done yesterday.  Denies any recent fevers or chills.    Notes from December 2019:  Patient here for follow-up.  Denies any chest pains at rest on exertion, orthopnea, PND, swelling of feet.  Blood pressure mildly elevated at clinic.  At home states blood pressure stays around 140-145 mm systolic.  I will increase his Toprol-XL to 75 mg daily.    Notes from January 2020:  Patient here because states that yesterday he felt some chest pain.  It was right-sided.  States did not feel like it was from his heart but just wanted to make sure.  He took nitroglycerines and had no response to nitroglycerin.  States that did not feel like his earlier anginal pains.    PAST MEDICAL HISTORY:     Past Medical History:   Diagnosis Date    Diabetes mellitus     High cholesterol     Hypertension     Prostate cancer        PAST SURGICAL HISTORY:     Past Surgical History:   Procedure Laterality Date    APPLICATION OF FULL-THICKNESS SKIN GRAFT (FTSG) TO UPPER EXTREMITY Right 11/15/2018    Procedure: APPLICATION, GRAFT, SKIN, FULL-THICKNESS, TO UPPER EXTREMITY VS STSG WITH FROZEN SECTIONS;  Surgeon: Alan Arzola MD;  Location: [facility] OR;  Service: Plastics;  Laterality: Right;    EXCISION OF SQUAMOUS CELL CARCINOMA Right 11/15/2018    Procedure: EXCISION, CARCINOMA, SQUAMOUS CELL @ RIGHT HAND;  Surgeon: Alan Arzola MD;  Location: [facility] OR;  Service: Plastics;  Laterality: Right;  RN PREOP 11/13/2018--NEED H/P    JOINT REPLACEMENT      LEFT HEART CATHETERIZATION Left 5/6/2019    Procedure: Left heart cath;  Surgeon: Rashad Nieto MD;  Location: [facility] CATH LAB;  Service: Cardiology;  Laterality: Left;    TONSILLECTOMY      TOTAL KNEE ARTHROPLASTY          ALLERGIES AND MEDICATION:   Review of patient's allergies indicates:  No Known Allergies     Medication List           Accurate as of January 31, 2020  8:34 AM. If you have any questions, ask your nurse or doctor.               CONTINUE taking these medications    albuterol 90 mcg/actuation inhaler  Commonly known as:  PROVENTIL/VENTOLIN HFA     Amitiza 24 MCG Cap  Generic drug:  lubiprostone     amLODIPine 10 MG tablet  Commonly known as:  NORVASC  Take 1 tablet (10 mg total) by mouth once daily.     aspirin 81 MG EC tablet  Commonly known as:  ECOTRIN     atorvastatin 40 MG tablet  Commonly known as:  LIPITOR  Take 1 tablet (40 mg total) by mouth once daily.     blood-glucose meter Misc     clopidogrel 75 mg tablet  Commonly known as:  PLAVIX  Take 1 tablet (75 mg total) by mouth once daily.     dicyclomine 10 MG capsule  Commonly known as:  BENTYL     escitalopram oxalate 10 MG tablet  Commonly known as:  LEXAPRO     hydroCHLOROthiazide 12.5 mg capsule  Commonly known as:  MICROZIDE     isosorbide mononitrate 60 MG 24 hr tablet  Commonly known as:  IMDUR  Take 1 tablet (60 mg total) by mouth once daily.     LANCETS & BLOOD GLUCOSE STRIPS MISC     metFORMIN 1000 MG tablet  Commonly known as:  GLUCOPHAGE  Take 1 tablet (1,000 mg total) by mouth 2 (two) times daily with meals.     * metoprolol succinate 50 MG 24 hr tablet  Commonly known as:  TOPROL-XL  Take 1 tablet (50 mg total) by mouth once daily.     * metoprolol succinate 25 MG 24 hr tablet  Commonly known as:  TOPROL-XL  Take 1 tablet (25 mg total) by mouth once daily.     nitroGLYCERIN 0.4 MG SL tablet  Commonly known as:  NITROSTAT  Place 1 tablet (0.4 mg total) under the tongue every 5 (five) minutes as needed for Chest pain.     ondansetron 4 MG tablet  Commonly known as:  ZOFRAN  Take 1 tablet (4 mg total) by mouth every 6 (six) hours as needed for Nausea.         * This list has 2 medication(s) that are the same as other medications prescribed for  you. Read the directions carefully, and ask your doctor or other care provider to review them with you.                SOCIAL HISTORY:     Social History     Socioeconomic History    Marital status:      Spouse name: Not on file    Number of children: Not on file    Years of education: Not on file    Highest education level: Not on file   Occupational History    Not on file   Social Needs    Financial resource strain: Not on file    Food insecurity:     Worry: Not on file     Inability: Not on file    Transportation needs:     Medical: Not on file     Non-medical: Not on file   Tobacco Use    Smoking status: Former Smoker     Packs/day: 0.00     Types: Cigarettes     Last attempt to quit: 8/12/2016     Years since quitting: 3.4    Smokeless tobacco: Never Used    Tobacco comment: 8/12/2016   Substance and Sexual Activity    Alcohol use: No    Drug use: No    Sexual activity: Not Currently   Lifestyle    Physical activity:     Days per week: Not on file     Minutes per session: Not on file    Stress: Not on file   Relationships    Social connections:     Talks on phone: Not on file     Gets together: Not on file     Attends Mu-ism service: Not on file     Active member of club or organization: Not on file     Attends meetings of clubs or organizations: Not on file     Relationship status: Not on file   Other Topics Concern    Not on file   Social History Narrative    Not on file       FAMILY HISTORY:     Family History   Problem Relation Age of Onset    Heart disease Mother     Stroke Father        REVIEW OF SYSTEMS:   Review of Systems   Constitutional: Negative.    HENT: Negative.    Eyes: Negative.    Respiratory: Negative.    Cardiovascular: Negative.    Gastrointestinal: Negative.    Genitourinary: Negative.    Musculoskeletal: Positive for joint pain.   Skin: Negative.    Neurological: Negative.    Endo/Heme/Allergies: Negative.        A 10 point review of systems was performed  "and all the pertinent positives have been mentioned. Rest of review of systems was negative.        PHYSICAL EXAM:     Vitals:    01/31/20 0817   BP: (!) 164/68   Pulse: 67    Body mass index is 28.11 kg/m².  Weight: 83.8 kg (184 lb 13.7 oz)   Height: 5' 8" (172.7 cm)     Physical Exam   Constitutional: He is oriented to person, place, and time. He appears well-developed and well-nourished. He is active.   HENT:   Head: Normocephalic and atraumatic.   Right Ear: Hearing normal.   Left Ear: Hearing normal.   Nose: Nose normal.   Mouth/Throat: Mucous membranes are normal.   Eyes: Pupils are equal, round, and reactive to light. Conjunctivae and lids are normal.   Neck: Normal range of motion. Neck supple.   Cardiovascular: Normal rate, regular rhythm, normal heart sounds, intact distal pulses and normal pulses.   Pulmonary/Chest: Effort normal and breath sounds normal.   Abdominal: Soft. Normal appearance. There is no tenderness.   Musculoskeletal: He exhibits no edema or deformity.   Neurological: He is alert and oriented to person, place, and time.   Skin: Skin is warm, dry and intact.   Psychiatric: He has a normal mood and affect. His speech is normal.   Nursing note and vitals reviewed.        DATA:     Laboratory:  CBC:  Recent Labs   Lab 08/09/19  2310 08/10/19  0740 11/22/19  0830   WBC 6.71 7.50 7.93   Hemoglobin 13.4 L 14.8 15.1   Hematocrit 40.2 44.1 43.7   Platelets 256 275 318       CHEMISTRIES:  Recent Labs   Lab 09/17/17  0220  05/07/19  0610 05/12/19  0140 08/09/19  2310 08/10/19  0740 11/22/19  0830   Glucose 116 H   < > 102 114 H 156 H 139 H 193 H   Sodium 137   < > 138 137 136 137 132 L   Potassium 3.8   < > 4.0 3.5 3.4 L 3.5 3.6   BUN, Bld 17   < > 13 13 15 12 12   Creatinine 1.2   < > 0.9 1.1 1.1 0.9 1.2   eGFR if African American >60   < > >60 >60 >60 >60 >60   eGFR if non  57 A   < > >60 >60 >60 >60 56 A   Calcium 9.2   < > 9.2 8.9 8.7 9.0 9.7   Magnesium 1.9  --  2.0 1.9  --   -- "   --     < > = values in this interval not displayed.       CARDIAC BIOMARKERS:  Recent Labs   Lab 08/10/19  1952 11/22/19  0830 11/22/19  1050   Troponin I 0.253 H 0.013 0.013       COAGS:        LIPIDS/LFTS:  Recent Labs   Lab 05/06/19  1045  08/09/19  2310 08/10/19  0740 11/22/19  0830   Cholesterol 134  --   --   --   --    Triglycerides 80  --   --   --   --    HDL 45  --   --   --   --    LDL Cholesterol 73.0  --   --   --   --    Non-HDL Cholesterol 89  --   --   --   --    AST  --    < > 14 17 22   ALT  --    < > 19 22 22    < > = values in this interval not displayed.       Hemoglobin A1C   Date Value Ref Range Status   05/07/2019 7.0 (H) 4.0 - 5.6 % Final     Comment:     ADA Screening Guidelines:  5.7-6.4%  Consistent with prediabetes  >or=6.5%  Consistent with diabetes  High levels of fetal hemoglobin interfere with the HbA1C  assay. Heterozygous hemoglobin variants (HbS, HgC, etc)do  not significantly interfere with this assay.   However, presence of multiple variants may affect accuracy.     06/19/2018 7.3 (H) 4.0 - 5.6 % Final     Comment:     ADA Screening Guidelines:  5.7-6.4%  Consistent with prediabetes  >or=6.5%  Consistent with diabetes  High levels of fetal hemoglobin interfere with the HbA1C  assay. Heterozygous hemoglobin variants (HbS, HgC, etc)do  not significantly interfere with this assay.   However, presence of multiple variants may affect accuracy.         TSH        The ASCVD Risk score (Mera DC Jr., et al., 2013) failed to calculate for the following reasons:    The 2013 ASCVD risk score is only valid for ages 40 to 79    The patient has a prior MI or stroke diagnosis           Cardiovascular Testing:    EKG: (personally reviewed tracing)  May 2019:  Normal sinus rhythm, left axis deviation.  Abnormal EKG.    2D echocardiogram May 2019:  · Normal left ventricular systolic function. The estimated ejection fraction is 65%  · Normal LV diastolic function.  · Mild left atrial  enlargement.  · Normal central venous pressure (3 mm Hg).  · The estimated PA systolic pressure is 10 mm Hg       Coronary angiogram 6th May 2019:    No acute thrombotic lesion identified.  Coronary artery disease as described below  LVEDP: 12 mmHg    Carotid ultrasound May 28, 2019:    · There is 20-39% right Internal Carotid Stenosis.  · There is 20-39% left Internal Carotid Stenosis.     ABIs May 28, 2019:    Noncompressible LOW bilaterally.  Mildly decrease TBI bilaterally.  Normal PVR waveforms bilaterally.       Coronary Anatomy:  LM:  No significant stenosis  LAD:  Mid LAD 60-70% stenosis.  Distal LAD 50% stenosis.  IFR 0.92 (nonischemic).  Diagonal 1 is a tortuous vessel with mid 80% stenosis.  Will medically manage this diagonal stenosis  LCx :  Mild nonobstructive CAD  RCA:  50% distal RCA/proximal PDA lesion.  IFR 1 (nonischemic)     Impression / Plan  Coronary artery disease as described above.  Continue medical management with aggressive risk factor modification.  Continue aspirin 81 mg daily. Change simvastatin to atorvastatin 80 mg daily.        ASSESSMENT AND PLAN     Patient Active Problem List   Diagnosis    Essential hypertension, benign    Type 2 diabetes mellitus    Hyperlipidemia    Body mass index 28.0-28.9, adult    History of prostate cancer    Orthostatic hypotension    Diabetic ulcer of toe of left foot associated with type 2 diabetes mellitus, with fat layer exposed    SCC (squamous cell carcinoma)    CAD (coronary artery disease)    Unstable angina    Palpitations    Elevated troponin       1.  Patient with coronary artery disease, now angina free.  Being managed medically.  Continue current medical therapy.    2.  Hypertension:  Blood pressure elevated in clinic today, but patient states that he checks his blood pressure at home and usually stays around 120-130 mm Hg.  Continue current management.    3.  Cardiovascular screening with rest and stress ABIs and carotid  ultrasound performed.  ABIs demonstrated noncompressible ABIs bilaterally, mildly decreased ABIs bilaterally with normal PVR waveforms bilaterally.  I got a peripheral ultrasound for further evaluation of his abnormal TBI.   ultrasound did not show any flow restriction in the right or the left lower extremity.    4.  Dyslipidemia:  Continue medical management with atorvastatin    5.  Preoperative clearance for lithotripsy.  Patient here for preop clearance prior for his lithotripsy.  He had angiogram done this year which showed nonobstructive coronary artery disease in his major arteries and tortuous diagonal 1 with some disease.  He has been angina free.  His chest pain is not his anginal pain as per the patient.  No further ischemic workup is needed at the current time.  Patient may proceed for lithotripsy at low to intermediate risk of coronary ischemia.  Continue beta-blockers to keep heart rate around 60 beats per minute pre, zack and postoperative.    6.  Preoperative for EGD:  Patient may undergo EGD at low risk for coronary ischemia.  May hold Plavix as needed for procedures.    7.  Right-sided chest pain which happened yesterday, nonresponsive to nitroglycerin.  Different than his earlier anginal pain during his non-STEMI.  No further cardiac evaluation required at this time.  May proceed for surgery as planned      Thank you very much for involving me in the care of your patient.  Please do not hesitate to contact me if there are any questions.      Rashad Nieto MD, FACC, [facility]  Interventional Cardiologist, [facility].     Follow-up in 3 months      This note was dictated with the help of speech recognition software.  There might be un-intended errors and/or substitutions.

## 2020-03-19 ENCOUNTER — TELEPHONE (OUTPATIENT)
Dept: UROLOGY | Facility: CLINIC | Age: 85
End: 2020-03-19

## 2020-03-19 NOTE — TELEPHONE ENCOUNTER
----- Message from Frandy Nunez sent at 3/19/2020  1:36 PM CDT -----  Contact: Self  Type: Patient Call Back    Who called: Self    What is the request in detail: Patient is requesting a call back to discuss apt. Please advise.    Can the clinic reply by [facility]? No    Would the patient rather a call back or a response via [facility]? Call    Best call back number: [phone]    Additional Information:n/a

## 2020-04-02 RX ORDER — METOPROLOL SUCCINATE 50 MG/1
50 TABLET, EXTENDED RELEASE ORAL DAILY
Qty: 90 TABLET | Refills: 3 | Status: SHIPPED | OUTPATIENT
Start: 2020-04-02 | End: 2021-03-18 | Stop reason: SDUPTHER

## 2020-04-06 ENCOUNTER — TELEPHONE (OUTPATIENT)
Dept: UROLOGY | Facility: CLINIC | Age: 85
End: 2020-04-06

## 2020-04-06 DIAGNOSIS — N20.0 KIDNEY STONES: Primary | ICD-10-CM

## 2020-04-06 DIAGNOSIS — Z85.46 HISTORY OF PROSTATE CANCER: ICD-10-CM

## 2020-04-06 NOTE — TELEPHONE ENCOUNTER
Spoke to pt he states on 02/04/20 he had a kidney stone removed at [facility]. An was told to follow up with  with imaging but do to the corona virus pt would like to hold. Pt is asking when should he come in an what type of imaging will he need. Pt also states he is not having any issues at this time. Please advise-dede

## 2020-04-06 NOTE — TELEPHONE ENCOUNTER
Spoke to pt advised per  pt should follow up in 3 months with ultrasound an psa. Pt understands recall placed in epic-dede

## 2020-04-16 RX ORDER — ISOSORBIDE MONONITRATE 60 MG/1
60 TABLET, EXTENDED RELEASE ORAL DAILY
Qty: 90 TABLET | Refills: 3 | Status: SHIPPED | OUTPATIENT
Start: 2020-04-16 | End: 2021-03-18 | Stop reason: SDUPTHER

## 2020-05-21 ENCOUNTER — OFFICE VISIT (OUTPATIENT)
Dept: CARDIOLOGY | Facility: CLINIC | Age: 85
End: 2020-05-21
Payer: MEDICARE

## 2020-05-21 VITALS
WEIGHT: 183 LBS | OXYGEN SATURATION: 97 % | RESPIRATION RATE: 16 BRPM | DIASTOLIC BLOOD PRESSURE: 62 MMHG | SYSTOLIC BLOOD PRESSURE: 120 MMHG | BODY MASS INDEX: 27.82 KG/M2 | HEART RATE: 69 BPM

## 2020-05-21 DIAGNOSIS — I25.10 CORONARY ARTERY DISEASE INVOLVING NATIVE CORONARY ARTERY OF NATIVE HEART WITHOUT ANGINA PECTORIS: Primary | ICD-10-CM

## 2020-05-21 PROCEDURE — 1159F PR MEDICATION LIST DOCUMENTED IN MEDICAL RECORD: ICD-10-PCS | Mod: S$GLB,,, | Performed by: INTERNAL MEDICINE

## 2020-05-21 PROCEDURE — 3074F SYST BP LT 130 MM HG: CPT | Mod: CPTII,S$GLB,, | Performed by: INTERNAL MEDICINE

## 2020-05-21 PROCEDURE — 1101F PT FALLS ASSESS-DOCD LE1/YR: CPT | Mod: CPTII,S$GLB,, | Performed by: INTERNAL MEDICINE

## 2020-05-21 PROCEDURE — 1159F MED LIST DOCD IN RCRD: CPT | Mod: S$GLB,,, | Performed by: INTERNAL MEDICINE

## 2020-05-21 PROCEDURE — 3078F DIAST BP <80 MM HG: CPT | Mod: CPTII,S$GLB,, | Performed by: INTERNAL MEDICINE

## 2020-05-21 PROCEDURE — 99999 PR PBB SHADOW E&M-EST. PATIENT-LVL III: CPT | Mod: PBBFAC,,, | Performed by: INTERNAL MEDICINE

## 2020-05-21 PROCEDURE — 3078F PR MOST RECENT DIASTOLIC BLOOD PRESSURE < 80 MM HG: ICD-10-PCS | Mod: CPTII,S$GLB,, | Performed by: INTERNAL MEDICINE

## 2020-05-21 PROCEDURE — 3074F PR MOST RECENT SYSTOLIC BLOOD PRESSURE < 130 MM HG: ICD-10-PCS | Mod: CPTII,S$GLB,, | Performed by: INTERNAL MEDICINE

## 2020-05-21 PROCEDURE — 1126F PR PAIN SEVERITY QUANTIFIED, NO PAIN PRESENT: ICD-10-PCS | Mod: S$GLB,,, | Performed by: INTERNAL MEDICINE

## 2020-05-21 PROCEDURE — 99999 PR PBB SHADOW E&M-EST. PATIENT-LVL III: ICD-10-PCS | Mod: PBBFAC,,, | Performed by: INTERNAL MEDICINE

## 2020-05-21 PROCEDURE — 99214 PR OFFICE/OUTPT VISIT, EST, LEVL IV, 30-39 MIN: ICD-10-PCS | Mod: S$GLB,,, | Performed by: INTERNAL MEDICINE

## 2020-05-21 PROCEDURE — 99214 OFFICE O/P EST MOD 30 MIN: CPT | Mod: S$GLB,,, | Performed by: INTERNAL MEDICINE

## 2020-05-21 PROCEDURE — 1126F AMNT PAIN NOTED NONE PRSNT: CPT | Mod: S$GLB,,, | Performed by: INTERNAL MEDICINE

## 2020-05-21 PROCEDURE — 1101F PR PT FALLS ASSESS DOC 0-1 FALLS W/OUT INJ PAST YR: ICD-10-PCS | Mod: CPTII,S$GLB,, | Performed by: INTERNAL MEDICINE

## 2020-05-21 RX ORDER — AMLODIPINE BESYLATE 10 MG/1
10 TABLET ORAL DAILY
Qty: 90 TABLET | Refills: 3 | Status: SHIPPED | OUTPATIENT
Start: 2020-05-21 | End: 2021-04-12 | Stop reason: SDUPTHER

## 2020-05-21 NOTE — LETTER
May 21, 2020    Juan Martínez Jr.  [address]  [place] [address]             [facility] - Cardiology  [address]  [place] [address]  Phone: [phone]   Juan Marítnez Jr. was seen by me on 5/21/2020 and is cleared for EGD with low risk.    If you have any questions or concerns, please don't hesitate to call.    Sincerely,    Rashad Nieto

## 2020-05-21 NOTE — PROGRESS NOTES
CARDIOVASCULAR CONSULTATION    REASON FOR CONSULT:   Juan Martínez Jr. is a 84 y.o. male who presents for follow-up recent hospitalization for a non-STEMI..      HISTORY OF PRESENT ILLNESS:     Notes from August 2019:   Patient is here for follow-up today.  Was recently admitted for hypertensive urgency.  Did not have any chest pains during the hypertensive urgency.  Troponins were found to be mildly elevated.  Was evaluated by Cardiology and Norvasc was restarted.  His blood pressure has been well controlled since then.  Denies any chest pains at rest on exertion, orthopnea, PND, swelling of feet.      Notes from July 2019  Patient is here for follow-up today.  Denies any chest pains at rest on exertion, orthopnea, PND.  Denies any claudication symptoms.  Denies any dyspnea at rest on exertion    Note from clinic visit in May 2019:  Patient is 83-year-old man pleasant man with a past medical history significant for diabetes, dyslipidemia, hypertension, coronary artery disease who recently presented to the hospital with a non-STEMI.  Coronary angiogram performed at that time revealed nonischemic coronary artery disease of the LAD and RCA as well as 80% stenosis in his diagonal 1.  Which is being managed medically.  A week after that he developed some right-sided chest pain, different from his anginal pain which he had presented with his non-STEMI.  He states that right-sided chest pain resolved on its own in less than 20 min, but since this is all new for him he decided to come to the hospital to make sure everything was okay.  He was admitted and serial troponins did not reveal any elevation and he was discharged home.  He has not had any further episodes of chest pains.  Denies orthopnea, PND chest pains at rest or on exertion.  Denies typical claudication pain, has knee pains and atypical leg pain.      Notes from October 2019:  Patient here for follow-up.  States that had a brief episode of right-sided chest  pain which lasted a few seconds to few minutes and then went away.  This was different than the anginal pain he had in the past.  Denies any orthopnea, PND, swelling of feet.  States that he tried telling is  that this he felt was related to his lungs, and an checks x-ray done yesterday.  Denies any recent fevers or chills.    Notes from December 2019:  Patient here for follow-up.  Denies any chest pains at rest on exertion, orthopnea, PND, swelling of feet.  Blood pressure mildly elevated at clinic.  At home states blood pressure stays around 140-145 mm systolic.  I will increase his Toprol-XL to 75 mg daily.    Notes from January 2020:  Patient here because states that yesterday he felt some chest pain.  It was right-sided.  States did not feel like it was from his heart but just wanted to make sure.  He took nitroglycerines and had no response to nitroglycerin.  States that did not feel like his earlier anginal pains.    Notes from May 2020:  Patient here for follow-up did not have any further episodes of chest pain.  Is looking for clearance for EGD.  Denies any orthopnea, PND, swelling of feet.    PAST MEDICAL HISTORY:     Past Medical History:   Diagnosis Date    Diabetes mellitus     High cholesterol     Hypertension     Prostate cancer        PAST SURGICAL HISTORY:     Past Surgical History:   Procedure Laterality Date    APPLICATION OF FULL-THICKNESS SKIN GRAFT (FTSG) TO UPPER EXTREMITY Right 11/15/2018    Procedure: APPLICATION, GRAFT, SKIN, FULL-THICKNESS, TO UPPER EXTREMITY VS STSG WITH FROZEN SECTIONS;  Surgeon: Alan Arzola MD;  Location: [facility] OR;  Service: Plastics;  Laterality: Right;    EXCISION OF SQUAMOUS CELL CARCINOMA Right 11/15/2018    Procedure: EXCISION, CARCINOMA, SQUAMOUS CELL @ RIGHT HAND;  Surgeon: Alan Arzola MD;  Location: [facility] OR;  Service: Plastics;  Laterality: Right;  RN PREOP 11/13/2018--NEED H/P    JOINT REPLACEMENT      LEFT HEART CATHETERIZATION Left  5/6/2019    Procedure: Left heart cath;  Surgeon: Rashad Nieto MD;  Location: [facility] CATH LAB;  Service: Cardiology;  Laterality: Left;    TONSILLECTOMY      TOTAL KNEE ARTHROPLASTY         ALLERGIES AND MEDICATION:   Review of patient's allergies indicates:  No Known Allergies     Medication List           Accurate as of May 21, 2020  8:42 AM. If you have any questions, ask your nurse or doctor.               CONTINUE taking these medications    albuterol 90 mcg/actuation inhaler  Commonly known as:  PROVENTIL/VENTOLIN HFA     AMITIZA 24 MCG Cap  Generic drug:  lubiprostone     amLODIPine 10 MG tablet  Commonly known as:  NORVASC  Take 1 tablet (10 mg total) by mouth once daily.     aspirin 81 MG EC tablet  Commonly known as:  ECOTRIN     atorvastatin 40 MG tablet  Commonly known as:  LIPITOR  Take 1 tablet (40 mg total) by mouth once daily.     blood-glucose meter Misc     clopidogreL 75 mg tablet  Commonly known as:  PLAVIX  Take 1 tablet (75 mg total) by mouth once daily.     dicyclomine 10 MG capsule  Commonly known as:  BENTYL     escitalopram oxalate 10 MG tablet  Commonly known as:  LEXAPRO     hydroCHLOROthiazide 12.5 mg capsule  Commonly known as:  MICROZIDE     isosorbide mononitrate 60 MG 24 hr tablet  Commonly known as:  IMDUR  Take 1 tablet (60 mg total) by mouth once daily.     LANCETS & BLOOD GLUCOSE STRIPS MISC     metFORMIN 1000 MG tablet  Commonly known as:  GLUCOPHAGE  Take 1 tablet (1,000 mg total) by mouth 2 (two) times daily with meals.     metoprolol succinate 50 MG 24 hr tablet  Commonly known as:  TOPROL-XL  Take 1 tablet (50 mg total) by mouth once daily.     nitroGLYCERIN 0.4 MG SL tablet  Commonly known as:  NITROSTAT  Place 1 tablet (0.4 mg total) under the tongue every 5 (five) minutes as needed for Chest pain.     ondansetron 4 MG tablet  Commonly known as:  ZOFRAN  Take 1 tablet (4 mg total) by mouth every 6 (six) hours as needed for Nausea.            SOCIAL HISTORY:     Social  History     Socioeconomic History    Marital status:      Spouse name: Not on file    Number of children: Not on file    Years of education: Not on file    Highest education level: Not on file   Occupational History    Not on file   Social Needs    Financial resource strain: Not on file    Food insecurity:     Worry: Not on file     Inability: Not on file    Transportation needs:     Medical: Not on file     Non-medical: Not on file   Tobacco Use    Smoking status: Former Smoker     Packs/day: 0.00     Types: Cigarettes     Last attempt to quit: 8/12/2016     Years since quitting: 3.7    Smokeless tobacco: Never Used    Tobacco comment: 8/12/2016   Substance and Sexual Activity    Alcohol use: No    Drug use: No    Sexual activity: Not Currently   Lifestyle    Physical activity:     Days per week: Not on file     Minutes per session: Not on file    Stress: Not on file   Relationships    Social connections:     Talks on phone: Not on file     Gets together: Not on file     Attends Caodaism service: Not on file     Active member of club or organization: Not on file     Attends meetings of clubs or organizations: Not on file     Relationship status: Not on file   Other Topics Concern    Not on file   Social History Narrative    Not on file       FAMILY HISTORY:     Family History   Problem Relation Age of Onset    Heart disease Mother     Stroke Father        REVIEW OF SYSTEMS:   Review of Systems   Constitutional: Negative.    HENT: Negative.    Eyes: Negative.    Respiratory: Negative.    Cardiovascular: Negative.    Gastrointestinal: Negative.    Genitourinary: Negative.    Musculoskeletal: Positive for joint pain.   Skin: Negative.    Neurological: Negative.    Endo/Heme/Allergies: Negative.        A 10 point review of systems was performed and all the pertinent positives have been mentioned. Rest of review of systems was negative.        PHYSICAL EXAM:     Vitals:    05/21/20 0825    BP: 120/62   Pulse: 69   Resp: 16    Body mass index is 27.82 kg/m².  Weight: 83 kg (182 lb 15.7 oz)         Physical Exam   Constitutional: He is oriented to person, place, and time. He appears well-developed and well-nourished. He is active.   HENT:   Head: Normocephalic and atraumatic.   Right Ear: Hearing normal.   Left Ear: Hearing normal.   Nose: Nose normal.   Mouth/Throat: Mucous membranes are normal.   Eyes: Pupils are equal, round, and reactive to light. Conjunctivae and lids are normal.   Neck: Normal range of motion. Neck supple.   Cardiovascular: Normal rate, regular rhythm, normal heart sounds, intact distal pulses and normal pulses.   Pulmonary/Chest: Effort normal and breath sounds normal.   Abdominal: Soft. Normal appearance. There is no tenderness.   Musculoskeletal: He exhibits no edema or deformity.   Neurological: He is alert and oriented to person, place, and time.   Skin: Skin is warm, dry and intact.   Psychiatric: He has a normal mood and affect. His speech is normal.   Nursing note and vitals reviewed.        DATA:     Laboratory:  CBC:  Recent Labs   Lab 08/09/19  2310 08/10/19  0740 11/22/19  0830   WBC 6.71 7.50 7.93   Hemoglobin 13.4 L 14.8 15.1   Hematocrit 40.2 44.1 43.7   Platelets 256 275 318       CHEMISTRIES:  Recent Labs   Lab 09/17/17  0220  05/07/19  0610 05/12/19  0140 08/09/19  2310 08/10/19  0740 11/22/19  0830   Glucose 116 H   < > 102 114 H 156 H 139 H 193 H   Sodium 137   < > 138 137 136 137 132 L   Potassium 3.8   < > 4.0 3.5 3.4 L 3.5 3.6   BUN, Bld 17   < > 13 13 15 12 12   Creatinine 1.2   < > 0.9 1.1 1.1 0.9 1.2   eGFR if African American >60   < > >60 >60 >60 >60 >60   eGFR if non  57 A   < > >60 >60 >60 >60 56 A   Calcium 9.2   < > 9.2 8.9 8.7 9.0 9.7   Magnesium 1.9  --  2.0 1.9  --   --   --     < > = values in this interval not displayed.       CARDIAC BIOMARKERS:  Recent Labs   Lab 08/10/19  1952 11/22/19  0830 11/22/19  1050   Troponin I  0.253 H 0.013 0.013       COAGS:        LIPIDS/LFTS:  Recent Labs   Lab 05/06/19  1045  08/09/19  2310 08/10/19  0740 11/22/19  0830   Cholesterol 134  --   --   --   --    Triglycerides 80  --   --   --   --    HDL 45  --   --   --   --    LDL Cholesterol 73.0  --   --   --   --    Non-HDL Cholesterol 89  --   --   --   --    AST  --    < > 14 17 22   ALT  --    < > 19 22 22    < > = values in this interval not displayed.       Hemoglobin A1C   Date Value Ref Range Status   05/07/2019 7.0 (H) 4.0 - 5.6 % Final     Comment:     ADA Screening Guidelines:  5.7-6.4%  Consistent with prediabetes  >or=6.5%  Consistent with diabetes  High levels of fetal hemoglobin interfere with the HbA1C  assay. Heterozygous hemoglobin variants (HbS, HgC, etc)do  not significantly interfere with this assay.   However, presence of multiple variants may affect accuracy.     06/19/2018 7.3 (H) 4.0 - 5.6 % Final     Comment:     ADA Screening Guidelines:  5.7-6.4%  Consistent with prediabetes  >or=6.5%  Consistent with diabetes  High levels of fetal hemoglobin interfere with the HbA1C  assay. Heterozygous hemoglobin variants (HbS, HgC, etc)do  not significantly interfere with this assay.   However, presence of multiple variants may affect accuracy.       Hemoglobin A1c   Date Value Ref Range Status   02/12/2020 6.5 (H) <5.7 % of total Hgb Final     Comment:     For someone without known diabetes, a hemoglobin A1c  value of 6.5% or greater indicates that they may have   diabetes and this should be confirmed with a follow-up   test.    For someone with known diabetes, a value <7% indicates   that their diabetes is well controlled and a value   greater than or equal to 7% indicates suboptimal   control. A1c targets should be individualized based on   duration of diabetes, age, comorbid conditions, and   other considerations.    Currently, no consensus exists regarding use of  hemoglobin A1c for diagnosis of diabetes for children.            TSH        The ASCVD Risk score ([place] JASIEL Jr., et al., 2013) failed to calculate for the following reasons:    The 2013 ASCVD risk score is only valid for ages 40 to 79           Cardiovascular Testing:    EKG: (personally reviewed tracing)  May 2019:  Normal sinus rhythm, left axis deviation.  Abnormal EKG.    2D echocardiogram May 2019:  · Normal left ventricular systolic function. The estimated ejection fraction is 65%  · Normal LV diastolic function.  · Mild left atrial enlargement.  · Normal central venous pressure (3 mm Hg).  · The estimated PA systolic pressure is 10 mm Hg       Coronary angiogram 6th May 2019:    No acute thrombotic lesion identified.  Coronary artery disease as described below  LVEDP: 12 mmHg    Carotid ultrasound May 28, 2019:    · There is 20-39% right Internal Carotid Stenosis.  · There is 20-39% left Internal Carotid Stenosis.     ABIs May 28, 2019:    Noncompressible LOW bilaterally.  Mildly decrease TBI bilaterally.  Normal PVR waveforms bilaterally.       Coronary Anatomy:  LM:  No significant stenosis  LAD:  Mid LAD 60-70% stenosis.  Distal LAD 50% stenosis.  IFR 0.92 (nonischemic).  Diagonal 1 is a tortuous vessel with mid 80% stenosis.  Will medically manage this diagonal stenosis  LCx :  Mild nonobstructive CAD  RCA:  50% distal RCA/proximal PDA lesion.  IFR 1 (nonischemic)     Impression / Plan  Coronary artery disease as described above.  Continue medical management with aggressive risk factor modification.  Continue aspirin 81 mg daily. Change simvastatin to atorvastatin 80 mg daily.        ASSESSMENT AND PLAN     Patient Active Problem List   Diagnosis    Essential hypertension, benign    Type 2 diabetes mellitus    Hyperlipidemia    Body mass index 28.0-28.9, adult    History of prostate cancer    Orthostatic hypotension    Diabetic ulcer of toe of left foot associated with type 2 diabetes mellitus, with fat layer exposed    SCC (squamous cell carcinoma)    CAD  (coronary artery disease)    Unstable angina    Palpitations    Elevated troponin       1.  Patient with coronary artery disease, now angina free.  Being managed medically.  Continue current medical therapy.    2.  Hypertension:  Well controlled on current therapy    3.  Cardiovascular screening with rest and stress ABIs and carotid ultrasound performed.  ABIs demonstrated noncompressible ABIs bilaterally, mildly decreased ABIs bilaterally with normal PVR waveforms bilaterally.  I got a peripheral ultrasound for further evaluation of his abnormal TBI.   ultrasound did not show any flow restriction in the right or the left lower extremity.    4.  Dyslipidemia:  Continue medical management with atorvastatin    6.  Preoperative for EGD:  Patient may undergo EGD at low risk for coronary ischemia.  May hold Plavix as needed for procedures.      Thank you very much for involving me in the care of your patient.  Please do not hesitate to contact me if there are any questions.      Rashad Nieto MD, FACC, [facility]  Interventional Cardiologist, [facility].     Follow-up in 3 months      This note was dictated with the help of speech recognition software.  There might be un-intended errors and/or substitutions.

## 2020-05-25 ENCOUNTER — TELEPHONE (OUTPATIENT)
Dept: UROLOGY | Facility: CLINIC | Age: 85
End: 2020-05-25

## 2020-05-25 NOTE — TELEPHONE ENCOUNTER
----- Message from Paris Nunez sent at 5/25/2020  8:21 AM CDT -----  Type: Patient Call Back    Who called: Self     What is the request in detail:patient states the office cancelled his PSA  Labs, visit and xray. Please call to r/s     Can the clinic reply by [facility]? no     Would the patient rather a call back or a response via [facility]?  Call     Best call back number: [phone]

## 2020-06-10 ENCOUNTER — HOSPITAL ENCOUNTER (OUTPATIENT)
Dept: RADIOLOGY | Facility: HOSPITAL | Age: 85
Discharge: HOME | End: 2020-06-10
Attending: UROLOGY
Payer: MEDICARE

## 2020-06-10 DIAGNOSIS — N20.0 KIDNEY STONES: ICD-10-CM

## 2020-06-10 PROCEDURE — 76770 US EXAM ABDO BACK WALL COMP: CPT | Mod: 26,,, | Performed by: RADIOLOGY

## 2020-06-10 PROCEDURE — 76770 US RETROPERITONEAL COMPLETE: ICD-10-PCS | Mod: 26,,, | Performed by: RADIOLOGY

## 2020-06-10 PROCEDURE — 76770 US EXAM ABDO BACK WALL COMP: CPT | Mod: TC

## 2020-07-09 NOTE — TELEPHONE ENCOUNTER
----- Message from Opal Beck sent at 7/9/2020  9:30 AM CDT -----  Regarding: Refill on meds  3 Refills supply  on Atrorvastatin 40mg  sent to the [employer] in [facility]

## 2020-07-10 RX ORDER — ATORVASTATIN CALCIUM 40 MG/1
40 TABLET, FILM COATED ORAL DAILY
Qty: 90 TABLET | Refills: 3 | Status: SHIPPED | OUTPATIENT
Start: 2020-07-10 | End: 2021-06-07 | Stop reason: SDUPTHER

## 2020-07-24 ENCOUNTER — LAB VISIT (OUTPATIENT)
Dept: PRIMARY CARE CLINIC | Facility: OTHER | Age: 85
End: 2020-07-24
Attending: INTERNAL MEDICINE
Payer: MEDICARE

## 2020-07-24 DIAGNOSIS — Z11.59 SCREENING FOR VIRAL DISEASE: ICD-10-CM

## 2020-07-24 PROCEDURE — U0003 INFECTIOUS AGENT DETECTION BY NUCLEIC ACID (DNA OR RNA); SEVERE ACUTE RESPIRATORY SYNDROME CORONAVIRUS 2 (SARS-COV-2) (CORONAVIRUS DISEASE [COVID-19]), AMPLIFIED PROBE TECHNIQUE, MAKING USE OF HIGH THROUGHPUT TECHNOLOGIES AS DESCRIBED BY CMS-2020-01-R: HCPCS

## 2020-07-27 LAB — SARS-COV-2 RNA RESP QL NAA+PROBE: NEGATIVE

## 2020-07-28 ENCOUNTER — OFFICE VISIT (OUTPATIENT)
Dept: UROLOGY | Facility: CLINIC | Age: 85
End: 2020-07-28
Payer: MEDICARE

## 2020-07-28 VITALS — BODY MASS INDEX: 27.58 KG/M2 | HEIGHT: 68 IN | WEIGHT: 182 LBS

## 2020-07-28 DIAGNOSIS — N52.9 ED (ERECTILE DYSFUNCTION) OF ORGANIC ORIGIN: ICD-10-CM

## 2020-07-28 DIAGNOSIS — R35.1 NOCTURIA MORE THAN TWICE PER NIGHT: ICD-10-CM

## 2020-07-28 DIAGNOSIS — Z85.46 HISTORY OF PROSTATE CANCER: Primary | ICD-10-CM

## 2020-07-28 DIAGNOSIS — N20.0 KIDNEY STONES: ICD-10-CM

## 2020-07-28 LAB
BILIRUB SERPL-MCNC: NORMAL MG/DL
BLOOD URINE, POC: NORMAL
COLOR, POC UA: YELLOW
GLUCOSE UR QL STRIP: POSITIVE
KETONES UR QL STRIP: NORMAL
LEUKOCYTE ESTERASE URINE, POC: NORMAL
NITRITE, POC UA: NORMAL
PH, POC UA: 5
PROTEIN, POC: NORMAL
SPECIFIC GRAVITY, POC UA: 1020
UROBILINOGEN, POC UA: NORMAL

## 2020-07-28 PROCEDURE — 99214 PR OFFICE/OUTPT VISIT, EST, LEVL IV, 30-39 MIN: ICD-10-PCS | Mod: 25,S$GLB,, | Performed by: UROLOGY

## 2020-07-28 PROCEDURE — 1159F PR MEDICATION LIST DOCUMENTED IN MEDICAL RECORD: ICD-10-PCS | Mod: S$GLB,,, | Performed by: UROLOGY

## 2020-07-28 PROCEDURE — 99999 PR PBB SHADOW E&M-EST. PATIENT-LVL IV: ICD-10-PCS | Mod: PBBFAC,,, | Performed by: UROLOGY

## 2020-07-28 PROCEDURE — 81001 URINALYSIS AUTO W/SCOPE: CPT | Mod: S$GLB,,, | Performed by: UROLOGY

## 2020-07-28 PROCEDURE — 99214 OFFICE O/P EST MOD 30 MIN: CPT | Mod: 25,S$GLB,, | Performed by: UROLOGY

## 2020-07-28 PROCEDURE — 1101F PR PT FALLS ASSESS DOC 0-1 FALLS W/OUT INJ PAST YR: ICD-10-PCS | Mod: CPTII,S$GLB,, | Performed by: UROLOGY

## 2020-07-28 PROCEDURE — 1101F PT FALLS ASSESS-DOCD LE1/YR: CPT | Mod: CPTII,S$GLB,, | Performed by: UROLOGY

## 2020-07-28 PROCEDURE — 99999 PR PBB SHADOW E&M-EST. PATIENT-LVL IV: CPT | Mod: PBBFAC,,, | Performed by: UROLOGY

## 2020-07-28 PROCEDURE — 1126F AMNT PAIN NOTED NONE PRSNT: CPT | Mod: S$GLB,,, | Performed by: UROLOGY

## 2020-07-28 PROCEDURE — 1159F MED LIST DOCD IN RCRD: CPT | Mod: S$GLB,,, | Performed by: UROLOGY

## 2020-07-28 PROCEDURE — 1126F PR PAIN SEVERITY QUANTIFIED, NO PAIN PRESENT: ICD-10-PCS | Mod: S$GLB,,, | Performed by: UROLOGY

## 2020-07-28 PROCEDURE — 81001 POCT URINALYSIS, DIPSTICK OR TABLET REAGENT, AUTOMATED, WITH MICROSCOP: ICD-10-PCS | Mod: S$GLB,,, | Performed by: UROLOGY

## 2020-07-28 NOTE — PROGRESS NOTES
Subjective:       Patient ID: Juan Martínez Jr. is a 84 y.o. male who was last seen in this office Visit date not found    Chief Complaint:   Chief Complaint   Patient presents with    Follow-up     3 month follow up with US and PSA       History of Present Illness  Prostate Cancer  He underwent brachytherapy in 2003 by Dr. Brown.  He voids with a good stream.  He has nocturia x 1.  He denies hematuria, dysuria, pain, weight loss, or UTI.  He has ED but does not want treatment.    Kidney Stone  He went to [facility] since his last visit with kidney stones.  They have been removed.  He is back here with an ultrasound.    ACTIVE MEDICAL ISSUES:  Patient Active Problem List   Diagnosis    Essential hypertension, benign    Type 2 diabetes mellitus    Hyperlipidemia    Body mass index 28.0-28.9, adult    History of prostate cancer    Orthostatic hypotension    Diabetic ulcer of toe of left foot associated with type 2 diabetes mellitus, with fat layer exposed    SCC (squamous cell carcinoma)    CAD (coronary artery disease)    Unstable angina    Palpitations    Elevated troponin       ALLERGIES AND MEDICATIONS: updated and reviewed.  Review of patient's allergies indicates:  No Known Allergies  Current Outpatient Medications   Medication Sig    albuterol (PROVENTIL/VENTOLIN HFA) 90 mcg/actuation inhaler Inhale 2 puffs into the lungs.    amLODIPine (NORVASC) 10 MG tablet Take 1 tablet (10 mg total) by mouth once daily.    aspirin (ECOTRIN) 81 MG EC tablet Take 81 mg by mouth once daily.    atorvastatin (LIPITOR) 40 MG tablet Take 1 tablet (40 mg total) by mouth once daily.    blood-glucose meter Misc by Misc.(Non-Drug; Combo Route) route    clopidogrel (PLAVIX) 75 mg tablet Take 1 tablet (75 mg total) by mouth once daily.    dicyclomine (BENTYL) 10 MG capsule Take 10 mg by mouth.    hydroCHLOROthiazide (MICROZIDE) 12.5 mg capsule     isosorbide mononitrate (IMDUR) 60 MG 24 hr tablet Take 1 tablet (60  "mg total) by mouth once daily.    LANCETS & BLOOD GLUCOSE STRIPS MISC by Misc.(Non-Drug; Combo Route) route Check blood sugars  Twice a day.    metformin (GLUCOPHAGE) 1000 MG tablet Take 1 tablet (1,000 mg total) by mouth 2 (two) times daily with meals.    metoprolol succinate (TOPROL-XL) 50 MG 24 hr tablet Take 1 tablet (50 mg total) by mouth once daily.    ondansetron (ZOFRAN) 4 MG tablet Take 1 tablet (4 mg total) by mouth every 6 (six) hours as needed for Nausea.    AMITIZA 24 mcg Cap 24 mcg 2 (two) times daily with meals.     escitalopram oxalate (LEXAPRO) 10 MG tablet Take 10 mg by mouth.    nitroGLYCERIN (NITROSTAT) 0.4 MG SL tablet Place 1 tablet (0.4 mg total) under the tongue every 5 (five) minutes as needed for Chest pain.     No current facility-administered medications for this visit.        Review of Systems   Constitutional: Negative for activity change, fatigue, fever and unexpected weight change.   HENT: Negative for congestion.    Eyes: Negative for redness.   Respiratory: Negative for chest tightness and shortness of breath.    Cardiovascular: Negative for chest pain and leg swelling.   Gastrointestinal: Negative for abdominal pain, constipation, diarrhea, nausea and vomiting.   Genitourinary: Negative for dysuria, flank pain, frequency, hematuria, penile pain, penile swelling, scrotal swelling, testicular pain and urgency.   Musculoskeletal: Negative for arthralgias and back pain.   Neurological: Negative for dizziness and light-headedness.   Psychiatric/Behavioral: Negative for behavioral problems and confusion. The patient is not nervous/anxious.    All other systems reviewed and are negative.      Objective:      Vitals:    07/28/20 1546   Weight: 82.6 kg (182 lb)   Height: 5' 8" (1.727 m)     Physical Exam   Nursing note and vitals reviewed.  Constitutional: He is oriented to person, place, and time. He appears well-developed.   HENT:   Head: Normocephalic.   Eyes: Conjunctivae are " normal.   Neck: Normal range of motion. Neck supple. No tracheal deviation present. No thyromegaly present.   Cardiovascular: Normal rate and normal heart sounds.    Pulmonary/Chest: Effort normal and breath sounds normal. No respiratory distress. He has no wheezes.   Abdominal: Soft. Bowel sounds are normal. There is no abdominal tenderness. There is no rebound. No hernia.   Musculoskeletal: Normal range of motion. No tenderness.   Lymphadenopathy:     He has no cervical adenopathy.   Neurological: He is alert and oriented to person, place, and time.   Skin: Skin is warm and dry. No rash noted. No erythema.     Psychiatric: His behavior is normal. Judgment and thought content normal.       Urine dipstick shows negative for all components.  Micro exam: negative for WBC's or RBC's.    PSA DIAGNOSTIC 0.00 - 4.00 ng/mL <0.01    Comment: PSA Expected levels:   Hormonal Therapy: <0.05 ng/ml   Prostatectomy: <0.01 ng/ml   Radiation Therapy: <1.00 ng/ml    Resulting Agency  [facility]      Narrative  Performed by: [facility]  PSA 3 months      Specimen Collected: 06/10/20 08:24   Last Resulted: 06/10/20 14:44        US Retroperitoneal Complete (Kidney and  Order: [id number]  Status:  Final result   Visible to patient:  No (not released)   Next appt:  None   Dx:  Kidney stones  Details    Reading Physician Reading Date Result Priority   Jenn Romero MD  [phone]  [phone] 6/10/2020 Routine      Narrative & Impression     EXAMINATION:  US RETROPERITONEAL COMPLETE     CLINICAL HISTORY:  Calculus of kidney     TECHNIQUE:  Ultrasound of the kidneys and urinary bladder was performed including color flow and Doppler evaluation of the kidneys.     COMPARISON:  None.     FINDINGS:  Right kidney: The right kidney measures 9.9 cm. No cortical thinning. No loss of corticomedullary distinction. Resistive index measures 0.7.  A there are cysts seen within the right kidney the largest of which measures 8 mm.  No renal stone. No  hydronephrosis.     Left kidney: The left kidney measures 10.3 cm. No cortical thinning. No loss of corticomedullary distinction. Resistive index measures 0.8.  No mass. No renal stone. No hydronephrosis.     The postvoid residual is 61 mL.     Impression:     The postvoid residual is 61 mL.        Electronically signed by: Jenn Romero MD  Date:                                            06/10/2020  Time:                                           10:11            Last Resulted: 06/10/20 10:11               Assessment:       1. History of prostate cancer    2. Kidney stones    3. Nocturia more than twice per night    4. ED (erectile dysfunction) of organic origin          Plan:       1. History of prostate cancer  Doing well  - Prostate Specific Antigen, Diagnostic; Future    2. Kidney stones  S/p ureteroscopy  - US Retroperitoneal Complete (Kidney and; Future    3. Nocturia more than twice per night  Limit evening fluids  - POCT urinalysis, dipstick or tablet reag    4. ED (erectile dysfunction) of organic origin  Viagra when needed            Follow up in about 1 year (around 7/28/2021) for Follow up, Review PSA, Review X-ray.

## 2020-08-10 NOTE — TELEPHONE ENCOUNTER
----- Message from Opal Beck sent at 8/10/2020  8:06 AM CDT -----  Juan Martínez MRN [id number] need the following medication refilled:    Clopidogrel (plavix) 75mg     Sent to [facility] in [place]

## 2020-08-11 RX ORDER — CLOPIDOGREL BISULFATE 75 MG/1
75 TABLET ORAL DAILY
Qty: 30 TABLET | Refills: 11 | Status: SHIPPED | OUTPATIENT
Start: 2020-08-11 | End: 2021-06-29 | Stop reason: SDUPTHER

## 2020-09-28 ENCOUNTER — OFFICE VISIT (OUTPATIENT)
Dept: CARDIOLOGY | Facility: CLINIC | Age: 85
End: 2020-09-28
Payer: MEDICARE

## 2020-09-28 VITALS
HEART RATE: 63 BPM | SYSTOLIC BLOOD PRESSURE: 136 MMHG | OXYGEN SATURATION: 95 % | DIASTOLIC BLOOD PRESSURE: 70 MMHG | BODY MASS INDEX: 29.7 KG/M2 | WEIGHT: 196 LBS | HEIGHT: 68 IN

## 2020-09-28 DIAGNOSIS — E11.9 TYPE 2 DIABETES MELLITUS WITHOUT COMPLICATION, WITHOUT LONG-TERM CURRENT USE OF INSULIN: ICD-10-CM

## 2020-09-28 DIAGNOSIS — R94.31 ABNORMAL EKG: ICD-10-CM

## 2020-09-28 DIAGNOSIS — I10 ESSENTIAL HYPERTENSION, BENIGN: Primary | ICD-10-CM

## 2020-09-28 DIAGNOSIS — C44.92 SCC (SQUAMOUS CELL CARCINOMA): ICD-10-CM

## 2020-09-28 DIAGNOSIS — R06.02 SOB (SHORTNESS OF BREATH): ICD-10-CM

## 2020-09-28 DIAGNOSIS — L97.522 DIABETIC ULCER OF TOE OF LEFT FOOT ASSOCIATED WITH TYPE 2 DIABETES MELLITUS, WITH FAT LAYER EXPOSED: ICD-10-CM

## 2020-09-28 DIAGNOSIS — R00.2 PALPITATIONS: ICD-10-CM

## 2020-09-28 DIAGNOSIS — E78.2 MIXED HYPERLIPIDEMIA: ICD-10-CM

## 2020-09-28 DIAGNOSIS — I25.10 CORONARY ARTERY DISEASE INVOLVING NATIVE CORONARY ARTERY OF NATIVE HEART WITHOUT ANGINA PECTORIS: ICD-10-CM

## 2020-09-28 DIAGNOSIS — E11.621 DIABETIC ULCER OF TOE OF LEFT FOOT ASSOCIATED WITH TYPE 2 DIABETES MELLITUS, WITH FAT LAYER EXPOSED: ICD-10-CM

## 2020-09-28 PROCEDURE — 93000 ELECTROCARDIOGRAM COMPLETE: CPT | Mod: S$GLB,,, | Performed by: INTERNAL MEDICINE

## 2020-09-28 PROCEDURE — 1126F AMNT PAIN NOTED NONE PRSNT: CPT | Mod: S$GLB,,, | Performed by: INTERNAL MEDICINE

## 2020-09-28 PROCEDURE — 3075F PR MOST RECENT SYSTOLIC BLOOD PRESS GE 130-139MM HG: ICD-10-PCS | Mod: CPTII,S$GLB,, | Performed by: INTERNAL MEDICINE

## 2020-09-28 PROCEDURE — 1159F MED LIST DOCD IN RCRD: CPT | Mod: S$GLB,,, | Performed by: INTERNAL MEDICINE

## 2020-09-28 PROCEDURE — 3075F SYST BP GE 130 - 139MM HG: CPT | Mod: CPTII,S$GLB,, | Performed by: INTERNAL MEDICINE

## 2020-09-28 PROCEDURE — 99999 PR PBB SHADOW E&M-EST. PATIENT-LVL IV: CPT | Mod: PBBFAC,,, | Performed by: INTERNAL MEDICINE

## 2020-09-28 PROCEDURE — 99214 OFFICE O/P EST MOD 30 MIN: CPT | Mod: S$GLB,,, | Performed by: INTERNAL MEDICINE

## 2020-09-28 PROCEDURE — 1126F PR PAIN SEVERITY QUANTIFIED, NO PAIN PRESENT: ICD-10-PCS | Mod: S$GLB,,, | Performed by: INTERNAL MEDICINE

## 2020-09-28 PROCEDURE — 3078F DIAST BP <80 MM HG: CPT | Mod: CPTII,S$GLB,, | Performed by: INTERNAL MEDICINE

## 2020-09-28 PROCEDURE — 1101F PR PT FALLS ASSESS DOC 0-1 FALLS W/OUT INJ PAST YR: ICD-10-PCS | Mod: CPTII,S$GLB,, | Performed by: INTERNAL MEDICINE

## 2020-09-28 PROCEDURE — 99214 PR OFFICE/OUTPT VISIT, EST, LEVL IV, 30-39 MIN: ICD-10-PCS | Mod: S$GLB,,, | Performed by: INTERNAL MEDICINE

## 2020-09-28 PROCEDURE — 93000 EKG 12-LEAD: ICD-10-PCS | Mod: S$GLB,,, | Performed by: INTERNAL MEDICINE

## 2020-09-28 PROCEDURE — 1159F PR MEDICATION LIST DOCUMENTED IN MEDICAL RECORD: ICD-10-PCS | Mod: S$GLB,,, | Performed by: INTERNAL MEDICINE

## 2020-09-28 PROCEDURE — 99999 PR PBB SHADOW E&M-EST. PATIENT-LVL IV: ICD-10-PCS | Mod: PBBFAC,,, | Performed by: INTERNAL MEDICINE

## 2020-09-28 PROCEDURE — 1101F PT FALLS ASSESS-DOCD LE1/YR: CPT | Mod: CPTII,S$GLB,, | Performed by: INTERNAL MEDICINE

## 2020-09-28 PROCEDURE — 3078F PR MOST RECENT DIASTOLIC BLOOD PRESSURE < 80 MM HG: ICD-10-PCS | Mod: CPTII,S$GLB,, | Performed by: INTERNAL MEDICINE

## 2020-09-28 NOTE — PROGRESS NOTES
CARDIOVASCULAR CONSULTATION    REASON FOR CONSULT:   Juan Martínez Jr. is a 85 y.o. male who presents for follow-up recent hospitalization for a non-STEMI..      HISTORY OF PRESENT ILLNESS:     Notes from August 2019:   Patient is here for follow-up today.  Was recently admitted for hypertensive urgency.  Did not have any chest pains during the hypertensive urgency.  Troponins were found to be mildly elevated.  Was evaluated by Cardiology and Norvasc was restarted.  His blood pressure has been well controlled since then.  Denies any chest pains at rest on exertion, orthopnea, PND, swelling of feet.      Notes from July 2019  Patient is here for follow-up today.  Denies any chest pains at rest on exertion, orthopnea, PND.  Denies any claudication symptoms.  Denies any dyspnea at rest on exertion    Note from clinic visit in May 2019:  Patient is 83-year-old man pleasant man with a past medical history significant for diabetes, dyslipidemia, hypertension, coronary artery disease who recently presented to the hospital with a non-STEMI.  Coronary angiogram performed at that time revealed nonischemic coronary artery disease of the LAD and RCA as well as 80% stenosis in his diagonal 1.  Which is being managed medically.  A week after that he developed some right-sided chest pain, different from his anginal pain which he had presented with his non-STEMI.  He states that right-sided chest pain resolved on its own in less than 20 min, but since this is all new for him he decided to come to the hospital to make sure everything was okay.  He was admitted and serial troponins did not reveal any elevation and he was discharged home.  He has not had any further episodes of chest pains.  Denies orthopnea, PND chest pains at rest or on exertion.  Denies typical claudication pain, has knee pains and atypical leg pain.      Notes from October 2019:  Patient here for follow-up.  States that had a brief episode of right-sided  chest pain which lasted a few seconds to few minutes and then went away.  This was different than the anginal pain he had in the past.  Denies any orthopnea, PND, swelling of feet.  States that he tried telling is  that this he felt was related to his lungs, and an checks x-ray done yesterday.  Denies any recent fevers or chills.    Notes from December 2019:  Patient here for follow-up.  Denies any chest pains at rest on exertion, orthopnea, PND, swelling of feet.  Blood pressure mildly elevated at clinic.  At home states blood pressure stays around 140-145 mm systolic.  I will increase his Toprol-XL to 75 mg daily.    Notes from January 2020:  Patient here because states that yesterday he felt some chest pain.  It was right-sided.  States did not feel like it was from his heart but just wanted to make sure.  He took nitroglycerines and had no response to nitroglycerin.  States that did not feel like his earlier anginal pains.    Notes from May 2020:  Patient here for follow-up did not have any further episodes of chest pain.  Is looking for clearance for EGD.  Denies any orthopnea, PND, swelling of feet.    Notes from September 2020:  Patient here for follow-up.  Denies any anginal sounding chest pains, orthopnea, PND.  States he had an episode last Friday where he took a deep breath and felt a sharp pain which lasted 1 sec and then went away.  Denies any anginal sounding chest pains on exertion.  Denies any orthopnea, PND, swelling of feet.  EKG done in the clinic today was personally reviewed and demonstrated normal sinus rhythm, left axis deviation.  Abnormal EKG.    PAST MEDICAL HISTORY:     Past Medical History:   Diagnosis Date    Diabetes mellitus     High cholesterol     Hypertension     Prostate cancer        PAST SURGICAL HISTORY:     Past Surgical History:   Procedure Laterality Date    APPLICATION OF FULL-THICKNESS SKIN GRAFT (FTSG) TO UPPER EXTREMITY Right 11/15/2018    Procedure: APPLICATION,  GRAFT, SKIN, FULL-THICKNESS, TO UPPER EXTREMITY VS STSG WITH FROZEN SECTIONS;  Surgeon: Alan Arzola MD;  Location: [facility] OR;  Service: Plastics;  Laterality: Right;    EXCISION OF SQUAMOUS CELL CARCINOMA Right 11/15/2018    Procedure: EXCISION, CARCINOMA, SQUAMOUS CELL @ RIGHT HAND;  Surgeon: Alan Arzola MD;  Location: [facility] OR;  Service: Plastics;  Laterality: Right;  RN PREOP 11/13/2018--NEED H/P    JOINT REPLACEMENT      LEFT HEART CATHETERIZATION Left 5/6/2019    Procedure: Left heart cath;  Surgeon: Rashad Nieto MD;  Location: [facility] CATH LAB;  Service: Cardiology;  Laterality: Left;    TONSILLECTOMY      TOTAL KNEE ARTHROPLASTY         ALLERGIES AND MEDICATION:   Review of patient's allergies indicates:  No Known Allergies     Medication List          Accurate as of September 28, 2020 12:24 PM. If you have any questions, ask your nurse or doctor.            CONTINUE taking these medications    albuterol 90 mcg/actuation inhaler  Commonly known as: PROVENTIL/VENTOLIN HFA     AMITIZA 24 MCG Cap  Generic drug: lubiprostone     amLODIPine 10 MG tablet  Commonly known as: NORVASC  Take 1 tablet (10 mg total) by mouth once daily.     aspirin 81 MG EC tablet  Commonly known as: ECOTRIN     atorvastatin 40 MG tablet  Commonly known as: LIPITOR  Take 1 tablet (40 mg total) by mouth once daily.     blood-glucose meter Misc     clopidogreL 75 mg tablet  Commonly known as: PLAVIX  Take 1 tablet (75 mg total) by mouth once daily.     dicyclomine 10 MG capsule  Commonly known as: BENTYL     escitalopram oxalate 10 MG tablet  Commonly known as: LEXAPRO     hydroCHLOROthiazide 12.5 mg capsule  Commonly known as: MICROZIDE     isosorbide mononitrate 60 MG 24 hr tablet  Commonly known as: IMDUR  Take 1 tablet (60 mg total) by mouth once daily.     LANCETS & BLOOD GLUCOSE STRIPS MISC     metFORMIN 1000 MG tablet  Commonly known as: GLUCOPHAGE  Take 1 tablet (1,000 mg total) by mouth 2 (two) times daily with  meals.     metoprolol succinate 50 MG 24 hr tablet  Commonly known as: TOPROL-XL  Take 1 tablet (50 mg total) by mouth once daily.     nitroGLYCERIN 0.4 MG SL tablet  Commonly known as: NITROSTAT  Place 1 tablet (0.4 mg total) under the tongue every 5 (five) minutes as needed for Chest pain.     ondansetron 4 MG tablet  Commonly known as: ZOFRAN  Take 1 tablet (4 mg total) by mouth every 6 (six) hours as needed for Nausea.            SOCIAL HISTORY:     Social History     Socioeconomic History    Marital status:      Spouse name: Not on file    Number of children: Not on file    Years of education: Not on file    Highest education level: Not on file   Occupational History    Not on file   Social Needs    Financial resource strain: Not on file    Food insecurity     Worry: Not on file     Inability: Not on file    Transportation needs     Medical: Not on file     Non-medical: Not on file   Tobacco Use    Smoking status: Former Smoker     Packs/day: 0.00     Types: Cigarettes     Quit date: 2016     Years since quittin.1    Smokeless tobacco: Never Used    Tobacco comment: 2016   Substance and Sexual Activity    Alcohol use: No    Drug use: No    Sexual activity: Not Currently   Lifestyle    Physical activity     Days per week: Not on file     Minutes per session: Not on file    Stress: Not on file   Relationships    Social connections     Talks on phone: Not on file     Gets together: Not on file     Attends Restorationism service: Not on file     Active member of club or organization: Not on file     Attends meetings of clubs or organizations: Not on file     Relationship status: Not on file   Other Topics Concern    Not on file   Social History Narrative    Not on file       FAMILY HISTORY:     Family History   Problem Relation Age of Onset    Heart disease Mother     Stroke Father        REVIEW OF SYSTEMS:   Review of Systems   Constitutional: Negative.    HENT: Negative.   "  Eyes: Negative.    Respiratory: Negative.    Cardiovascular: Negative.    Gastrointestinal: Negative.    Genitourinary: Negative.    Musculoskeletal: Positive for joint pain.   Skin: Negative.    Neurological: Negative.    Endo/Heme/Allergies: Negative.        A 10 point review of systems was performed and all the pertinent positives have been mentioned. Rest of review of systems was negative.        PHYSICAL EXAM:     Vitals:    09/28/20 1127   BP: 136/70   Pulse: 63    Body mass index is 29.8 kg/m².  Weight: 88.9 kg (195 lb 15.8 oz)   Height: 5' 8" (172.7 cm)     Physical Exam  Vitals signs and nursing note reviewed.   Constitutional:       Appearance: Normal appearance. He is well-developed.   HENT:      Head: Normocephalic and atraumatic.      Right Ear: Hearing normal.      Left Ear: Hearing normal.      Nose: Nose normal.   Eyes:      General: Lids are normal.      Conjunctiva/sclera: Conjunctivae normal.      Pupils: Pupils are equal, round, and reactive to light.   Neck:      Musculoskeletal: Normal range of motion and neck supple.   Cardiovascular:      Rate and Rhythm: Normal rate and regular rhythm.      Pulses: Normal pulses.      Heart sounds: Normal heart sounds.   Pulmonary:      Effort: Pulmonary effort is normal.      Breath sounds: Normal breath sounds.   Abdominal:      Palpations: Abdomen is soft.      Tenderness: There is no abdominal tenderness.   Musculoskeletal:         General: No deformity.   Skin:     General: Skin is warm and dry.   Neurological:      Mental Status: He is alert and oriented to person, place, and time.   Psychiatric:         Speech: Speech normal.           DATA:     Laboratory:  CBC:  Recent Labs   Lab 08/09/19  2310 08/10/19  0740 11/22/19  0830   WBC 6.71 7.50 7.93   Hemoglobin 13.4 L 14.8 15.1   Hematocrit 40.2 44.1 43.7   Platelets 256 275 318       CHEMISTRIES:  Recent Labs   Lab 05/07/19  0610 05/12/19  0140 08/09/19  2310 08/10/19  0740 11/22/19  0830   Glucose " 102 114 H 156 H 139 H 193 H   Sodium 138 137 136 137 132 L   Potassium 4.0 3.5 3.4 L 3.5 3.6   BUN, Bld 13 13 15 12 12   Creatinine 0.9 1.1 1.1 0.9 1.2   eGFR if  >60 >60 >60 >60 >60   eGFR if non African American >60 >60 >60 >60 56 A   Calcium 9.2 8.9 8.7 9.0 9.7   Magnesium 2.0 1.9  --   --   --        CARDIAC BIOMARKERS:  Recent Labs   Lab 08/10/19  1952 11/22/19  0830 11/22/19  1050   Troponin I 0.253 H 0.013 0.013       COAGS:        LIPIDS/LFTS:  Recent Labs   Lab 05/06/19  1045  08/09/19  2310 08/10/19  0740 11/22/19  0830   Cholesterol 134  --   --   --   --    Triglycerides 80  --   --   --   --    HDL 45  --   --   --   --    LDL Cholesterol 73.0  --   --   --   --    Non-HDL Cholesterol 89  --   --   --   --    AST  --    < > 14 17 22   ALT  --    < > 19 22 22    < > = values in this interval not displayed.       Hemoglobin A1C   Date Value Ref Range Status   05/07/2019 7.0 (H) 4.0 - 5.6 % Final     Comment:     ADA Screening Guidelines:  5.7-6.4%  Consistent with prediabetes  >or=6.5%  Consistent with diabetes  High levels of fetal hemoglobin interfere with the HbA1C  assay. Heterozygous hemoglobin variants (HbS, HgC, etc)do  not significantly interfere with this assay.   However, presence of multiple variants may affect accuracy.     06/19/2018 7.3 (H) 4.0 - 5.6 % Final     Comment:     ADA Screening Guidelines:  5.7-6.4%  Consistent with prediabetes  >or=6.5%  Consistent with diabetes  High levels of fetal hemoglobin interfere with the HbA1C  assay. Heterozygous hemoglobin variants (HbS, HgC, etc)do  not significantly interfere with this assay.   However, presence of multiple variants may affect accuracy.       Hemoglobin A1c   Date Value Ref Range Status   07/09/2020 6.5 (H) <5.7 % of total Hgb Final     Comment:     For someone without known diabetes, a hemoglobin A1c  value of 6.5% or greater indicates that they may have   diabetes and this should be confirmed with a follow-up    test.    For someone with known diabetes, a value <7% indicates   that their diabetes is well controlled and a value   greater than or equal to 7% indicates suboptimal   control. A1c targets should be individualized based on   duration of diabetes, age, comorbid conditions, and   other considerations.    Currently, no consensus exists regarding use of  hemoglobin A1c for diagnosis of diabetes for children.       02/12/2020 6.5 (H) <5.7 % of total Hgb Final     Comment:     For someone without known diabetes, a hemoglobin A1c  value of 6.5% or greater indicates that they may have   diabetes and this should be confirmed with a follow-up   test.    For someone with known diabetes, a value <7% indicates   that their diabetes is well controlled and a value   greater than or equal to 7% indicates suboptimal   control. A1c targets should be individualized based on   duration of diabetes, age, comorbid conditions, and   other considerations.    Currently, no consensus exists regarding use of  hemoglobin A1c for diagnosis of diabetes for children.           TSH        The ASCVD Risk score (Binghamtonsilvestre WEATHERS Jr., et al., 2013) failed to calculate for the following reasons:    The 2013 ASCVD risk score is only valid for ages 40 to 79           Cardiovascular Testing:    EKG: (personally reviewed tracing)  May 2019:  Normal sinus rhythm, left axis deviation.  Abnormal EKG.    2D echocardiogram May 2019:  · Normal left ventricular systolic function. The estimated ejection fraction is 65%  · Normal LV diastolic function.  · Mild left atrial enlargement.  · Normal central venous pressure (3 mm Hg).  · The estimated PA systolic pressure is 10 mm Hg       Coronary angiogram 6th May 2019:    No acute thrombotic lesion identified.  Coronary artery disease as described below  LVEDP: 12 mmHg    Carotid ultrasound May 28, 2019:    · There is 20-39% right Internal Carotid Stenosis.  · There is 20-39% left Internal Carotid Stenosis.     ABIs May  28, 2019:    Noncompressible LOW bilaterally.  Mildly decrease TBI bilaterally.  Normal PVR waveforms bilaterally.       Coronary Anatomy:  LM:  No significant stenosis  LAD:  Mid LAD 60-70% stenosis.  Distal LAD 50% stenosis.  IFR 0.92 (nonischemic).  Diagonal 1 is a tortuous vessel with mid 80% stenosis.  Will medically manage this diagonal stenosis  LCx :  Mild nonobstructive CAD  RCA:  50% distal RCA/proximal PDA lesion.  IFR 1 (nonischemic)     Impression / Plan  Coronary artery disease as described above.  Continue medical management with aggressive risk factor modification.  Continue aspirin 81 mg daily. Change simvastatin to atorvastatin 80 mg daily.        ASSESSMENT AND PLAN     Patient Active Problem List   Diagnosis    Essential hypertension, benign    Type 2 diabetes mellitus    Hyperlipidemia    Body mass index 28.0-28.9, adult    History of prostate cancer    Orthostatic hypotension    Diabetic ulcer of toe of left foot associated with type 2 diabetes mellitus, with fat layer exposed    SCC (squamous cell carcinoma)    CAD (coronary artery disease)    Unstable angina    Palpitations    Elevated troponin       1.  Patient with coronary artery disease, now angina free.  Being managed medically.  Continue current medical therapy.    2.  Hypertension:  Well controlled on current therapy    3.  Cardiovascular screening with rest and stress ABIs and carotid ultrasound performed.  ABIs demonstrated noncompressible ABIs bilaterally, mildly decreased ABIs bilaterally with normal PVR waveforms bilaterally.  I got a peripheral ultrasound for further evaluation of his abnormal TBI.   ultrasound did not show any flow restriction in the right or the left lower extremity.    4.  Dyslipidemia:  Continue medical management with atorvastatin    6.  Preoperative for EGD:  Patient may undergo EGD at low risk for coronary ischemia.  May hold Plavix as needed for procedures.      Thank you very much for  involving me in the care of your patient.  Please do not hesitate to contact me if there are any questions.      Rashad Nieto MD, FACC, [facility]  Interventional Cardiologist, [facility].     Follow-up in 3 months      This note was dictated with the help of speech recognition software.  There might be un-intended errors and/or substitutions.

## 2020-11-20 ENCOUNTER — OFFICE VISIT (OUTPATIENT)
Dept: UROLOGY | Facility: CLINIC | Age: 85
End: 2020-11-20
Payer: MEDICARE

## 2020-11-20 VITALS
BODY MASS INDEX: 29.55 KG/M2 | WEIGHT: 195 LBS | SYSTOLIC BLOOD PRESSURE: 130 MMHG | HEIGHT: 68 IN | DIASTOLIC BLOOD PRESSURE: 80 MMHG

## 2020-11-20 DIAGNOSIS — R10.9 FLANK PAIN: Primary | ICD-10-CM

## 2020-11-20 DIAGNOSIS — Z85.46 HISTORY OF PROSTATE CANCER: ICD-10-CM

## 2020-11-20 DIAGNOSIS — Z87.442 HISTORY OF KIDNEY STONES: ICD-10-CM

## 2020-11-20 PROCEDURE — 3079F DIAST BP 80-89 MM HG: CPT | Mod: CPTII,S$GLB,, | Performed by: NURSE PRACTITIONER

## 2020-11-20 PROCEDURE — 1126F PR PAIN SEVERITY QUANTIFIED, NO PAIN PRESENT: ICD-10-PCS | Mod: S$GLB,,, | Performed by: NURSE PRACTITIONER

## 2020-11-20 PROCEDURE — 3079F PR MOST RECENT DIASTOLIC BLOOD PRESSURE 80-89 MM HG: ICD-10-PCS | Mod: CPTII,S$GLB,, | Performed by: NURSE PRACTITIONER

## 2020-11-20 PROCEDURE — 87086 URINE CULTURE/COLONY COUNT: CPT

## 2020-11-20 PROCEDURE — 1101F PR PT FALLS ASSESS DOC 0-1 FALLS W/OUT INJ PAST YR: ICD-10-PCS | Mod: CPTII,S$GLB,, | Performed by: NURSE PRACTITIONER

## 2020-11-20 PROCEDURE — 1126F AMNT PAIN NOTED NONE PRSNT: CPT | Mod: S$GLB,,, | Performed by: NURSE PRACTITIONER

## 2020-11-20 PROCEDURE — 1101F PT FALLS ASSESS-DOCD LE1/YR: CPT | Mod: CPTII,S$GLB,, | Performed by: NURSE PRACTITIONER

## 2020-11-20 PROCEDURE — 3288F FALL RISK ASSESSMENT DOCD: CPT | Mod: CPTII,S$GLB,, | Performed by: NURSE PRACTITIONER

## 2020-11-20 PROCEDURE — 1159F PR MEDICATION LIST DOCUMENTED IN MEDICAL RECORD: ICD-10-PCS | Mod: S$GLB,,, | Performed by: NURSE PRACTITIONER

## 2020-11-20 PROCEDURE — 3075F PR MOST RECENT SYSTOLIC BLOOD PRESS GE 130-139MM HG: ICD-10-PCS | Mod: CPTII,S$GLB,, | Performed by: NURSE PRACTITIONER

## 2020-11-20 PROCEDURE — 99999 PR PBB SHADOW E&M-EST. PATIENT-LVL IV: ICD-10-PCS | Mod: PBBFAC,,, | Performed by: NURSE PRACTITIONER

## 2020-11-20 PROCEDURE — 3288F PR FALLS RISK ASSESSMENT DOCUMENTED: ICD-10-PCS | Mod: CPTII,S$GLB,, | Performed by: NURSE PRACTITIONER

## 2020-11-20 PROCEDURE — 3075F SYST BP GE 130 - 139MM HG: CPT | Mod: CPTII,S$GLB,, | Performed by: NURSE PRACTITIONER

## 2020-11-20 PROCEDURE — 1159F MED LIST DOCD IN RCRD: CPT | Mod: S$GLB,,, | Performed by: NURSE PRACTITIONER

## 2020-11-20 PROCEDURE — 99214 OFFICE O/P EST MOD 30 MIN: CPT | Mod: S$GLB,,, | Performed by: NURSE PRACTITIONER

## 2020-11-20 PROCEDURE — 99999 PR PBB SHADOW E&M-EST. PATIENT-LVL IV: CPT | Mod: PBBFAC,,, | Performed by: NURSE PRACTITIONER

## 2020-11-20 PROCEDURE — 99214 PR OFFICE/OUTPT VISIT, EST, LEVL IV, 30-39 MIN: ICD-10-PCS | Mod: S$GLB,,, | Performed by: NURSE PRACTITIONER

## 2020-11-20 NOTE — PROGRESS NOTES
"Subjective:       Patient ID: Juan Martínez Jr. is a 85 y.o. male who is an established patient of Dr Brenner though new to me was last seen in this office 7/28/2020    Chief Complaint:   Chief Complaint   Patient presents with    Flank Pain     Pt. states he has right side pain that affects him in the morning once he gets up and when he's trying to lay down at any time of the day.. no other issues stated        Patient with known history of prostate cancer and kidney stones.  He underwent right ureteroscopy in 2/20 at [facility]. Most recent PSA was normal.     Today he presents with c/o right sided flank pain for the past 3 days. Pain described as 6/10 "stiffness". Symptoms are more pronounced in the AM (upon awakening) and worsen with bending and twisting of body. Denies recent strenuous activity or heavy lifting. He has tried applying Icy Hot to this area without relief. Also reports taking "pain pill" from previous surgery in 2/20 which helped some.    He denies dysuria, gross hematuria, n/v or fever    TRIP 6/10/20--R renal cysts, no stones, no hydronephrosis      ACTIVE MEDICAL ISSUES:  Patient Active Problem List   Diagnosis    Essential hypertension, benign    Type 2 diabetes mellitus    Hyperlipidemia    Body mass index 28.0-28.9, adult    History of prostate cancer    Orthostatic hypotension    Diabetic ulcer of toe of left foot associated with type 2 diabetes mellitus, with fat layer exposed    SCC (squamous cell carcinoma)    CAD (coronary artery disease)    Unstable angina    Palpitations    Elevated troponin       ALLERGIES AND MEDICATIONS: updated and reviewed.  Review of patient's allergies indicates:  No Known Allergies  Current Outpatient Medications   Medication Sig    AMITIZA 24 mcg Cap 24 mcg 2 (two) times daily with meals.     amLODIPine (NORVASC) 10 MG tablet Take 1 tablet (10 mg total) by mouth once daily.    aspirin (ECOTRIN) 81 MG EC tablet Take 81 mg by mouth once daily.    " atorvastatin (LIPITOR) 40 MG tablet Take 1 tablet (40 mg total) by mouth once daily.    blood-glucose meter Misc by Misc.(Non-Drug; Combo Route) route    clopidogreL (PLAVIX) 75 mg tablet Take 1 tablet (75 mg total) by mouth once daily.    dicyclomine (BENTYL) 10 MG capsule Take 10 mg by mouth.    hydroCHLOROthiazide (MICROZIDE) 12.5 mg capsule     isosorbide mononitrate (IMDUR) 60 MG 24 hr tablet Take 1 tablet (60 mg total) by mouth once daily.    LANCETS & BLOOD GLUCOSE STRIPS MISC by Misc.(Non-Drug; Combo Route) route Check blood sugars  Twice a day.    metformin (GLUCOPHAGE) 1000 MG tablet Take 1 tablet (1,000 mg total) by mouth 2 (two) times daily with meals.    metoprolol succinate (TOPROL-XL) 50 MG 24 hr tablet Take 1 tablet (50 mg total) by mouth once daily.    ondansetron (ZOFRAN) 4 MG tablet Take 1 tablet (4 mg total) by mouth every 6 (six) hours as needed for Nausea.    albuterol (PROVENTIL/VENTOLIN HFA) 90 mcg/actuation inhaler Inhale 2 puffs into the lungs.    escitalopram oxalate (LEXAPRO) 10 MG tablet Take 10 mg by mouth.    nitroGLYCERIN (NITROSTAT) 0.4 MG SL tablet Place 1 tablet (0.4 mg total) under the tongue every 5 (five) minutes as needed for Chest pain.     No current facility-administered medications for this visit.        Review of Systems   Constitutional: Negative for activity change, chills, fatigue, fever and unexpected weight change.   Eyes: Negative for discharge, redness and visual disturbance.   Respiratory: Negative for cough, shortness of breath and wheezing.    Cardiovascular: Negative for chest pain and leg swelling.   Gastrointestinal: Negative for abdominal distention, abdominal pain, constipation, diarrhea, nausea and vomiting.   Genitourinary: Positive for flank pain (R sided). Negative for decreased urine volume, difficulty urinating, discharge, dysuria, frequency, hematuria, penile pain, penile swelling, scrotal swelling and urgency.   Musculoskeletal: Negative  "for arthralgias, joint swelling and myalgias.   Skin: Negative for color change and rash.   Neurological: Negative for dizziness and light-headedness.   Psychiatric/Behavioral: Negative for behavioral problems and confusion. The patient is not nervous/anxious.        Objective:      Vitals:    11/20/20 1000   BP: 130/80   Weight: 88.5 kg (195 lb)   Height: 5' 8" (1.727 m)     Physical Exam  Constitutional:       Appearance: He is well-developed.   HENT:      Head: Normocephalic and atraumatic.      Nose: Nose normal.   Eyes:      General:         Right eye: No discharge.         Left eye: No discharge.      Conjunctiva/sclera: Conjunctivae normal.   Neck:      Musculoskeletal: Normal range of motion and neck supple.      Thyroid: No thyromegaly.      Trachea: No tracheal deviation.   Cardiovascular:      Rate and Rhythm: Normal rate and regular rhythm.   Pulmonary:      Effort: Pulmonary effort is normal. No respiratory distress.      Breath sounds: No wheezing.   Abdominal:      General: There is no distension.      Palpations: Abdomen is soft.      Tenderness: There is no abdominal tenderness.      Hernia: No hernia is present.   Genitourinary:     Comments: Patient declined exam  Musculoskeletal: Normal range of motion.   Skin:     General: Skin is warm and dry.      Findings: No erythema or rash.   Neurological:      Mental Status: He is alert and oriented to person, place, and time.   Psychiatric:         Behavior: Behavior normal.         Judgment: Judgment normal.         Urine dipstick shows patient unable to produce specimen. He will drop off urine sample at later time (specimen cup provided)    Assessment:       1. Flank pain    2. History of kidney stones    3. History of prostate cancer          Plan:       1. Flank pain  -+hx of kidney stones  -Will check renal ultrasound for obstructive uropathy  -His pain may be musculoskeletal in nature based on description and aggravating factors  -Ok to try NSAIDs " prn, warm compress  -He was instructed to present to the ED with uncontrolled/worsening pain, n/v, fever  - US Retroperitoneal Complete (Kidney and; Future    2. History of kidney stones  -TRIP 6/20--no stones or hydronephrosis noted    3. History of prostate cancer  -Doing well            Follow up in about 2 weeks (around 12/4/2020) for Follow up with renal ultrasound.

## 2020-11-22 LAB — BACTERIA UR CULT: NORMAL

## 2020-11-27 ENCOUNTER — HOSPITAL ENCOUNTER (OUTPATIENT)
Dept: RADIOLOGY | Facility: HOSPITAL | Age: 85
Discharge: HOME | End: 2020-11-27
Attending: NURSE PRACTITIONER
Payer: MEDICARE

## 2020-11-27 DIAGNOSIS — R10.9 FLANK PAIN: ICD-10-CM

## 2020-11-27 PROCEDURE — 76770 US RETROPERITONEAL COMPLETE: ICD-10-PCS | Mod: 26,,, | Performed by: INTERNAL MEDICINE

## 2020-11-27 PROCEDURE — 76770 US EXAM ABDO BACK WALL COMP: CPT | Mod: TC

## 2020-11-27 PROCEDURE — 76770 US EXAM ABDO BACK WALL COMP: CPT | Mod: 26,,, | Performed by: INTERNAL MEDICINE

## 2020-12-10 ENCOUNTER — OFFICE VISIT (OUTPATIENT)
Dept: UROLOGY | Facility: CLINIC | Age: 85
End: 2020-12-10
Payer: MEDICARE

## 2020-12-10 VITALS
BODY MASS INDEX: 29.55 KG/M2 | SYSTOLIC BLOOD PRESSURE: 138 MMHG | HEIGHT: 68 IN | WEIGHT: 195 LBS | DIASTOLIC BLOOD PRESSURE: 80 MMHG

## 2020-12-10 DIAGNOSIS — Z85.46 HISTORY OF PROSTATE CANCER: ICD-10-CM

## 2020-12-10 DIAGNOSIS — Z87.442 HISTORY OF KIDNEY STONES: ICD-10-CM

## 2020-12-10 DIAGNOSIS — R10.9 FLANK PAIN: Primary | ICD-10-CM

## 2020-12-10 PROCEDURE — 1126F AMNT PAIN NOTED NONE PRSNT: CPT | Mod: S$GLB,,, | Performed by: NURSE PRACTITIONER

## 2020-12-10 PROCEDURE — 3075F SYST BP GE 130 - 139MM HG: CPT | Mod: CPTII,S$GLB,, | Performed by: NURSE PRACTITIONER

## 2020-12-10 PROCEDURE — 3079F DIAST BP 80-89 MM HG: CPT | Mod: CPTII,S$GLB,, | Performed by: NURSE PRACTITIONER

## 2020-12-10 PROCEDURE — 1101F PT FALLS ASSESS-DOCD LE1/YR: CPT | Mod: CPTII,S$GLB,, | Performed by: NURSE PRACTITIONER

## 2020-12-10 PROCEDURE — 3288F FALL RISK ASSESSMENT DOCD: CPT | Mod: CPTII,S$GLB,, | Performed by: NURSE PRACTITIONER

## 2020-12-10 PROCEDURE — 99214 OFFICE O/P EST MOD 30 MIN: CPT | Mod: 25,S$GLB,, | Performed by: NURSE PRACTITIONER

## 2020-12-10 PROCEDURE — 3288F PR FALLS RISK ASSESSMENT DOCUMENTED: ICD-10-PCS | Mod: CPTII,S$GLB,, | Performed by: NURSE PRACTITIONER

## 2020-12-10 PROCEDURE — 99999 PR PBB SHADOW E&M-EST. PATIENT-LVL IV: CPT | Mod: PBBFAC,,, | Performed by: NURSE PRACTITIONER

## 2020-12-10 PROCEDURE — 1126F PR PAIN SEVERITY QUANTIFIED, NO PAIN PRESENT: ICD-10-PCS | Mod: S$GLB,,, | Performed by: NURSE PRACTITIONER

## 2020-12-10 PROCEDURE — 1159F PR MEDICATION LIST DOCUMENTED IN MEDICAL RECORD: ICD-10-PCS | Mod: S$GLB,,, | Performed by: NURSE PRACTITIONER

## 2020-12-10 PROCEDURE — 99214 PR OFFICE/OUTPT VISIT, EST, LEVL IV, 30-39 MIN: ICD-10-PCS | Mod: 25,S$GLB,, | Performed by: NURSE PRACTITIONER

## 2020-12-10 PROCEDURE — 81001 URINALYSIS AUTO W/SCOPE: CPT | Mod: S$GLB,,, | Performed by: NURSE PRACTITIONER

## 2020-12-10 PROCEDURE — 3075F PR MOST RECENT SYSTOLIC BLOOD PRESS GE 130-139MM HG: ICD-10-PCS | Mod: CPTII,S$GLB,, | Performed by: NURSE PRACTITIONER

## 2020-12-10 PROCEDURE — 3079F PR MOST RECENT DIASTOLIC BLOOD PRESSURE 80-89 MM HG: ICD-10-PCS | Mod: CPTII,S$GLB,, | Performed by: NURSE PRACTITIONER

## 2020-12-10 PROCEDURE — 81001 PR  URINALYSIS, AUTO, W/SCOPE: ICD-10-PCS | Mod: S$GLB,,, | Performed by: NURSE PRACTITIONER

## 2020-12-10 PROCEDURE — 1159F MED LIST DOCD IN RCRD: CPT | Mod: S$GLB,,, | Performed by: NURSE PRACTITIONER

## 2020-12-10 PROCEDURE — 99999 PR PBB SHADOW E&M-EST. PATIENT-LVL IV: ICD-10-PCS | Mod: PBBFAC,,, | Performed by: NURSE PRACTITIONER

## 2020-12-10 PROCEDURE — 1101F PR PT FALLS ASSESS DOC 0-1 FALLS W/OUT INJ PAST YR: ICD-10-PCS | Mod: CPTII,S$GLB,, | Performed by: NURSE PRACTITIONER

## 2020-12-10 NOTE — PROGRESS NOTES
"Subjective:       Patient ID: Juan Martínez Jr. is a 85 y.o. male who was last seen in this office 11/20/2020    Chief Complaint:   Chief Complaint   Patient presents with    Follow-up     US results .. He states he still has some pain in his right side lower back area .. no other issues stated       Patient with known history of prostate cancer and kidney stones.  He underwent right ureteroscopy in 2/20 at [facility]. Most recent PSA was normal.     He presented with a 3 day history of right sided flank pain a few weeks ago.  Pain described as 6/10 "stiffness". Symptoms are more pronounced in the AM (upon awakening) and worsen with bending and twisting of body. Denies recent strenuous activity or heavy lifting. He has tried applying Icy Hot to this area without relief. Also reports taking "pain pill" from previous surgery in 2/20 which helped some.    He denies dysuria, gross hematuria, n/v or fever    TRIP 6/10/20--R renal cysts, no stones, no hydronephrosis    UCx 11/20-- negative    He is back today with a renal ultrasound. Still with right sided flank pain/lower back pain. He does reports some improvement in his symptoms. No new complaints      ACTIVE MEDICAL ISSUES:  Patient Active Problem List   Diagnosis    Essential hypertension, benign    Type 2 diabetes mellitus    Hyperlipidemia    Body mass index 28.0-28.9, adult    History of prostate cancer    Orthostatic hypotension    Diabetic ulcer of toe of left foot associated with type 2 diabetes mellitus, with fat layer exposed    SCC (squamous cell carcinoma)    CAD (coronary artery disease)    Unstable angina    Palpitations    Elevated troponin       ALLERGIES AND MEDICATIONS: updated and reviewed.  Review of patient's allergies indicates:  No Known Allergies  Current Outpatient Medications   Medication Sig    AMITIZA 24 mcg Cap 24 mcg 2 (two) times daily with meals.     amLODIPine (NORVASC) 10 MG tablet Take 1 tablet (10 mg total) by mouth once " daily.    aspirin (ECOTRIN) 81 MG EC tablet Take 81 mg by mouth once daily.    atorvastatin (LIPITOR) 40 MG tablet Take 1 tablet (40 mg total) by mouth once daily.    blood-glucose meter Misc by Misc.(Non-Drug; Combo Route) route    clopidogreL (PLAVIX) 75 mg tablet Take 1 tablet (75 mg total) by mouth once daily.    dicyclomine (BENTYL) 10 MG capsule Take 10 mg by mouth.    hydroCHLOROthiazide (MICROZIDE) 12.5 mg capsule     isosorbide mononitrate (IMDUR) 60 MG 24 hr tablet Take 1 tablet (60 mg total) by mouth once daily.    LANCETS & BLOOD GLUCOSE STRIPS MISC by Misc.(Non-Drug; Combo Route) route Check blood sugars  Twice a day.    metformin (GLUCOPHAGE) 1000 MG tablet Take 1 tablet (1,000 mg total) by mouth 2 (two) times daily with meals.    metoprolol succinate (TOPROL-XL) 50 MG 24 hr tablet Take 1 tablet (50 mg total) by mouth once daily.    ondansetron (ZOFRAN) 4 MG tablet Take 1 tablet (4 mg total) by mouth every 6 (six) hours as needed for Nausea.    albuterol (PROVENTIL/VENTOLIN HFA) 90 mcg/actuation inhaler Inhale 2 puffs into the lungs.    escitalopram oxalate (LEXAPRO) 10 MG tablet Take 10 mg by mouth.    nitroGLYCERIN (NITROSTAT) 0.4 MG SL tablet Place 1 tablet (0.4 mg total) under the tongue every 5 (five) minutes as needed for Chest pain.     No current facility-administered medications for this visit.        Review of Systems   Constitutional: Negative for activity change, chills, fatigue, fever and unexpected weight change.   Eyes: Negative for discharge, redness and visual disturbance.   Respiratory: Negative for cough, shortness of breath and wheezing.    Cardiovascular: Negative for chest pain and leg swelling.   Gastrointestinal: Negative for abdominal distention, abdominal pain, constipation, diarrhea, nausea and vomiting.   Genitourinary: Positive for flank pain (R sided). Negative for decreased urine volume, difficulty urinating, discharge, dysuria, frequency, hematuria, penile  "pain, penile swelling, scrotal swelling and urgency.   Musculoskeletal: Positive for back pain. Negative for arthralgias, joint swelling and myalgias.   Skin: Negative for color change and rash.   Neurological: Negative for dizziness and light-headedness.   Psychiatric/Behavioral: Negative for behavioral problems and confusion. The patient is not nervous/anxious.        Objective:      Vitals:    12/10/20 0915   BP: 138/80   Weight: 88.5 kg (195 lb)   Height: 5' 8" (1.727 m)     Physical Exam  Constitutional:       Appearance: He is well-developed.   HENT:      Head: Normocephalic and atraumatic.      Nose: Nose normal.   Eyes:      General:         Right eye: No discharge.         Left eye: No discharge.      Conjunctiva/sclera: Conjunctivae normal.   Neck:      Musculoskeletal: Normal range of motion and neck supple.      Thyroid: No thyromegaly.      Trachea: No tracheal deviation.   Cardiovascular:      Rate and Rhythm: Normal rate and regular rhythm.   Pulmonary:      Effort: Pulmonary effort is normal. No respiratory distress.      Breath sounds: No wheezing.   Abdominal:      General: There is no distension.      Palpations: Abdomen is soft.      Tenderness: There is no abdominal tenderness.      Hernia: No hernia is present.   Genitourinary:     Comments: Patient declined exam  Musculoskeletal: Normal range of motion.   Skin:     General: Skin is warm and dry.      Findings: No erythema or rash.   Neurological:      Mental Status: He is alert and oriented to person, place, and time.   Psychiatric:         Behavior: Behavior normal.         Judgment: Judgment normal.         Urine dipstick shows negative for all components.  Micro exam: negative for WBC's or RBC's.    Narrative & Impression    EXAMINATION:  US RETROPERITONEAL COMPLETE     CLINICAL HISTORY:  Unspecified abdominal pain     TECHNIQUE:  Ultrasound of the kidneys and urinary bladder was performed including color flow and Doppler evaluation of the " kidneys.     COMPARISON:  June 2020     FINDINGS:  Right kidney: The right kidney measures 10.1 cm. Mild cortical thinning. Resistive index measures 0.76.  There is a subcentimeter cyst at the lower pole, similar to prior.  Additional subcentimeter focus within the mid kidney is difficult to characterize due to small size.  No renal stone. No hydronephrosis.     Left kidney: The left kidney measures 11.1 cm. Mild cortical thinning. Resistive index measures 0.81.  No mass. No renal stone. No hydronephrosis.     The bladder is partially distended at the time of scanning and has an unremarkable appearance.     Impression:     No acute finding to correlate with the history of abdominal pain     Kidneys demonstrate mild cortical thinning and mildly elevated resistive indices, most compatible with mild chronic disease.     Two subcentimeter foci within the right kidney, 1 of which appears compatible with a cyst and the 2nd is too small to characterize due to small size.        Electronically signed by: Deepika Scott MD  Date:                                            11/27/2020  Time:                                           11:10    Encounter            Reviewed with patient    Assessment:       1. Flank pain    2. History of kidney stones    3. History of prostate cancer          Plan:       1. Flank pain  -TRIP--no hydronephrosis  -Suspect pain is musculoskeletal in nature   -Ok to try NSAIDs prn, warm compress  -Encouraged patient to f/u with PCP for further evaluation if symptoms worsen or fail to improve  - POCT urinalysis, dipstick or tablet reag    2. History of kidney stones  -no stones noted on recent imaging    3. History of prostate cancer  -stable            Follow up if symptoms worsen or fail to improve.

## 2020-12-28 ENCOUNTER — OFFICE VISIT (OUTPATIENT)
Dept: CARDIOLOGY | Facility: CLINIC | Age: 85
End: 2020-12-28
Payer: MEDICARE

## 2020-12-28 VITALS
BODY MASS INDEX: 30.34 KG/M2 | WEIGHT: 200.19 LBS | RESPIRATION RATE: 16 BRPM | OXYGEN SATURATION: 95 % | HEIGHT: 68 IN | DIASTOLIC BLOOD PRESSURE: 61 MMHG | SYSTOLIC BLOOD PRESSURE: 133 MMHG | HEART RATE: 68 BPM

## 2020-12-28 DIAGNOSIS — R79.89 ELEVATED TROPONIN: ICD-10-CM

## 2020-12-28 DIAGNOSIS — E11.9 TYPE 2 DIABETES MELLITUS WITHOUT COMPLICATION, WITHOUT LONG-TERM CURRENT USE OF INSULIN: ICD-10-CM

## 2020-12-28 DIAGNOSIS — I95.1 ORTHOSTATIC HYPOTENSION: ICD-10-CM

## 2020-12-28 DIAGNOSIS — R00.2 PALPITATIONS: ICD-10-CM

## 2020-12-28 DIAGNOSIS — E78.2 MIXED HYPERLIPIDEMIA: ICD-10-CM

## 2020-12-28 DIAGNOSIS — E11.621 DIABETIC ULCER OF TOE OF LEFT FOOT ASSOCIATED WITH TYPE 2 DIABETES MELLITUS, WITH FAT LAYER EXPOSED: ICD-10-CM

## 2020-12-28 DIAGNOSIS — Z85.46 HISTORY OF PROSTATE CANCER: ICD-10-CM

## 2020-12-28 DIAGNOSIS — I25.10 CORONARY ARTERY DISEASE INVOLVING NATIVE CORONARY ARTERY OF NATIVE HEART WITHOUT ANGINA PECTORIS: ICD-10-CM

## 2020-12-28 DIAGNOSIS — C44.92 SCC (SQUAMOUS CELL CARCINOMA): ICD-10-CM

## 2020-12-28 DIAGNOSIS — L97.522 DIABETIC ULCER OF TOE OF LEFT FOOT ASSOCIATED WITH TYPE 2 DIABETES MELLITUS, WITH FAT LAYER EXPOSED: ICD-10-CM

## 2020-12-28 DIAGNOSIS — I10 ESSENTIAL HYPERTENSION, BENIGN: Primary | ICD-10-CM

## 2020-12-28 DIAGNOSIS — I20.0 UNSTABLE ANGINA: ICD-10-CM

## 2020-12-28 PROCEDURE — 1159F MED LIST DOCD IN RCRD: CPT | Mod: S$GLB,,, | Performed by: INTERNAL MEDICINE

## 2020-12-28 PROCEDURE — 3075F SYST BP GE 130 - 139MM HG: CPT | Mod: CPTII,S$GLB,, | Performed by: INTERNAL MEDICINE

## 2020-12-28 PROCEDURE — 99999 PR PBB SHADOW E&M-EST. PATIENT-LVL IV: ICD-10-PCS | Mod: PBBFAC,,, | Performed by: INTERNAL MEDICINE

## 2020-12-28 PROCEDURE — 1159F PR MEDICATION LIST DOCUMENTED IN MEDICAL RECORD: ICD-10-PCS | Mod: S$GLB,,, | Performed by: INTERNAL MEDICINE

## 2020-12-28 PROCEDURE — 1126F PR PAIN SEVERITY QUANTIFIED, NO PAIN PRESENT: ICD-10-PCS | Mod: S$GLB,,, | Performed by: INTERNAL MEDICINE

## 2020-12-28 PROCEDURE — 1101F PR PT FALLS ASSESS DOC 0-1 FALLS W/OUT INJ PAST YR: ICD-10-PCS | Mod: CPTII,S$GLB,, | Performed by: INTERNAL MEDICINE

## 2020-12-28 PROCEDURE — 1126F AMNT PAIN NOTED NONE PRSNT: CPT | Mod: S$GLB,,, | Performed by: INTERNAL MEDICINE

## 2020-12-28 PROCEDURE — 3288F FALL RISK ASSESSMENT DOCD: CPT | Mod: CPTII,S$GLB,, | Performed by: INTERNAL MEDICINE

## 2020-12-28 PROCEDURE — 3288F PR FALLS RISK ASSESSMENT DOCUMENTED: ICD-10-PCS | Mod: CPTII,S$GLB,, | Performed by: INTERNAL MEDICINE

## 2020-12-28 PROCEDURE — 3078F DIAST BP <80 MM HG: CPT | Mod: CPTII,S$GLB,, | Performed by: INTERNAL MEDICINE

## 2020-12-28 PROCEDURE — 3075F PR MOST RECENT SYSTOLIC BLOOD PRESS GE 130-139MM HG: ICD-10-PCS | Mod: CPTII,S$GLB,, | Performed by: INTERNAL MEDICINE

## 2020-12-28 PROCEDURE — 99999 PR PBB SHADOW E&M-EST. PATIENT-LVL IV: CPT | Mod: PBBFAC,,, | Performed by: INTERNAL MEDICINE

## 2020-12-28 PROCEDURE — 1101F PT FALLS ASSESS-DOCD LE1/YR: CPT | Mod: CPTII,S$GLB,, | Performed by: INTERNAL MEDICINE

## 2020-12-28 PROCEDURE — 3078F PR MOST RECENT DIASTOLIC BLOOD PRESSURE < 80 MM HG: ICD-10-PCS | Mod: CPTII,S$GLB,, | Performed by: INTERNAL MEDICINE

## 2020-12-28 PROCEDURE — 99214 OFFICE O/P EST MOD 30 MIN: CPT | Mod: S$GLB,,, | Performed by: INTERNAL MEDICINE

## 2020-12-28 PROCEDURE — 99214 PR OFFICE/OUTPT VISIT, EST, LEVL IV, 30-39 MIN: ICD-10-PCS | Mod: S$GLB,,, | Performed by: INTERNAL MEDICINE

## 2020-12-28 NOTE — PROGRESS NOTES
CARDIOVASCULAR CONSULTATION    REASON FOR CONSULT:   Juan Martínez Jr. is a 85 y.o. male who presents for follow-up recent hospitalization for a non-STEMI..      HISTORY OF PRESENT ILLNESS:     Notes from August 2019:   Patient is here for follow-up today.  Was recently admitted for hypertensive urgency.  Did not have any chest pains during the hypertensive urgency.  Troponins were found to be mildly elevated.  Was evaluated by Cardiology and Norvasc was restarted.  His blood pressure has been well controlled since then.  Denies any chest pains at rest on exertion, orthopnea, PND, swelling of feet.      Notes from July 2019  Patient is here for follow-up today.  Denies any chest pains at rest on exertion, orthopnea, PND.  Denies any claudication symptoms.  Denies any dyspnea at rest on exertion    Note from clinic visit in May 2019:  Patient is 83-year-old man pleasant man with a past medical history significant for diabetes, dyslipidemia, hypertension, coronary artery disease who recently presented to the hospital with a non-STEMI.  Coronary angiogram performed at that time revealed nonischemic coronary artery disease of the LAD and RCA as well as 80% stenosis in his diagonal 1.  Which is being managed medically.  A week after that he developed some right-sided chest pain, different from his anginal pain which he had presented with his non-STEMI.  He states that right-sided chest pain resolved on its own in less than 20 min, but since this is all new for him he decided to come to the hospital to make sure everything was okay.  He was admitted and serial troponins did not reveal any elevation and he was discharged home.  He has not had any further episodes of chest pains.  Denies orthopnea, PND chest pains at rest or on exertion.  Denies typical claudication pain, has knee pains and atypical leg pain.      Notes from October 2019:  Patient here for follow-up.  States that had a brief episode of right-sided  chest pain which lasted a few seconds to few minutes and then went away.  This was different than the anginal pain he had in the past.  Denies any orthopnea, PND, swelling of feet.  States that he tried telling is  that this he felt was related to his lungs, and an checks x-ray done yesterday.  Denies any recent fevers or chills.    Notes from December 2019:  Patient here for follow-up.  Denies any chest pains at rest on exertion, orthopnea, PND, swelling of feet.  Blood pressure mildly elevated at clinic.  At home states blood pressure stays around 140-145 mm systolic.  I will increase his Toprol-XL to 75 mg daily.    Notes from January 2020:  Patient here because states that yesterday he felt some chest pain.  It was right-sided.  States did not feel like it was from his heart but just wanted to make sure.  He took nitroglycerines and had no response to nitroglycerin.  States that did not feel like his earlier anginal pains.    Notes from May 2020:  Patient here for follow-up did not have any further episodes of chest pain.  Is looking for clearance for EGD.  Denies any orthopnea, PND, swelling of feet.    Notes from September 2020:  Patient here for follow-up.  Denies any anginal sounding chest pains, orthopnea, PND.  States he had an episode last Friday where he took a deep breath and felt a sharp pain which lasted 1 sec and then went away.  Denies any anginal sounding chest pains on exertion.  Denies any orthopnea, PND, swelling of feet.  EKG done in the clinic today was personally reviewed and demonstrated normal sinus rhythm, left axis deviation.  Abnormal EKG.    Notes from December 2020:  Patient here follow-up.  Denies any chest pains rest exertion PND.  Doing fine.  No cardiac issues.    PAST MEDICAL HISTORY:     Past Medical History:   Diagnosis Date    Diabetes mellitus     High cholesterol     Hypertension     Prostate cancer        PAST SURGICAL HISTORY:     Past Surgical History:   Procedure  Laterality Date    APPLICATION OF FULL-THICKNESS SKIN GRAFT (FTSG) TO UPPER EXTREMITY Right 11/15/2018    Procedure: APPLICATION, GRAFT, SKIN, FULL-THICKNESS, TO UPPER EXTREMITY VS STSG WITH FROZEN SECTIONS;  Surgeon: Alan Arzola MD;  Location: [facility] OR;  Service: Plastics;  Laterality: Right;    EXCISION OF SQUAMOUS CELL CARCINOMA Right 11/15/2018    Procedure: EXCISION, CARCINOMA, SQUAMOUS CELL @ RIGHT HAND;  Surgeon: Alan Arzola MD;  Location: [facility] OR;  Service: Plastics;  Laterality: Right;  RN PREOP 11/13/2018--NEED H/P    JOINT REPLACEMENT      LEFT HEART CATHETERIZATION Left 5/6/2019    Procedure: Left heart cath;  Surgeon: Rashad Nieto MD;  Location: [facility] CATH LAB;  Service: Cardiology;  Laterality: Left;    TONSILLECTOMY      TOTAL KNEE ARTHROPLASTY         ALLERGIES AND MEDICATION:   Review of patient's allergies indicates:  No Known Allergies     Medication List          Accurate as of December 28, 2020  1:34 PM. If you have any questions, ask your nurse or doctor.            CONTINUE taking these medications    albuterol 90 mcg/actuation inhaler  Commonly known as: PROVENTIL/VENTOLIN HFA     AMITIZA 24 MCG Cap  Generic drug: lubiprostone     amLODIPine 10 MG tablet  Commonly known as: NORVASC  Take 1 tablet (10 mg total) by mouth once daily.     aspirin 81 MG EC tablet  Commonly known as: ECOTRIN     atorvastatin 40 MG tablet  Commonly known as: LIPITOR  Take 1 tablet (40 mg total) by mouth once daily.     blood-glucose meter Misc     clopidogreL 75 mg tablet  Commonly known as: PLAVIX  Take 1 tablet (75 mg total) by mouth once daily.     dicyclomine 10 MG capsule  Commonly known as: BENTYL     escitalopram oxalate 10 MG tablet  Commonly known as: LEXAPRO     hydroCHLOROthiazide 12.5 mg capsule  Commonly known as: MICROZIDE     isosorbide mononitrate 60 MG 24 hr tablet  Commonly known as: IMDUR  Take 1 tablet (60 mg total) by mouth once daily.     LANCETS & BLOOD GLUCOSE STRIPS MISC      metFORMIN 1000 MG tablet  Commonly known as: GLUCOPHAGE  Take 1 tablet (1,000 mg total) by mouth 2 (two) times daily with meals.     metoprolol succinate 50 MG 24 hr tablet  Commonly known as: TOPROL-XL  Take 1 tablet (50 mg total) by mouth once daily.     nitroGLYCERIN 0.4 MG SL tablet  Commonly known as: NITROSTAT  Place 1 tablet (0.4 mg total) under the tongue every 5 (five) minutes as needed for Chest pain.     ondansetron 4 MG tablet  Commonly known as: ZOFRAN  Take 1 tablet (4 mg total) by mouth every 6 (six) hours as needed for Nausea.            SOCIAL HISTORY:     Social History     Socioeconomic History    Marital status:      Spouse name: Not on file    Number of children: Not on file    Years of education: Not on file    Highest education level: Not on file   Occupational History    Not on file   Social Needs    Financial resource strain: Not on file    Food insecurity     Worry: Not on file     Inability: Not on file    Transportation needs     Medical: Not on file     Non-medical: Not on file   Tobacco Use    Smoking status: Former Smoker     Packs/day: 0.00     Types: Cigarettes     Quit date: 2016     Years since quittin.3    Smokeless tobacco: Never Used    Tobacco comment: 2016   Substance and Sexual Activity    Alcohol use: No    Drug use: No    Sexual activity: Not Currently   Lifestyle    Physical activity     Days per week: Not on file     Minutes per session: Not on file    Stress: Not on file   Relationships    Social connections     Talks on phone: Not on file     Gets together: Not on file     Attends [employer] service: Not on file     Active member of club or organization: Not on file     Attends meetings of clubs or organizations: Not on file     Relationship status: Not on file   Other Topics Concern    Not on file   Social History Narrative    Not on file       FAMILY HISTORY:     Family History   Problem Relation Age of Onset    Heart disease  "Mother     Stroke Father        REVIEW OF SYSTEMS:   Review of Systems   Constitutional: Negative.    HENT: Negative.    Eyes: Negative.    Respiratory: Negative.    Cardiovascular: Negative.    Gastrointestinal: Negative.    Genitourinary: Negative.    Musculoskeletal: Positive for joint pain.   Skin: Negative.    Neurological: Negative.    Endo/Heme/Allergies: Negative.        A 10 point review of systems was performed and all the pertinent positives have been mentioned. Rest of review of systems was negative.        PHYSICAL EXAM:     Vitals:    12/28/20 1317   BP: 133/61   Pulse: 68   Resp: 16    Body mass index is 30.44 kg/m².  Weight: 90.8 kg (200 lb 2.8 oz)   Height: 5' 8" (172.7 cm)     Physical Exam  Vitals signs and nursing note reviewed.   Constitutional:       Appearance: Normal appearance. He is well-developed.   HENT:      Head: Normocephalic and atraumatic.      Right Ear: Hearing normal.      Left Ear: Hearing normal.      Nose: Nose normal.   Eyes:      General: Lids are normal.      Conjunctiva/sclera: Conjunctivae normal.      Pupils: Pupils are equal, round, and reactive to light.   Neck:      Musculoskeletal: Normal range of motion and neck supple.   Cardiovascular:      Rate and Rhythm: Normal rate and regular rhythm.      Pulses: Normal pulses.      Heart sounds: Normal heart sounds.   Pulmonary:      Effort: Pulmonary effort is normal.      Breath sounds: Normal breath sounds.   Abdominal:      Palpations: Abdomen is soft.      Tenderness: There is no abdominal tenderness.   Musculoskeletal:         General: No deformity.   Skin:     General: Skin is warm and dry.   Neurological:      Mental Status: He is alert and oriented to person, place, and time.   Psychiatric:         Speech: Speech normal.           DATA:     Laboratory:  CBC:  Recent Labs   Lab 08/09/19  2310 08/10/19  0740 11/22/19  0830   WBC 6.71 7.50 7.93   Hemoglobin 13.4 L 14.8 15.1   Hematocrit 40.2 44.1 43.7   Platelets 256 " 275 318       CHEMISTRIES:  Recent Labs   Lab 05/07/19  0610 05/12/19  0140 08/09/19  2310 08/10/19  0740 11/22/19  0830   Glucose 102 114 H 156 H 139 H 193 H   Sodium 138 137 136 137 132 L   Potassium 4.0 3.5 3.4 L 3.5 3.6   BUN 13 13 15 12 12   Creatinine 0.9 1.1 1.1 0.9 1.2   eGFR if  >60 >60 >60 >60 >60   eGFR if non African American >60 >60 >60 >60 56 A   Calcium 9.2 8.9 8.7 9.0 9.7   Magnesium 2.0 1.9  --   --   --        CARDIAC BIOMARKERS:  Recent Labs   Lab 08/10/19  1952 11/22/19  0830 11/22/19  1050   Troponin I 0.253 H 0.013 0.013       COAGS:        LIPIDS/LFTS:  Recent Labs   Lab 05/06/19  1045  08/09/19  2310 08/10/19  0740 11/22/19  0830   Cholesterol 134  --   --   --   --    Triglycerides 80  --   --   --   --    HDL 45  --   --   --   --    LDL Cholesterol 73.0  --   --   --   --    Non-HDL Cholesterol 89  --   --   --   --    AST  --    < > 14 17 22   ALT  --    < > 19 22 22    < > = values in this interval not displayed.       Hemoglobin A1C   Date Value Ref Range Status   05/07/2019 7.0 (H) 4.0 - 5.6 % Final     Comment:     ADA Screening Guidelines:  5.7-6.4%  Consistent with prediabetes  >or=6.5%  Consistent with diabetes  High levels of fetal hemoglobin interfere with the HbA1C  assay. Heterozygous hemoglobin variants (HbS, HgC, etc)do  not significantly interfere with this assay.   However, presence of multiple variants may affect accuracy.     06/19/2018 7.3 (H) 4.0 - 5.6 % Final     Comment:     ADA Screening Guidelines:  5.7-6.4%  Consistent with prediabetes  >or=6.5%  Consistent with diabetes  High levels of fetal hemoglobin interfere with the HbA1C  assay. Heterozygous hemoglobin variants (HbS, HgC, etc)do  not significantly interfere with this assay.   However, presence of multiple variants may affect accuracy.       Hemoglobin A1c   Date Value Ref Range Status   11/19/2020 6.5 (H) <5.7 % of total Hgb Final     Comment:     For someone without known diabetes, a  hemoglobin A1c  value of 6.5% or greater indicates that they may have   diabetes and this should be confirmed with a follow-up   test.    For someone with known diabetes, a value <7% indicates   that their diabetes is well controlled and a value   greater than or equal to 7% indicates suboptimal   control. A1c targets should be individualized based on   duration of diabetes, age, comorbid conditions, and   other considerations.    Currently, no consensus exists regarding use of  hemoglobin A1c for diagnosis of diabetes for children.       07/09/2020 6.5 (H) <5.7 % of total Hgb Final     Comment:     For someone without known diabetes, a hemoglobin A1c  value of 6.5% or greater indicates that they may have   diabetes and this should be confirmed with a follow-up   test.    For someone with known diabetes, a value <7% indicates   that their diabetes is well controlled and a value   greater than or equal to 7% indicates suboptimal   control. A1c targets should be individualized based on   duration of diabetes, age, comorbid conditions, and   other considerations.    Currently, no consensus exists regarding use of  hemoglobin A1c for diagnosis of diabetes for children.       02/12/2020 6.5 (H) <5.7 % of total Hgb Final     Comment:     For someone without known diabetes, a hemoglobin A1c  value of 6.5% or greater indicates that they may have   diabetes and this should be confirmed with a follow-up   test.    For someone with known diabetes, a value <7% indicates   that their diabetes is well controlled and a value   greater than or equal to 7% indicates suboptimal   control. A1c targets should be individualized based on   duration of diabetes, age, comorbid conditions, and   other considerations.    Currently, no consensus exists regarding use of  hemoglobin A1c for diagnosis of diabetes for children.           TSH        The ASCVD Risk score (Waucoma JASIEL Jr., et al., 2013) failed to calculate for the following reasons:     The 2013 ASCVD risk score is only valid for ages 40 to 79           Cardiovascular Testing:    EKG: (personally reviewed tracing)  May 2019:  Normal sinus rhythm, left axis deviation.  Abnormal EKG.    2D echocardiogram May 2019:  · Normal left ventricular systolic function. The estimated ejection fraction is 65%  · Normal LV diastolic function.  · Mild left atrial enlargement.  · Normal central venous pressure (3 mm Hg).  · The estimated PA systolic pressure is 10 mm Hg       Coronary angiogram 6th May 2019:    No acute thrombotic lesion identified.  Coronary artery disease as described below  LVEDP: 12 mmHg    Carotid ultrasound May 28, 2019:    · There is 20-39% right Internal Carotid Stenosis.  · There is 20-39% left Internal Carotid Stenosis.     ABIs May 28, 2019:    Noncompressible LOW bilaterally.  Mildly decrease TBI bilaterally.  Normal PVR waveforms bilaterally.       Coronary Anatomy:  LM:  No significant stenosis  LAD:  Mid LAD 60-70% stenosis.  Distal LAD 50% stenosis.  IFR 0.92 (nonischemic).  Diagonal 1 is a tortuous vessel with mid 80% stenosis.  Will medically manage this diagonal stenosis  LCx :  Mild nonobstructive CAD  RCA:  50% distal RCA/proximal PDA lesion.  IFR 1 (nonischemic)     Impression / Plan  Coronary artery disease as described above.  Continue medical management with aggressive risk factor modification.  Continue aspirin 81 mg daily. Change simvastatin to atorvastatin 80 mg daily.        ASSESSMENT AND PLAN     Patient Active Problem List   Diagnosis    Essential hypertension, benign    Type 2 diabetes mellitus    Hyperlipidemia    Body mass index 28.0-28.9, adult    History of prostate cancer    Orthostatic hypotension    Diabetic ulcer of toe of left foot associated with type 2 diabetes mellitus, with fat layer exposed    SCC (squamous cell carcinoma)    CAD (coronary artery disease)    Unstable angina    Palpitations    Elevated troponin       1.  Patient with  coronary artery disease, now angina free.  Being managed medically.  Continue current medical therapy.    2.  Hypertension:  Well controlled on current therapy    3.  Cardiovascular screening with rest and stress ABIs and carotid ultrasound performed.  ABIs demonstrated noncompressible ABIs bilaterally, mildly decreased ABIs bilaterally with normal PVR waveforms bilaterally.  I got a peripheral ultrasound for further evaluation of his abnormal TBI.   ultrasound did not show any flow restriction in the right or the left lower extremity.    4.  Dyslipidemia:  Continue medical management with atorvastatin    6.  May hold Plavix as needed for procedures.      Thank you very much for involving me in the care of your patient.  Please do not hesitate to contact me if there are any questions.      Rashad Nieto MD, FACC, [facility]  Interventional Cardiologist, [facility].     Follow-up in 3 months      This note was dictated with the help of speech recognition software.  There might be un-intended errors and/or substitutions.

## 2021-01-03 ENCOUNTER — NURSE TRIAGE (OUTPATIENT)
Dept: ADMINISTRATIVE | Facility: CLINIC | Age: 86
End: 2021-01-03

## 2021-02-13 ENCOUNTER — IMMUNIZATION (OUTPATIENT)
Dept: PHARMACY | Facility: CLINIC | Age: 86
End: 2021-02-13
Payer: MEDICARE

## 2021-02-13 DIAGNOSIS — Z23 NEED FOR VACCINATION: Primary | ICD-10-CM

## 2021-02-23 ENCOUNTER — OFFICE VISIT (OUTPATIENT)
Dept: CARDIOLOGY | Facility: CLINIC | Age: 86
End: 2021-02-23
Payer: MEDICARE

## 2021-02-23 VITALS
BODY MASS INDEX: 30.37 KG/M2 | HEIGHT: 68 IN | WEIGHT: 200.38 LBS | OXYGEN SATURATION: 95 % | DIASTOLIC BLOOD PRESSURE: 82 MMHG | SYSTOLIC BLOOD PRESSURE: 134 MMHG | HEART RATE: 74 BPM

## 2021-02-23 DIAGNOSIS — E11.9 TYPE 2 DIABETES MELLITUS WITHOUT COMPLICATION, WITHOUT LONG-TERM CURRENT USE OF INSULIN: ICD-10-CM

## 2021-02-23 DIAGNOSIS — E78.2 MIXED HYPERLIPIDEMIA: ICD-10-CM

## 2021-02-23 DIAGNOSIS — I25.10 CORONARY ARTERY DISEASE INVOLVING NATIVE CORONARY ARTERY OF NATIVE HEART WITHOUT ANGINA PECTORIS: ICD-10-CM

## 2021-02-23 DIAGNOSIS — I10 ESSENTIAL HYPERTENSION, BENIGN: Primary | ICD-10-CM

## 2021-02-23 PROCEDURE — 3288F FALL RISK ASSESSMENT DOCD: CPT | Mod: CPTII,S$GLB,, | Performed by: INTERNAL MEDICINE

## 2021-02-23 PROCEDURE — 99999 PR PBB SHADOW E&M-EST. PATIENT-LVL IV: CPT | Mod: PBBFAC,,, | Performed by: INTERNAL MEDICINE

## 2021-02-23 PROCEDURE — 1159F MED LIST DOCD IN RCRD: CPT | Mod: S$GLB,,, | Performed by: INTERNAL MEDICINE

## 2021-02-23 PROCEDURE — 3075F PR MOST RECENT SYSTOLIC BLOOD PRESS GE 130-139MM HG: ICD-10-PCS | Mod: CPTII,S$GLB,, | Performed by: INTERNAL MEDICINE

## 2021-02-23 PROCEDURE — 3288F PR FALLS RISK ASSESSMENT DOCUMENTED: ICD-10-PCS | Mod: CPTII,S$GLB,, | Performed by: INTERNAL MEDICINE

## 2021-02-23 PROCEDURE — 1101F PR PT FALLS ASSESS DOC 0-1 FALLS W/OUT INJ PAST YR: ICD-10-PCS | Mod: CPTII,S$GLB,, | Performed by: INTERNAL MEDICINE

## 2021-02-23 PROCEDURE — 3075F SYST BP GE 130 - 139MM HG: CPT | Mod: CPTII,S$GLB,, | Performed by: INTERNAL MEDICINE

## 2021-02-23 PROCEDURE — 99214 OFFICE O/P EST MOD 30 MIN: CPT | Mod: S$GLB,,, | Performed by: INTERNAL MEDICINE

## 2021-02-23 PROCEDURE — 1126F PR PAIN SEVERITY QUANTIFIED, NO PAIN PRESENT: ICD-10-PCS | Mod: S$GLB,,, | Performed by: INTERNAL MEDICINE

## 2021-02-23 PROCEDURE — 1101F PT FALLS ASSESS-DOCD LE1/YR: CPT | Mod: CPTII,S$GLB,, | Performed by: INTERNAL MEDICINE

## 2021-02-23 PROCEDURE — 93000 ELECTROCARDIOGRAM COMPLETE: CPT | Mod: S$GLB,,, | Performed by: INTERNAL MEDICINE

## 2021-02-23 PROCEDURE — 3079F DIAST BP 80-89 MM HG: CPT | Mod: CPTII,S$GLB,, | Performed by: INTERNAL MEDICINE

## 2021-02-23 PROCEDURE — 99214 PR OFFICE/OUTPT VISIT, EST, LEVL IV, 30-39 MIN: ICD-10-PCS | Mod: S$GLB,,, | Performed by: INTERNAL MEDICINE

## 2021-02-23 PROCEDURE — 1159F PR MEDICATION LIST DOCUMENTED IN MEDICAL RECORD: ICD-10-PCS | Mod: S$GLB,,, | Performed by: INTERNAL MEDICINE

## 2021-02-23 PROCEDURE — 99999 PR PBB SHADOW E&M-EST. PATIENT-LVL IV: ICD-10-PCS | Mod: PBBFAC,,, | Performed by: INTERNAL MEDICINE

## 2021-02-23 PROCEDURE — 1126F AMNT PAIN NOTED NONE PRSNT: CPT | Mod: S$GLB,,, | Performed by: INTERNAL MEDICINE

## 2021-02-23 PROCEDURE — 93000 EKG 12-LEAD: ICD-10-PCS | Mod: S$GLB,,, | Performed by: INTERNAL MEDICINE

## 2021-02-23 PROCEDURE — 3079F PR MOST RECENT DIASTOLIC BLOOD PRESSURE 80-89 MM HG: ICD-10-PCS | Mod: CPTII,S$GLB,, | Performed by: INTERNAL MEDICINE

## 2021-03-13 ENCOUNTER — IMMUNIZATION (OUTPATIENT)
Dept: PHARMACY | Facility: CLINIC | Age: 86
End: 2021-03-13
Payer: MEDICARE

## 2021-03-13 DIAGNOSIS — Z23 NEED FOR VACCINATION: Primary | ICD-10-CM

## 2021-03-18 RX ORDER — ISOSORBIDE MONONITRATE 60 MG/1
60 TABLET, EXTENDED RELEASE ORAL DAILY
Qty: 90 TABLET | Refills: 3 | Status: SHIPPED | OUTPATIENT
Start: 2021-03-18 | End: 2022-02-10 | Stop reason: SDUPTHER

## 2021-03-18 RX ORDER — METOPROLOL SUCCINATE 50 MG/1
50 TABLET, EXTENDED RELEASE ORAL DAILY
Qty: 90 TABLET | Refills: 3 | Status: SHIPPED | OUTPATIENT
Start: 2021-03-18 | End: 2022-02-10 | Stop reason: SDUPTHER

## 2021-04-12 RX ORDER — AMLODIPINE BESYLATE 10 MG/1
10 TABLET ORAL DAILY
Qty: 90 TABLET | Refills: 3 | Status: SHIPPED | OUTPATIENT
Start: 2021-04-12 | End: 2022-02-10 | Stop reason: SDUPTHER

## 2021-06-07 RX ORDER — ATORVASTATIN CALCIUM 40 MG/1
40 TABLET, FILM COATED ORAL DAILY
Qty: 90 TABLET | Refills: 3 | Status: SHIPPED | OUTPATIENT
Start: 2021-06-07 | End: 2022-02-10 | Stop reason: SDUPTHER

## 2021-06-22 ENCOUNTER — OFFICE VISIT (OUTPATIENT)
Dept: CARDIOLOGY | Facility: CLINIC | Age: 86
End: 2021-06-22
Payer: MEDICARE

## 2021-06-22 VITALS
HEIGHT: 68 IN | SYSTOLIC BLOOD PRESSURE: 138 MMHG | HEART RATE: 80 BPM | DIASTOLIC BLOOD PRESSURE: 80 MMHG | OXYGEN SATURATION: 97 % | WEIGHT: 195.56 LBS | BODY MASS INDEX: 29.64 KG/M2

## 2021-06-22 DIAGNOSIS — I25.10 CORONARY ARTERY DISEASE INVOLVING NATIVE CORONARY ARTERY OF NATIVE HEART WITHOUT ANGINA PECTORIS: ICD-10-CM

## 2021-06-22 DIAGNOSIS — E11.621 DIABETIC ULCER OF TOE OF LEFT FOOT ASSOCIATED WITH TYPE 2 DIABETES MELLITUS, WITH FAT LAYER EXPOSED: ICD-10-CM

## 2021-06-22 DIAGNOSIS — L97.522 DIABETIC ULCER OF TOE OF LEFT FOOT ASSOCIATED WITH TYPE 2 DIABETES MELLITUS, WITH FAT LAYER EXPOSED: ICD-10-CM

## 2021-06-22 DIAGNOSIS — R00.2 PALPITATIONS: ICD-10-CM

## 2021-06-22 DIAGNOSIS — I20.0 UNSTABLE ANGINA: ICD-10-CM

## 2021-06-22 DIAGNOSIS — C44.92 SCC (SQUAMOUS CELL CARCINOMA): ICD-10-CM

## 2021-06-22 DIAGNOSIS — E78.2 MIXED HYPERLIPIDEMIA: ICD-10-CM

## 2021-06-22 DIAGNOSIS — R79.89 ELEVATED TROPONIN: ICD-10-CM

## 2021-06-22 DIAGNOSIS — E11.9 TYPE 2 DIABETES MELLITUS WITHOUT COMPLICATION, WITHOUT LONG-TERM CURRENT USE OF INSULIN: ICD-10-CM

## 2021-06-22 DIAGNOSIS — I10 ESSENTIAL HYPERTENSION, BENIGN: Primary | ICD-10-CM

## 2021-06-22 PROCEDURE — 3288F FALL RISK ASSESSMENT DOCD: CPT | Mod: CPTII,S$GLB,, | Performed by: INTERNAL MEDICINE

## 2021-06-22 PROCEDURE — 1159F PR MEDICATION LIST DOCUMENTED IN MEDICAL RECORD: ICD-10-PCS | Mod: S$GLB,,, | Performed by: INTERNAL MEDICINE

## 2021-06-22 PROCEDURE — 1126F PR PAIN SEVERITY QUANTIFIED, NO PAIN PRESENT: ICD-10-PCS | Mod: S$GLB,,, | Performed by: INTERNAL MEDICINE

## 2021-06-22 PROCEDURE — 99214 OFFICE O/P EST MOD 30 MIN: CPT | Mod: S$GLB,,, | Performed by: INTERNAL MEDICINE

## 2021-06-22 PROCEDURE — 99999 PR PBB SHADOW E&M-EST. PATIENT-LVL IV: CPT | Mod: PBBFAC,,, | Performed by: INTERNAL MEDICINE

## 2021-06-22 PROCEDURE — 99999 PR PBB SHADOW E&M-EST. PATIENT-LVL IV: ICD-10-PCS | Mod: PBBFAC,,, | Performed by: INTERNAL MEDICINE

## 2021-06-22 PROCEDURE — 3079F DIAST BP 80-89 MM HG: CPT | Mod: CPTII,S$GLB,, | Performed by: INTERNAL MEDICINE

## 2021-06-22 PROCEDURE — 1159F MED LIST DOCD IN RCRD: CPT | Mod: S$GLB,,, | Performed by: INTERNAL MEDICINE

## 2021-06-22 PROCEDURE — 3075F PR MOST RECENT SYSTOLIC BLOOD PRESS GE 130-139MM HG: ICD-10-PCS | Mod: CPTII,S$GLB,, | Performed by: INTERNAL MEDICINE

## 2021-06-22 PROCEDURE — 99214 PR OFFICE/OUTPT VISIT, EST, LEVL IV, 30-39 MIN: ICD-10-PCS | Mod: S$GLB,,, | Performed by: INTERNAL MEDICINE

## 2021-06-22 PROCEDURE — 3075F SYST BP GE 130 - 139MM HG: CPT | Mod: CPTII,S$GLB,, | Performed by: INTERNAL MEDICINE

## 2021-06-22 PROCEDURE — 1126F AMNT PAIN NOTED NONE PRSNT: CPT | Mod: S$GLB,,, | Performed by: INTERNAL MEDICINE

## 2021-06-22 PROCEDURE — 3079F PR MOST RECENT DIASTOLIC BLOOD PRESSURE 80-89 MM HG: ICD-10-PCS | Mod: CPTII,S$GLB,, | Performed by: INTERNAL MEDICINE

## 2021-06-22 PROCEDURE — 3288F PR FALLS RISK ASSESSMENT DOCUMENTED: ICD-10-PCS | Mod: CPTII,S$GLB,, | Performed by: INTERNAL MEDICINE

## 2021-06-22 PROCEDURE — 1101F PT FALLS ASSESS-DOCD LE1/YR: CPT | Mod: CPTII,S$GLB,, | Performed by: INTERNAL MEDICINE

## 2021-06-22 PROCEDURE — 1101F PR PT FALLS ASSESS DOC 0-1 FALLS W/OUT INJ PAST YR: ICD-10-PCS | Mod: CPTII,S$GLB,, | Performed by: INTERNAL MEDICINE

## 2021-07-02 RX ORDER — CLOPIDOGREL BISULFATE 75 MG/1
75 TABLET ORAL DAILY
Qty: 30 TABLET | Refills: 11 | Status: SHIPPED | OUTPATIENT
Start: 2021-07-02 | End: 2021-07-06 | Stop reason: SDUPTHER

## 2021-07-07 RX ORDER — CLOPIDOGREL BISULFATE 75 MG/1
75 TABLET ORAL DAILY
Qty: 90 TABLET | Refills: 3 | Status: SHIPPED | OUTPATIENT
Start: 2021-07-07 | End: 2022-05-27 | Stop reason: SDUPTHER

## 2021-07-24 ENCOUNTER — HOSPITAL ENCOUNTER (INPATIENT)
Facility: HOSPITAL | Age: 86
LOS: 2 days | Discharge: HOME | DRG: 247 | End: 2021-07-27
Attending: STUDENT IN AN ORGANIZED HEALTH CARE EDUCATION/TRAINING PROGRAM | Admitting: HOSPITALIST
Payer: MEDICARE

## 2021-07-24 DIAGNOSIS — I25.10 CAD (CORONARY ARTERY DISEASE): ICD-10-CM

## 2021-07-24 DIAGNOSIS — I21.4 NSTEMI (NON-ST ELEVATED MYOCARDIAL INFARCTION): Primary | ICD-10-CM

## 2021-07-24 DIAGNOSIS — R07.9 CHEST PAIN: ICD-10-CM

## 2021-07-24 LAB
ALBUMIN SERPL BCP-MCNC: 3.6 G/DL (ref 3.5–5.2)
ALP SERPL-CCNC: 75 U/L (ref 55–135)
ALT SERPL W/O P-5'-P-CCNC: 11 U/L (ref 10–44)
ANION GAP SERPL CALC-SCNC: 10 MMOL/L (ref 8–16)
AST SERPL-CCNC: 15 U/L (ref 10–40)
BASOPHILS # BLD AUTO: 0.03 K/UL (ref 0–0.2)
BASOPHILS NFR BLD: 0.3 % (ref 0–1.9)
BILIRUB SERPL-MCNC: 0.3 MG/DL (ref 0.1–1)
BNP SERPL-MCNC: 146 PG/ML (ref 0–99)
BUN SERPL-MCNC: 18 MG/DL (ref 8–23)
CALCIUM SERPL-MCNC: 9.3 MG/DL (ref 8.7–10.5)
CHLORIDE SERPL-SCNC: 104 MMOL/L (ref 95–110)
CO2 SERPL-SCNC: 24 MMOL/L (ref 23–29)
CREAT SERPL-MCNC: 1.2 MG/DL (ref 0.5–1.4)
DIFFERENTIAL METHOD: ABNORMAL
EOSINOPHIL # BLD AUTO: 0.2 K/UL (ref 0–0.5)
EOSINOPHIL NFR BLD: 1.9 % (ref 0–8)
ERYTHROCYTE [DISTWIDTH] IN BLOOD BY AUTOMATED COUNT: 15.3 % (ref 11.5–14.5)
EST. GFR  (AFRICAN AMERICAN): >60 ML/MIN/1.73 M^2
EST. GFR  (NON AFRICAN AMERICAN): 56 ML/MIN/1.73 M^2
GLUCOSE SERPL-MCNC: 124 MG/DL (ref 70–110)
HCT VFR BLD AUTO: 35.9 % (ref 40–54)
HGB BLD-MCNC: 12.1 G/DL (ref 14–18)
IMM GRANULOCYTES # BLD AUTO: 0.04 K/UL (ref 0–0.04)
IMM GRANULOCYTES NFR BLD AUTO: 0.5 % (ref 0–0.5)
LYMPHOCYTES # BLD AUTO: 2.4 K/UL (ref 1–4.8)
LYMPHOCYTES NFR BLD: 27.3 % (ref 18–48)
MCH RBC QN AUTO: 30.9 PG (ref 27–31)
MCHC RBC AUTO-ENTMCNC: 33.7 G/DL (ref 32–36)
MCV RBC AUTO: 92 FL (ref 82–98)
MONOCYTES # BLD AUTO: 1 K/UL (ref 0.3–1)
MONOCYTES NFR BLD: 10.8 % (ref 4–15)
NEUTROPHILS # BLD AUTO: 5.2 K/UL (ref 1.8–7.7)
NEUTROPHILS NFR BLD: 59.2 % (ref 38–73)
NRBC BLD-RTO: 0 /100 WBC
PLATELET # BLD AUTO: 251 K/UL (ref 150–450)
PMV BLD AUTO: 9.7 FL (ref 9.2–12.9)
POTASSIUM SERPL-SCNC: 3.5 MMOL/L (ref 3.5–5.1)
PROT SERPL-MCNC: 6.7 G/DL (ref 6–8.4)
RBC # BLD AUTO: 3.91 M/UL (ref 4.6–6.2)
SODIUM SERPL-SCNC: 138 MMOL/L (ref 136–145)
TROPONIN I SERPL DL<=0.01 NG/ML-MCNC: <0.006 NG/ML (ref 0–0.03)
WBC # BLD AUTO: 8.8 K/UL (ref 3.9–12.7)

## 2021-07-24 PROCEDURE — 84484 ASSAY OF TROPONIN QUANT: CPT | Performed by: STUDENT IN AN ORGANIZED HEALTH CARE EDUCATION/TRAINING PROGRAM

## 2021-07-24 PROCEDURE — 93010 EKG 12-LEAD: ICD-10-PCS | Mod: ,,, | Performed by: INTERNAL MEDICINE

## 2021-07-24 PROCEDURE — 83880 ASSAY OF NATRIURETIC PEPTIDE: CPT | Performed by: STUDENT IN AN ORGANIZED HEALTH CARE EDUCATION/TRAINING PROGRAM

## 2021-07-24 PROCEDURE — 93005 ELECTROCARDIOGRAM TRACING: CPT

## 2021-07-24 PROCEDURE — 85025 COMPLETE CBC W/AUTO DIFF WBC: CPT | Performed by: STUDENT IN AN ORGANIZED HEALTH CARE EDUCATION/TRAINING PROGRAM

## 2021-07-24 PROCEDURE — 80053 COMPREHEN METABOLIC PANEL: CPT | Performed by: STUDENT IN AN ORGANIZED HEALTH CARE EDUCATION/TRAINING PROGRAM

## 2021-07-24 PROCEDURE — 99285 EMERGENCY DEPT VISIT HI MDM: CPT | Mod: 25

## 2021-07-24 PROCEDURE — 93010 ELECTROCARDIOGRAM REPORT: CPT | Mod: ,,, | Performed by: INTERNAL MEDICINE

## 2021-07-24 RX ORDER — NITROGLYCERIN 0.4 MG/1
0.4 TABLET SUBLINGUAL EVERY 5 MIN PRN
Status: DISCONTINUED | OUTPATIENT
Start: 2021-07-25 | End: 2021-07-27 | Stop reason: HOSPADM

## 2021-07-24 RX ADMIN — NITROGLYCERIN 0.4 MG: 0.4 TABLET, ORALLY DISINTEGRATING SUBLINGUAL at 11:07

## 2021-07-25 PROBLEM — I21.4 NSTEMI (NON-ST ELEVATED MYOCARDIAL INFARCTION): Status: ACTIVE | Noted: 2021-07-25

## 2021-07-25 PROBLEM — R07.9 CHEST PAIN: Status: ACTIVE | Noted: 2021-07-25

## 2021-07-25 LAB
ANION GAP SERPL CALC-SCNC: 8 MMOL/L (ref 8–16)
APTT BLDCRRT: 25.4 SEC (ref 21–32)
APTT BLDCRRT: 45.6 SEC (ref 21–32)
BASOPHILS # BLD AUTO: 0.04 K/UL (ref 0–0.2)
BASOPHILS # BLD AUTO: 0.04 K/UL (ref 0–0.2)
BASOPHILS NFR BLD: 0.5 % (ref 0–1.9)
BASOPHILS NFR BLD: 0.6 % (ref 0–1.9)
BILIRUB UR QL STRIP: NEGATIVE
BUN SERPL-MCNC: 13 MG/DL (ref 8–23)
CALCIUM SERPL-MCNC: 8.8 MG/DL (ref 8.7–10.5)
CHLORIDE SERPL-SCNC: 103 MMOL/L (ref 95–110)
CLARITY UR: CLEAR
CO2 SERPL-SCNC: 27 MMOL/L (ref 23–29)
COLOR UR: COLORLESS
CREAT SERPL-MCNC: 1 MG/DL (ref 0.5–1.4)
CTP QC/QA: YES
DIFFERENTIAL METHOD: ABNORMAL
DIFFERENTIAL METHOD: ABNORMAL
EOSINOPHIL # BLD AUTO: 0.1 K/UL (ref 0–0.5)
EOSINOPHIL # BLD AUTO: 0.1 K/UL (ref 0–0.5)
EOSINOPHIL NFR BLD: 0.6 % (ref 0–8)
EOSINOPHIL NFR BLD: 1.1 % (ref 0–8)
ERYTHROCYTE [DISTWIDTH] IN BLOOD BY AUTOMATED COUNT: 15.1 % (ref 11.5–14.5)
ERYTHROCYTE [DISTWIDTH] IN BLOOD BY AUTOMATED COUNT: 15.3 % (ref 11.5–14.5)
EST. GFR  (AFRICAN AMERICAN): >60 ML/MIN/1.73 M^2
EST. GFR  (NON AFRICAN AMERICAN): >60 ML/MIN/1.73 M^2
FACT X PPP CHRO-ACNC: <0.1 IU/ML (ref 0.3–0.7)
GLUCOSE SERPL-MCNC: 114 MG/DL (ref 70–110)
GLUCOSE UR QL STRIP: ABNORMAL
HCT VFR BLD AUTO: 35.8 % (ref 40–54)
HCT VFR BLD AUTO: 37.8 % (ref 40–54)
HGB BLD-MCNC: 11.9 G/DL (ref 14–18)
HGB BLD-MCNC: 12.7 G/DL (ref 14–18)
HGB UR QL STRIP: NEGATIVE
IMM GRANULOCYTES # BLD AUTO: 0.04 K/UL (ref 0–0.04)
IMM GRANULOCYTES # BLD AUTO: 0.05 K/UL (ref 0–0.04)
IMM GRANULOCYTES NFR BLD AUTO: 0.6 % (ref 0–0.5)
IMM GRANULOCYTES NFR BLD AUTO: 0.6 % (ref 0–0.5)
INR PPP: 1 (ref 0.8–1.2)
KETONES UR QL STRIP: NEGATIVE
LEUKOCYTE ESTERASE UR QL STRIP: NEGATIVE
LYMPHOCYTES # BLD AUTO: 1.1 K/UL (ref 1–4.8)
LYMPHOCYTES # BLD AUTO: 1.5 K/UL (ref 1–4.8)
LYMPHOCYTES NFR BLD: 16 % (ref 18–48)
LYMPHOCYTES NFR BLD: 19.8 % (ref 18–48)
MAGNESIUM SERPL-MCNC: 1.8 MG/DL (ref 1.6–2.6)
MCH RBC QN AUTO: 30.9 PG (ref 27–31)
MCH RBC QN AUTO: 31.1 PG (ref 27–31)
MCHC RBC AUTO-ENTMCNC: 33.2 G/DL (ref 32–36)
MCHC RBC AUTO-ENTMCNC: 33.6 G/DL (ref 32–36)
MCV RBC AUTO: 92 FL (ref 82–98)
MCV RBC AUTO: 93 FL (ref 82–98)
MONOCYTES # BLD AUTO: 0.5 K/UL (ref 0.3–1)
MONOCYTES # BLD AUTO: 0.7 K/UL (ref 0.3–1)
MONOCYTES NFR BLD: 7.5 % (ref 4–15)
MONOCYTES NFR BLD: 8.7 % (ref 4–15)
NEUTROPHILS # BLD AUTO: 5.2 K/UL (ref 1.8–7.7)
NEUTROPHILS # BLD AUTO: 5.4 K/UL (ref 1.8–7.7)
NEUTROPHILS NFR BLD: 69.8 % (ref 38–73)
NEUTROPHILS NFR BLD: 74.2 % (ref 38–73)
NITRITE UR QL STRIP: NEGATIVE
NRBC BLD-RTO: 0 /100 WBC
NRBC BLD-RTO: 0 /100 WBC
PH UR STRIP: 6 [PH] (ref 5–8)
PLATELET # BLD AUTO: 249 K/UL (ref 150–450)
PLATELET # BLD AUTO: 252 K/UL (ref 150–450)
PMV BLD AUTO: 10.1 FL (ref 9.2–12.9)
PMV BLD AUTO: 9.8 FL (ref 9.2–12.9)
POCT GLUCOSE: 102 MG/DL (ref 70–110)
POCT GLUCOSE: 116 MG/DL (ref 70–110)
POCT GLUCOSE: 119 MG/DL (ref 70–110)
POCT GLUCOSE: 136 MG/DL (ref 70–110)
POCT GLUCOSE: 165 MG/DL (ref 70–110)
POTASSIUM SERPL-SCNC: 4.1 MMOL/L (ref 3.5–5.1)
PROT UR QL STRIP: NEGATIVE
PROTHROMBIN TIME: 10.6 SEC (ref 9–12.5)
RBC # BLD AUTO: 3.85 M/UL (ref 4.6–6.2)
RBC # BLD AUTO: 4.09 M/UL (ref 4.6–6.2)
SARS-COV-2 RDRP RESP QL NAA+PROBE: NEGATIVE
SODIUM SERPL-SCNC: 138 MMOL/L (ref 136–145)
SP GR UR STRIP: 1.01 (ref 1–1.03)
TROPONIN I SERPL DL<=0.01 NG/ML-MCNC: 0.02 NG/ML (ref 0–0.03)
TROPONIN I SERPL DL<=0.01 NG/ML-MCNC: 0.14 NG/ML (ref 0–0.03)
TROPONIN I SERPL DL<=0.01 NG/ML-MCNC: 0.4 NG/ML (ref 0–0.03)
TROPONIN I SERPL DL<=0.01 NG/ML-MCNC: 0.65 NG/ML (ref 0–0.03)
URN SPEC COLLECT METH UR: ABNORMAL
UROBILINOGEN UR STRIP-ACNC: NEGATIVE EU/DL
WBC # BLD AUTO: 7.06 K/UL (ref 3.9–12.7)
WBC # BLD AUTO: 7.79 K/UL (ref 3.9–12.7)

## 2021-07-25 PROCEDURE — 99223 1ST HOSP IP/OBS HIGH 75: CPT | Mod: ,,, | Performed by: INTERNAL MEDICINE

## 2021-07-25 PROCEDURE — 85520 HEPARIN ASSAY: CPT | Performed by: NURSE PRACTITIONER

## 2021-07-25 PROCEDURE — 36415 COLL VENOUS BLD VENIPUNCTURE: CPT | Performed by: HOSPITALIST

## 2021-07-25 PROCEDURE — 63600175 PHARM REV CODE 636 W HCPCS: Performed by: STUDENT IN AN ORGANIZED HEALTH CARE EDUCATION/TRAINING PROGRAM

## 2021-07-25 PROCEDURE — 85025 COMPLETE CBC W/AUTO DIFF WBC: CPT | Performed by: NURSE PRACTITIONER

## 2021-07-25 PROCEDURE — 25000003 PHARM REV CODE 250: Performed by: NURSE PRACTITIONER

## 2021-07-25 PROCEDURE — 25000242 PHARM REV CODE 250 ALT 637 W/ HCPCS: Performed by: STUDENT IN AN ORGANIZED HEALTH CARE EDUCATION/TRAINING PROGRAM

## 2021-07-25 PROCEDURE — 85730 THROMBOPLASTIN TIME PARTIAL: CPT | Mod: 91 | Performed by: NURSE PRACTITIONER

## 2021-07-25 PROCEDURE — 36415 COLL VENOUS BLD VENIPUNCTURE: CPT | Performed by: NURSE PRACTITIONER

## 2021-07-25 PROCEDURE — 85025 COMPLETE CBC W/AUTO DIFF WBC: CPT | Mod: 91 | Performed by: NURSE PRACTITIONER

## 2021-07-25 PROCEDURE — 85730 THROMBOPLASTIN TIME PARTIAL: CPT | Performed by: HOSPITALIST

## 2021-07-25 PROCEDURE — U0002 COVID-19 LAB TEST NON-CDC: HCPCS | Performed by: STUDENT IN AN ORGANIZED HEALTH CARE EDUCATION/TRAINING PROGRAM

## 2021-07-25 PROCEDURE — 85610 PROTHROMBIN TIME: CPT | Performed by: NURSE PRACTITIONER

## 2021-07-25 PROCEDURE — 84484 ASSAY OF TROPONIN QUANT: CPT | Mod: 91 | Performed by: NURSE PRACTITIONER

## 2021-07-25 PROCEDURE — 63600175 PHARM REV CODE 636 W HCPCS: Performed by: NURSE PRACTITIONER

## 2021-07-25 PROCEDURE — 96374 THER/PROPH/DIAG INJ IV PUSH: CPT

## 2021-07-25 PROCEDURE — 99223 PR INITIAL HOSPITAL CARE,LEVL III: ICD-10-PCS | Mod: ,,, | Performed by: INTERNAL MEDICINE

## 2021-07-25 PROCEDURE — 25000003 PHARM REV CODE 250: Performed by: STUDENT IN AN ORGANIZED HEALTH CARE EDUCATION/TRAINING PROGRAM

## 2021-07-25 PROCEDURE — 84484 ASSAY OF TROPONIN QUANT: CPT | Performed by: STUDENT IN AN ORGANIZED HEALTH CARE EDUCATION/TRAINING PROGRAM

## 2021-07-25 PROCEDURE — 21400001 HC TELEMETRY ROOM

## 2021-07-25 PROCEDURE — 80048 BASIC METABOLIC PNL TOTAL CA: CPT | Performed by: NURSE PRACTITIONER

## 2021-07-25 PROCEDURE — 81003 URINALYSIS AUTO W/O SCOPE: CPT | Performed by: NURSE PRACTITIONER

## 2021-07-25 PROCEDURE — 83735 ASSAY OF MAGNESIUM: CPT | Performed by: NURSE PRACTITIONER

## 2021-07-25 RX ORDER — IBUPROFEN 200 MG
24 TABLET ORAL
Status: DISCONTINUED | OUTPATIENT
Start: 2021-07-25 | End: 2021-07-27 | Stop reason: HOSPADM

## 2021-07-25 RX ORDER — ESCITALOPRAM OXALATE 10 MG/1
10 TABLET ORAL DAILY
Status: DISCONTINUED | OUTPATIENT
Start: 2021-07-25 | End: 2021-07-27 | Stop reason: HOSPADM

## 2021-07-25 RX ORDER — GLUCAGON 1 MG
1 KIT INJECTION
Status: DISCONTINUED | OUTPATIENT
Start: 2021-07-25 | End: 2021-07-27 | Stop reason: HOSPADM

## 2021-07-25 RX ORDER — IBUPROFEN 200 MG
16 TABLET ORAL
Status: DISCONTINUED | OUTPATIENT
Start: 2021-07-25 | End: 2021-07-27 | Stop reason: HOSPADM

## 2021-07-25 RX ORDER — ASPIRIN 325 MG
325 TABLET ORAL DAILY
Status: DISCONTINUED | OUTPATIENT
Start: 2021-07-25 | End: 2021-07-25

## 2021-07-25 RX ORDER — ASPIRIN 81 MG/1
244 TABLET ORAL ONCE
Status: COMPLETED | OUTPATIENT
Start: 2021-07-25 | End: 2021-07-25

## 2021-07-25 RX ORDER — HEPARIN SODIUM,PORCINE/D5W 25000/250
0-40 INTRAVENOUS SOLUTION INTRAVENOUS CONTINUOUS
Status: DISCONTINUED | OUTPATIENT
Start: 2021-07-25 | End: 2021-07-25

## 2021-07-25 RX ORDER — AMLODIPINE BESYLATE 5 MG/1
10 TABLET ORAL DAILY
Status: DISCONTINUED | OUTPATIENT
Start: 2021-07-25 | End: 2021-07-27 | Stop reason: HOSPADM

## 2021-07-25 RX ORDER — ONDANSETRON 2 MG/ML
4 INJECTION INTRAMUSCULAR; INTRAVENOUS EVERY 8 HOURS PRN
Status: DISCONTINUED | OUTPATIENT
Start: 2021-07-25 | End: 2021-07-27 | Stop reason: HOSPADM

## 2021-07-25 RX ORDER — ATORVASTATIN CALCIUM 40 MG/1
40 TABLET, FILM COATED ORAL DAILY
Status: DISCONTINUED | OUTPATIENT
Start: 2021-07-25 | End: 2021-07-27 | Stop reason: HOSPADM

## 2021-07-25 RX ORDER — HYDROCHLOROTHIAZIDE 12.5 MG/1
12.5 TABLET ORAL DAILY
Status: DISCONTINUED | OUTPATIENT
Start: 2021-07-25 | End: 2021-07-25

## 2021-07-25 RX ORDER — AMOXICILLIN 250 MG
1 CAPSULE ORAL 2 TIMES DAILY
Status: DISCONTINUED | OUTPATIENT
Start: 2021-07-25 | End: 2021-07-27 | Stop reason: HOSPADM

## 2021-07-25 RX ORDER — INSULIN ASPART 100 [IU]/ML
0-5 INJECTION, SOLUTION INTRAVENOUS; SUBCUTANEOUS
Status: DISCONTINUED | OUTPATIENT
Start: 2021-07-25 | End: 2021-07-27 | Stop reason: HOSPADM

## 2021-07-25 RX ORDER — CALCIUM CARBONATE 200(500)MG
1000 TABLET,CHEWABLE ORAL EVERY 6 HOURS PRN
Status: DISCONTINUED | OUTPATIENT
Start: 2021-07-25 | End: 2021-07-27 | Stop reason: HOSPADM

## 2021-07-25 RX ORDER — ASPIRIN 81 MG/1
81 TABLET ORAL DAILY
Status: DISCONTINUED | OUTPATIENT
Start: 2021-07-25 | End: 2021-07-27 | Stop reason: HOSPADM

## 2021-07-25 RX ORDER — MORPHINE SULFATE 4 MG/ML
5 INJECTION, SOLUTION INTRAMUSCULAR; INTRAVENOUS
Status: COMPLETED | OUTPATIENT
Start: 2021-07-25 | End: 2021-07-25

## 2021-07-25 RX ORDER — HYDRALAZINE HYDROCHLORIDE 20 MG/ML
10 INJECTION INTRAMUSCULAR; INTRAVENOUS ONCE
Status: DISCONTINUED | OUTPATIENT
Start: 2021-07-25 | End: 2021-07-26

## 2021-07-25 RX ORDER — ISOSORBIDE MONONITRATE 30 MG/1
60 TABLET, EXTENDED RELEASE ORAL DAILY
Status: DISCONTINUED | OUTPATIENT
Start: 2021-07-25 | End: 2021-07-27 | Stop reason: HOSPADM

## 2021-07-25 RX ORDER — CLOPIDOGREL BISULFATE 75 MG/1
75 TABLET ORAL DAILY
Status: DISCONTINUED | OUTPATIENT
Start: 2021-07-25 | End: 2021-07-27 | Stop reason: HOSPADM

## 2021-07-25 RX ORDER — ACETAMINOPHEN 325 MG/1
650 TABLET ORAL EVERY 4 HOURS PRN
Status: DISCONTINUED | OUTPATIENT
Start: 2021-07-25 | End: 2021-07-27 | Stop reason: HOSPADM

## 2021-07-25 RX ORDER — HEPARIN SODIUM,PORCINE/D5W 25000/250
0-40 INTRAVENOUS SOLUTION INTRAVENOUS CONTINUOUS
Status: DISCONTINUED | OUTPATIENT
Start: 2021-07-25 | End: 2021-07-26

## 2021-07-25 RX ORDER — METOPROLOL SUCCINATE 50 MG/1
50 TABLET, EXTENDED RELEASE ORAL DAILY
Status: DISCONTINUED | OUTPATIENT
Start: 2021-07-25 | End: 2021-07-27 | Stop reason: HOSPADM

## 2021-07-25 RX ORDER — ENOXAPARIN SODIUM 100 MG/ML
40 INJECTION SUBCUTANEOUS EVERY 24 HOURS
Status: DISCONTINUED | OUTPATIENT
Start: 2021-07-25 | End: 2021-07-27 | Stop reason: HOSPADM

## 2021-07-25 RX ORDER — SODIUM CHLORIDE 0.9 % (FLUSH) 0.9 %
10 SYRINGE (ML) INJECTION
Status: DISCONTINUED | OUTPATIENT
Start: 2021-07-25 | End: 2021-07-27 | Stop reason: HOSPADM

## 2021-07-25 RX ADMIN — ENOXAPARIN SODIUM 40 MG: 100 INJECTION SUBCUTANEOUS at 05:07

## 2021-07-25 RX ADMIN — METOPROLOL SUCCINATE 50 MG: 50 TABLET, EXTENDED RELEASE ORAL at 10:07

## 2021-07-25 RX ADMIN — DOCUSATE SODIUM 50 MG AND SENNOSIDES 8.6 MG 1 TABLET: 8.6; 5 TABLET, FILM COATED ORAL at 10:07

## 2021-07-25 RX ADMIN — AMLODIPINE BESYLATE 10 MG: 5 TABLET ORAL at 10:07

## 2021-07-25 RX ADMIN — HEPARIN SODIUM AND DEXTROSE 12 UNITS/KG/HR: 10000; 5 INJECTION INTRAVENOUS at 03:07

## 2021-07-25 RX ADMIN — NITROGLYCERIN 0.4 MG: 0.4 TABLET, ORALLY DISINTEGRATING SUBLINGUAL at 12:07

## 2021-07-25 RX ADMIN — ATORVASTATIN CALCIUM 40 MG: 40 TABLET, FILM COATED ORAL at 10:07

## 2021-07-25 RX ADMIN — CLOPIDOGREL 75 MG: 75 TABLET, FILM COATED ORAL at 10:07

## 2021-07-25 RX ADMIN — ASPIRIN 81 MG: 81 TABLET, COATED ORAL at 10:07

## 2021-07-25 RX ADMIN — ESCITALOPRAM OXALATE 10 MG: 10 TABLET ORAL at 10:07

## 2021-07-25 RX ADMIN — ISOSORBIDE MONONITRATE 60 MG: 30 TABLET, EXTENDED RELEASE ORAL at 10:07

## 2021-07-25 RX ADMIN — ASPIRIN 243 MG: 81 TABLET, COATED ORAL at 12:07

## 2021-07-25 RX ADMIN — DOCUSATE SODIUM 50 MG AND SENNOSIDES 8.6 MG 1 TABLET: 8.6; 5 TABLET, FILM COATED ORAL at 09:07

## 2021-07-25 RX ADMIN — MORPHINE SULFATE 5 MG: 4 INJECTION, SOLUTION INTRAMUSCULAR; INTRAVENOUS at 01:07

## 2021-07-26 LAB
ALBUMIN SERPL BCP-MCNC: 3.5 G/DL (ref 3.5–5.2)
ALLENS TEST: ABNORMAL
ALP SERPL-CCNC: 88 U/L (ref 55–135)
ALT SERPL W/O P-5'-P-CCNC: 12 U/L (ref 10–44)
ANION GAP SERPL CALC-SCNC: 10 MMOL/L (ref 8–16)
AORTIC ROOT ANNULUS: 4.25 CM
AORTIC VALVE CUSP SEPERATION: 2.52 CM
APTT BLDCRRT: 39.7 SEC (ref 21–32)
ASCENDING AORTA: 3.58 CM
AST SERPL-CCNC: 19 U/L (ref 10–40)
AV INDEX (PROSTH): 0.89
AV MEAN GRADIENT: 2 MMHG
AV PEAK GRADIENT: 4 MMHG
AV VALVE AREA: 4.02 CM2
AV VELOCITY RATIO: 1.05
BASOPHILS # BLD AUTO: 0.04 K/UL (ref 0–0.2)
BASOPHILS NFR BLD: 0.4 % (ref 0–1.9)
BILIRUB SERPL-MCNC: 0.5 MG/DL (ref 0.1–1)
BSA FOR ECHO PROCEDURE: 2.06 M2
BUN SERPL-MCNC: 12 MG/DL (ref 8–23)
CALCIUM SERPL-MCNC: 9.2 MG/DL (ref 8.7–10.5)
CHLORIDE SERPL-SCNC: 104 MMOL/L (ref 95–110)
CO2 SERPL-SCNC: 23 MMOL/L (ref 23–29)
CREAT SERPL-MCNC: 0.9 MG/DL (ref 0.5–1.4)
CV ECHO LV RWT: 0.38 CM
DIFFERENTIAL METHOD: ABNORMAL
DOP CALC AO PEAK VEL: 0.97 M/S
DOP CALC AO VTI: 21.84 CM
DOP CALC LVOT AREA: 4.5 CM2
DOP CALC LVOT DIAMETER: 2.4 CM
DOP CALC LVOT PEAK VEL: 1.02 M/S
DOP CALC LVOT STROKE VOLUME: 87.9 CM3
DOP CALCLVOT PEAK VEL VTI: 19.44 CM
E WAVE DECELERATION TIME: 256.19 MSEC
E/A RATIO: 0.62
E/E' RATIO: 8.73 M/S
ECHO LV POSTERIOR WALL: 0.99 CM (ref 0.6–1.1)
EJECTION FRACTION: 60 %
EOSINOPHIL # BLD AUTO: 0.1 K/UL (ref 0–0.5)
EOSINOPHIL NFR BLD: 1.3 % (ref 0–8)
ERYTHROCYTE [DISTWIDTH] IN BLOOD BY AUTOMATED COUNT: 15.4 % (ref 11.5–14.5)
EST. GFR  (AFRICAN AMERICAN): >60 ML/MIN/1.73 M^2
EST. GFR  (NON AFRICAN AMERICAN): >60 ML/MIN/1.73 M^2
FRACTIONAL SHORTENING: 28 % (ref 28–44)
GLUCOSE SERPL-MCNC: 126 MG/DL (ref 70–110)
HCT VFR BLD AUTO: 39 % (ref 40–54)
HGB BLD-MCNC: 13.2 G/DL (ref 14–18)
IMM GRANULOCYTES # BLD AUTO: 0.04 K/UL (ref 0–0.04)
IMM GRANULOCYTES NFR BLD AUTO: 0.4 % (ref 0–0.5)
INTERVENTRICULAR SEPTUM: 0.99 CM (ref 0.6–1.1)
IVRT: 147.64 MSEC
LA MAJOR: 4.93 CM
LA MINOR: 4.99 CM
LA WIDTH: 3.77 CM
LEFT ATRIUM SIZE: 4.05 CM
LEFT ATRIUM VOLUME INDEX: 31.7 ML/M2
LEFT ATRIUM VOLUME: 64.37 CM3
LEFT INTERNAL DIMENSION IN SYSTOLE: 3.7 CM (ref 2.1–4)
LEFT VENTRICLE DIASTOLIC VOLUME INDEX: 62.46 ML/M2
LEFT VENTRICLE DIASTOLIC VOLUME: 126.8 ML
LEFT VENTRICLE MASS INDEX: 93 G/M2
LEFT VENTRICLE SYSTOLIC VOLUME INDEX: 28.7 ML/M2
LEFT VENTRICLE SYSTOLIC VOLUME: 58.18 ML
LEFT VENTRICULAR INTERNAL DIMENSION IN DIASTOLE: 5.15 CM (ref 3.5–6)
LEFT VENTRICULAR MASS: 188.53 G
LV LATERAL E/E' RATIO: 9.6 M/S
LV SEPTAL E/E' RATIO: 8 M/S
LYMPHOCYTES # BLD AUTO: 2.1 K/UL (ref 1–4.8)
LYMPHOCYTES NFR BLD: 23.5 % (ref 18–48)
MAGNESIUM SERPL-MCNC: 1.9 MG/DL (ref 1.6–2.6)
MCH RBC QN AUTO: 30.8 PG (ref 27–31)
MCHC RBC AUTO-ENTMCNC: 33.8 G/DL (ref 32–36)
MCV RBC AUTO: 91 FL (ref 82–98)
MONOCYTES # BLD AUTO: 0.7 K/UL (ref 0.3–1)
MONOCYTES NFR BLD: 7.8 % (ref 4–15)
MV PEAK A VEL: 0.78 M/S
MV PEAK E VEL: 0.48 M/S
MV STENOSIS PRESSURE HALF TIME: 74.3 MS
MV VALVE AREA P 1/2 METHOD: 2.96 CM2
NEUTROPHILS # BLD AUTO: 6 K/UL (ref 1.8–7.7)
NEUTROPHILS NFR BLD: 66.6 % (ref 38–73)
NRBC BLD-RTO: 0 /100 WBC
PISA TR MAX VEL: 0.89 M/S
PLATELET # BLD AUTO: 282 K/UL (ref 150–450)
PMV BLD AUTO: 9.8 FL (ref 9.2–12.9)
POC ACTIVATED CLOTTING TIME K: 241 SEC (ref 74–137)
POCT GLUCOSE: 112 MG/DL (ref 70–110)
POCT GLUCOSE: 124 MG/DL (ref 70–110)
POCT GLUCOSE: 129 MG/DL (ref 70–110)
POCT GLUCOSE: 138 MG/DL (ref 70–110)
POTASSIUM SERPL-SCNC: 3.4 MMOL/L (ref 3.5–5.1)
PROT SERPL-MCNC: 6.9 G/DL (ref 6–8.4)
PV PEAK VELOCITY: 1.06 CM/S
RA MAJOR: 6.35 CM
RA PRESSURE: 3 MMHG
RA WIDTH: 2.98 CM
RBC # BLD AUTO: 4.28 M/UL (ref 4.6–6.2)
RIGHT VENTRICULAR END-DIASTOLIC DIMENSION: 3.83 CM
RV TISSUE DOPPLER FREE WALL SYSTOLIC VELOCITY 1 (APICAL 4 CHAMBER VIEW): 10.17 CM/S
SAMPLE: ABNORMAL
SINUS: 4.05 CM
SITE: ABNORMAL
SODIUM SERPL-SCNC: 137 MMOL/L (ref 136–145)
STJ: 3.42 CM
TDI LATERAL: 0.05 M/S
TDI SEPTAL: 0.06 M/S
TDI: 0.06 M/S
TR MAX PG: 3 MMHG
TRICUSPID ANNULAR PLANE SYSTOLIC EXCURSION: 1.9 CM
TROPONIN I SERPL DL<=0.01 NG/ML-MCNC: 0.63 NG/ML (ref 0–0.03)
TV REST PULMONARY ARTERY PRESSURE: 6 MMHG
WBC # BLD AUTO: 9.03 K/UL (ref 3.9–12.7)

## 2021-07-26 PROCEDURE — 99152 PR MOD CONSCIOUS SEDATION, SAME PHYS, 5+ YRS, FIRST 15 MIN: ICD-10-PCS | Mod: ,,, | Performed by: INTERNAL MEDICINE

## 2021-07-26 PROCEDURE — 84484 ASSAY OF TROPONIN QUANT: CPT | Performed by: NURSE PRACTITIONER

## 2021-07-26 PROCEDURE — 85347 COAGULATION TIME ACTIVATED: CPT | Performed by: INTERNAL MEDICINE

## 2021-07-26 PROCEDURE — C1769 GUIDE WIRE: HCPCS | Performed by: INTERNAL MEDICINE

## 2021-07-26 PROCEDURE — 92928 PR STENT: ICD-10-PCS | Mod: RC,,, | Performed by: INTERNAL MEDICINE

## 2021-07-26 PROCEDURE — 36415 COLL VENOUS BLD VENIPUNCTURE: CPT | Performed by: HOSPITALIST

## 2021-07-26 PROCEDURE — 93458 PR CATH PLACE/CORON ANGIO, IMG SUPER/INTERP,W LEFT HEART VENTRICULOGRAPHY: ICD-10-PCS | Mod: 26,59,, | Performed by: INTERNAL MEDICINE

## 2021-07-26 PROCEDURE — 25000003 PHARM REV CODE 250: Performed by: NURSE PRACTITIONER

## 2021-07-26 PROCEDURE — C1874 STENT, COATED/COV W/DEL SYS: HCPCS | Performed by: INTERNAL MEDICINE

## 2021-07-26 PROCEDURE — C1887 CATHETER, GUIDING: HCPCS | Performed by: INTERNAL MEDICINE

## 2021-07-26 PROCEDURE — 63600175 PHARM REV CODE 636 W HCPCS: Performed by: NURSE PRACTITIONER

## 2021-07-26 PROCEDURE — 92928 PRQ TCAT PLMT NTRAC ST 1 LES: CPT | Mod: RC,,, | Performed by: INTERNAL MEDICINE

## 2021-07-26 PROCEDURE — 21400001 HC TELEMETRY ROOM

## 2021-07-26 PROCEDURE — 63600175 PHARM REV CODE 636 W HCPCS: Performed by: INTERNAL MEDICINE

## 2021-07-26 PROCEDURE — C1725 CATH, TRANSLUMIN NON-LASER: HCPCS | Performed by: INTERNAL MEDICINE

## 2021-07-26 PROCEDURE — C1894 INTRO/SHEATH, NON-LASER: HCPCS | Performed by: INTERNAL MEDICINE

## 2021-07-26 PROCEDURE — 85025 COMPLETE CBC W/AUTO DIFF WBC: CPT | Performed by: NURSE PRACTITIONER

## 2021-07-26 PROCEDURE — C9600 PERC DRUG-EL COR STENT SING: HCPCS | Mod: RC | Performed by: INTERNAL MEDICINE

## 2021-07-26 PROCEDURE — 99152 MOD SED SAME PHYS/QHP 5/>YRS: CPT | Mod: ,,, | Performed by: INTERNAL MEDICINE

## 2021-07-26 PROCEDURE — 25000003 PHARM REV CODE 250: Performed by: INTERNAL MEDICINE

## 2021-07-26 PROCEDURE — 93458 L HRT ARTERY/VENTRICLE ANGIO: CPT | Mod: 59 | Performed by: INTERNAL MEDICINE

## 2021-07-26 PROCEDURE — 99153 MOD SED SAME PHYS/QHP EA: CPT | Performed by: INTERNAL MEDICINE

## 2021-07-26 PROCEDURE — 85730 THROMBOPLASTIN TIME PARTIAL: CPT | Performed by: HOSPITALIST

## 2021-07-26 PROCEDURE — 83735 ASSAY OF MAGNESIUM: CPT | Performed by: NURSE PRACTITIONER

## 2021-07-26 PROCEDURE — 99152 MOD SED SAME PHYS/QHP 5/>YRS: CPT | Performed by: INTERNAL MEDICINE

## 2021-07-26 PROCEDURE — 80053 COMPREHEN METABOLIC PANEL: CPT | Performed by: NURSE PRACTITIONER

## 2021-07-26 PROCEDURE — 25500020 PHARM REV CODE 255: Performed by: INTERNAL MEDICINE

## 2021-07-26 PROCEDURE — 93458 L HRT ARTERY/VENTRICLE ANGIO: CPT | Mod: 26,59,, | Performed by: INTERNAL MEDICINE

## 2021-07-26 DEVICE — STENT RONYX27526UX RESOLUTE ONYX 2.75X26
Type: IMPLANTABLE DEVICE | Site: CORONARY | Status: FUNCTIONAL
Brand: RESOLUTE ONYX™

## 2021-07-26 RX ORDER — ACETAMINOPHEN 325 MG/1
650 TABLET ORAL EVERY 4 HOURS PRN
Status: DISCONTINUED | OUTPATIENT
Start: 2021-07-26 | End: 2021-07-27 | Stop reason: HOSPADM

## 2021-07-26 RX ORDER — FENTANYL CITRATE 50 UG/ML
INJECTION, SOLUTION INTRAMUSCULAR; INTRAVENOUS
Status: DISCONTINUED | OUTPATIENT
Start: 2021-07-26 | End: 2021-07-26 | Stop reason: HOSPADM

## 2021-07-26 RX ORDER — POTASSIUM CHLORIDE 20 MEQ/1
40 TABLET, EXTENDED RELEASE ORAL ONCE
Status: COMPLETED | OUTPATIENT
Start: 2021-07-26 | End: 2021-07-26

## 2021-07-26 RX ORDER — NITROGLYCERIN 20 MG/100ML
INJECTION INTRAVENOUS
Status: DISCONTINUED | OUTPATIENT
Start: 2021-07-26 | End: 2021-07-26 | Stop reason: HOSPADM

## 2021-07-26 RX ORDER — MIDAZOLAM HYDROCHLORIDE 1 MG/ML
INJECTION, SOLUTION INTRAMUSCULAR; INTRAVENOUS
Status: DISCONTINUED | OUTPATIENT
Start: 2021-07-26 | End: 2021-07-26 | Stop reason: HOSPADM

## 2021-07-26 RX ORDER — PHENYLEPHRINE HCL IN 0.9% NACL 1 MG/10 ML
SYRINGE (ML) INTRAVENOUS
Status: DISCONTINUED | OUTPATIENT
Start: 2021-07-26 | End: 2021-07-26 | Stop reason: HOSPADM

## 2021-07-26 RX ORDER — VERAPAMIL HYDROCHLORIDE 2.5 MG/ML
INJECTION, SOLUTION INTRAVENOUS
Status: DISCONTINUED | OUTPATIENT
Start: 2021-07-26 | End: 2021-07-26 | Stop reason: HOSPADM

## 2021-07-26 RX ORDER — DIPHENHYDRAMINE HCL 25 MG
50 CAPSULE ORAL ONCE
Status: COMPLETED | OUTPATIENT
Start: 2021-07-26 | End: 2021-07-26

## 2021-07-26 RX ORDER — HYDRALAZINE HYDROCHLORIDE 20 MG/ML
10 INJECTION INTRAMUSCULAR; INTRAVENOUS EVERY 8 HOURS PRN
Status: DISCONTINUED | OUTPATIENT
Start: 2021-07-26 | End: 2021-07-27 | Stop reason: HOSPADM

## 2021-07-26 RX ORDER — SODIUM CHLORIDE 9 MG/ML
INJECTION, SOLUTION INTRAVENOUS CONTINUOUS
Status: DISCONTINUED | OUTPATIENT
Start: 2021-07-26 | End: 2021-07-27 | Stop reason: HOSPADM

## 2021-07-26 RX ORDER — HEPARIN SODIUM 1000 [USP'U]/ML
INJECTION, SOLUTION INTRAVENOUS; SUBCUTANEOUS
Status: DISCONTINUED | OUTPATIENT
Start: 2021-07-26 | End: 2021-07-26 | Stop reason: HOSPADM

## 2021-07-26 RX ORDER — ONDANSETRON 2 MG/ML
4 INJECTION INTRAMUSCULAR; INTRAVENOUS EVERY 12 HOURS PRN
Status: DISCONTINUED | OUTPATIENT
Start: 2021-07-26 | End: 2021-07-27 | Stop reason: HOSPADM

## 2021-07-26 RX ORDER — LIDOCAINE HYDROCHLORIDE 10 MG/ML
INJECTION, SOLUTION EPIDURAL; INFILTRATION; INTRACAUDAL; PERINEURAL
Status: DISCONTINUED | OUTPATIENT
Start: 2021-07-26 | End: 2021-07-26 | Stop reason: HOSPADM

## 2021-07-26 RX ADMIN — ESCITALOPRAM OXALATE 10 MG: 10 TABLET ORAL at 08:07

## 2021-07-26 RX ADMIN — SODIUM CHLORIDE: 0.9 INJECTION, SOLUTION INTRAVENOUS at 05:07

## 2021-07-26 RX ADMIN — ENOXAPARIN SODIUM 40 MG: 100 INJECTION SUBCUTANEOUS at 05:07

## 2021-07-26 RX ADMIN — DIPHENHYDRAMINE HYDROCHLORIDE 50 MG: 25 CAPSULE ORAL at 12:07

## 2021-07-26 RX ADMIN — ISOSORBIDE MONONITRATE 60 MG: 30 TABLET, EXTENDED RELEASE ORAL at 08:07

## 2021-07-26 RX ADMIN — HYDRALAZINE HYDROCHLORIDE 10 MG: 20 INJECTION, SOLUTION INTRAMUSCULAR; INTRAVENOUS at 01:07

## 2021-07-26 RX ADMIN — CLOPIDOGREL 75 MG: 75 TABLET, FILM COATED ORAL at 08:07

## 2021-07-26 RX ADMIN — METOPROLOL SUCCINATE 50 MG: 50 TABLET, EXTENDED RELEASE ORAL at 08:07

## 2021-07-26 RX ADMIN — POTASSIUM CHLORIDE 40 MEQ: 20 TABLET, EXTENDED RELEASE ORAL at 08:07

## 2021-07-26 RX ADMIN — ATORVASTATIN CALCIUM 40 MG: 40 TABLET, FILM COATED ORAL at 08:07

## 2021-07-26 RX ADMIN — AMLODIPINE BESYLATE 10 MG: 5 TABLET ORAL at 08:07

## 2021-07-26 RX ADMIN — ASPIRIN 81 MG: 81 TABLET, COATED ORAL at 08:07

## 2021-07-27 VITALS
HEART RATE: 98 BPM | OXYGEN SATURATION: 95 % | WEIGHT: 191.56 LBS | BODY MASS INDEX: 28.37 KG/M2 | RESPIRATION RATE: 18 BRPM | SYSTOLIC BLOOD PRESSURE: 135 MMHG | TEMPERATURE: 98 F | DIASTOLIC BLOOD PRESSURE: 71 MMHG | HEIGHT: 69 IN

## 2021-07-27 PROBLEM — R07.9 CHEST PAIN: Status: RESOLVED | Noted: 2021-07-25 | Resolved: 2021-07-27

## 2021-07-27 LAB
ALBUMIN SERPL BCP-MCNC: 3.2 G/DL (ref 3.5–5.2)
ALP SERPL-CCNC: 81 U/L (ref 55–135)
ALT SERPL W/O P-5'-P-CCNC: 11 U/L (ref 10–44)
ANION GAP SERPL CALC-SCNC: 7 MMOL/L (ref 8–16)
AST SERPL-CCNC: 17 U/L (ref 10–40)
BASOPHILS # BLD AUTO: 0.04 K/UL (ref 0–0.2)
BASOPHILS NFR BLD: 0.6 % (ref 0–1.9)
BILIRUB SERPL-MCNC: 0.5 MG/DL (ref 0.1–1)
BUN SERPL-MCNC: 11 MG/DL (ref 8–23)
CALCIUM SERPL-MCNC: 8.6 MG/DL (ref 8.7–10.5)
CHLORIDE SERPL-SCNC: 110 MMOL/L (ref 95–110)
CO2 SERPL-SCNC: 21 MMOL/L (ref 23–29)
CREAT SERPL-MCNC: 0.9 MG/DL (ref 0.5–1.4)
DIFFERENTIAL METHOD: ABNORMAL
EOSINOPHIL # BLD AUTO: 0.1 K/UL (ref 0–0.5)
EOSINOPHIL NFR BLD: 1.4 % (ref 0–8)
ERYTHROCYTE [DISTWIDTH] IN BLOOD BY AUTOMATED COUNT: 15.9 % (ref 11.5–14.5)
EST. GFR  (AFRICAN AMERICAN): >60 ML/MIN/1.73 M^2
EST. GFR  (NON AFRICAN AMERICAN): >60 ML/MIN/1.73 M^2
GLUCOSE SERPL-MCNC: 97 MG/DL (ref 70–110)
HCT VFR BLD AUTO: 36.6 % (ref 40–54)
HGB BLD-MCNC: 12.3 G/DL (ref 14–18)
IMM GRANULOCYTES # BLD AUTO: 0.05 K/UL (ref 0–0.04)
IMM GRANULOCYTES NFR BLD AUTO: 0.7 % (ref 0–0.5)
LYMPHOCYTES # BLD AUTO: 1.4 K/UL (ref 1–4.8)
LYMPHOCYTES NFR BLD: 18.7 % (ref 18–48)
MCH RBC QN AUTO: 31.1 PG (ref 27–31)
MCHC RBC AUTO-ENTMCNC: 33.6 G/DL (ref 32–36)
MCV RBC AUTO: 92 FL (ref 82–98)
MONOCYTES # BLD AUTO: 0.7 K/UL (ref 0.3–1)
MONOCYTES NFR BLD: 9.3 % (ref 4–15)
NEUTROPHILS # BLD AUTO: 5 K/UL (ref 1.8–7.7)
NEUTROPHILS NFR BLD: 69.3 % (ref 38–73)
NRBC BLD-RTO: 0 /100 WBC
PLATELET # BLD AUTO: 257 K/UL (ref 150–450)
PMV BLD AUTO: 9.8 FL (ref 9.2–12.9)
POCT GLUCOSE: 105 MG/DL (ref 70–110)
POCT GLUCOSE: 117 MG/DL (ref 70–110)
POTASSIUM SERPL-SCNC: 3.6 MMOL/L (ref 3.5–5.1)
PROT SERPL-MCNC: 6.2 G/DL (ref 6–8.4)
RBC # BLD AUTO: 3.96 M/UL (ref 4.6–6.2)
SODIUM SERPL-SCNC: 138 MMOL/L (ref 136–145)
WBC # BLD AUTO: 7.22 K/UL (ref 3.9–12.7)

## 2021-07-27 PROCEDURE — 25000003 PHARM REV CODE 250: Performed by: NURSE PRACTITIONER

## 2021-07-27 PROCEDURE — 99233 PR SUBSEQUENT HOSPITAL CARE,LEVL III: ICD-10-PCS | Mod: 25,,, | Performed by: INTERNAL MEDICINE

## 2021-07-27 PROCEDURE — 36415 COLL VENOUS BLD VENIPUNCTURE: CPT | Performed by: NURSE PRACTITIONER

## 2021-07-27 PROCEDURE — 93010 ELECTROCARDIOGRAM REPORT: CPT | Mod: ,,, | Performed by: INTERNAL MEDICINE

## 2021-07-27 PROCEDURE — 85025 COMPLETE CBC W/AUTO DIFF WBC: CPT | Performed by: NURSE PRACTITIONER

## 2021-07-27 PROCEDURE — 93010 EKG 12-LEAD: ICD-10-PCS | Mod: ,,, | Performed by: INTERNAL MEDICINE

## 2021-07-27 PROCEDURE — 99233 SBSQ HOSP IP/OBS HIGH 50: CPT | Mod: 25,,, | Performed by: INTERNAL MEDICINE

## 2021-07-27 PROCEDURE — 93005 ELECTROCARDIOGRAM TRACING: CPT

## 2021-07-27 PROCEDURE — 80053 COMPREHEN METABOLIC PANEL: CPT | Performed by: NURSE PRACTITIONER

## 2021-07-27 RX ADMIN — METOPROLOL SUCCINATE 50 MG: 50 TABLET, EXTENDED RELEASE ORAL at 08:07

## 2021-07-27 RX ADMIN — ATORVASTATIN CALCIUM 40 MG: 40 TABLET, FILM COATED ORAL at 08:07

## 2021-07-27 RX ADMIN — DOCUSATE SODIUM 50 MG AND SENNOSIDES 8.6 MG 1 TABLET: 8.6; 5 TABLET, FILM COATED ORAL at 08:07

## 2021-07-27 RX ADMIN — CLOPIDOGREL 75 MG: 75 TABLET, FILM COATED ORAL at 08:07

## 2021-07-27 RX ADMIN — ISOSORBIDE MONONITRATE 60 MG: 30 TABLET, EXTENDED RELEASE ORAL at 08:07

## 2021-07-27 RX ADMIN — ASPIRIN 81 MG: 81 TABLET, COATED ORAL at 08:07

## 2021-07-27 RX ADMIN — ESCITALOPRAM OXALATE 10 MG: 10 TABLET ORAL at 08:07

## 2021-07-27 RX ADMIN — AMLODIPINE BESYLATE 10 MG: 5 TABLET ORAL at 08:07

## 2021-07-30 ENCOUNTER — HOSPITAL ENCOUNTER (EMERGENCY)
Facility: HOSPITAL | Age: 86
Discharge: HOME | End: 2021-07-30
Attending: EMERGENCY MEDICINE
Payer: MEDICARE

## 2021-07-30 ENCOUNTER — OFFICE VISIT (OUTPATIENT)
Dept: CARDIOLOGY | Facility: CLINIC | Age: 86
End: 2021-07-30
Payer: MEDICARE

## 2021-07-30 VITALS
HEIGHT: 69 IN | WEIGHT: 191 LBS | TEMPERATURE: 98 F | RESPIRATION RATE: 22 BRPM | BODY MASS INDEX: 28.29 KG/M2 | SYSTOLIC BLOOD PRESSURE: 173 MMHG | OXYGEN SATURATION: 96 % | DIASTOLIC BLOOD PRESSURE: 81 MMHG | HEART RATE: 78 BPM

## 2021-07-30 VITALS
DIASTOLIC BLOOD PRESSURE: 62 MMHG | OXYGEN SATURATION: 97 % | BODY MASS INDEX: 28.08 KG/M2 | SYSTOLIC BLOOD PRESSURE: 114 MMHG | HEIGHT: 69 IN | WEIGHT: 189.63 LBS | HEART RATE: 70 BPM

## 2021-07-30 DIAGNOSIS — I10 ESSENTIAL HYPERTENSION, BENIGN: Primary | ICD-10-CM

## 2021-07-30 DIAGNOSIS — C44.92 SCC (SQUAMOUS CELL CARCINOMA): ICD-10-CM

## 2021-07-30 DIAGNOSIS — M54.2 MUSCULOSKELETAL NECK PAIN: ICD-10-CM

## 2021-07-30 DIAGNOSIS — R00.2 PALPITATIONS: ICD-10-CM

## 2021-07-30 DIAGNOSIS — E11.621 DIABETIC ULCER OF TOE OF LEFT FOOT ASSOCIATED WITH TYPE 2 DIABETES MELLITUS, WITH FAT LAYER EXPOSED: ICD-10-CM

## 2021-07-30 DIAGNOSIS — I21.4 NSTEMI (NON-ST ELEVATED MYOCARDIAL INFARCTION): ICD-10-CM

## 2021-07-30 DIAGNOSIS — R79.89 ELEVATED TROPONIN: Primary | ICD-10-CM

## 2021-07-30 DIAGNOSIS — R79.89 ELEVATED TROPONIN: ICD-10-CM

## 2021-07-30 DIAGNOSIS — M54.2 NECK PAIN: ICD-10-CM

## 2021-07-30 DIAGNOSIS — L97.522 DIABETIC ULCER OF TOE OF LEFT FOOT ASSOCIATED WITH TYPE 2 DIABETES MELLITUS, WITH FAT LAYER EXPOSED: ICD-10-CM

## 2021-07-30 DIAGNOSIS — I20.0 UNSTABLE ANGINA: ICD-10-CM

## 2021-07-30 DIAGNOSIS — I25.10 CORONARY ARTERY DISEASE INVOLVING NATIVE CORONARY ARTERY OF NATIVE HEART WITHOUT ANGINA PECTORIS: ICD-10-CM

## 2021-07-30 DIAGNOSIS — E11.9 TYPE 2 DIABETES MELLITUS WITHOUT COMPLICATION, WITHOUT LONG-TERM CURRENT USE OF INSULIN: ICD-10-CM

## 2021-07-30 DIAGNOSIS — E78.2 MIXED HYPERLIPIDEMIA: ICD-10-CM

## 2021-07-30 LAB
ALBUMIN SERPL BCP-MCNC: 3.7 G/DL (ref 3.5–5.2)
ALP SERPL-CCNC: 90 U/L (ref 55–135)
ALT SERPL W/O P-5'-P-CCNC: 22 U/L (ref 10–44)
ANION GAP SERPL CALC-SCNC: 13 MMOL/L (ref 8–16)
AST SERPL-CCNC: 29 U/L (ref 10–40)
BASOPHILS # BLD AUTO: 0.03 K/UL (ref 0–0.2)
BASOPHILS NFR BLD: 0.4 % (ref 0–1.9)
BILIRUB SERPL-MCNC: 0.4 MG/DL (ref 0.1–1)
BNP SERPL-MCNC: 110 PG/ML (ref 0–99)
BUN SERPL-MCNC: 15 MG/DL (ref 8–23)
CALCIUM SERPL-MCNC: 9.5 MG/DL (ref 8.7–10.5)
CHLORIDE SERPL-SCNC: 100 MMOL/L (ref 95–110)
CO2 SERPL-SCNC: 22 MMOL/L (ref 23–29)
CREAT SERPL-MCNC: 1 MG/DL (ref 0.5–1.4)
D DIMER PPP IA.FEU-MCNC: 0.96 MG/L FEU
DIFFERENTIAL METHOD: ABNORMAL
EOSINOPHIL # BLD AUTO: 0.2 K/UL (ref 0–0.5)
EOSINOPHIL NFR BLD: 2.8 % (ref 0–8)
ERYTHROCYTE [DISTWIDTH] IN BLOOD BY AUTOMATED COUNT: 15.1 % (ref 11.5–14.5)
EST. GFR  (AFRICAN AMERICAN): >60 ML/MIN/1.73 M^2
EST. GFR  (NON AFRICAN AMERICAN): >60 ML/MIN/1.73 M^2
GLUCOSE SERPL-MCNC: 119 MG/DL (ref 70–110)
HCT VFR BLD AUTO: 35.6 % (ref 40–54)
HGB BLD-MCNC: 12.3 G/DL (ref 14–18)
IMM GRANULOCYTES # BLD AUTO: 0.04 K/UL (ref 0–0.04)
IMM GRANULOCYTES NFR BLD AUTO: 0.5 % (ref 0–0.5)
LYMPHOCYTES # BLD AUTO: 1.6 K/UL (ref 1–4.8)
LYMPHOCYTES NFR BLD: 21.9 % (ref 18–48)
MCH RBC QN AUTO: 31.1 PG (ref 27–31)
MCHC RBC AUTO-ENTMCNC: 34.6 G/DL (ref 32–36)
MCV RBC AUTO: 90 FL (ref 82–98)
MONOCYTES # BLD AUTO: 0.7 K/UL (ref 0.3–1)
MONOCYTES NFR BLD: 9.1 % (ref 4–15)
NEUTROPHILS # BLD AUTO: 4.9 K/UL (ref 1.8–7.7)
NEUTROPHILS NFR BLD: 65.3 % (ref 38–73)
NRBC BLD-RTO: 0 /100 WBC
PLATELET # BLD AUTO: 254 K/UL (ref 150–450)
PMV BLD AUTO: 9.4 FL (ref 9.2–12.9)
POTASSIUM SERPL-SCNC: 3.2 MMOL/L (ref 3.5–5.1)
PROT SERPL-MCNC: 6.9 G/DL (ref 6–8.4)
RBC # BLD AUTO: 3.96 M/UL (ref 4.6–6.2)
SODIUM SERPL-SCNC: 135 MMOL/L (ref 136–145)
TROPONIN I SERPL DL<=0.01 NG/ML-MCNC: 0.2 NG/ML (ref 0–0.03)
TROPONIN I SERPL DL<=0.01 NG/ML-MCNC: 0.21 NG/ML (ref 0–0.03)
WBC # BLD AUTO: 7.45 K/UL (ref 3.9–12.7)

## 2021-07-30 PROCEDURE — 99999 PR PBB SHADOW E&M-EST. PATIENT-LVL IV: ICD-10-PCS | Mod: PBBFAC,,, | Performed by: INTERNAL MEDICINE

## 2021-07-30 PROCEDURE — 3078F DIAST BP <80 MM HG: CPT | Mod: CPTII,S$GLB,, | Performed by: INTERNAL MEDICINE

## 2021-07-30 PROCEDURE — 1160F PR REVIEW ALL MEDS BY PRESCRIBER/CLIN PHARMACIST DOCUMENTED: ICD-10-PCS | Mod: CPTII,S$GLB,, | Performed by: INTERNAL MEDICINE

## 2021-07-30 PROCEDURE — 25000003 PHARM REV CODE 250: Performed by: EMERGENCY MEDICINE

## 2021-07-30 PROCEDURE — 1111F DSCHRG MED/CURRENT MED MERGE: CPT | Mod: CPTII,S$GLB,, | Performed by: INTERNAL MEDICINE

## 2021-07-30 PROCEDURE — 83880 ASSAY OF NATRIURETIC PEPTIDE: CPT | Performed by: EMERGENCY MEDICINE

## 2021-07-30 PROCEDURE — 3074F SYST BP LT 130 MM HG: CPT | Mod: CPTII,S$GLB,, | Performed by: INTERNAL MEDICINE

## 2021-07-30 PROCEDURE — 85025 COMPLETE CBC W/AUTO DIFF WBC: CPT | Performed by: EMERGENCY MEDICINE

## 2021-07-30 PROCEDURE — 1101F PR PT FALLS ASSESS DOC 0-1 FALLS W/OUT INJ PAST YR: ICD-10-PCS | Mod: CPTII,S$GLB,, | Performed by: INTERNAL MEDICINE

## 2021-07-30 PROCEDURE — 93010 ELECTROCARDIOGRAM REPORT: CPT | Mod: ,,, | Performed by: INTERNAL MEDICINE

## 2021-07-30 PROCEDURE — 99214 OFFICE O/P EST MOD 30 MIN: CPT | Mod: 25,S$GLB,, | Performed by: INTERNAL MEDICINE

## 2021-07-30 PROCEDURE — 3074F PR MOST RECENT SYSTOLIC BLOOD PRESSURE < 130 MM HG: ICD-10-PCS | Mod: CPTII,S$GLB,, | Performed by: INTERNAL MEDICINE

## 2021-07-30 PROCEDURE — 3078F PR MOST RECENT DIASTOLIC BLOOD PRESSURE < 80 MM HG: ICD-10-PCS | Mod: CPTII,S$GLB,, | Performed by: INTERNAL MEDICINE

## 2021-07-30 PROCEDURE — 1111F PR DISCHARGE MEDS RECONCILED W/ CURRENT OUTPATIENT MED LIST: ICD-10-PCS | Mod: CPTII,S$GLB,, | Performed by: INTERNAL MEDICINE

## 2021-07-30 PROCEDURE — 3288F PR FALLS RISK ASSESSMENT DOCUMENTED: ICD-10-PCS | Mod: CPTII,S$GLB,, | Performed by: INTERNAL MEDICINE

## 2021-07-30 PROCEDURE — 1159F PR MEDICATION LIST DOCUMENTED IN MEDICAL RECORD: ICD-10-PCS | Mod: CPTII,S$GLB,, | Performed by: INTERNAL MEDICINE

## 2021-07-30 PROCEDURE — 1159F MED LIST DOCD IN RCRD: CPT | Mod: CPTII,S$GLB,, | Performed by: INTERNAL MEDICINE

## 2021-07-30 PROCEDURE — 85379 FIBRIN DEGRADATION QUANT: CPT | Performed by: EMERGENCY MEDICINE

## 2021-07-30 PROCEDURE — 1126F AMNT PAIN NOTED NONE PRSNT: CPT | Mod: CPTII,S$GLB,, | Performed by: INTERNAL MEDICINE

## 2021-07-30 PROCEDURE — 80053 COMPREHEN METABOLIC PANEL: CPT | Performed by: EMERGENCY MEDICINE

## 2021-07-30 PROCEDURE — 84484 ASSAY OF TROPONIN QUANT: CPT | Performed by: EMERGENCY MEDICINE

## 2021-07-30 PROCEDURE — 99285 EMERGENCY DEPT VISIT HI MDM: CPT | Mod: 25

## 2021-07-30 PROCEDURE — 1160F RVW MEDS BY RX/DR IN RCRD: CPT | Mod: CPTII,S$GLB,, | Performed by: INTERNAL MEDICINE

## 2021-07-30 PROCEDURE — 3288F FALL RISK ASSESSMENT DOCD: CPT | Mod: CPTII,S$GLB,, | Performed by: INTERNAL MEDICINE

## 2021-07-30 PROCEDURE — 99214 PR OFFICE/OUTPT VISIT, EST, LEVL IV, 30-39 MIN: ICD-10-PCS | Mod: 25,S$GLB,, | Performed by: INTERNAL MEDICINE

## 2021-07-30 PROCEDURE — 93005 ELECTROCARDIOGRAM TRACING: CPT

## 2021-07-30 PROCEDURE — 99999 PR PBB SHADOW E&M-EST. PATIENT-LVL IV: CPT | Mod: PBBFAC,,, | Performed by: INTERNAL MEDICINE

## 2021-07-30 PROCEDURE — 1126F PR PAIN SEVERITY QUANTIFIED, NO PAIN PRESENT: ICD-10-PCS | Mod: CPTII,S$GLB,, | Performed by: INTERNAL MEDICINE

## 2021-07-30 PROCEDURE — 93010 EKG 12-LEAD: ICD-10-PCS | Mod: ,,, | Performed by: INTERNAL MEDICINE

## 2021-07-30 PROCEDURE — 1101F PT FALLS ASSESS-DOCD LE1/YR: CPT | Mod: CPTII,S$GLB,, | Performed by: INTERNAL MEDICINE

## 2021-07-30 RX ORDER — ASPIRIN 325 MG
325 TABLET ORAL
Status: COMPLETED | OUTPATIENT
Start: 2021-07-30 | End: 2021-07-30

## 2021-07-30 RX ORDER — LIDOCAINE 50 MG/G
1 PATCH TOPICAL DAILY PRN
Qty: 5 PATCH | Refills: 1 | Status: ON HOLD | OUTPATIENT
Start: 2021-07-30 | End: 2022-08-17 | Stop reason: HOSPADM

## 2021-07-30 RX ADMIN — ASPIRIN 325 MG ORAL TABLET 325 MG: 325 PILL ORAL at 03:07

## 2021-08-02 RX ORDER — NITROGLYCERIN 0.4 MG/1
0.4 TABLET SUBLINGUAL EVERY 5 MIN PRN
Qty: 90 TABLET | Refills: 12 | Status: SHIPPED | OUTPATIENT
Start: 2021-08-02 | End: 2022-02-10 | Stop reason: SDUPTHER

## 2021-10-07 ENCOUNTER — IMMUNIZATION (OUTPATIENT)
Dept: PHARMACY | Facility: CLINIC | Age: 86
End: 2021-10-07
Payer: MEDICARE

## 2021-10-07 DIAGNOSIS — Z23 NEED FOR VACCINATION: Primary | ICD-10-CM

## 2021-10-25 ENCOUNTER — OFFICE VISIT (OUTPATIENT)
Dept: CARDIOLOGY | Facility: CLINIC | Age: 86
End: 2021-10-25
Payer: MEDICARE

## 2021-10-25 VITALS
DIASTOLIC BLOOD PRESSURE: 62 MMHG | BODY MASS INDEX: 27.99 KG/M2 | WEIGHT: 189 LBS | SYSTOLIC BLOOD PRESSURE: 122 MMHG | HEIGHT: 69 IN | OXYGEN SATURATION: 96 % | HEART RATE: 67 BPM

## 2021-10-25 DIAGNOSIS — E78.2 MIXED HYPERLIPIDEMIA: ICD-10-CM

## 2021-10-25 DIAGNOSIS — C44.92 SCC (SQUAMOUS CELL CARCINOMA): ICD-10-CM

## 2021-10-25 DIAGNOSIS — R00.2 PALPITATIONS: ICD-10-CM

## 2021-10-25 DIAGNOSIS — I10 ESSENTIAL HYPERTENSION, BENIGN: Primary | ICD-10-CM

## 2021-10-25 DIAGNOSIS — I21.4 NSTEMI (NON-ST ELEVATED MYOCARDIAL INFARCTION): ICD-10-CM

## 2021-10-25 DIAGNOSIS — I25.10 CORONARY ARTERY DISEASE INVOLVING NATIVE CORONARY ARTERY OF NATIVE HEART WITHOUT ANGINA PECTORIS: ICD-10-CM

## 2021-10-25 DIAGNOSIS — Z85.46 HISTORY OF PROSTATE CANCER: ICD-10-CM

## 2021-10-25 PROCEDURE — 1159F MED LIST DOCD IN RCRD: CPT | Mod: CPTII,S$GLB,, | Performed by: INTERNAL MEDICINE

## 2021-10-25 PROCEDURE — 1159F PR MEDICATION LIST DOCUMENTED IN MEDICAL RECORD: ICD-10-PCS | Mod: CPTII,S$GLB,, | Performed by: INTERNAL MEDICINE

## 2021-10-25 PROCEDURE — 1101F PT FALLS ASSESS-DOCD LE1/YR: CPT | Mod: CPTII,S$GLB,, | Performed by: INTERNAL MEDICINE

## 2021-10-25 PROCEDURE — 99999 PR PBB SHADOW E&M-EST. PATIENT-LVL IV: ICD-10-PCS | Mod: PBBFAC,,, | Performed by: INTERNAL MEDICINE

## 2021-10-25 PROCEDURE — 3078F DIAST BP <80 MM HG: CPT | Mod: CPTII,S$GLB,, | Performed by: INTERNAL MEDICINE

## 2021-10-25 PROCEDURE — 3288F FALL RISK ASSESSMENT DOCD: CPT | Mod: CPTII,S$GLB,, | Performed by: INTERNAL MEDICINE

## 2021-10-25 PROCEDURE — 99214 OFFICE O/P EST MOD 30 MIN: CPT | Mod: S$GLB,,, | Performed by: INTERNAL MEDICINE

## 2021-10-25 PROCEDURE — 1126F AMNT PAIN NOTED NONE PRSNT: CPT | Mod: CPTII,S$GLB,, | Performed by: INTERNAL MEDICINE

## 2021-10-25 PROCEDURE — 1160F PR REVIEW ALL MEDS BY PRESCRIBER/CLIN PHARMACIST DOCUMENTED: ICD-10-PCS | Mod: CPTII,S$GLB,, | Performed by: INTERNAL MEDICINE

## 2021-10-25 PROCEDURE — 1160F RVW MEDS BY RX/DR IN RCRD: CPT | Mod: CPTII,S$GLB,, | Performed by: INTERNAL MEDICINE

## 2021-10-25 PROCEDURE — 1101F PR PT FALLS ASSESS DOC 0-1 FALLS W/OUT INJ PAST YR: ICD-10-PCS | Mod: CPTII,S$GLB,, | Performed by: INTERNAL MEDICINE

## 2021-10-25 PROCEDURE — 99214 PR OFFICE/OUTPT VISIT, EST, LEVL IV, 30-39 MIN: ICD-10-PCS | Mod: S$GLB,,, | Performed by: INTERNAL MEDICINE

## 2021-10-25 PROCEDURE — 1126F PR PAIN SEVERITY QUANTIFIED, NO PAIN PRESENT: ICD-10-PCS | Mod: CPTII,S$GLB,, | Performed by: INTERNAL MEDICINE

## 2021-10-25 PROCEDURE — 99999 PR PBB SHADOW E&M-EST. PATIENT-LVL IV: CPT | Mod: PBBFAC,,, | Performed by: INTERNAL MEDICINE

## 2021-10-25 PROCEDURE — 3074F PR MOST RECENT SYSTOLIC BLOOD PRESSURE < 130 MM HG: ICD-10-PCS | Mod: CPTII,S$GLB,, | Performed by: INTERNAL MEDICINE

## 2021-10-25 PROCEDURE — 3074F SYST BP LT 130 MM HG: CPT | Mod: CPTII,S$GLB,, | Performed by: INTERNAL MEDICINE

## 2021-10-25 PROCEDURE — 3288F PR FALLS RISK ASSESSMENT DOCUMENTED: ICD-10-PCS | Mod: CPTII,S$GLB,, | Performed by: INTERNAL MEDICINE

## 2021-10-25 PROCEDURE — 3078F PR MOST RECENT DIASTOLIC BLOOD PRESSURE < 80 MM HG: ICD-10-PCS | Mod: CPTII,S$GLB,, | Performed by: INTERNAL MEDICINE

## 2021-11-11 ENCOUNTER — HOSPITAL ENCOUNTER (EMERGENCY)
Facility: HOSPITAL | Age: 86
Discharge: HOME | End: 2021-11-11
Attending: EMERGENCY MEDICINE
Payer: MEDICARE

## 2021-11-11 VITALS
WEIGHT: 200 LBS | BODY MASS INDEX: 29.53 KG/M2 | SYSTOLIC BLOOD PRESSURE: 167 MMHG | HEART RATE: 60 BPM | OXYGEN SATURATION: 96 % | RESPIRATION RATE: 18 BRPM | DIASTOLIC BLOOD PRESSURE: 74 MMHG | TEMPERATURE: 98 F

## 2021-11-11 DIAGNOSIS — R07.81 RIB PAIN ON RIGHT SIDE: Primary | ICD-10-CM

## 2021-11-11 LAB
ALBUMIN SERPL BCP-MCNC: 3.9 G/DL (ref 3.5–5.2)
ALP SERPL-CCNC: 86 U/L (ref 55–135)
ALT SERPL W/O P-5'-P-CCNC: 12 U/L (ref 10–44)
ANION GAP SERPL CALC-SCNC: 12 MMOL/L (ref 8–16)
AST SERPL-CCNC: 16 U/L (ref 10–40)
BASOPHILS # BLD AUTO: 0.04 K/UL (ref 0–0.2)
BASOPHILS NFR BLD: 0.5 % (ref 0–1.9)
BILIRUB SERPL-MCNC: 0.6 MG/DL (ref 0.1–1)
BNP SERPL-MCNC: 104 PG/ML (ref 0–99)
BUN SERPL-MCNC: 14 MG/DL (ref 8–23)
CALCIUM SERPL-MCNC: 9.6 MG/DL (ref 8.7–10.5)
CHLORIDE SERPL-SCNC: 103 MMOL/L (ref 95–110)
CO2 SERPL-SCNC: 23 MMOL/L (ref 23–29)
CREAT SERPL-MCNC: 1 MG/DL (ref 0.5–1.4)
CTP QC/QA: YES
DIFFERENTIAL METHOD: ABNORMAL
EOSINOPHIL # BLD AUTO: 0.8 K/UL (ref 0–0.5)
EOSINOPHIL NFR BLD: 9.1 % (ref 0–8)
ERYTHROCYTE [DISTWIDTH] IN BLOOD BY AUTOMATED COUNT: 14.6 % (ref 11.5–14.5)
EST. GFR  (AFRICAN AMERICAN): >60 ML/MIN/1.73 M^2
EST. GFR  (NON AFRICAN AMERICAN): >60 ML/MIN/1.73 M^2
GLUCOSE SERPL-MCNC: 179 MG/DL (ref 70–110)
HCT VFR BLD AUTO: 37.7 % (ref 40–54)
HGB BLD-MCNC: 12.7 G/DL (ref 14–18)
IMM GRANULOCYTES # BLD AUTO: 0.04 K/UL (ref 0–0.04)
IMM GRANULOCYTES NFR BLD AUTO: 0.5 % (ref 0–0.5)
INR PPP: 1 (ref 0.8–1.2)
LYMPHOCYTES # BLD AUTO: 1.8 K/UL (ref 1–4.8)
LYMPHOCYTES NFR BLD: 21.4 % (ref 18–48)
MAGNESIUM SERPL-MCNC: 2 MG/DL (ref 1.6–2.6)
MCH RBC QN AUTO: 31.7 PG (ref 27–31)
MCHC RBC AUTO-ENTMCNC: 33.7 G/DL (ref 32–36)
MCV RBC AUTO: 94 FL (ref 82–98)
MONOCYTES # BLD AUTO: 0.7 K/UL (ref 0.3–1)
MONOCYTES NFR BLD: 8.5 % (ref 4–15)
NEUTROPHILS # BLD AUTO: 4.9 K/UL (ref 1.8–7.7)
NEUTROPHILS NFR BLD: 60 % (ref 38–73)
NRBC BLD-RTO: 0 /100 WBC
PLATELET # BLD AUTO: 285 K/UL (ref 150–450)
PMV BLD AUTO: 9.6 FL (ref 9.2–12.9)
POTASSIUM SERPL-SCNC: 3.7 MMOL/L (ref 3.5–5.1)
PROT SERPL-MCNC: 7.4 G/DL (ref 6–8.4)
PROTHROMBIN TIME: 10.2 SEC (ref 9–12.5)
RBC # BLD AUTO: 4.01 M/UL (ref 4.6–6.2)
SARS-COV-2 RDRP RESP QL NAA+PROBE: NEGATIVE
SODIUM SERPL-SCNC: 138 MMOL/L (ref 136–145)
TROPONIN I SERPL DL<=0.01 NG/ML-MCNC: 0.01 NG/ML (ref 0–0.03)
TROPONIN I SERPL DL<=0.01 NG/ML-MCNC: <0.006 NG/ML (ref 0–0.03)
WBC # BLD AUTO: 8.22 K/UL (ref 3.9–12.7)

## 2021-11-11 PROCEDURE — 80053 COMPREHEN METABOLIC PANEL: CPT | Performed by: EMERGENCY MEDICINE

## 2021-11-11 PROCEDURE — 93010 ELECTROCARDIOGRAM REPORT: CPT | Mod: ,,, | Performed by: INTERNAL MEDICINE

## 2021-11-11 PROCEDURE — 83735 ASSAY OF MAGNESIUM: CPT | Performed by: EMERGENCY MEDICINE

## 2021-11-11 PROCEDURE — 83880 ASSAY OF NATRIURETIC PEPTIDE: CPT | Performed by: EMERGENCY MEDICINE

## 2021-11-11 PROCEDURE — 84484 ASSAY OF TROPONIN QUANT: CPT | Mod: 91 | Performed by: EMERGENCY MEDICINE

## 2021-11-11 PROCEDURE — 93005 ELECTROCARDIOGRAM TRACING: CPT

## 2021-11-11 PROCEDURE — 93010 EKG 12-LEAD: ICD-10-PCS | Mod: ,,, | Performed by: INTERNAL MEDICINE

## 2021-11-11 PROCEDURE — 84484 ASSAY OF TROPONIN QUANT: CPT | Performed by: EMERGENCY MEDICINE

## 2021-11-11 PROCEDURE — 25000003 PHARM REV CODE 250: Performed by: EMERGENCY MEDICINE

## 2021-11-11 PROCEDURE — 99284 EMERGENCY DEPT VISIT MOD MDM: CPT | Mod: 25

## 2021-11-11 PROCEDURE — U0002 COVID-19 LAB TEST NON-CDC: HCPCS | Performed by: EMERGENCY MEDICINE

## 2021-11-11 PROCEDURE — 85025 COMPLETE CBC W/AUTO DIFF WBC: CPT | Performed by: EMERGENCY MEDICINE

## 2021-11-11 PROCEDURE — 85610 PROTHROMBIN TIME: CPT | Performed by: EMERGENCY MEDICINE

## 2021-11-11 RX ORDER — ACETAMINOPHEN 325 MG/1
650 TABLET ORAL EVERY 6 HOURS PRN
Qty: 21 TABLET | Refills: 0 | Status: ON HOLD | OUTPATIENT
Start: 2021-11-11 | End: 2022-08-17 | Stop reason: HOSPADM

## 2021-11-11 RX ORDER — ACETAMINOPHEN 325 MG/1
650 TABLET ORAL
Status: COMPLETED | OUTPATIENT
Start: 2021-11-11 | End: 2021-11-11

## 2021-11-11 RX ADMIN — ACETAMINOPHEN 650 MG: 325 TABLET ORAL at 07:11

## 2022-01-11 ENCOUNTER — HOSPITAL ENCOUNTER (EMERGENCY)
Facility: HOSPITAL | Age: 87
Discharge: HOME | End: 2022-01-12
Attending: EMERGENCY MEDICINE
Payer: MEDICARE

## 2022-01-11 DIAGNOSIS — R09.1 PLEURISY: Primary | ICD-10-CM

## 2022-01-11 DIAGNOSIS — R07.9 CHEST PAIN: ICD-10-CM

## 2022-01-11 LAB
BASOPHILS # BLD AUTO: 0.04 K/UL (ref 0–0.2)
BASOPHILS NFR BLD: 0.4 % (ref 0–1.9)
DIFFERENTIAL METHOD: ABNORMAL
EOSINOPHIL # BLD AUTO: 0.3 K/UL (ref 0–0.5)
EOSINOPHIL NFR BLD: 3.3 % (ref 0–8)
ERYTHROCYTE [DISTWIDTH] IN BLOOD BY AUTOMATED COUNT: 14.6 % (ref 11.5–14.5)
HCT VFR BLD AUTO: 37.2 % (ref 40–54)
HGB BLD-MCNC: 12.3 G/DL (ref 14–18)
IMM GRANULOCYTES # BLD AUTO: 0.03 K/UL (ref 0–0.04)
IMM GRANULOCYTES NFR BLD AUTO: 0.3 % (ref 0–0.5)
LYMPHOCYTES # BLD AUTO: 1.7 K/UL (ref 1–4.8)
LYMPHOCYTES NFR BLD: 18.9 % (ref 18–48)
MCH RBC QN AUTO: 30.4 PG (ref 27–31)
MCHC RBC AUTO-ENTMCNC: 33.1 G/DL (ref 32–36)
MCV RBC AUTO: 92 FL (ref 82–98)
MONOCYTES # BLD AUTO: 0.9 K/UL (ref 0.3–1)
MONOCYTES NFR BLD: 9.6 % (ref 4–15)
NEUTROPHILS # BLD AUTO: 6.1 K/UL (ref 1.8–7.7)
NEUTROPHILS NFR BLD: 67.5 % (ref 38–73)
NRBC BLD-RTO: 0 /100 WBC
PLATELET # BLD AUTO: 265 K/UL (ref 150–450)
PMV BLD AUTO: 9.6 FL (ref 9.2–12.9)
RBC # BLD AUTO: 4.04 M/UL (ref 4.6–6.2)
WBC # BLD AUTO: 9.04 K/UL (ref 3.9–12.7)

## 2022-01-11 PROCEDURE — 85379 FIBRIN DEGRADATION QUANT: CPT | Performed by: EMERGENCY MEDICINE

## 2022-01-11 PROCEDURE — 84484 ASSAY OF TROPONIN QUANT: CPT | Performed by: EMERGENCY MEDICINE

## 2022-01-11 PROCEDURE — 96374 THER/PROPH/DIAG INJ IV PUSH: CPT

## 2022-01-11 PROCEDURE — 85025 COMPLETE CBC W/AUTO DIFF WBC: CPT | Performed by: EMERGENCY MEDICINE

## 2022-01-11 PROCEDURE — 99285 EMERGENCY DEPT VISIT HI MDM: CPT | Mod: 25

## 2022-01-11 PROCEDURE — 93010 ELECTROCARDIOGRAM REPORT: CPT | Mod: ,,, | Performed by: INTERNAL MEDICINE

## 2022-01-11 PROCEDURE — 93005 ELECTROCARDIOGRAM TRACING: CPT

## 2022-01-11 PROCEDURE — 80053 COMPREHEN METABOLIC PANEL: CPT | Performed by: EMERGENCY MEDICINE

## 2022-01-11 PROCEDURE — 93010 EKG 12-LEAD: ICD-10-PCS | Mod: ,,, | Performed by: INTERNAL MEDICINE

## 2022-01-12 VITALS
BODY MASS INDEX: 28.73 KG/M2 | HEART RATE: 68 BPM | RESPIRATION RATE: 18 BRPM | SYSTOLIC BLOOD PRESSURE: 164 MMHG | WEIGHT: 194 LBS | OXYGEN SATURATION: 94 % | TEMPERATURE: 98 F | HEIGHT: 69 IN | DIASTOLIC BLOOD PRESSURE: 74 MMHG

## 2022-01-12 LAB
ALBUMIN SERPL BCP-MCNC: 3.6 G/DL (ref 3.5–5.2)
ALP SERPL-CCNC: 89 U/L (ref 55–135)
ALT SERPL W/O P-5'-P-CCNC: 13 U/L (ref 10–44)
ANION GAP SERPL CALC-SCNC: 10 MMOL/L (ref 8–16)
AST SERPL-CCNC: 15 U/L (ref 10–40)
BILIRUB SERPL-MCNC: 0.6 MG/DL (ref 0.1–1)
BUN SERPL-MCNC: 18 MG/DL (ref 8–23)
CALCIUM SERPL-MCNC: 9 MG/DL (ref 8.7–10.5)
CHLORIDE SERPL-SCNC: 102 MMOL/L (ref 95–110)
CO2 SERPL-SCNC: 26 MMOL/L (ref 23–29)
CREAT SERPL-MCNC: 1.2 MG/DL (ref 0.5–1.4)
CTP QC/QA: YES
D DIMER PPP IA.FEU-MCNC: 0.67 MG/L FEU
EST. GFR  (AFRICAN AMERICAN): >60 ML/MIN/1.73 M^2
EST. GFR  (NON AFRICAN AMERICAN): 55 ML/MIN/1.73 M^2
GLUCOSE SERPL-MCNC: 138 MG/DL (ref 70–110)
POTASSIUM SERPL-SCNC: 3.4 MMOL/L (ref 3.5–5.1)
PROT SERPL-MCNC: 7 G/DL (ref 6–8.4)
SARS-COV-2 RDRP RESP QL NAA+PROBE: NEGATIVE
SODIUM SERPL-SCNC: 138 MMOL/L (ref 136–145)
TROPONIN I SERPL DL<=0.01 NG/ML-MCNC: 0.02 NG/ML (ref 0–0.03)

## 2022-01-12 PROCEDURE — U0002 COVID-19 LAB TEST NON-CDC: HCPCS | Performed by: EMERGENCY MEDICINE

## 2022-01-12 PROCEDURE — 25500020 PHARM REV CODE 255: Performed by: EMERGENCY MEDICINE

## 2022-01-12 PROCEDURE — 63600175 PHARM REV CODE 636 W HCPCS: Performed by: EMERGENCY MEDICINE

## 2022-01-12 RX ORDER — PREDNISONE 20 MG/1
40 TABLET ORAL DAILY
Qty: 10 TABLET | Refills: 0 | Status: SHIPPED | OUTPATIENT
Start: 2022-01-12 | End: 2022-01-17

## 2022-01-12 RX ORDER — KETOROLAC TROMETHAMINE 30 MG/ML
15 INJECTION, SOLUTION INTRAMUSCULAR; INTRAVENOUS
Status: COMPLETED | OUTPATIENT
Start: 2022-01-12 | End: 2022-01-12

## 2022-01-12 RX ADMIN — KETOROLAC TROMETHAMINE 15 MG: 30 INJECTION, SOLUTION INTRAMUSCULAR at 01:01

## 2022-01-12 RX ADMIN — IOHEXOL 75 ML: 350 INJECTION, SOLUTION INTRAVENOUS at 01:01

## 2022-01-12 NOTE — ED TRIAGE NOTES
Patient reports to ED with non radiating midsternal chest pain that started approx at 8 PM. Patient states he took 2 sublingual nitroglycerins, no relief.   Patient states he had a heart attack and stents placed in December.     Patient denies H/A, dizziness, weakness, SOB, cough.     Patient placed on cardiac monitor and EKG obtained.

## 2022-01-12 NOTE — ED PROVIDER NOTES
Encounter Date: 1/11/2022    SCRIBE #1 NOTE: I, Zuleima Bonilla-Kamilah, am scribing for, and in the presence of,  Kevin Mix MD. I have scribed the following portions of the note - Other sections scribed: HPI, ROS, PE.       History     Chief Complaint   Patient presents with    Chest Pain     Pt presents to ED c/o mid sternal chest pain, non radiating started around 2030.  Pt reports lying on the sofa watching tv when chest pain started.  Described the pain as constant pressure. Denies sob, ha, dizziness, n/v.  Pt reports taking two Nitro's around 2230 with no relief.  Pt reports taking Clopidogrel.  Stent placed 12/21.       86 year old male with a PMHx of DM, HTN, NSTEMI, and high cholesterol presents to the ED with the complaint of midsternal chest pain. The patient describes the pain as fleeting and sharp. He states that the pain is worse with inspiration. The patient reports he had chest pain accompanied by a NSTEMI one month ago, but claims the pain is different. He also expressed concerns of a possible COVID-19 infection as he was recently in a large group with no mask. The patient has had three doses of the COVID-19 vaccination along with the Flu vaccination. He denies any other associated symptoms.    The history is provided by the patient. No  was used.     Review of patient's allergies indicates:  No Known Allergies  Past Medical History:   Diagnosis Date    Diabetes mellitus     High cholesterol     Hypertension     NSTEMI (non-ST elevated myocardial infarction) 7/25/2021    Prostate cancer      Past Surgical History:   Procedure Laterality Date    APPLICATION OF FULL-THICKNESS SKIN GRAFT (FTSG) TO UPPER EXTREMITY Right 11/15/2018    Procedure: APPLICATION, GRAFT, SKIN, FULL-THICKNESS, TO UPPER EXTREMITY VS STSG WITH FROZEN SECTIONS;  Surgeon: Alan Arzola MD;  Location: [facility] OR;  Service: Plastics;  Laterality: Right;    EXCISION OF SQUAMOUS CELL CARCINOMA Right  11/15/2018    Procedure: EXCISION, CARCINOMA, SQUAMOUS CELL @ RIGHT HAND;  Surgeon: Alan Arzola MD;  Location: [facility] OR;  Service: Plastics;  Laterality: Right;  RN PREOP 2018--NEED H/P    JOINT REPLACEMENT      LEFT HEART CATHETERIZATION Left 2019    Procedure: Left heart cath;  Surgeon: Rashad Nieto MD;  Location: [facility] CATH LAB;  Service: Cardiology;  Laterality: Left;    LEFT HEART CATHETERIZATION Left 2021    Procedure: Left heart cath, radial;  Surgeon: Rashad Nieto MD;  Location: [facility] CATH LAB;  Service: Cardiology;  Laterality: Left;    TONSILLECTOMY      TOTAL KNEE ARTHROPLASTY       Family History   Problem Relation Age of Onset    Heart disease Mother     Stroke Father      Social History     Tobacco Use    Smoking status: Former Smoker     Packs/day: 0.00     Types: Cigarettes     Quit date: 2016     Years since quittin.4    Smokeless tobacco: Never Used    Tobacco comment: 2016   Substance Use Topics    Alcohol use: No    Drug use: No     Review of Systems   Constitutional: Negative.    HENT: Negative.    Eyes: Negative.    Respiratory: Negative.    Cardiovascular: Positive for chest pain. Negative for palpitations and leg swelling.   Gastrointestinal: Negative.    Endocrine: Negative.    Genitourinary: Negative.    Musculoskeletal: Negative.    Skin: Negative.    Allergic/Immunologic: Negative.    Neurological: Negative.    Hematological: Negative.    Psychiatric/Behavioral: Negative.    All other systems reviewed and are negative.      Physical Exam     Initial Vitals [22 2259]   BP Pulse Resp Temp SpO2   (!) 156/70 69 18 97.9 °F (36.6 °C) 97 %      MAP       --         Physical Exam    Nursing note and vitals reviewed.  Constitutional: Vital signs are normal. He appears well-developed. He is active and cooperative.   HENT:   Head: Normocephalic and atraumatic.   Eyes: Conjunctivae, EOM and lids are normal. Pupils are equal, round, and reactive  to light.   Neck: Trachea normal. Neck supple. No thyroid mass present.    Full passive range of motion without pain.     Cardiovascular: Normal rate, regular rhythm, S1 normal, S2 normal, normal heart sounds, intact distal pulses and normal pulses.   Abdominal: Abdomen is soft. Normal appearance, normal aorta and bowel sounds are normal.   Musculoskeletal:         General: Normal range of motion.      Cervical back: Full passive range of motion without pain and neck supple.     Lymphadenopathy:     He has no axillary adenopathy.   Neurological: He is alert and oriented to person, place, and time.   Skin: Skin is warm, dry and intact.   Psychiatric: He has a normal mood and affect. His speech is normal and behavior is normal. Judgment and thought content normal. Cognition and memory are normal.         ED Course   Procedures  Labs Reviewed - No data to display       Imaging Results    None          Medications - No data to display  Medical Decision Making:   History:   Old Medical Records: I decided to obtain old medical records.  Independently Interpreted Test(s):   I have ordered and independently interpreted EKG Reading(s) - see summary below  Clinical Tests:   Lab Tests: Ordered and Reviewed  Radiological Study: Ordered and Reviewed  Medical Tests: Ordered and Reviewed  ED Management:  Cardiopulmonary, vascular, GI and musculoskeletal etiologies were all considered.  The patient has intermittent fleeting reproducible pain in his retrosternal area consistent with pleuritic type symptomatology.  Patient had significant improvement from Toradol here in the emergency department.  Patient will be discharged home on an anti-inflammatory with instructions to follow up with his primary health care provider.          Scribe Attestation:   Scribe #1: I performed the above scribed service and the documentation accurately describes the services I performed. I attest to the accuracy of the note.                 Clinical  Impression:   Final diagnoses:  [R07.9] Chest pain                 I, Kevin Mix MD, personally performed the services described in this documentation. All medical record entries made by the scribe were at my direction and in my presence. I have reviewed the chart and agree that the record reflects my personal performance and is accurate and complete.       Kevin Mix MD  01/12/22 0233

## 2022-02-10 ENCOUNTER — OFFICE VISIT (OUTPATIENT)
Dept: CARDIOLOGY | Facility: CLINIC | Age: 87
End: 2022-02-10
Payer: MEDICARE

## 2022-02-10 VITALS
SYSTOLIC BLOOD PRESSURE: 138 MMHG | HEART RATE: 64 BPM | HEIGHT: 69 IN | DIASTOLIC BLOOD PRESSURE: 60 MMHG | BODY MASS INDEX: 28.73 KG/M2 | WEIGHT: 194 LBS | OXYGEN SATURATION: 94 %

## 2022-02-10 DIAGNOSIS — I10 ESSENTIAL HYPERTENSION, BENIGN: Primary | ICD-10-CM

## 2022-02-10 DIAGNOSIS — I21.4 NSTEMI (NON-ST ELEVATED MYOCARDIAL INFARCTION): ICD-10-CM

## 2022-02-10 DIAGNOSIS — R00.2 PALPITATIONS: ICD-10-CM

## 2022-02-10 DIAGNOSIS — E78.2 MIXED HYPERLIPIDEMIA: ICD-10-CM

## 2022-02-10 DIAGNOSIS — E11.9 TYPE 2 DIABETES MELLITUS WITHOUT COMPLICATION, WITHOUT LONG-TERM CURRENT USE OF INSULIN: ICD-10-CM

## 2022-02-10 DIAGNOSIS — I25.10 CORONARY ARTERY DISEASE INVOLVING NATIVE CORONARY ARTERY OF NATIVE HEART WITHOUT ANGINA PECTORIS: ICD-10-CM

## 2022-02-10 DIAGNOSIS — C44.92 SCC (SQUAMOUS CELL CARCINOMA): ICD-10-CM

## 2022-02-10 PROCEDURE — 99999 PR PBB SHADOW E&M-EST. PATIENT-LVL IV: CPT | Mod: PBBFAC,,, | Performed by: INTERNAL MEDICINE

## 2022-02-10 PROCEDURE — 3078F PR MOST RECENT DIASTOLIC BLOOD PRESSURE < 80 MM HG: ICD-10-PCS | Mod: CPTII,S$GLB,, | Performed by: INTERNAL MEDICINE

## 2022-02-10 PROCEDURE — 99214 PR OFFICE/OUTPT VISIT, EST, LEVL IV, 30-39 MIN: ICD-10-PCS | Mod: S$GLB,,, | Performed by: INTERNAL MEDICINE

## 2022-02-10 PROCEDURE — 1160F PR REVIEW ALL MEDS BY PRESCRIBER/CLIN PHARMACIST DOCUMENTED: ICD-10-PCS | Mod: CPTII,S$GLB,, | Performed by: INTERNAL MEDICINE

## 2022-02-10 PROCEDURE — 99214 OFFICE O/P EST MOD 30 MIN: CPT | Mod: S$GLB,,, | Performed by: INTERNAL MEDICINE

## 2022-02-10 PROCEDURE — 1160F RVW MEDS BY RX/DR IN RCRD: CPT | Mod: CPTII,S$GLB,, | Performed by: INTERNAL MEDICINE

## 2022-02-10 PROCEDURE — 1101F PT FALLS ASSESS-DOCD LE1/YR: CPT | Mod: CPTII,S$GLB,, | Performed by: INTERNAL MEDICINE

## 2022-02-10 PROCEDURE — 1159F PR MEDICATION LIST DOCUMENTED IN MEDICAL RECORD: ICD-10-PCS | Mod: CPTII,S$GLB,, | Performed by: INTERNAL MEDICINE

## 2022-02-10 PROCEDURE — 99999 PR PBB SHADOW E&M-EST. PATIENT-LVL IV: ICD-10-PCS | Mod: PBBFAC,,, | Performed by: INTERNAL MEDICINE

## 2022-02-10 PROCEDURE — 3288F FALL RISK ASSESSMENT DOCD: CPT | Mod: CPTII,S$GLB,, | Performed by: INTERNAL MEDICINE

## 2022-02-10 PROCEDURE — 1101F PR PT FALLS ASSESS DOC 0-1 FALLS W/OUT INJ PAST YR: ICD-10-PCS | Mod: CPTII,S$GLB,, | Performed by: INTERNAL MEDICINE

## 2022-02-10 PROCEDURE — 1126F AMNT PAIN NOTED NONE PRSNT: CPT | Mod: CPTII,S$GLB,, | Performed by: INTERNAL MEDICINE

## 2022-02-10 PROCEDURE — 3078F DIAST BP <80 MM HG: CPT | Mod: CPTII,S$GLB,, | Performed by: INTERNAL MEDICINE

## 2022-02-10 PROCEDURE — 3075F SYST BP GE 130 - 139MM HG: CPT | Mod: CPTII,S$GLB,, | Performed by: INTERNAL MEDICINE

## 2022-02-10 PROCEDURE — 1126F PR PAIN SEVERITY QUANTIFIED, NO PAIN PRESENT: ICD-10-PCS | Mod: CPTII,S$GLB,, | Performed by: INTERNAL MEDICINE

## 2022-02-10 PROCEDURE — 3075F PR MOST RECENT SYSTOLIC BLOOD PRESS GE 130-139MM HG: ICD-10-PCS | Mod: CPTII,S$GLB,, | Performed by: INTERNAL MEDICINE

## 2022-02-10 PROCEDURE — 3288F PR FALLS RISK ASSESSMENT DOCUMENTED: ICD-10-PCS | Mod: CPTII,S$GLB,, | Performed by: INTERNAL MEDICINE

## 2022-02-10 PROCEDURE — 1159F MED LIST DOCD IN RCRD: CPT | Mod: CPTII,S$GLB,, | Performed by: INTERNAL MEDICINE

## 2022-02-10 RX ORDER — ATORVASTATIN CALCIUM 40 MG/1
40 TABLET, FILM COATED ORAL DAILY
Qty: 90 TABLET | Refills: 3 | Status: SHIPPED | OUTPATIENT
Start: 2022-02-10 | End: 2022-10-04 | Stop reason: SDUPTHER

## 2022-02-10 RX ORDER — METOPROLOL SUCCINATE 50 MG/1
50 TABLET, EXTENDED RELEASE ORAL DAILY
Qty: 90 TABLET | Refills: 3 | Status: SHIPPED | OUTPATIENT
Start: 2022-02-10 | End: 2022-10-04 | Stop reason: SDUPTHER

## 2022-02-10 RX ORDER — NITROGLYCERIN 0.4 MG/1
0.4 TABLET SUBLINGUAL EVERY 5 MIN PRN
Qty: 90 TABLET | Refills: 12 | Status: SHIPPED | OUTPATIENT
Start: 2022-02-10 | End: 2022-10-04 | Stop reason: SDUPTHER

## 2022-02-10 RX ORDER — AMLODIPINE BESYLATE 10 MG/1
10 TABLET ORAL DAILY
Qty: 90 TABLET | Refills: 3 | Status: SHIPPED | OUTPATIENT
Start: 2022-02-10 | End: 2022-10-04 | Stop reason: SDUPTHER

## 2022-02-10 RX ORDER — ISOSORBIDE MONONITRATE 60 MG/1
60 TABLET, EXTENDED RELEASE ORAL DAILY
Qty: 90 TABLET | Refills: 3 | Status: SHIPPED | OUTPATIENT
Start: 2022-02-10 | End: 2022-05-06 | Stop reason: SDUPTHER

## 2022-02-10 NOTE — PROGRESS NOTES
CARDIOVASCULAR CONSULTATION    REASON FOR CONSULT:   Juan Martínez Jr. is a 86 y.o. male who presents for follow-up recent hospitalization for a non-STEMI..      HISTORY OF PRESENT ILLNESS:     Notes from August 2019:   Patient is here for follow-up today.  Was recently admitted for hypertensive urgency.  Did not have any chest pains during the hypertensive urgency.  Troponins were found to be mildly elevated.  Was evaluated by Cardiology and Norvasc was restarted.  His blood pressure has been well controlled since then.  Denies any chest pains at rest on exertion, orthopnea, PND, swelling of feet.      Notes from July 2019  Patient is here for follow-up today.  Denies any chest pains at rest on exertion, orthopnea, PND.  Denies any claudication symptoms.  Denies any dyspnea at rest on exertion    Note from clinic visit in May 2019:  Patient is 83-year-old man pleasant man with a past medical history significant for diabetes, dyslipidemia, hypertension, coronary artery disease who recently presented to the hospital with a non-STEMI.  Coronary angiogram performed at that time revealed nonischemic coronary artery disease of the LAD and RCA as well as 80% stenosis in his diagonal 1.  Which is being managed medically.  A week after that he developed some right-sided chest pain, different from his anginal pain which he had presented with his non-STEMI.  He states that right-sided chest pain resolved on its own in less than 20 min, but since this is all new for him he decided to come to the hospital to make sure everything was okay.  He was admitted and serial troponins did not reveal any elevation and he was discharged home.  He has not had any further episodes of chest pains.  Denies orthopnea, PND chest pains at rest or on exertion.  Denies typical claudication pain, has knee pains and atypical leg pain.      Notes from October 2019:  Patient here for follow-up.  States that had a brief episode of right-sided  chest pain which lasted a few seconds to few minutes and then went away.  This was different than the anginal pain he had in the past.  Denies any orthopnea, PND, swelling of feet.  States that he tried telling is  that this he felt was related to his lungs, and an checks x-ray done yesterday.  Denies any recent fevers or chills.    Notes from December 2019:  Patient here for follow-up.  Denies any chest pains at rest on exertion, orthopnea, PND, swelling of feet.  Blood pressure mildly elevated at clinic.  At home states blood pressure stays around 140-145 mm systolic.  I will increase his Toprol-XL to 75 mg daily.    Notes from January 2020:  Patient here because states that yesterday he felt some chest pain.  It was right-sided.  States did not feel like it was from his heart but just wanted to make sure.  He took nitroglycerines and had no response to nitroglycerin.  States that did not feel like his earlier anginal pains.    Notes from May 2020:  Patient here for follow-up did not have any further episodes of chest pain.  Is looking for clearance for EGD.  Denies any orthopnea, PND, swelling of feet.    Notes from September 2020:  Patient here for follow-up.  Denies any anginal sounding chest pains, orthopnea, PND.  States he had an episode last Friday where he took a deep breath and felt a sharp pain which lasted 1 sec and then went away.  Denies any anginal sounding chest pains on exertion.  Denies any orthopnea, PND, swelling of feet.  EKG done in the clinic today was personally reviewed and demonstrated normal sinus rhythm, left axis deviation.  Abnormal EKG.    Notes from December 2020:  Patient here follow-up.  Denies any chest pains rest exertion PND.  Doing fine.  No cardiac issues.    Notes from February 2021:  Patient here follow-up.  Denies any chest pains at rest on exertion, orthopnea, PND.  EKG done in the clinic today was personally reviewed and demonstrated normal sinus rhythm with left  anterior fascicular block.  No other cardiac symptomatology.    Notes from June 2021:  Patient here for follow-up.  Doing fine.  Denies any chest pains at rest on exertion, orthopnea PND.    Notes from July 21:  Patient here for after recent hospitalization.  Had come in with non-STEMI.  Culprit lesion was distal RCA.  Underwent PCI distal RCA.  Doing fine.  No chest pains.  No complications from cardiac catheterization.    October 21:  Patient here for follow-up.  Denies any chest pains at rest on exertion, orthopnea, PND.  Doing fine.    February 2022 patient here for follow-up.  Doing fine.  No anginal sounding chest pains, orthopnea, PND.    PAST MEDICAL HISTORY:     Past Medical History:   Diagnosis Date    Diabetes mellitus     High cholesterol     Hypertension     NSTEMI (non-ST elevated myocardial infarction) 7/25/2021    Prostate cancer        PAST SURGICAL HISTORY:     Past Surgical History:   Procedure Laterality Date    APPLICATION OF FULL-THICKNESS SKIN GRAFT (FTSG) TO UPPER EXTREMITY Right 11/15/2018    Procedure: APPLICATION, GRAFT, SKIN, FULL-THICKNESS, TO UPPER EXTREMITY VS STSG WITH FROZEN SECTIONS;  Surgeon: Alan Arzola MD;  Location: [facility] OR;  Service: Plastics;  Laterality: Right;    EXCISION OF SQUAMOUS CELL CARCINOMA Right 11/15/2018    Procedure: EXCISION, CARCINOMA, SQUAMOUS CELL @ RIGHT HAND;  Surgeon: Alan Arzola MD;  Location: [facility] OR;  Service: Plastics;  Laterality: Right;  RN PREOP 11/13/2018--NEED H/P    JOINT REPLACEMENT      LEFT HEART CATHETERIZATION Left 5/6/2019    Procedure: Left heart cath;  Surgeon: Rashad Nieto MD;  Location: [facility] CATH LAB;  Service: Cardiology;  Laterality: Left;    LEFT HEART CATHETERIZATION Left 7/26/2021    Procedure: Left heart cath, radial;  Surgeon: Rashad Nieto MD;  Location: [facility] CATH LAB;  Service: Cardiology;  Laterality: Left;    TONSILLECTOMY      TOTAL KNEE ARTHROPLASTY         ALLERGIES AND MEDICATION:   Review of  patient's allergies indicates:  No Known Allergies     Medication List          Accurate as of February 10, 2022  3:09 PM. If you have any questions, ask your nurse or doctor.            CONTINUE taking these medications    acetaminophen 325 MG tablet  Commonly known as: TYLENOL  Take 2 tablets (650 mg total) by mouth every 6 (six) hours as needed for Pain.     albuterol 90 mcg/actuation inhaler  Commonly known as: PROVENTIL/VENTOLIN HFA     AMITIZA 24 MCG Cap  Generic drug: lubiprostone     amLODIPine 10 MG tablet  Commonly known as: NORVASC  Take 1 tablet (10 mg total) by mouth once daily.     aspirin 81 MG EC tablet  Commonly known as: ECOTRIN     atorvastatin 40 MG tablet  Commonly known as: LIPITOR  Take 1 tablet (40 mg total) by mouth once daily.     blood-glucose meter Misc     clopidogreL 75 mg tablet  Commonly known as: PLAVIX  Take 1 tablet (75 mg total) by mouth once daily.     dicyclomine 10 MG capsule  Commonly known as: BENTYL     EScitalopram oxalate 10 MG tablet  Commonly known as: LEXAPRO     hydroCHLOROthiazide 12.5 mg capsule  Commonly known as: MICROZIDE     isosorbide mononitrate 60 MG 24 hr tablet  Commonly known as: IMDUR  Take 1 tablet (60 mg total) by mouth once daily.     LANCETS & BLOOD GLUCOSE STRIPS MISC     LIDOcaine 5 %  Commonly known as: LIDODERM  Place 1 patch onto the skin daily as needed (neck pain.). Remove & Discard patch within 12 hours or as directed by MD. Apply to posterior neck in case of pain.     metFORMIN 1000 MG tablet  Commonly known as: GLUCOPHAGE  Take 1 tablet (1,000 mg total) by mouth 2 (two) times daily with meals.     metoprolol succinate 50 MG 24 hr tablet  Commonly known as: TOPROL-XL  Take 1 tablet (50 mg total) by mouth once daily.     nitroGLYCERIN 0.4 MG SL tablet  Commonly known as: NITROSTAT  Place 1 tablet (0.4 mg total) under the tongue every 5 (five) minutes as needed for Chest pain.     ondansetron 4 MG tablet  Commonly known as: ZOFRAN  Take 1 tablet  "(4 mg total) by mouth every 6 (six) hours as needed for Nausea.     varicella-zoster gE-AS01B (PF) 50 mcg/0.5 mL injection  Commonly known as: SHINGRIX  Inject into the muscle.            SOCIAL HISTORY:     Social History     Socioeconomic History    Marital status:    Tobacco Use    Smoking status: Former Smoker     Packs/day: 0.00     Types: Cigarettes     Quit date: 2016     Years since quittin.5    Smokeless tobacco: Never Used    Tobacco comment: 2016   Substance and Sexual Activity    Alcohol use: No    Drug use: No    Sexual activity: Not Currently       FAMILY HISTORY:     Family History   Problem Relation Age of Onset    Heart disease Mother     Stroke Father        REVIEW OF SYSTEMS:   Review of Systems   Constitutional: Negative.    HENT: Negative.    Eyes: Negative.    Respiratory: Negative.    Cardiovascular: Negative.    Gastrointestinal: Negative.    Genitourinary: Negative.    Musculoskeletal: Positive for joint pain.   Skin: Negative.    Neurological: Negative.    Endo/Heme/Allergies: Negative.        A 10 point review of systems was performed and all the pertinent positives have been mentioned. Rest of review of systems was negative.        PHYSICAL EXAM:     Vitals:    02/10/22 1420   BP: 138/60   Pulse: 64    Body mass index is 28.65 kg/m².  Weight: 88 kg (194 lb)   Height: 5' 9" (175.3 cm)     Physical Exam  Vitals and nursing note reviewed.   Constitutional:       Appearance: Normal appearance. He is well-developed.   HENT:      Head: Normocephalic and atraumatic.      Right Ear: Hearing normal.      Left Ear: Hearing normal.      Nose: Nose normal.   Eyes:      General: Lids are normal.      Conjunctiva/sclera: Conjunctivae normal.      Pupils: Pupils are equal, round, and reactive to light.   Cardiovascular:      Rate and Rhythm: Normal rate and regular rhythm.      Pulses: Normal pulses.      Heart sounds: Normal heart sounds.   Pulmonary:      Effort: " Pulmonary effort is normal.      Breath sounds: Normal breath sounds.   Abdominal:      Palpations: Abdomen is soft.      Tenderness: There is no abdominal tenderness.   Musculoskeletal:         General: No deformity.      Cervical back: Normal range of motion and neck supple.   Skin:     General: Skin is warm and dry.   Neurological:      Mental Status: He is alert and oriented to person, place, and time.   Psychiatric:         Speech: Speech normal.           DATA:     Laboratory:  CBC:  Recent Labs   Lab 07/30/21  0116 11/11/21  0609 01/11/22  2335   WBC 7.45 8.22 9.04   Hemoglobin 12.3 L 12.7 L 12.3 L   Hematocrit 35.6 L 37.7 L 37.2 L   Platelets 254 285 265       CHEMISTRIES:  Recent Labs   Lab 07/25/21  0503 07/25/21  0503 07/26/21  0348 07/27/21  0550 07/30/21  0116 11/11/21  0609 01/11/22  2335   Glucose 114 H   < > 126 H   < > 119 H 179 H 138 H   Sodium 138   < > 137   < > 135 L 138 138   Potassium 4.1   < > 3.4 L   < > 3.2 L 3.7 3.4 L   BUN 13   < > 12   < > 15 14 18   Creatinine 1.0   < > 0.9   < > 1.0 1.0 1.2   eGFR if African American >60   < > >60   < > >60 >60 >60   eGFR if non African American >60   < > >60   < > >60 >60 55 A   Calcium 8.8   < > 9.2   < > 9.5 9.6 9.0   Magnesium 1.8  --  1.9  --   --  2.0  --     < > = values in this interval not displayed.       CARDIAC BIOMARKERS:  Recent Labs   Lab 11/11/21  0609 11/11/21  0820 01/11/22  2335   Troponin I <0.006 0.011 0.017       COAGS:  Recent Labs   Lab 07/25/21  1138 11/11/21  0609   INR 1.0 1.0       LIPIDS/LFTS:  Recent Labs   Lab 05/06/19  1045 05/12/19  0140 07/30/21  0116 11/11/21  0609 01/11/22  2335   Cholesterol 134  --   --   --   --    Triglycerides 80  --   --   --   --    HDL 45  --   --   --   --    LDL Cholesterol 73.0  --   --   --   --    Non-HDL Cholesterol 89  --   --   --   --    AST  --    < > 29 16 15   ALT  --    < > 22 12 13    < > = values in this interval not displayed.       Hemoglobin A1C   Date Value Ref Range Status    05/07/2019 7.0 (H) 4.0 - 5.6 % Final     Comment:     ADA Screening Guidelines:  5.7-6.4%  Consistent with prediabetes  >or=6.5%  Consistent with diabetes  High levels of fetal hemoglobin interfere with the HbA1C  assay. Heterozygous hemoglobin variants (HbS, HgC, etc)do  not significantly interfere with this assay.   However, presence of multiple variants may affect accuracy.     06/19/2018 7.3 (H) 4.0 - 5.6 % Final     Comment:     ADA Screening Guidelines:  5.7-6.4%  Consistent with prediabetes  >or=6.5%  Consistent with diabetes  High levels of fetal hemoglobin interfere with the HbA1C  assay. Heterozygous hemoglobin variants (HbS, HgC, etc)do  not significantly interfere with this assay.   However, presence of multiple variants may affect accuracy.       Hemoglobin A1c   Date Value Ref Range Status   12/02/2021 6.7 (H) <5.7 % of total Hgb Final     Comment:     For someone without known diabetes, a hemoglobin A1c  value of 6.5% or greater indicates that they may have   diabetes and this should be confirmed with a follow-up   test.    For someone with known diabetes, a value <7% indicates   that their diabetes is well controlled and a value   greater than or equal to 7% indicates suboptimal   control. A1c targets should be individualized based on   duration of diabetes, age, comorbid conditions, and   other considerations.    Currently, no consensus exists regarding use of  hemoglobin A1c for diagnosis of diabetes for children.       07/23/2021 6.8 (H) <5.7 % of total Hgb Final     Comment:     For someone without known diabetes, a hemoglobin A1c  value of 6.5% or greater indicates that they may have   diabetes and this should be confirmed with a follow-up   test.    For someone with known diabetes, a value <7% indicates   that their diabetes is well controlled and a value   greater than or equal to 7% indicates suboptimal   control. A1c targets should be individualized based on   duration of diabetes, age,  comorbid conditions, and   other considerations.    Currently, no consensus exists regarding use of  hemoglobin A1c for diagnosis of diabetes for children.       03/16/2021 6.4 (H) <5.7 % of total Hgb Final     Comment:     For someone without known diabetes, a hemoglobin   A1c value between 5.7% and 6.4% is consistent with  prediabetes and should be confirmed with a   follow-up test.    For someone with known diabetes, a value <7%  indicates that their diabetes is well controlled. A1c  targets should be individualized based on duration of  diabetes, age, comorbid conditions, and other  considerations.    This assay result is consistent with an increased risk  of diabetes.    Currently, no consensus exists regarding use of  hemoglobin A1c for diagnosis of diabetes for children.       TSH        The ASCVD Risk score (Blairsilvestre WEATHERS Jr., et al., 2013) failed to calculate for the following reasons:    The 2013 ASCVD risk score is only valid for ages 40 to 79    The patient has a prior MI or stroke diagnosis           Cardiovascular Testing:    EKG: (personally reviewed tracing)  May 2019:  Normal sinus rhythm, left axis deviation.  Abnormal EKG.    2D echocardiogram May 2019:  · Normal left ventricular systolic function. The estimated ejection fraction is 65%  · Normal LV diastolic function.  · Mild left atrial enlargement.  · Normal central venous pressure (3 mm Hg).  · The estimated PA systolic pressure is 10 mm Hg       Coronary angiogram 6th May 2019:    No acute thrombotic lesion identified.  Coronary artery disease as described below  LVEDP: 12 mmHg    Carotid ultrasound May 28, 2019:    · There is 20-39% right Internal Carotid Stenosis.  · There is 20-39% left Internal Carotid Stenosis.     ABIs May 28, 2019:    Noncompressible LOW bilaterally.  Mildly decrease TBI bilaterally.  Normal PVR waveforms bilaterally.       Coronary Anatomy:  LM:  No significant stenosis  LAD:  Mid LAD 60-70% stenosis.  Distal LAD 50%  stenosis.  IFR 0.92 (nonischemic).  Diagonal 1 is a tortuous vessel with mid 80% stenosis.  Will medically manage this diagonal stenosis  LCx :  Mild nonobstructive CAD  RCA:  50% distal RCA/proximal PDA lesion.  IFR 1 (nonischemic)     Impression / Plan  Coronary artery disease as described above.  Continue medical management with aggressive risk factor modification.  Continue aspirin 81 mg daily. Change simvastatin to atorvastatin 80 mg daily.        ASSESSMENT AND PLAN     Patient Active Problem List   Diagnosis    Essential hypertension, benign    Type 2 diabetes mellitus    Hyperlipidemia    Body mass index 28.0-28.9, adult    History of prostate cancer    Orthostatic hypotension    Diabetic ulcer of toe of left foot associated with type 2 diabetes mellitus, with fat layer exposed    SCC (squamous cell carcinoma)    CAD (coronary artery disease)    Unstable angina    Palpitations    Elevated troponin    NSTEMI (non-ST elevated myocardial infarction)       1.  Patient with coronary artery disease, now angina free.  Status post PCI distal RCA in July of 2021. Continue aspirin 81 mg daily indefinitely and Plavix 75 mg daily uninterrupted for at least 1 year from date of PCI.  Diagonal 1 disease being managed medically.  Referral has been made to cardiac rehab.    2.  Hypertension:  Well controlled on current therapy.      3.  Cardiovascular screening with rest and stress ABIs and carotid ultrasound performed.  ABIs demonstrated noncompressible ABIs bilaterally, mildly decreased ABIs bilaterally with normal PVR waveforms bilaterally.  I got a peripheral ultrasound for further evaluation of his abnormal TBI.   ultrasound did not show any flow restriction in the right or the left lower extremity.    4.  Dyslipidemia:  Continue medical management with atorvastatin.    6.  May hold Plavix as needed for procedures.      Thank you very much for involving me in the care of your patient.  Please do not  hesitate to contact me if there are any questions.      Rashad Nieto MD, FAC, [facility]  Interventional Cardiologist, [facility].     Follow-up in 4 months      This note was dictated with the help of speech recognition software.  There might be un-intended errors and/or substitutions.

## 2022-03-31 ENCOUNTER — IMMUNIZATION (OUTPATIENT)
Dept: OBSTETRICS AND GYNECOLOGY | Facility: CLINIC | Age: 87
End: 2022-03-31
Payer: MEDICARE

## 2022-03-31 DIAGNOSIS — Z23 NEED FOR VACCINATION: Primary | ICD-10-CM

## 2022-03-31 PROCEDURE — 91305 COVID-19, MRNA, LNP-S, PF, 30 MCG/0.3 ML DOSE VACCINE (PFIZER): CPT | Mod: PBBFAC | Performed by: FAMILY MEDICINE

## 2022-05-06 NOTE — TELEPHONE ENCOUNTER
----- Message from Zuleima Stewart sent at 5/5/2022  3:44 PM CDT -----  Regarding: self  .Type: RX Refill Request    Who Called:  self     Have you contacted your pharmacy: yes     Refill or New Rx: refill     RX Name and Strength:isosorbide mononitrate (IMDUR) 60 MG 24 hr tablet    Preferred Pharmacy with phone number: .  [facility] - [address] [address]  Phone: [phone] Fax: [phone]    Local or Mail Order: local     Ordering Provider: Dr brush     Would the patient rather a call back or a response via [facility]?  Call     Best Call Back Number: .[phone]

## 2022-05-10 ENCOUNTER — TELEPHONE (OUTPATIENT)
Dept: CARDIOLOGY | Facility: CLINIC | Age: 87
End: 2022-05-10
Payer: MEDICARE

## 2022-05-10 RX ORDER — ISOSORBIDE MONONITRATE 60 MG/1
60 TABLET, EXTENDED RELEASE ORAL DAILY
Qty: 90 TABLET | Refills: 3 | Status: SHIPPED | OUTPATIENT
Start: 2022-05-10 | End: 2022-10-04 | Stop reason: SDUPTHER

## 2022-05-10 NOTE — TELEPHONE ENCOUNTER
----- Message from Jina Savage sent at 5/4/2022 12:15 PM CDT -----  Pt would like a refill on isosorbide mononitrate 60 mg with the 3 refills pt needs medicine by Monday

## 2022-05-10 NOTE — TELEPHONE ENCOUNTER
Called pt to verify pharmacy for IMDUR that was accidentally printed out on 05/10/22. Pt states he no longer needs 60mg of IMDUR because his pharmacy is out of 60mg. Pt states he is still taking 30mg of IMDUR.     Informed Dr. Nieto of pt information.

## 2022-05-11 NOTE — Clinical Note
146 ml injected throughout the case. 154 mL total wasted during the case. 300 mL total used in the case.  08-May-2022 21:00

## 2022-05-27 RX ORDER — CLOPIDOGREL BISULFATE 75 MG/1
75 TABLET ORAL DAILY
Qty: 90 TABLET | Refills: 3 | Status: SHIPPED | OUTPATIENT
Start: 2022-05-27 | End: 2022-10-04 | Stop reason: SDUPTHER

## 2022-05-27 NOTE — TELEPHONE ENCOUNTER
----- Message from Natty Pablo sent at 5/26/2022  8:23 AM CDT -----  Regarding: prescription refill  Pt came into the office stating he needs a 3 month supply refill on his clopidogreL (PLAVIX) 75 mg tablet medication. Can someone please reach out to Mr. Martínez in regards to this matter. Thanks.

## 2022-07-19 ENCOUNTER — OFFICE VISIT (OUTPATIENT)
Dept: CARDIOLOGY | Facility: CLINIC | Age: 87
End: 2022-07-19
Payer: MEDICARE

## 2022-07-19 VITALS
DIASTOLIC BLOOD PRESSURE: 62 MMHG | SYSTOLIC BLOOD PRESSURE: 130 MMHG | HEART RATE: 66 BPM | BODY MASS INDEX: 28.65 KG/M2 | WEIGHT: 193.44 LBS | RESPIRATION RATE: 18 BRPM | OXYGEN SATURATION: 96 % | HEIGHT: 69 IN

## 2022-07-19 DIAGNOSIS — R79.89 ELEVATED TROPONIN: ICD-10-CM

## 2022-07-19 DIAGNOSIS — E11.621 DIABETIC ULCER OF TOE OF LEFT FOOT ASSOCIATED WITH TYPE 2 DIABETES MELLITUS, WITH FAT LAYER EXPOSED: ICD-10-CM

## 2022-07-19 DIAGNOSIS — C44.92 SCC (SQUAMOUS CELL CARCINOMA): ICD-10-CM

## 2022-07-19 DIAGNOSIS — I10 ESSENTIAL HYPERTENSION, BENIGN: ICD-10-CM

## 2022-07-19 DIAGNOSIS — R00.2 PALPITATIONS: ICD-10-CM

## 2022-07-19 DIAGNOSIS — L97.522 DIABETIC ULCER OF TOE OF LEFT FOOT ASSOCIATED WITH TYPE 2 DIABETES MELLITUS, WITH FAT LAYER EXPOSED: ICD-10-CM

## 2022-07-19 DIAGNOSIS — I21.4 NSTEMI (NON-ST ELEVATED MYOCARDIAL INFARCTION): ICD-10-CM

## 2022-07-19 DIAGNOSIS — I25.10 CORONARY ARTERY DISEASE, UNSPECIFIED VESSEL OR LESION TYPE, UNSPECIFIED WHETHER ANGINA PRESENT, UNSPECIFIED WHETHER NATIVE OR TRANSPLANTED HEART: Primary | ICD-10-CM

## 2022-07-19 DIAGNOSIS — E78.2 MIXED HYPERLIPIDEMIA: ICD-10-CM

## 2022-07-19 DIAGNOSIS — E11.9 TYPE 2 DIABETES MELLITUS WITHOUT COMPLICATION, WITHOUT LONG-TERM CURRENT USE OF INSULIN: ICD-10-CM

## 2022-07-19 PROCEDURE — 99999 PR PBB SHADOW E&M-EST. PATIENT-LVL IV: CPT | Mod: PBBFAC,,, | Performed by: INTERNAL MEDICINE

## 2022-07-19 PROCEDURE — 1159F MED LIST DOCD IN RCRD: CPT | Mod: CPTII,S$GLB,, | Performed by: INTERNAL MEDICINE

## 2022-07-19 PROCEDURE — 93000 EKG 12-LEAD: ICD-10-PCS | Mod: S$GLB,,, | Performed by: INTERNAL MEDICINE

## 2022-07-19 PROCEDURE — 1101F PT FALLS ASSESS-DOCD LE1/YR: CPT | Mod: CPTII,S$GLB,, | Performed by: INTERNAL MEDICINE

## 2022-07-19 PROCEDURE — 1160F RVW MEDS BY RX/DR IN RCRD: CPT | Mod: CPTII,S$GLB,, | Performed by: INTERNAL MEDICINE

## 2022-07-19 PROCEDURE — 3075F SYST BP GE 130 - 139MM HG: CPT | Mod: CPTII,S$GLB,, | Performed by: INTERNAL MEDICINE

## 2022-07-19 PROCEDURE — 3288F PR FALLS RISK ASSESSMENT DOCUMENTED: ICD-10-PCS | Mod: CPTII,S$GLB,, | Performed by: INTERNAL MEDICINE

## 2022-07-19 PROCEDURE — 3078F DIAST BP <80 MM HG: CPT | Mod: CPTII,S$GLB,, | Performed by: INTERNAL MEDICINE

## 2022-07-19 PROCEDURE — 3075F PR MOST RECENT SYSTOLIC BLOOD PRESS GE 130-139MM HG: ICD-10-PCS | Mod: CPTII,S$GLB,, | Performed by: INTERNAL MEDICINE

## 2022-07-19 PROCEDURE — 1160F PR REVIEW ALL MEDS BY PRESCRIBER/CLIN PHARMACIST DOCUMENTED: ICD-10-PCS | Mod: CPTII,S$GLB,, | Performed by: INTERNAL MEDICINE

## 2022-07-19 PROCEDURE — 1159F PR MEDICATION LIST DOCUMENTED IN MEDICAL RECORD: ICD-10-PCS | Mod: CPTII,S$GLB,, | Performed by: INTERNAL MEDICINE

## 2022-07-19 PROCEDURE — 99999 PR PBB SHADOW E&M-EST. PATIENT-LVL IV: ICD-10-PCS | Mod: PBBFAC,,, | Performed by: INTERNAL MEDICINE

## 2022-07-19 PROCEDURE — 1126F AMNT PAIN NOTED NONE PRSNT: CPT | Mod: CPTII,S$GLB,, | Performed by: INTERNAL MEDICINE

## 2022-07-19 PROCEDURE — 3288F FALL RISK ASSESSMENT DOCD: CPT | Mod: CPTII,S$GLB,, | Performed by: INTERNAL MEDICINE

## 2022-07-19 PROCEDURE — 1101F PR PT FALLS ASSESS DOC 0-1 FALLS W/OUT INJ PAST YR: ICD-10-PCS | Mod: CPTII,S$GLB,, | Performed by: INTERNAL MEDICINE

## 2022-07-19 PROCEDURE — 3078F PR MOST RECENT DIASTOLIC BLOOD PRESSURE < 80 MM HG: ICD-10-PCS | Mod: CPTII,S$GLB,, | Performed by: INTERNAL MEDICINE

## 2022-07-19 PROCEDURE — 99214 OFFICE O/P EST MOD 30 MIN: CPT | Mod: S$GLB,,, | Performed by: INTERNAL MEDICINE

## 2022-07-19 PROCEDURE — 1126F PR PAIN SEVERITY QUANTIFIED, NO PAIN PRESENT: ICD-10-PCS | Mod: CPTII,S$GLB,, | Performed by: INTERNAL MEDICINE

## 2022-07-19 PROCEDURE — 93000 ELECTROCARDIOGRAM COMPLETE: CPT | Mod: S$GLB,,, | Performed by: INTERNAL MEDICINE

## 2022-07-19 PROCEDURE — 99214 PR OFFICE/OUTPT VISIT, EST, LEVL IV, 30-39 MIN: ICD-10-PCS | Mod: S$GLB,,, | Performed by: INTERNAL MEDICINE

## 2022-07-19 NOTE — PROGRESS NOTES
CARDIOVASCULAR CONSULTATION    REASON FOR CONSULT:   Juan Martínez Jr. is a 86 y.o. male who presents for follow-up recent hospitalization for a non-STEMI..      HISTORY OF PRESENT ILLNESS:     Notes from August 2019:   Patient is here for follow-up today.  Was recently admitted for hypertensive urgency.  Did not have any chest pains during the hypertensive urgency.  Troponins were found to be mildly elevated.  Was evaluated by Cardiology and Norvasc was restarted.  His blood pressure has been well controlled since then.  Denies any chest pains at rest on exertion, orthopnea, PND, swelling of feet.      Notes from July 2019  Patient is here for follow-up today.  Denies any chest pains at rest on exertion, orthopnea, PND.  Denies any claudication symptoms.  Denies any dyspnea at rest on exertion    Note from clinic visit in May 2019:  Patient is 83-year-old man pleasant man with a past medical history significant for diabetes, dyslipidemia, hypertension, coronary artery disease who recently presented to the hospital with a non-STEMI.  Coronary angiogram performed at that time revealed nonischemic coronary artery disease of the LAD and RCA as well as 80% stenosis in his diagonal 1.  Which is being managed medically.  A week after that he developed some right-sided chest pain, different from his anginal pain which he had presented with his non-STEMI.  He states that right-sided chest pain resolved on its own in less than 20 min, but since this is all new for him he decided to come to the hospital to make sure everything was okay.  He was admitted and serial troponins did not reveal any elevation and he was discharged home.  He has not had any further episodes of chest pains.  Denies orthopnea, PND chest pains at rest or on exertion.  Denies typical claudication pain, has knee pains and atypical leg pain.      Notes from October 2019:  Patient here for follow-up.  States that had a brief episode of right-sided  chest pain which lasted a few seconds to few minutes and then went away.  This was different than the anginal pain he had in the past.  Denies any orthopnea, PND, swelling of feet.  States that he tried telling is  that this he felt was related to his lungs, and an checks x-ray done yesterday.  Denies any recent fevers or chills.    Notes from December 2019:  Patient here for follow-up.  Denies any chest pains at rest on exertion, orthopnea, PND, swelling of feet.  Blood pressure mildly elevated at clinic.  At home states blood pressure stays around 140-145 mm systolic.  I will increase his Toprol-XL to 75 mg daily.    Notes from January 2020:  Patient here because states that yesterday he felt some chest pain.  It was right-sided.  States did not feel like it was from his heart but just wanted to make sure.  He took nitroglycerines and had no response to nitroglycerin.  States that did not feel like his earlier anginal pains.    Notes from May 2020:  Patient here for follow-up did not have any further episodes of chest pain.  Is looking for clearance for EGD.  Denies any orthopnea, PND, swelling of feet.    Notes from September 2020:  Patient here for follow-up.  Denies any anginal sounding chest pains, orthopnea, PND.  States he had an episode last Friday where he took a deep breath and felt a sharp pain which lasted 1 sec and then went away.  Denies any anginal sounding chest pains on exertion.  Denies any orthopnea, PND, swelling of feet.  EKG done in the clinic today was personally reviewed and demonstrated normal sinus rhythm, left axis deviation.  Abnormal EKG.    Notes from December 2020:  Patient here follow-up.  Denies any chest pains rest exertion PND.  Doing fine.  No cardiac issues.    Notes from February 2021:  Patient here follow-up.  Denies any chest pains at rest on exertion, orthopnea, PND.  EKG done in the clinic today was personally reviewed and demonstrated normal sinus rhythm with left  anterior fascicular block.  No other cardiac symptomatology.    Notes from June 2021:  Patient here for follow-up.  Doing fine.  Denies any chest pains at rest on exertion, orthopnea PND.    Notes from July 21:  Patient here for after recent hospitalization.  Had come in with non-STEMI.  Culprit lesion was distal RCA.  Underwent PCI distal RCA.  Doing fine.  No chest pains.  No complications from cardiac catheterization.    October 21:  Patient here for follow-up.  Denies any chest pains at rest on exertion, orthopnea, PND.  Doing fine.    February 2022 patient here for follow-up.  Doing fine.  No anginal sounding chest pains, orthopnea, PND.    Notes from July 2022:  Patient here for follow-up.  Denies any chest at rest on exertion, orthopnea, PND, swelling of feet     PAST MEDICAL HISTORY:     Past Medical History:   Diagnosis Date    Diabetes mellitus     High cholesterol     Hypertension     NSTEMI (non-ST elevated myocardial infarction) 7/25/2021    Prostate cancer        PAST SURGICAL HISTORY:     Past Surgical History:   Procedure Laterality Date    APPLICATION OF FULL-THICKNESS SKIN GRAFT (FTSG) TO UPPER EXTREMITY Right 11/15/2018    Procedure: APPLICATION, GRAFT, SKIN, FULL-THICKNESS, TO UPPER EXTREMITY VS STSG WITH FROZEN SECTIONS;  Surgeon: Alan Arzola MD;  Location: [facility] OR;  Service: Plastics;  Laterality: Right;    EXCISION OF SQUAMOUS CELL CARCINOMA Right 11/15/2018    Procedure: EXCISION, CARCINOMA, SQUAMOUS CELL @ RIGHT HAND;  Surgeon: Alan Arzola MD;  Location: [facility] OR;  Service: Plastics;  Laterality: Right;  RN PREOP 11/13/2018--NEED H/P    JOINT REPLACEMENT      LEFT HEART CATHETERIZATION Left 5/6/2019    Procedure: Left heart cath;  Surgeon: Rashad Nieto MD;  Location: [facility] CATH LAB;  Service: Cardiology;  Laterality: Left;    LEFT HEART CATHETERIZATION Left 7/26/2021    Procedure: Left heart cath, radial;  Surgeon: Rashad Nieto MD;  Location: [facility] CATH LAB;   Service: Cardiology;  Laterality: Left;    TONSILLECTOMY      TOTAL KNEE ARTHROPLASTY         ALLERGIES AND MEDICATION:   Review of patient's allergies indicates:  No Known Allergies     Medication List          Accurate as of July 19, 2022  1:41 PM. If you have any questions, ask your nurse or doctor.            CONTINUE taking these medications    acetaminophen 325 MG tablet  Commonly known as: TYLENOL  Take 2 tablets (650 mg total) by mouth every 6 (six) hours as needed for Pain.     albuterol 90 mcg/actuation inhaler  Commonly known as: PROVENTIL/VENTOLIN HFA     AMITIZA 24 MCG Cap  Generic drug: lubiprostone     amLODIPine 10 MG tablet  Commonly known as: NORVASC  Take 1 tablet (10 mg total) by mouth once daily.     aspirin 81 MG EC tablet  Commonly known as: ECOTRIN     atorvastatin 40 MG tablet  Commonly known as: LIPITOR  Take 1 tablet (40 mg total) by mouth once daily.     blood-glucose meter Misc     clopidogreL 75 mg tablet  Commonly known as: PLAVIX  Take 1 tablet (75 mg total) by mouth once daily.     dicyclomine 10 MG capsule  Commonly known as: BENTYL     EScitalopram oxalate 10 MG tablet  Commonly known as: LEXAPRO     hydroCHLOROthiazide 12.5 mg capsule  Commonly known as: MICROZIDE     isosorbide mononitrate 60 MG 24 hr tablet  Commonly known as: IMDUR  Take 1 tablet (60 mg total) by mouth once daily.     LANCETS & BLOOD GLUCOSE STRIPS MISC     LIDOcaine 5 %  Commonly known as: LIDODERM  Place 1 patch onto the skin daily as needed (neck pain.). Remove & Discard patch within 12 hours or as directed by MD. Apply to posterior neck in case of pain.     metFORMIN 1000 MG tablet  Commonly known as: GLUCOPHAGE  Take 1 tablet (1,000 mg total) by mouth 2 (two) times daily with meals.     metoprolol succinate 50 MG 24 hr tablet  Commonly known as: TOPROL-XL  Take 1 tablet (50 mg total) by mouth once daily.     nitroGLYCERIN 0.4 MG SL tablet  Commonly known as: NITROSTAT  Place 1 tablet (0.4 mg total) under  "the tongue every 5 (five) minutes as needed for Chest pain.     ondansetron 4 MG tablet  Commonly known as: ZOFRAN  Take 1 tablet (4 mg total) by mouth every 6 (six) hours as needed for Nausea.     varicella-zoster gE-AS01B (PF) 50 mcg/0.5 mL injection  Commonly known as: SHINGRIX  Inject into the muscle.            SOCIAL HISTORY:     Social History     Socioeconomic History    Marital status:    Tobacco Use    Smoking status: Former Smoker     Packs/day: 0.00     Types: Cigarettes     Quit date: 2016     Years since quittin.9    Smokeless tobacco: Never Used    Tobacco comment: 2016   Substance and Sexual Activity    Alcohol use: No    Drug use: No    Sexual activity: Not Currently       FAMILY HISTORY:     Family History   Problem Relation Age of Onset    Heart disease Mother     Stroke Father        REVIEW OF SYSTEMS:   Review of Systems   Constitutional: Negative.    HENT: Negative.    Eyes: Negative.    Respiratory: Negative.    Cardiovascular: Negative.    Gastrointestinal: Negative.    Genitourinary: Negative.    Musculoskeletal: Positive for joint pain.   Skin: Negative.    Neurological: Negative.    Endo/Heme/Allergies: Negative.        A 10 point review of systems was performed and all the pertinent positives have been mentioned. Rest of review of systems was negative.        PHYSICAL EXAM:     Vitals:    22 1304   BP: 130/62   Pulse: 66   Resp: 18    Body mass index is 28.57 kg/m².  Weight: 87.8 kg (193 lb 7.3 oz)   Height: 5' 9" (175.3 cm)     Physical Exam  Vitals and nursing note reviewed.   Constitutional:       Appearance: Normal appearance. He is well-developed.   HENT:      Head: Normocephalic and atraumatic.      Right Ear: Hearing normal.      Left Ear: Hearing normal.      Nose: Nose normal.   Eyes:      General: Lids are normal.      Conjunctiva/sclera: Conjunctivae normal.      Pupils: Pupils are equal, round, and reactive to light.   Cardiovascular:      " Rate and Rhythm: Normal rate and regular rhythm.      Pulses: Normal pulses.      Heart sounds: Normal heart sounds.   Pulmonary:      Effort: Pulmonary effort is normal.      Breath sounds: Normal breath sounds.   Abdominal:      Palpations: Abdomen is soft.      Tenderness: There is no abdominal tenderness.   Musculoskeletal:         General: No deformity.      Cervical back: Normal range of motion and neck supple.   Skin:     General: Skin is warm and dry.   Neurological:      Mental Status: He is alert and oriented to person, place, and time.   Psychiatric:         Speech: Speech normal.           DATA:     Laboratory:  CBC:  Recent Labs   Lab 07/30/21  0116 11/11/21  0609 01/11/22  2335   WBC 7.45 8.22 9.04   Hemoglobin 12.3 L 12.7 L 12.3 L   Hematocrit 35.6 L 37.7 L 37.2 L   Platelets 254 285 265       CHEMISTRIES:  Recent Labs   Lab 07/25/21  0503 07/26/21  0348 07/27/21  0550 07/30/21  0116 11/11/21  0609 01/11/22  2335   Glucose 114 H 126 H   < > 119 H 179 H 138 H   Sodium 138 137   < > 135 L 138 138   Potassium 4.1 3.4 L   < > 3.2 L 3.7 3.4 L   BUN 13 12   < > 15 14 18   Creatinine 1.0 0.9   < > 1.0 1.0 1.2   eGFR if African American >60 >60   < > >60 >60 >60   eGFR if non African American >60 >60   < > >60 >60 55 A   Calcium 8.8 9.2   < > 9.5 9.6 9.0   Magnesium 1.8 1.9  --   --  2.0  --     < > = values in this interval not displayed.       CARDIAC BIOMARKERS:  Recent Labs   Lab 11/11/21  0609 11/11/21  0820 01/11/22  2335   Troponin I <0.006 0.011 0.017       COAGS:  Recent Labs   Lab 07/25/21  1138 11/11/21  0609   INR 1.0 1.0       LIPIDS/LFTS:  Recent Labs   Lab 07/30/21  0116 11/11/21  0609 01/11/22  2335   AST 29 16 15   ALT 22 12 13       Hemoglobin A1C   Date Value Ref Range Status   05/07/2019 7.0 (H) 4.0 - 5.6 % Final     Comment:     ADA Screening Guidelines:  5.7-6.4%  Consistent with prediabetes  >or=6.5%  Consistent with diabetes  High levels of fetal hemoglobin interfere with the  HbA1C  assay. Heterozygous hemoglobin variants (HbS, HgC, etc)do  not significantly interfere with this assay.   However, presence of multiple variants may affect accuracy.     06/19/2018 7.3 (H) 4.0 - 5.6 % Final     Comment:     ADA Screening Guidelines:  5.7-6.4%  Consistent with prediabetes  >or=6.5%  Consistent with diabetes  High levels of fetal hemoglobin interfere with the HbA1C  assay. Heterozygous hemoglobin variants (HbS, HgC, etc)do  not significantly interfere with this assay.   However, presence of multiple variants may affect accuracy.       Hemoglobin A1c   Date Value Ref Range Status   12/02/2021 6.7 (H) <5.7 % of total Hgb Final     Comment:     For someone without known diabetes, a hemoglobin A1c  value of 6.5% or greater indicates that they may have   diabetes and this should be confirmed with a follow-up   test.    For someone with known diabetes, a value <7% indicates   that their diabetes is well controlled and a value   greater than or equal to 7% indicates suboptimal   control. A1c targets should be individualized based on   duration of diabetes, age, comorbid conditions, and   other considerations.    Currently, no consensus exists regarding use of  hemoglobin A1c for diagnosis of diabetes for children.       07/23/2021 6.8 (H) <5.7 % of total Hgb Final     Comment:     For someone without known diabetes, a hemoglobin A1c  value of 6.5% or greater indicates that they may have   diabetes and this should be confirmed with a follow-up   test.    For someone with known diabetes, a value <7% indicates   that their diabetes is well controlled and a value   greater than or equal to 7% indicates suboptimal   control. A1c targets should be individualized based on   duration of diabetes, age, comorbid conditions, and   other considerations.    Currently, no consensus exists regarding use of  hemoglobin A1c for diagnosis of diabetes for children.       03/16/2021 6.4 (H) <5.7 % of total Hgb Final      Comment:     For someone without known diabetes, a hemoglobin   A1c value between 5.7% and 6.4% is consistent with  prediabetes and should be confirmed with a   follow-up test.    For someone with known diabetes, a value <7%  indicates that their diabetes is well controlled. A1c  targets should be individualized based on duration of  diabetes, age, comorbid conditions, and other  considerations.    This assay result is consistent with an increased risk  of diabetes.    Currently, no consensus exists regarding use of  hemoglobin A1c for diagnosis of diabetes for children.       TSH        The ASCVD Risk score (Arrey JASIEL Jr., et al., 2013) failed to calculate for the following reasons:    The 2013 ASCVD risk score is only valid for ages 40 to 79    The patient has a prior MI or stroke diagnosis           Cardiovascular Testing:    EKG: (personally reviewed tracing)  May 2019:  Normal sinus rhythm, left axis deviation.  Abnormal EKG.    2D echocardiogram May 2019:  · Normal left ventricular systolic function. The estimated ejection fraction is 65%  · Normal LV diastolic function.  · Mild left atrial enlargement.  · Normal central venous pressure (3 mm Hg).  · The estimated PA systolic pressure is 10 mm Hg       Coronary angiogram 6th May 2019:    No acute thrombotic lesion identified.  Coronary artery disease as described below  LVEDP: 12 mmHg    Carotid ultrasound May 28, 2019:    · There is 20-39% right Internal Carotid Stenosis.  · There is 20-39% left Internal Carotid Stenosis.     ABIs May 28, 2019:    Noncompressible LOW bilaterally.  Mildly decrease TBI bilaterally.  Normal PVR waveforms bilaterally.       Coronary Anatomy:  LM:  No significant stenosis  LAD:  Mid LAD 60-70% stenosis.  Distal LAD 50% stenosis.  IFR 0.92 (nonischemic).  Diagonal 1 is a tortuous vessel with mid 80% stenosis.  Will medically manage this diagonal stenosis  LCx :  Mild nonobstructive CAD  RCA:  50% distal RCA/proximal PDA lesion.  IFR  1 (nonischemic)     Impression / Plan  Coronary artery disease as described above.  Continue medical management with aggressive risk factor modification.  Continue aspirin 81 mg daily. Change simvastatin to atorvastatin 80 mg daily.        ASSESSMENT AND PLAN     Patient Active Problem List   Diagnosis    Essential hypertension, benign    Type 2 diabetes mellitus    Hyperlipidemia    Body mass index 28.0-28.9, adult    History of prostate cancer    Orthostatic hypotension    Diabetic ulcer of toe of left foot associated with type 2 diabetes mellitus, with fat layer exposed    SCC (squamous cell carcinoma)    CAD (coronary artery disease)    Unstable angina    Palpitations    Elevated troponin    NSTEMI (non-ST elevated myocardial infarction)       1.  Patient with coronary artery disease, now angina free.  Status post PCI distal RCA in July of 2021. Continue aspirin 81 mg daily indefinitely and Plavix 75 mg daily uninterrupted for at least 1 year from date of PCI.  Diagonal 1 disease being managed medically.  Referral has been made to cardiac rehab.    2.  Hypertension:  Well controlled on current therapy.      3.  Cardiovascular screening with rest and stress ABIs and carotid ultrasound performed.  ABIs demonstrated noncompressible ABIs bilaterally, mildly decreased ABIs bilaterally with normal PVR waveforms bilaterally.  I got a peripheral ultrasound for further evaluation of his abnormal TBI.   ultrasound did not show any flow restriction in the right or the left lower extremity.    4.  Dyslipidemia:  Continue medical management with atorvastatin.    6.  May hold Plavix as needed for procedures.    Follow-up in 6 months.      Thank you very much for involving me in the care of your patient.  Please do not hesitate to contact me if there are any questions.      Rsahad Nieto MD, FACC, [facility]  Interventional Cardiologist, [facility].     Follow-up in 4 months      This note was dictated with the  help of speech recognition software.  There might be un-intended errors and/or substitutions.

## 2022-08-13 ENCOUNTER — ANESTHESIA EVENT (OUTPATIENT)
Dept: SURGERY | Facility: HOSPITAL | Age: 87
DRG: 522 | End: 2022-08-13
Payer: MEDICARE

## 2022-08-13 ENCOUNTER — HOSPITAL ENCOUNTER (INPATIENT)
Facility: HOSPITAL | Age: 87
LOS: 4 days | Discharge: NURSING, REHAB OR LONG-TERM CARE | DRG: 522 | End: 2022-08-17
Attending: EMERGENCY MEDICINE | Admitting: EMERGENCY MEDICINE
Payer: MEDICARE

## 2022-08-13 DIAGNOSIS — S79.919A HIP INJURY: ICD-10-CM

## 2022-08-13 DIAGNOSIS — S72.001A CLOSED FRACTURE OF NECK OF RIGHT FEMUR, INITIAL ENCOUNTER: Primary | ICD-10-CM

## 2022-08-13 DIAGNOSIS — Z01.818 PREOPERATIVE CLEARANCE: ICD-10-CM

## 2022-08-13 DIAGNOSIS — S79.919A HIP INJURY, INITIAL ENCOUNTER: ICD-10-CM

## 2022-08-13 PROBLEM — S72.009A HIP FRACTURE: Status: ACTIVE | Noted: 2022-08-13

## 2022-08-13 LAB
ABO + RH BLD: NORMAL
ALBUMIN SERPL BCP-MCNC: 3.7 G/DL (ref 3.5–5.2)
ALP SERPL-CCNC: 77 U/L (ref 55–135)
ALT SERPL W/O P-5'-P-CCNC: 9 U/L (ref 10–44)
ANION GAP SERPL CALC-SCNC: 14 MMOL/L (ref 8–16)
APTT BLDCRRT: 21.1 SEC (ref 21–32)
AST SERPL-CCNC: 22 U/L (ref 10–40)
BASOPHILS # BLD AUTO: 0.03 K/UL (ref 0–0.2)
BASOPHILS NFR BLD: 0.4 % (ref 0–1.9)
BILIRUB SERPL-MCNC: 0.5 MG/DL (ref 0.1–1)
BLD GP AB SCN CELLS X3 SERPL QL: NORMAL
BUN SERPL-MCNC: 13 MG/DL (ref 8–23)
CALCIUM SERPL-MCNC: 8.7 MG/DL (ref 8.7–10.5)
CHLORIDE SERPL-SCNC: 100 MMOL/L (ref 95–110)
CO2 SERPL-SCNC: 22 MMOL/L (ref 23–29)
CREAT SERPL-MCNC: 1.3 MG/DL (ref 0.5–1.4)
DIFFERENTIAL METHOD: ABNORMAL
EOSINOPHIL # BLD AUTO: 0.4 K/UL (ref 0–0.5)
EOSINOPHIL NFR BLD: 4.6 % (ref 0–8)
ERYTHROCYTE [DISTWIDTH] IN BLOOD BY AUTOMATED COUNT: 15 % (ref 11.5–14.5)
EST. GFR  (NO RACE VARIABLE): 54 ML/MIN/1.73 M^2
GLUCOSE SERPL-MCNC: 138 MG/DL (ref 70–110)
HCT VFR BLD AUTO: 33.5 % (ref 40–54)
HGB BLD-MCNC: 11.2 G/DL (ref 14–18)
IMM GRANULOCYTES # BLD AUTO: 0.05 K/UL (ref 0–0.04)
IMM GRANULOCYTES NFR BLD AUTO: 0.6 % (ref 0–0.5)
INR PPP: 1 (ref 0.8–1.2)
INR PPP: 1 (ref 0.8–1.2)
LYMPHOCYTES # BLD AUTO: 1.3 K/UL (ref 1–4.8)
LYMPHOCYTES NFR BLD: 14.9 % (ref 18–48)
MCH RBC QN AUTO: 30.2 PG (ref 27–31)
MCHC RBC AUTO-ENTMCNC: 33.4 G/DL (ref 32–36)
MCV RBC AUTO: 90 FL (ref 82–98)
MONOCYTES # BLD AUTO: 0.7 K/UL (ref 0.3–1)
MONOCYTES NFR BLD: 7.7 % (ref 4–15)
NEUTROPHILS # BLD AUTO: 6.1 K/UL (ref 1.8–7.7)
NEUTROPHILS NFR BLD: 71.8 % (ref 38–73)
NRBC BLD-RTO: 0 /100 WBC
PLATELET # BLD AUTO: 261 K/UL (ref 150–450)
PMV BLD AUTO: 9.7 FL (ref 9.2–12.9)
POCT GLUCOSE: 149 MG/DL (ref 70–110)
POCT GLUCOSE: 189 MG/DL (ref 70–110)
POTASSIUM SERPL-SCNC: 4.4 MMOL/L (ref 3.5–5.1)
PROT SERPL-MCNC: 7 G/DL (ref 6–8.4)
PROTHROMBIN TIME: 10.7 SEC (ref 9–12.5)
PROTHROMBIN TIME: 10.7 SEC (ref 9–12.5)
RBC # BLD AUTO: 3.71 M/UL (ref 4.6–6.2)
SARS-COV-2 RDRP RESP QL NAA+PROBE: NEGATIVE
SODIUM SERPL-SCNC: 136 MMOL/L (ref 136–145)
WBC # BLD AUTO: 8.47 K/UL (ref 3.9–12.7)

## 2022-08-13 PROCEDURE — 85610 PROTHROMBIN TIME: CPT | Mod: 91 | Performed by: EMERGENCY MEDICINE

## 2022-08-13 PROCEDURE — 85025 COMPLETE CBC W/AUTO DIFF WBC: CPT | Performed by: EMERGENCY MEDICINE

## 2022-08-13 PROCEDURE — U0002 COVID-19 LAB TEST NON-CDC: HCPCS | Performed by: STUDENT IN AN ORGANIZED HEALTH CARE EDUCATION/TRAINING PROGRAM

## 2022-08-13 PROCEDURE — 63600175 PHARM REV CODE 636 W HCPCS: Performed by: EMERGENCY MEDICINE

## 2022-08-13 PROCEDURE — 93010 ELECTROCARDIOGRAM REPORT: CPT | Mod: ,,, | Performed by: INTERNAL MEDICINE

## 2022-08-13 PROCEDURE — 85730 THROMBOPLASTIN TIME PARTIAL: CPT | Performed by: EMERGENCY MEDICINE

## 2022-08-13 PROCEDURE — 93005 ELECTROCARDIOGRAM TRACING: CPT

## 2022-08-13 PROCEDURE — 86901 BLOOD TYPING SEROLOGIC RH(D): CPT | Performed by: EMERGENCY MEDICINE

## 2022-08-13 PROCEDURE — 90715 TDAP VACCINE 7 YRS/> IM: CPT | Performed by: EMERGENCY MEDICINE

## 2022-08-13 PROCEDURE — 11000001 HC ACUTE MED/SURG PRIVATE ROOM

## 2022-08-13 PROCEDURE — 25000003 PHARM REV CODE 250: Performed by: EMERGENCY MEDICINE

## 2022-08-13 PROCEDURE — 86920 COMPATIBILITY TEST SPIN: CPT | Performed by: ANESTHESIOLOGY

## 2022-08-13 PROCEDURE — 85610 PROTHROMBIN TIME: CPT | Performed by: STUDENT IN AN ORGANIZED HEALTH CARE EDUCATION/TRAINING PROGRAM

## 2022-08-13 PROCEDURE — 99285 EMERGENCY DEPT VISIT HI MDM: CPT | Mod: 25

## 2022-08-13 PROCEDURE — 93010 EKG 12-LEAD: ICD-10-PCS | Mod: ,,, | Performed by: INTERNAL MEDICINE

## 2022-08-13 PROCEDURE — 25000003 PHARM REV CODE 250: Performed by: STUDENT IN AN ORGANIZED HEALTH CARE EDUCATION/TRAINING PROGRAM

## 2022-08-13 PROCEDURE — 90471 IMMUNIZATION ADMIN: CPT | Performed by: EMERGENCY MEDICINE

## 2022-08-13 PROCEDURE — 80053 COMPREHEN METABOLIC PANEL: CPT | Performed by: EMERGENCY MEDICINE

## 2022-08-13 PROCEDURE — 63600175 PHARM REV CODE 636 W HCPCS: Performed by: STUDENT IN AN ORGANIZED HEALTH CARE EDUCATION/TRAINING PROGRAM

## 2022-08-13 RX ORDER — ESCITALOPRAM OXALATE 10 MG/1
10 TABLET ORAL NIGHTLY
Status: DISCONTINUED | OUTPATIENT
Start: 2022-08-13 | End: 2022-08-17 | Stop reason: HOSPADM

## 2022-08-13 RX ORDER — HYDROMORPHONE HYDROCHLORIDE 2 MG/ML
0.5 INJECTION, SOLUTION INTRAMUSCULAR; INTRAVENOUS; SUBCUTANEOUS
Status: COMPLETED | OUTPATIENT
Start: 2022-08-13 | End: 2022-08-13

## 2022-08-13 RX ORDER — ISOSORBIDE MONONITRATE 30 MG/1
60 TABLET, EXTENDED RELEASE ORAL DAILY
Status: DISCONTINUED | OUTPATIENT
Start: 2022-08-13 | End: 2022-08-17 | Stop reason: HOSPADM

## 2022-08-13 RX ORDER — HEPARIN SODIUM 5000 [USP'U]/ML
5000 INJECTION, SOLUTION INTRAVENOUS; SUBCUTANEOUS EVERY 8 HOURS
Status: DISCONTINUED | OUTPATIENT
Start: 2022-08-13 | End: 2022-08-13

## 2022-08-13 RX ORDER — HEPARIN SODIUM 5000 [USP'U]/ML
5000 INJECTION, SOLUTION INTRAVENOUS; SUBCUTANEOUS EVERY 8 HOURS
Status: DISCONTINUED | OUTPATIENT
Start: 2022-08-13 | End: 2022-08-14

## 2022-08-13 RX ORDER — IBUPROFEN 200 MG
24 TABLET ORAL
Status: DISCONTINUED | OUTPATIENT
Start: 2022-08-13 | End: 2022-08-17 | Stop reason: HOSPADM

## 2022-08-13 RX ORDER — OXYCODONE AND ACETAMINOPHEN 5; 325 MG/1; MG/1
1 TABLET ORAL EVERY 4 HOURS PRN
Status: DISCONTINUED | OUTPATIENT
Start: 2022-08-13 | End: 2022-08-17 | Stop reason: HOSPADM

## 2022-08-13 RX ORDER — TALC
6 POWDER (GRAM) TOPICAL NIGHTLY PRN
Status: DISCONTINUED | OUTPATIENT
Start: 2022-08-13 | End: 2022-08-17 | Stop reason: HOSPADM

## 2022-08-13 RX ORDER — ONDANSETRON 4 MG/1
4 TABLET, FILM COATED ORAL EVERY 6 HOURS PRN
Status: DISCONTINUED | OUTPATIENT
Start: 2022-08-13 | End: 2022-08-17 | Stop reason: HOSPADM

## 2022-08-13 RX ORDER — ASPIRIN 81 MG/1
81 TABLET ORAL DAILY
Status: DISCONTINUED | OUTPATIENT
Start: 2022-08-13 | End: 2022-08-17 | Stop reason: HOSPADM

## 2022-08-13 RX ORDER — NITROGLYCERIN 0.4 MG/1
0.4 TABLET SUBLINGUAL EVERY 5 MIN PRN
Status: DISCONTINUED | OUTPATIENT
Start: 2022-08-13 | End: 2022-08-17 | Stop reason: HOSPADM

## 2022-08-13 RX ORDER — ACETAMINOPHEN 325 MG/1
650 TABLET ORAL EVERY 6 HOURS PRN
Status: DISCONTINUED | OUTPATIENT
Start: 2022-08-13 | End: 2022-08-17 | Stop reason: HOSPADM

## 2022-08-13 RX ORDER — HYDROCHLOROTHIAZIDE 12.5 MG/1
12.5 TABLET ORAL DAILY
Status: DISCONTINUED | OUTPATIENT
Start: 2022-08-13 | End: 2022-08-17 | Stop reason: HOSPADM

## 2022-08-13 RX ORDER — HYDRALAZINE HYDROCHLORIDE 20 MG/ML
10 INJECTION INTRAMUSCULAR; INTRAVENOUS EVERY 8 HOURS PRN
Status: DISCONTINUED | OUTPATIENT
Start: 2022-08-13 | End: 2022-08-17 | Stop reason: HOSPADM

## 2022-08-13 RX ORDER — ACETAMINOPHEN 325 MG/1
650 TABLET ORAL EVERY 8 HOURS PRN
Status: DISCONTINUED | OUTPATIENT
Start: 2022-08-13 | End: 2022-08-13

## 2022-08-13 RX ORDER — HYDROCODONE BITARTRATE AND ACETAMINOPHEN 5; 325 MG/1; MG/1
1 TABLET ORAL EVERY 6 HOURS PRN
Status: DISCONTINUED | OUTPATIENT
Start: 2022-08-13 | End: 2022-08-17 | Stop reason: HOSPADM

## 2022-08-13 RX ORDER — SODIUM CHLORIDE 0.9 % (FLUSH) 0.9 %
10 SYRINGE (ML) INJECTION
Status: DISCONTINUED | OUTPATIENT
Start: 2022-08-13 | End: 2022-08-17 | Stop reason: HOSPADM

## 2022-08-13 RX ORDER — INSULIN ASPART 100 [IU]/ML
0-5 INJECTION, SOLUTION INTRAVENOUS; SUBCUTANEOUS
Status: DISCONTINUED | OUTPATIENT
Start: 2022-08-13 | End: 2022-08-17 | Stop reason: HOSPADM

## 2022-08-13 RX ORDER — NALOXONE HCL 0.4 MG/ML
0.02 VIAL (ML) INJECTION
Status: DISCONTINUED | OUTPATIENT
Start: 2022-08-13 | End: 2022-08-17 | Stop reason: HOSPADM

## 2022-08-13 RX ORDER — AMLODIPINE BESYLATE 5 MG/1
10 TABLET ORAL DAILY
Status: DISCONTINUED | OUTPATIENT
Start: 2022-08-13 | End: 2022-08-17 | Stop reason: HOSPADM

## 2022-08-13 RX ORDER — GLUCAGON 1 MG
1 KIT INJECTION
Status: DISCONTINUED | OUTPATIENT
Start: 2022-08-13 | End: 2022-08-17 | Stop reason: HOSPADM

## 2022-08-13 RX ORDER — METOPROLOL SUCCINATE 50 MG/1
50 TABLET, EXTENDED RELEASE ORAL DAILY
Status: DISCONTINUED | OUTPATIENT
Start: 2022-08-13 | End: 2022-08-17 | Stop reason: HOSPADM

## 2022-08-13 RX ORDER — IBUPROFEN 200 MG
16 TABLET ORAL
Status: DISCONTINUED | OUTPATIENT
Start: 2022-08-13 | End: 2022-08-17 | Stop reason: HOSPADM

## 2022-08-13 RX ORDER — ALBUTEROL SULFATE 90 UG/1
2 AEROSOL, METERED RESPIRATORY (INHALATION) EVERY 6 HOURS PRN
Status: DISCONTINUED | OUTPATIENT
Start: 2022-08-13 | End: 2022-08-17 | Stop reason: HOSPADM

## 2022-08-13 RX ORDER — ATORVASTATIN CALCIUM 40 MG/1
40 TABLET, FILM COATED ORAL DAILY
Status: DISCONTINUED | OUTPATIENT
Start: 2022-08-13 | End: 2022-08-17 | Stop reason: HOSPADM

## 2022-08-13 RX ADMIN — ESCITALOPRAM OXALATE 10 MG: 10 TABLET ORAL at 08:08

## 2022-08-13 RX ADMIN — TETANUS TOXOID, REDUCED DIPHTHERIA TOXOID AND ACELLULAR PERTUSSIS VACCINE, ADSORBED 0.5 ML: 5; 2.5; 8; 8; 2.5 SUSPENSION INTRAMUSCULAR at 12:08

## 2022-08-13 RX ADMIN — HEPARIN SODIUM 5000 UNITS: 5000 INJECTION INTRAVENOUS; SUBCUTANEOUS at 09:08

## 2022-08-13 RX ADMIN — HYDROCODONE BITARTRATE AND ACETAMINOPHEN 1 TABLET: 5; 325 TABLET ORAL at 08:08

## 2022-08-13 RX ADMIN — AMLODIPINE BESYLATE 10 MG: 5 TABLET ORAL at 03:08

## 2022-08-13 RX ADMIN — HYDROMORPHONE HYDROCHLORIDE 0.5 MG: 2 INJECTION INTRAMUSCULAR; INTRAVENOUS; SUBCUTANEOUS at 12:08

## 2022-08-13 RX ADMIN — BACITRACIN ZINC, NEOMYCIN SULFATE, POLYMYXIN B SULFATE 1 EACH: 3.5; 5000; 4 OINTMENT TOPICAL at 12:08

## 2022-08-13 RX ADMIN — ATORVASTATIN CALCIUM 40 MG: 40 TABLET, FILM COATED ORAL at 03:08

## 2022-08-13 NOTE — PLAN OF CARE
Problem: Adult Inpatient Plan of Care  Goal: Plan of Care Review  Outcome: Ongoing, Progressing  Goal: Patient-Specific Goal (Individualized)  Outcome: Ongoing, Progressing  Goal: Absence of Hospital-Acquired Illness or Injury  Outcome: Ongoing, Progressing  Goal: Optimal Comfort and Wellbeing  Outcome: Ongoing, Progressing  Goal: Readiness for Transition of Care  Outcome: Ongoing, Progressing     Problem: Skin Injury Risk Increased  Goal: Skin Health and Integrity  Outcome: Ongoing, Progressing     Problem: Diabetes Comorbidity  Goal: Blood Glucose Level Within Targeted Range  Outcome: Ongoing, Progressing     Problem: Impaired Wound Healing  Goal: Optimal Wound Healing  Outcome: Ongoing, Progressing

## 2022-08-13 NOTE — H&P
[facility] Medicine  History & Physical    Patient Name: Juan Martínez Jr.  MRN: [id number]  Patient Class: IP- Inpatient  Admission Date: 8/13/2022  Attending Physician: Christian Proctor III, MD   Primary Care Provider: Lissy Gavin MD         Patient information was obtained from patient, past medical records and ER records.     Subjective:     Principal Problem:<principal problem not specified>    Chief Complaint:   Chief Complaint   Patient presents with    Hip Pain     EMS called to 85yo male that had a slip and fall this morning. Did not hit head and no LOC. C/o right hip pain with some shortening and rotation of the right leg. Denied taking blood thinners.         HPI: 86M with pmh of cad with stent place 07/2021, dm, hld, htn who presents for several hour history of right hip pain after pt accidentally stepped on a cat while washing his car causing him to fall onto the hip. Pt states the pain is currently controlled, but he was not able to walk after incident. Pt denies LOC, head injury, sob, or cp during the event. Pt has a h/o cad with a stent placed about 1 year pta and is currently on asa and plavix. Pt denies blood thinner use. Pt is a former smoker and denies alcohol or drug use. Pt denies h/o any known surgeries. In the ED pt had an xray of the hip that was suspicious for hip fracture which was confirmed on CT imaging of the hip. Ortho was consulted in the ed and plan for surgery pending cardiac evaluation.       Past Medical History:   Diagnosis Date    Coronary artery disease     Diabetes mellitus     High cholesterol     Hypertension     NSTEMI (non-ST elevated myocardial infarction) 07/25/2021    Prostate cancer        Past Surgical History:   Procedure Laterality Date    APPLICATION OF FULL-THICKNESS SKIN GRAFT (FTSG) TO UPPER EXTREMITY Right 11/15/2018    Procedure: APPLICATION, GRAFT, SKIN, FULL-THICKNESS, TO UPPER EXTREMITY VS STSG WITH FROZEN SECTIONS;   Surgeon: Alan Arzola MD;  Location: [facility] OR;  Service: Plastics;  Laterality: Right;    EXCISION OF SQUAMOUS CELL CARCINOMA Right 11/15/2018    Procedure: EXCISION, CARCINOMA, SQUAMOUS CELL @ RIGHT HAND;  Surgeon: Alan Arzola MD;  Location: [facility] OR;  Service: Plastics;  Laterality: Right;  RN PREOP 11/13/2018--NEED H/P    JOINT REPLACEMENT      LEFT HEART CATHETERIZATION Left 5/6/2019    Procedure: Left heart cath;  Surgeon: Rashad Nieto MD;  Location: [facility] CATH LAB;  Service: Cardiology;  Laterality: Left;    LEFT HEART CATHETERIZATION Left 7/26/2021    Procedure: Left heart cath, radial;  Surgeon: Rashad Nieto MD;  Location: [facility] CATH LAB;  Service: Cardiology;  Laterality: Left;    TONSILLECTOMY      TOTAL KNEE ARTHROPLASTY         Review of patient's allergies indicates:  No Known Allergies    No current facility-administered medications on file prior to encounter.     Current Outpatient Medications on File Prior to Encounter   Medication Sig    acetaminophen (TYLENOL) 325 MG tablet Take 2 tablets (650 mg total) by mouth every 6 (six) hours as needed for Pain. (Patient not taking: Reported on 2/10/2022)    albuterol (PROVENTIL/VENTOLIN HFA) 90 mcg/actuation inhaler Inhale 2 puffs into the lungs.    AMITIZA 24 mcg Cap 24 mcg 2 (two) times daily with meals.     amLODIPine (NORVASC) 10 MG tablet Take 1 tablet (10 mg total) by mouth once daily.    aspirin (ECOTRIN) 81 MG EC tablet Take 81 mg by mouth once daily.    atorvastatin (LIPITOR) 40 MG tablet Take 1 tablet (40 mg total) by mouth once daily.    blood-glucose meter Misc by Misc.(Non-Drug; Combo Route) route    clopidogreL (PLAVIX) 75 mg tablet Take 1 tablet (75 mg total) by mouth once daily.    dicyclomine (BENTYL) 10 MG capsule Take 10 mg by mouth.    escitalopram oxalate (LEXAPRO) 10 MG tablet Take 10 mg by mouth.    hydroCHLOROthiazide (MICROZIDE) 12.5 mg capsule     isosorbide mononitrate (IMDUR) 60 MG 24 hr tablet Take  1 tablet (60 mg total) by mouth once daily.    LANCETS & BLOOD GLUCOSE STRIPS MISC by Misc.(Non-Drug; Combo Route) route Check blood sugars  Twice a day.    LIDOcaine (LIDODERM) 5 % Place 1 patch onto the skin daily as needed (neck pain.). Remove & Discard patch within 12 hours or as directed by MD. Apply to posterior neck in case of pain. (Patient not taking: Reported on 2/10/2022)    metformin (GLUCOPHAGE) 1000 MG tablet Take 1 tablet (1,000 mg total) by mouth 2 (two) times daily with meals.    metoprolol succinate (TOPROL-XL) 50 MG 24 hr tablet Take 1 tablet (50 mg total) by mouth once daily.    nitroGLYCERIN (NITROSTAT) 0.4 MG SL tablet Place 1 tablet (0.4 mg total) under the tongue every 5 (five) minutes as needed for Chest pain.    ondansetron (ZOFRAN) 4 MG tablet Take 1 tablet (4 mg total) by mouth every 6 (six) hours as needed for Nausea. (Patient not taking: Reported on 2/10/2022)    varicella-zoster gE-AS01B, PF, (SHINGRIX) 50 mcg/0.5 mL injection Inject into the muscle. (Patient not taking: Reported on 2/10/2022)     Family History       Problem Relation (Age of Onset)    Heart disease Mother    Stroke Father          Tobacco Use    Smoking status: Former Smoker     Packs/day: 0.00     Types: Cigarettes     Quit date: 2016     Years since quittin.0    Smokeless tobacco: Never Used    Tobacco comment: 2016   Substance and Sexual Activity    Alcohol use: No    Drug use: No    Sexual activity: Not Currently     Review of Systems   Constitutional:  Negative for chills and fever.   HENT:  Negative for congestion and rhinorrhea.    Eyes:  Negative for visual disturbance (changes in vision).   Respiratory:  Negative for cough and shortness of breath.    Cardiovascular:  Negative for chest pain and leg swelling.   Gastrointestinal:  Negative for abdominal pain, blood in stool, nausea and vomiting.   Genitourinary:  Negative for dysuria and hematuria.   Musculoskeletal:  Negative for  neck pain.        Has hip pain    Neurological:  Negative for dizziness and syncope.   All other systems reviewed and are negative.  Objective:     Vital Signs (Most Recent):  Temp: 98.1 °F (36.7 °C) (08/13/22 1037)  Pulse: (!) 56 (08/13/22 1403)  Resp: 18 (08/13/22 1403)  BP: (!) 116/56 (08/13/22 1403)  SpO2: (!) 92 % (08/13/22 1403)   Vital Signs (24h Range):  Temp:  [98.1 °F (36.7 °C)] 98.1 °F (36.7 °C)  Pulse:  [56-61] 56  Resp:  [18-22] 18  SpO2:  [92 %-96 %] 92 %  BP: (116-148)/(56-67) 116/56     Weight: 83.5 kg (184 lb)  Body mass index is 27.98 kg/m².    Physical Exam  Constitutional:       General: He is not in acute distress.     Appearance: He is not ill-appearing.   HENT:      Head: Normocephalic and atraumatic.      Nose: No congestion or rhinorrhea.      Mouth/Throat:      Mouth: Mucous membranes are moist.   Eyes:      General: No scleral icterus.     Extraocular Movements: Extraocular movements intact.   Cardiovascular:      Rate and Rhythm: Normal rate and regular rhythm.      Pulses: Normal pulses.   Pulmonary:      Effort: No respiratory distress.      Breath sounds: Normal breath sounds. No wheezing.   Abdominal:      Palpations: Abdomen is soft.      Tenderness: There is no abdominal tenderness.   Musculoskeletal:         General: No deformity (right hip with slight shortening and external rotation. no ttp over the right hip. no obvious bruising of the skin).      Cervical back: Neck supple. No rigidity.   Skin:     General: Skin is warm and dry.   Neurological:      General: No focal deficit present.      Mental Status: He is alert and oriented to person, place, and time.   Psychiatric:         Mood and Affect: Mood normal.         Behavior: Behavior normal.           Significant Labs: All pertinent labs within the past 24 hours have been reviewed.    Significant Imaging: I have reviewed all pertinent imaging results/findings within the past 24 hours.    Assessment/Plan:     Hip fracture  Pt  with Minimally displaced and slightly impacted subcapital right femoral neck fracture on CT imaging 8/13  RCRI of 1 due to h/o ischemic heart disease.   Pt states he is able to walk several blocks without sob.  CXR and ekg pending  Pain medications ordered prn    -Ortho surgery consulted in ed plan for surgery pending Cardiac clearance        CAD (coronary artery disease)  Pt with h/o cad with stent placement in 7/2021 per pt records. Currently on DAPT therapy and statin along with bb.    -continue aspirin and bb  -hold plavix  -cardiology consulted for surgical clearance.      Type 2 diabetes mellitus  Patient's FSGs are controlled on current medication regimen.  Last A1c reviewed-   Lab Results   Component Value Date    LABA1C 6.9 (H) 07/06/2015    HGBA1C 6.7 (H) 12/02/2021     Most recent fingerstick glucose reviewed- No results for input(s): POCTGLUCOSE in the last 24 hours.  Current correctional scale  Low  Maintain anti-hyperglycemic dose as follows-   Antihyperglycemics (From admission, onward)            Start     Stop Route Frequency Ordered    08/13/22 1548  insulin aspart U-100 pen 0-5 Units         -- SubQ Before meals & nightly PRN 08/13/22 1450        Hold Oral hypoglycemics while patient is in the hospital.    Essential hypertension, benign  bp currently controlled. Will restart home medications when able  Hydralazine prn for hypertension      Hyperlipidemia  Per pt history. Restart statin      VTE Risk Mitigation (From admission, onward)         Ordered     IP VTE HIGH RISK PATIENT  Once         08/13/22 1450     Place sequential compression device  Until discontinued         08/13/22 1450                   Christian Proctor III, MD  Department of Hospital Medicine   [facility] [place] - Emergency Dept

## 2022-08-13 NOTE — NURSING
[facility] [place] 4 [place]      8/13/2022       Skin assessed on: Admit      [] No Pressure Injuries Present    []Prevention Measures Documented    [x] Yes LDA for Abrasion (R. elbow) present at arrival documented     [] Yes New Pressure Injury Discovered   [] LDA for New Pressure Injury Added      Attending RN:  Arlene Hawthorne RN     Second RN:  BIJAN Malcolm

## 2022-08-13 NOTE — ASSESSMENT & PLAN NOTE
bp currently controlled. Will restart home medications when able  Hydralazine prn for hypertension

## 2022-08-13 NOTE — HPI
86M with pmh of cad with stent place 07/2021, dm, hld, htn who presents for several hour history of right hip pain after pt accidentally stepped on a cat while washing his car causing him to fall onto the hip. Pt states the pain is currently controlled, but he was not able to walk after incident. Pt denies LOC, head injury, sob, or cp during the event. Pt has a h/o cad with a stent placed about 1 year pta and is currently on asa and plavix. Pt denies blood thinner use. Pt is a former smoker and denies alcohol or drug use. Pt denies h/o any known surgeries. In the ED pt had an xray of the hip that was suspicious for hip fracture which was confirmed on CT imaging of the hip. Ortho was consulted in the ed and plan for surgery pending cardiac evaluation.

## 2022-08-13 NOTE — ED TRIAGE NOTES
Per pt he had a fall onto the ground from standing height SP being startled by a cat, Denies LOC or hitting his head. States he was able to ambulate back into his apartments with max assist and an old walker. Pt presents with externally rotated RLE c/o hip pain

## 2022-08-13 NOTE — CONSULTS
87yo male with a sig pmhx of NSTEMI, DM, CAD, and HTN presented to ED after a mechanical fall this morning. He denies LOC, SOB, CP, or any other injury. Previous to this injury he was a normal ambulator.   VSSAF.  Reports he is unable to bear weight    He is on dual anti-platelet therapy    Review of patient's allergies indicates:  No Known Allergies  Past Medical History:   Diagnosis Date    Coronary artery disease     Diabetes mellitus     High cholesterol     Hypertension     NSTEMI (non-ST elevated myocardial infarction) 07/25/2021    Prostate cancer      Past Surgical History:   Procedure Laterality Date    APPLICATION OF FULL-THICKNESS SKIN GRAFT (FTSG) TO UPPER EXTREMITY Right 11/15/2018    Procedure: APPLICATION, GRAFT, SKIN, FULL-THICKNESS, TO UPPER EXTREMITY VS STSG WITH FROZEN SECTIONS;  Surgeon: Alan Arzola MD;  Location: [facility] OR;  Service: Plastics;  Laterality: Right;    EXCISION OF SQUAMOUS CELL CARCINOMA Right 11/15/2018    Procedure: EXCISION, CARCINOMA, SQUAMOUS CELL @ RIGHT HAND;  Surgeon: Alan Arzola MD;  Location: [facility] OR;  Service: Plastics;  Laterality: Right;  RN PREOP 11/13/2018--NEED H/P    JOINT REPLACEMENT      LEFT HEART CATHETERIZATION Left 5/6/2019    Procedure: Left heart cath;  Surgeon: Rashad Nieto MD;  Location: [facility] CATH LAB;  Service: Cardiology;  Laterality: Left;    LEFT HEART CATHETERIZATION Left 7/26/2021    Procedure: Left heart cath, radial;  Surgeon: Rashad Nieto MD;  Location: [facility] CATH LAB;  Service: Cardiology;  Laterality: Left;    TONSILLECTOMY      TOTAL KNEE ARTHROPLASTY     \  No current facility-administered medications on file prior to encounter.     Current Outpatient Medications on File Prior to Encounter   Medication Sig Dispense Refill    acetaminophen (TYLENOL) 325 MG tablet Take 2 tablets (650 mg total) by mouth every 6 (six) hours as needed for Pain. (Patient not taking: Reported on 2/10/2022) 21 tablet 0    albuterol  (PROVENTIL/VENTOLIN HFA) 90 mcg/actuation inhaler Inhale 2 puffs into the lungs.      AMITIZA 24 mcg Cap 24 mcg 2 (two) times daily with meals.       amLODIPine (NORVASC) 10 MG tablet Take 1 tablet (10 mg total) by mouth once daily. 90 tablet 3    aspirin (ECOTRIN) 81 MG EC tablet Take 81 mg by mouth once daily.      atorvastatin (LIPITOR) 40 MG tablet Take 1 tablet (40 mg total) by mouth once daily. 90 tablet 3    blood-glucose meter Misc by Misc.(Non-Drug; Combo Route) route      clopidogreL (PLAVIX) 75 mg tablet Take 1 tablet (75 mg total) by mouth once daily. 90 tablet 3    dicyclomine (BENTYL) 10 MG capsule Take 10 mg by mouth.      escitalopram oxalate (LEXAPRO) 10 MG tablet Take 10 mg by mouth.      hydroCHLOROthiazide (MICROZIDE) 12.5 mg capsule       isosorbide mononitrate (IMDUR) 60 MG 24 hr tablet Take 1 tablet (60 mg total) by mouth once daily. 90 tablet 3    LANCETS & BLOOD GLUCOSE STRIPS MISC by Misc.(Non-Drug; Combo Route) route Check blood sugars  Twice a day.      LIDOcaine (LIDODERM) 5 % Place 1 patch onto the skin daily as needed (neck pain.). Remove & Discard patch within 12 hours or as directed by MD. Apply to posterior neck in case of pain. (Patient not taking: Reported on 2/10/2022) 5 patch 1    metformin (GLUCOPHAGE) 1000 MG tablet Take 1 tablet (1,000 mg total) by mouth 2 (two) times daily with meals. 180 tablet 4    metoprolol succinate (TOPROL-XL) 50 MG 24 hr tablet Take 1 tablet (50 mg total) by mouth once daily. 90 tablet 3    nitroGLYCERIN (NITROSTAT) 0.4 MG SL tablet Place 1 tablet (0.4 mg total) under the tongue every 5 (five) minutes as needed for Chest pain. 90 tablet 12    ondansetron (ZOFRAN) 4 MG tablet Take 1 tablet (4 mg total) by mouth every 6 (six) hours as needed for Nausea. (Patient not taking: Reported on 2/10/2022) 12 tablet 0    varicella-zoster gE-AS01B, PF, (SHINGRIX) 50 mcg/0.5 mL injection Inject into the muscle. (Patient not taking: Reported on  2/10/2022) 1 each 0     Social History     Tobacco Use    Smoking status: Former Smoker     Packs/day: 0.00     Types: Cigarettes     Quit date: 2016     Years since quittin.0    Smokeless tobacco: Never Used    Tobacco comment: 2016   Substance Use Topics    Alcohol use: No    Drug use: No       Physical exam:  Alert awake and oriented fully    Right hip: Skin intact. Leg shortened and externally rotated. + TTP on lateral hip. Confirms sensation throughout leg, can flex/extend ankle, and wiggle all toes. Pulse present on doppler.   Hct: 33.5    Xray right hip:There is some foreshortening of the right femoral neck which can suggest an underlying fracture.  If the patient is having pain in this location, consider further evaluation with a CT scan.  The left femoral head and neck have a normal configuration.  Vascular calcifications are seen.   CT right hip: displaced  subcapital right femoral neck fracture.  With osteoporotic bone stock    A/P: Right femoral neck fracture  1) NPO at midnight  2) to OR tomorrow morning for right hip hemiarthroplasty pending cardiology clearance      Edis Villanueva MD  [facility]

## 2022-08-13 NOTE — ED NOTES
Per Dr. Fang, he is able to palpate R DP pulse very lightly. Also able to doppler R PT pulse. No loss of sensation or change in temperature compared to other foot.

## 2022-08-13 NOTE — ASSESSMENT & PLAN NOTE
Patient's FSGs are controlled on current medication regimen.  Last A1c reviewed-   Lab Results   Component Value Date    LABA1C 6.9 (H) 07/06/2015    HGBA1C 6.7 (H) 12/02/2021     Most recent fingerstick glucose reviewed- No results for input(s): POCTGLUCOSE in the last 24 hours.  Current correctional scale  Low  Maintain anti-hyperglycemic dose as follows-   Antihyperglycemics (From admission, onward)            Start     Stop Route Frequency Ordered    08/13/22 1548  insulin aspart U-100 pen 0-5 Units         -- SubQ Before meals & nightly PRN 08/13/22 1450        Hold Oral hypoglycemics while patient is in the hospital.

## 2022-08-13 NOTE — SUBJECTIVE & OBJECTIVE
Past Medical History:   Diagnosis Date    Coronary artery disease     Diabetes mellitus     High cholesterol     Hypertension     NSTEMI (non-ST elevated myocardial infarction) 07/25/2021    Prostate cancer        Past Surgical History:   Procedure Laterality Date    APPLICATION OF FULL-THICKNESS SKIN GRAFT (FTSG) TO UPPER EXTREMITY Right 11/15/2018    Procedure: APPLICATION, GRAFT, SKIN, FULL-THICKNESS, TO UPPER EXTREMITY VS STSG WITH FROZEN SECTIONS;  Surgeon: Alan Arzola MD;  Location: [facility] OR;  Service: Plastics;  Laterality: Right;    EXCISION OF SQUAMOUS CELL CARCINOMA Right 11/15/2018    Procedure: EXCISION, CARCINOMA, SQUAMOUS CELL @ RIGHT HAND;  Surgeon: Alan Arzola MD;  Location: [facility] OR;  Service: Plastics;  Laterality: Right;  RN PREOP 11/13/2018--NEED H/P    JOINT REPLACEMENT      LEFT HEART CATHETERIZATION Left 5/6/2019    Procedure: Left heart cath;  Surgeon: Rashad Nieto MD;  Location: [facility] CATH LAB;  Service: Cardiology;  Laterality: Left;    LEFT HEART CATHETERIZATION Left 7/26/2021    Procedure: Left heart cath, radial;  Surgeon: Rashad Nieto MD;  Location: [facility] CATH LAB;  Service: Cardiology;  Laterality: Left;    TONSILLECTOMY      TOTAL KNEE ARTHROPLASTY         Review of patient's allergies indicates:  No Known Allergies    No current facility-administered medications on file prior to encounter.     Current Outpatient Medications on File Prior to Encounter   Medication Sig    acetaminophen (TYLENOL) 325 MG tablet Take 2 tablets (650 mg total) by mouth every 6 (six) hours as needed for Pain. (Patient not taking: Reported on 2/10/2022)    albuterol (PROVENTIL/VENTOLIN HFA) 90 mcg/actuation inhaler Inhale 2 puffs into the lungs.    AMITIZA 24 mcg Cap 24 mcg 2 (two) times daily with meals.     amLODIPine (NORVASC) 10 MG tablet Take 1 tablet (10 mg total) by mouth once daily.    aspirin (ECOTRIN) 81 MG EC tablet Take 81 mg by mouth once daily.    atorvastatin (LIPITOR) 40 MG  tablet Take 1 tablet (40 mg total) by mouth once daily.    blood-glucose meter Misc by Misc.(Non-Drug; Combo Route) route    clopidogreL (PLAVIX) 75 mg tablet Take 1 tablet (75 mg total) by mouth once daily.    dicyclomine (BENTYL) 10 MG capsule Take 10 mg by mouth.    escitalopram oxalate (LEXAPRO) 10 MG tablet Take 10 mg by mouth.    hydroCHLOROthiazide (MICROZIDE) 12.5 mg capsule     isosorbide mononitrate (IMDUR) 60 MG 24 hr tablet Take 1 tablet (60 mg total) by mouth once daily.    LANCETS & BLOOD GLUCOSE STRIPS MISC by Misc.(Non-Drug; Combo Route) route Check blood sugars  Twice a day.    LIDOcaine (LIDODERM) 5 % Place 1 patch onto the skin daily as needed (neck pain.). Remove & Discard patch within 12 hours or as directed by MD. Apply to posterior neck in case of pain. (Patient not taking: Reported on 2/10/2022)    metformin (GLUCOPHAGE) 1000 MG tablet Take 1 tablet (1,000 mg total) by mouth 2 (two) times daily with meals.    metoprolol succinate (TOPROL-XL) 50 MG 24 hr tablet Take 1 tablet (50 mg total) by mouth once daily.    nitroGLYCERIN (NITROSTAT) 0.4 MG SL tablet Place 1 tablet (0.4 mg total) under the tongue every 5 (five) minutes as needed for Chest pain.    ondansetron (ZOFRAN) 4 MG tablet Take 1 tablet (4 mg total) by mouth every 6 (six) hours as needed for Nausea. (Patient not taking: Reported on 2/10/2022)    varicella-zoster gE-AS01B, PF, (SHINGRIX) 50 mcg/0.5 mL injection Inject into the muscle. (Patient not taking: Reported on 2/10/2022)     Family History       Problem Relation (Age of Onset)    Heart disease Mother    Stroke Father          Tobacco Use    Smoking status: Former Smoker     Packs/day: 0.00     Types: Cigarettes     Quit date: 2016     Years since quittin.0    Smokeless tobacco: Never Used    Tobacco comment: 2016   Substance and Sexual Activity    Alcohol use: No    Drug use: No    Sexual activity: Not Currently     Review of Systems   Constitutional:  Negative  for chills and fever.   HENT:  Negative for congestion and rhinorrhea.    Eyes:  Negative for visual disturbance (changes in vision).   Respiratory:  Negative for cough and shortness of breath.    Cardiovascular:  Negative for chest pain and leg swelling.   Gastrointestinal:  Negative for abdominal pain, blood in stool, nausea and vomiting.   Genitourinary:  Negative for dysuria and hematuria.   Musculoskeletal:  Negative for neck pain.        Has hip pain    Neurological:  Negative for dizziness and syncope.   All other systems reviewed and are negative.  Objective:     Vital Signs (Most Recent):  Temp: 98.1 °F (36.7 °C) (08/13/22 1037)  Pulse: (!) 56 (08/13/22 1403)  Resp: 18 (08/13/22 1403)  BP: (!) 116/56 (08/13/22 1403)  SpO2: (!) 92 % (08/13/22 1403)   Vital Signs (24h Range):  Temp:  [98.1 °F (36.7 °C)] 98.1 °F (36.7 °C)  Pulse:  [56-61] 56  Resp:  [18-22] 18  SpO2:  [92 %-96 %] 92 %  BP: (116-148)/(56-67) 116/56     Weight: 83.5 kg (184 lb)  Body mass index is 27.98 kg/m².    Physical Exam  Constitutional:       General: He is not in acute distress.     Appearance: He is not ill-appearing.   HENT:      Head: Normocephalic and atraumatic.      Nose: No congestion or rhinorrhea.      Mouth/Throat:      Mouth: Mucous membranes are moist.   Eyes:      General: No scleral icterus.     Extraocular Movements: Extraocular movements intact.   Cardiovascular:      Rate and Rhythm: Normal rate and regular rhythm.      Pulses: Normal pulses.   Pulmonary:      Effort: No respiratory distress.      Breath sounds: Normal breath sounds. No wheezing.   Abdominal:      Palpations: Abdomen is soft.      Tenderness: There is no abdominal tenderness.   Musculoskeletal:         General: No deformity (right hip with slight shortening and external rotation. no ttp over the right hip. no obvious bruising of the skin).      Cervical back: Neck supple. No rigidity.   Skin:     General: Skin is warm and dry.   Neurological:       General: No focal deficit present.      Mental Status: He is alert and oriented to person, place, and time.   Psychiatric:         Mood and Affect: Mood normal.         Behavior: Behavior normal.           Significant Labs: All pertinent labs within the past 24 hours have been reviewed.    Significant Imaging: I have reviewed all pertinent imaging results/findings within the past 24 hours.

## 2022-08-13 NOTE — ED PROVIDER NOTES
Encounter Date: 8/13/2022    SCRIBE #1 NOTE: I, Courtney Mckinnon, am scribing for, and in the presence of,  Miguel Fang Jr., MD. I have scribed the following portions of the note - Other sections scribed: HPI, ROS.       History     Chief Complaint   Patient presents with    Hip Pain     EMS called to 87yo male that had a slip and fall this morning. Did not hit head and no LOC. C/o right hip pain with some shortening and rotation of the right leg. Denied taking blood thinners.      86 year old male, with pertinent medical history of NSTEMI, DM, CAD, HLD, HTN, and prostate cancer, presents to the ED via EMS to evaluate his right hip pain secondary to a fall PTA. Patient states he was cleaning his car when a cat approached him, causing him to lose his balance. He reports slight pain in his right leg but denies any loss of sensation/numbness. Patient denies any previous hip injuries. Patient endorses right elbow abrasions but denies pain. He is unsure of his last tetanus immunization. Patient denies head trauma or loss of consciousness. Pain medications were given to the patient by EMS. No other exacerbating or alleviating factors. No other associated symptoms.     The history is provided by the patient. No  was used.     Review of patient's allergies indicates:  No Known Allergies  Past Medical History:   Diagnosis Date    Diabetes mellitus     High cholesterol     Hypertension     NSTEMI (non-ST elevated myocardial infarction) 7/25/2021    Prostate cancer      Past Surgical History:   Procedure Laterality Date    APPLICATION OF FULL-THICKNESS SKIN GRAFT (FTSG) TO UPPER EXTREMITY Right 11/15/2018    Procedure: APPLICATION, GRAFT, SKIN, FULL-THICKNESS, TO UPPER EXTREMITY VS STSG WITH FROZEN SECTIONS;  Surgeon: Alan Arzola MD;  Location: [facility];  Service: Plastics;  Laterality: Right;    EXCISION OF SQUAMOUS CELL CARCINOMA Right 11/15/2018    Procedure: EXCISION, CARCINOMA, SQUAMOUS  CELL @ RIGHT HAND;  Surgeon: Alan Arzola MD;  Location: [facility] OR;  Service: Plastics;  Laterality: Right;  RN PREOP 2018--NEED H/P    JOINT REPLACEMENT      LEFT HEART CATHETERIZATION Left 2019    Procedure: Left heart cath;  Surgeon: Rashad Nieto MD;  Location: [facility] CATH LAB;  Service: Cardiology;  Laterality: Left;    LEFT HEART CATHETERIZATION Left 2021    Procedure: Left heart cath, radial;  Surgeon: Rashad Nieto MD;  Location: [facility] CATH LAB;  Service: Cardiology;  Laterality: Left;    TONSILLECTOMY      TOTAL KNEE ARTHROPLASTY       Family History   Problem Relation Age of Onset    Heart disease Mother     Stroke Father      Social History     Tobacco Use    Smoking status: Former Smoker     Packs/day: 0.00     Types: Cigarettes     Quit date: 2016     Years since quittin.0    Smokeless tobacco: Never Used    Tobacco comment: 2016   Substance Use Topics    Alcohol use: No    Drug use: No     Review of Systems   HENT:        (-) head trauma   Musculoskeletal:        (+) right hip pain  (+) right leg pain  (-) right elbow pain   Skin: Positive for wound (abrasions on the right elbow).   Neurological: Negative for syncope and numbness.   All other systems reviewed and are negative.      Physical Exam     Initial Vitals [22 1037]   BP Pulse Resp Temp SpO2   134/61 60 18 98.1 °F (36.7 °C) 96 %      MAP       --         Physical Exam    Nursing note and vitals reviewed.  Constitutional: He appears well-developed and well-nourished. He is not diaphoretic. No distress.   HENT:   Head: Normocephalic and atraumatic.   Right Ear: External ear normal.   Left Ear: External ear normal.   Nose: Nose normal.   Mouth/Throat: Oropharynx is clear and moist.   Eyes: Conjunctivae and EOM are normal. Pupils are equal, round, and reactive to light. Right eye exhibits no discharge. Left eye exhibits no discharge. No scleral icterus.   Neck: Neck supple. No JVD present.   Normal  range of motion.  Cardiovascular: Normal rate, regular rhythm, normal heart sounds and intact distal pulses. Exam reveals no gallop and no friction rub.    No murmur heard.  Pulmonary/Chest: Breath sounds normal. No stridor. No respiratory distress. He has no wheezes. He has no rhonchi. He has no rales. He exhibits no tenderness.   Abdominal: Abdomen is soft. Bowel sounds are normal. He exhibits no distension and no mass. There is no abdominal tenderness. There is no rebound and no guarding.   Musculoskeletal:         General: No tenderness or edema. Normal range of motion.      Cervical back: Normal range of motion and neck supple.      Comments: There is slight shortening in external rotation of the right lower extremity at the hip.  The lower extremity appears well perfused.  Pulses are intact and the extremity is warm.  There are no sensory or motor deficits noted on examination.  The pelvis is stable.     Neurological: He is alert and oriented to person, place, and time. He has normal strength. No cranial nerve deficit or sensory deficit.   Skin: Skin is warm and dry. No rash noted. No erythema. No pallor.   There is an abrasion noted over the right elbow.  There is no suturable laceration.  There is no crepitus or tenderness to the area with palpation.   Psychiatric: He has a normal mood and affect. His behavior is normal. Judgment and thought content normal.         ED Course   Procedures  Labs Reviewed   CBC W/ AUTO DIFFERENTIAL - Abnormal; Notable for the following components:       Result Value    RBC 3.71 (*)     Hemoglobin 11.2 (*)     Hematocrit 33.5 (*)     RDW 15.0 (*)     Immature Granulocytes 0.6 (*)     Immature Grans (Abs) 0.05 (*)     Lymph % 14.9 (*)     All other components within normal limits   COMPREHENSIVE METABOLIC PANEL - Abnormal; Notable for the following components:    CO2 22 (*)     Glucose 138 (*)     ALT 9 (*)     eGFR 54 (*)     All other components within normal limits   APTT    PROTIME-INR   SARS-COV-2 RDRP GENE   TYPE & SCREEN          Imaging Results          CT Hip Without Contrast Right (Final result)  Result time 08/13/22 13:40:25    Final result by Jeniffer Montanez MD (08/13/22 13:40:25)                 Impression:      Minimally displaced and slightly impacted subcapital right femoral neck fracture.      Electronically signed by: Jeniffer Montanez MD  Date:    08/13/2022  Time:    13:40             Narrative:    EXAMINATION:  CT HIP WITHOUT CONTRAST RIGHT    CLINICAL HISTORY:  Fracture, hip;    TECHNIQUE:  Axial CT images of the right hip were obtained without contrast.  Coronal and sagittal reformats from the axial acquisition.    COMPARISON:  08/13/2022    FINDINGS:  There is a mildly displaced subcapital right femoral neck fracture.  There is some impaction at the fracture site with an associated small joint effusion.  Adjacent soft tissue swelling is noted.  Vascular calcifications are seen.                               X-Ray Femur Ap/Lat Right (Final result)  Result time 08/13/22 13:30:45    Final result by Jeniffer Montanez MD (08/13/22 13:30:45)                 Impression:      Suspected right femoral neck fracture.      Electronically signed by: Jeniffer Montanez MD  Date:    08/13/2022  Time:    13:30             Narrative:    EXAMINATION:  XR FEMUR 2 VIEW RIGHT    CLINICAL HISTORY:  Unspecified injury of unspecified hip, initial encounter    TECHNIQUE:  Two views of the right femur    COMPARISON:  None.    FINDINGS:  Postoperative changes of a right total knee arthroplasty are seen in satisfactory alignment without evidence for hardware complication.  Suspected fracture of the right femoral neck with foreshortening noted..                               X-Ray Hip 2 or 3 views Right (with Pelvis when performed) (Final result)  Result time 08/13/22 12:32:59    Final result by Jeniffer Mnotanez MD (08/13/22 12:32:59)                 Impression:      As above.      Electronically  signed by: Jeniffer Montanez MD  Date:    08/13/2022  Time:    12:32             Narrative:    EXAMINATION:  XR HIP WITH PELVIS WHEN PERFORMED, 2 OR 3  VIEWS RIGHT    CLINICAL HISTORY:  Unspecified injury of unspecified hip, initial encounter    TECHNIQUE:  Frontal radiograph of the pelvis with an oblique radiograph of the right hip.    COMPARISON:  11/20/2016    FINDINGS:  There is some foreshortening of the right femoral neck which can suggest an underlying fracture.  If the patient is having pain in this location, consider further evaluation with a CT scan.  The left femoral head and neck have a normal configuration.  Vascular calcifications are seen.                              X-Rays:   Independently Interpreted Readings:   Other Readings:  Hip x-ray with probable femoral neck fracture - right.    Medications   neomycin-bacitracnZn-polymyxnB packet (1 each Topical (Top) Given 8/13/22 1240)   Tdap (BOOSTRIX) vaccine injection 0.5 mL (0.5 mLs Intramuscular Given 8/13/22 1240)   HYDROmorphone (PF) injection 0.5 mg (0.5 mg Intravenous Given 8/13/22 1241)     Medical Decision Making:   History:   Old Medical Records: I decided to obtain old medical records.  Clinical Tests:   Lab Tests: Ordered and Reviewed  Radiological Study: Ordered and Reviewed  ED Management:  This is the emergent evaluation of an 86-year-old male presents emergency department for evaluation of injury to his right hip.  Differential diagnosis at the time of initial evaluation included, but was not limited to:  Hip fracture, hip dislocation, hip contusion.       Patient's x-ray reveals probable femoral neck fracture.  CT scan confirms this.  I have discussed the case with Dr. Villanueva.  Given patient's underlying comorbidities, he advises preoperative labs, EKG, chest x-ray, admission to Medicine, Cardiology consult.  I have done this.  I discussed the case with the admitting hospitalist Dr. Proctor.  Patient is stable.  Pain well controlled.  There  was some concern that there were undetectable pulses in the right extremity.  However, the patient has a loud PT dopplerable pulse on my exam and there is a palpable PT pulse.  He has no numbness or temperature change or color change of the right extremity.  He is able to move his distal right extremity and has intact sensation.           Scribe Attestation:   Scribe #1: I performed the above scribed service and the documentation accurately describes the services I performed. I attest to the accuracy of the note.                 Clinical Impression:   Final diagnoses:  [S79.919A] Hip injury, initial encounter  [S79.919A] Hip injury  [Z01.818] Preoperative clearance  [S72.001A] Closed fracture of neck of right femur, initial encounter (Primary)          ED Disposition Condition    Admit           I, Miguel Fang MD, personally performed the services described in this documentation. All medical record entries made by the scribe were at my direction and in my presence. I have reviewed the chart and agree that the record reflects my personal performance and is accurate and complete.     Miguel Fang Jr., MD  08/13/22 2594

## 2022-08-13 NOTE — ASSESSMENT & PLAN NOTE
Pt with Minimally displaced and slightly impacted subcapital right femoral neck fracture on CT imaging 8/13  RCRI of 1 due to h/o ischemic heart disease.   Pt states he is able to walk several blocks without sob.  CXR and ekg pending  Pain medications ordered prn    -Ortho surgery consulted in ed plan for surgery pending Cardiac clearance

## 2022-08-13 NOTE — ANESTHESIA PREPROCEDURE EVALUATION
2022  Juan Martínez Jr. is a 86 y.o., male.  To undergo Procedure(s) (LRB):  ARTHROPLASTY, HIP REPLACEMENT (Right)     Denies CP/SOB/GERD/CVA/URI symptoms.  Endorses NSTEMI in , s/p PCI with stent.  Patient took plavix 22.  METS > 4      Past Medical History:  Past Medical History:   Diagnosis Date    Diabetes mellitus     High cholesterol     Hypertension     NSTEMI (non-ST elevated myocardial infarction) 2021    Prostate cancer        Past Surgical History:  Past Surgical History:   Procedure Laterality Date    APPLICATION OF FULL-THICKNESS SKIN GRAFT (FTSG) TO UPPER EXTREMITY Right 11/15/2018    Procedure: APPLICATION, GRAFT, SKIN, FULL-THICKNESS, TO UPPER EXTREMITY VS STSG WITH FROZEN SECTIONS;  Surgeon: Alan Arzola MD;  Location: [facility] OR;  Service: Plastics;  Laterality: Right;    EXCISION OF SQUAMOUS CELL CARCINOMA Right 11/15/2018    Procedure: EXCISION, CARCINOMA, SQUAMOUS CELL @ RIGHT HAND;  Surgeon: Alan Arzola MD;  Location: [facility] OR;  Service: Plastics;  Laterality: Right;  RN PREOP 2018--NEED H/P    JOINT REPLACEMENT      LEFT HEART CATHETERIZATION Left 2019    Procedure: Left heart cath;  Surgeon: Rashad Nieto MD;  Location: [facility] CATH LAB;  Service: Cardiology;  Laterality: Left;    LEFT HEART CATHETERIZATION Left 2021    Procedure: Left heart cath, radial;  Surgeon: Rahsad Nieto MD;  Location: [facility] CATH LAB;  Service: Cardiology;  Laterality: Left;    TONSILLECTOMY      TOTAL KNEE ARTHROPLASTY         Social History:  Social History     Socioeconomic History    Marital status:    Tobacco Use    Smoking status: Former Smoker     Packs/day: 0.00     Types: Cigarettes     Quit date: 2016     Years since quittin.0    Smokeless tobacco: Never Used    Tobacco comment: 2016   Substance and Sexual Activity     Alcohol use: No    Drug use: No    Sexual activity: Not Currently       Medications:  No current facility-administered medications on file prior to encounter.     Current Outpatient Medications on File Prior to Encounter   Medication Sig Dispense Refill    acetaminophen (TYLENOL) 325 MG tablet Take 2 tablets (650 mg total) by mouth every 6 (six) hours as needed for Pain. (Patient not taking: Reported on 2/10/2022) 21 tablet 0    albuterol (PROVENTIL/VENTOLIN HFA) 90 mcg/actuation inhaler Inhale 2 puffs into the lungs.      AMITIZA 24 mcg Cap 24 mcg 2 (two) times daily with meals.       amLODIPine (NORVASC) 10 MG tablet Take 1 tablet (10 mg total) by mouth once daily. 90 tablet 3    aspirin (ECOTRIN) 81 MG EC tablet Take 81 mg by mouth once daily.      atorvastatin (LIPITOR) 40 MG tablet Take 1 tablet (40 mg total) by mouth once daily. 90 tablet 3    blood-glucose meter Misc by Misc.(Non-Drug; Combo Route) route      clopidogreL (PLAVIX) 75 mg tablet Take 1 tablet (75 mg total) by mouth once daily. 90 tablet 3    dicyclomine (BENTYL) 10 MG capsule Take 10 mg by mouth.      escitalopram oxalate (LEXAPRO) 10 MG tablet Take 10 mg by mouth.      hydroCHLOROthiazide (MICROZIDE) 12.5 mg capsule       isosorbide mononitrate (IMDUR) 60 MG 24 hr tablet Take 1 tablet (60 mg total) by mouth once daily. 90 tablet 3    LANCETS & BLOOD GLUCOSE STRIPS MISC by Misc.(Non-Drug; Combo Route) route Check blood sugars  Twice a day.      LIDOcaine (LIDODERM) 5 % Place 1 patch onto the skin daily as needed (neck pain.). Remove & Discard patch within 12 hours or as directed by MD. Apply to posterior neck in case of pain. (Patient not taking: Reported on 2/10/2022) 5 patch 1    metformin (GLUCOPHAGE) 1000 MG tablet Take 1 tablet (1,000 mg total) by mouth 2 (two) times daily with meals. 180 tablet 4    metoprolol succinate (TOPROL-XL) 50 MG 24 hr tablet Take 1 tablet (50 mg total) by mouth once daily. 90 tablet 3     nitroGLYCERIN (NITROSTAT) 0.4 MG SL tablet Place 1 tablet (0.4 mg total) under the tongue every 5 (five) minutes as needed for Chest pain. 90 tablet 12    ondansetron (ZOFRAN) 4 MG tablet Take 1 tablet (4 mg total) by mouth every 6 (six) hours as needed for Nausea. (Patient not taking: Reported on 2/10/2022) 12 tablet 0    varicella-zoster gE-AS01B, PF, (SHINGRIX) 50 mcg/0.5 mL injection Inject into the muscle. (Patient not taking: Reported on 2/10/2022) 1 each 0       Allergies:  Review of patient's allergies indicates:  No Known Allergies    Active Problems:  Patient Active Problem List   Diagnosis    Essential hypertension, benign    Type 2 diabetes mellitus    Hyperlipidemia    Body mass index 28.0-28.9, adult    History of prostate cancer    Orthostatic hypotension    Diabetic ulcer of toe of left foot associated with type 2 diabetes mellitus, with fat layer exposed    SCC (squamous cell carcinoma)    CAD (coronary artery disease)    Unstable angina    Palpitations    Elevated troponin    NSTEMI (non-ST elevated myocardial infarction)       Diagnostic Studies:   Latest Reference Range & Units 08/13/22 11:28   WBC 3.90 - 12.70 K/uL 8.47   RBC 4.60 - 6.20 M/uL 3.71 (L)   Hemoglobin 14.0 - 18.0 g/dL 11.2 (L)   Hematocrit 40.0 - 54.0 % 33.5 (L)   MCV 82 - 98 fL 90   MCH 27.0 - 31.0 pg 30.2   MCHC 32.0 - 36.0 g/dL 33.4   RDW 11.5 - 14.5 % 15.0 (H)   Platelets 150 - 450 K/uL 261   MPV 9.2 - 12.9 fL 9.7   Gran % 38.0 - 73.0 % 71.8   Lymph % 18.0 - 48.0 % 14.9 (L)   Mono % 4.0 - 15.0 % 7.7   Eosinophil % 0.0 - 8.0 % 4.6   Basophil % 0.0 - 1.9 % 0.4   Immature Granulocytes 0.0 - 0.5 % 0.6 (H)   Gran # (ANC) 1.8 - 7.7 K/uL 6.1   Lymph # 1.0 - 4.8 K/uL 1.3   Mono # 0.3 - 1.0 K/uL 0.7   Eos # 0.0 - 0.5 K/uL 0.4   Baso # 0.00 - 0.20 K/uL 0.03   Immature Grans (Abs) 0.00 - 0.04 K/uL 0.05 (H)   nRBC 0 /100 WBC 0   Differential Method  Automated      Latest Reference Range & Units 08/13/22 11:18   Sodium 136 -  145 mmol/L 136   Potassium 3.5 - 5.1 mmol/L 4.4   Chloride 95 - 110 mmol/L 100   CO2 23 - 29 mmol/L 22 (L)   Anion Gap 8 - 16 mmol/L 14   BUN 8 - 23 mg/dL 13   Creatinine 0.5 - 1.4 mg/dL 1.3   eGFR >60 mL/min/1.73 m^2 54 !   Glucose 70 - 110 mg/dL 138 (H)   Calcium 8.7 - 10.5 mg/dL 8.7   Alkaline Phosphatase 55 - 135 U/L 77   PROTEIN TOTAL 6.0 - 8.4 g/dL 7.0   Albumin 3.5 - 5.2 g/dL 3.7   BILIRUBIN TOTAL 0.1 - 1.0 mg/dL 0.5   AST 10 - 40 U/L 22   ALT 10 - 44 U/L 9 (L)      Latest Reference Range & Units 08/13/22 11:18   Protime 9.0 - 12.5 sec 10.7   INR 0.8 - 1.2  1.0     EKG (7/19/22):  Sinus bradycardia   Left anterior fascicular block   Cannot rule out Anterior infarct    TTE (7/26/21):  · The estimated ejection fraction is 60%.  · Normal systolic function.  · Grade I left ventricular diastolic dysfunction.  · Normal right ventricular size with normal right ventricular systolic function.  · Moderate left atrial enlargement.  · Mild right atrial enlargement.  · Normal central venous pressure (3 mmHg).  · The estimated PA systolic pressure is 6 mmHg.    Cath (7/26/21):  · The pre-procedure left ventricular end diastolic pressure was 6.  · The estimated blood loss was <50 mL.  · The Dist RCA lesion was 70% stenosed with 0% stenosis post-intervention.  · A STENT RESOLUTE TABATHA [id number] stent was successfully placed at 12 SANDOR for 6 sec.    24 Hour Vitals:  Temp:  [36.7 °C (98.1 °F)] 36.7 °C (98.1 °F)  Pulse:  [56-61] 56  Resp:  [18-22] 18  SpO2:  [92 %-96 %] 92 %  BP: (116-148)/(56-67) 116/56   See Nursing Charting For Additional Vitals      Pre-op Assessment    I have reviewed the Patient Summary Reports.     I have reviewed the Nursing Notes.       Review of Systems  Anesthesia Hx:  No problems with previous Anesthesia  Denies Family Hx of Anesthesia complications.   Denies Personal Hx of Anesthesia complications.   Social:  Former Smoker, No Alcohol Use    Hematology/Oncology:         -- Anemia: Hematology  "Comments: Plavix, last dose: 8/13/22  --  Cancer in past history:  Oncology Comments: Prostate CA     Cardiovascular:   Exercise tolerance: good Hypertension Past MI CAD  CABG/stent  hyperlipidemia ECG has been reviewed. NSTEMI in July 2021, PCI performed with stent placement->evaluated and cleared by cardiology     Pulmonary:  Pulmonary Normal    Renal/:  Renal/ Normal     Hepatic/GI:  Hepatic/GI Normal    Musculoskeletal:   Right hip fracture 2/2 mechanical fall    Right elbow "scratch" per patient; wrapped in Kerlix   Neurological:  Neurology Normal    Endocrine:   Diabetes        Physical Exam  General: Well nourished, Cooperative, Alert and Oriented    Airway:  Mallampati: III   Mouth Opening: Normal  TM Distance: 4 - 6 cm  Neck ROM: Extension Decreased    Dental:  Dentures    Chest/Lungs:  Clear to auscultation, Normal Respiratory Rate    Heart:  Rate: Normal  Rhythm: Regular Rhythm      Wt Readings from Last 3 Encounters:   08/13/22 85.7 kg (189 lb)   07/19/22 87.8 kg (193 lb 7.3 oz)   02/10/22 88 kg (194 lb)     Temp Readings from Last 3 Encounters:   08/14/22 36.6 °C (97.8 °F) (Oral)   01/11/22 36.6 °C (97.9 °F)   11/11/21 36.7 °C (98.1 °F)     BP Readings from Last 3 Encounters:   08/14/22 (!) 169/74   07/19/22 130/62   02/10/22 138/60     Pulse Readings from Last 3 Encounters:   08/14/22 (!) 59   07/19/22 66   02/10/22 64     8/13/2022 COVID negative    Anesthesia Plan  Type of Anesthesia, risks & benefits discussed:    Anesthesia Type: Gen ETT  Intra-op Monitoring Plan: Standard ASA Monitors  Post Op Pain Control Plan: multimodal analgesia and IV/PO Opioids PRN  Induction:  IV  Informed Consent: Informed consent signed with the Patient and all parties understand the risks and agree with anesthesia plan.  All questions answered. Patient consented to blood products? Yes  ASA Score: 3  Day of Surgery Review of History & Physical: H&P Update referred to the surgeon/provider.  Anesthesia Plan Notes: D/w " patient code status. He agrees to suspend DNR status during perioperative period, and would like everything done, including intubation and chest compressions. Asked if he would like us to call his sister and daughter to discuss the anesthetic plan, and patient declined.    Ready For Surgery From Anesthesia Perspective.     .

## 2022-08-13 NOTE — ASSESSMENT & PLAN NOTE
Pt with h/o cad with stent placement in 7/2021 per pt records. Currently on DAPT therapy and statin along with bb.    -continue aspirin and bb  -hold plavix  -cardiology consulted for surgical clearance.

## 2022-08-14 ENCOUNTER — ANESTHESIA (OUTPATIENT)
Dept: SURGERY | Facility: HOSPITAL | Age: 87
DRG: 522 | End: 2022-08-14
Payer: MEDICARE

## 2022-08-14 PROBLEM — I77.810 AORTIC ROOT DILATATION: Status: ACTIVE | Noted: 2022-08-14

## 2022-08-14 LAB
ANION GAP SERPL CALC-SCNC: 12 MMOL/L (ref 8–16)
BASOPHILS # BLD AUTO: 0.03 K/UL (ref 0–0.2)
BASOPHILS NFR BLD: 0.3 % (ref 0–1.9)
BUN SERPL-MCNC: 11 MG/DL (ref 8–23)
CALCIUM SERPL-MCNC: 9 MG/DL (ref 8.7–10.5)
CHLORIDE SERPL-SCNC: 99 MMOL/L (ref 95–110)
CO2 SERPL-SCNC: 25 MMOL/L (ref 23–29)
CREAT SERPL-MCNC: 0.9 MG/DL (ref 0.5–1.4)
DIFFERENTIAL METHOD: ABNORMAL
EOSINOPHIL # BLD AUTO: 0.1 K/UL (ref 0–0.5)
EOSINOPHIL NFR BLD: 1 % (ref 0–8)
ERYTHROCYTE [DISTWIDTH] IN BLOOD BY AUTOMATED COUNT: 15 % (ref 11.5–14.5)
EST. GFR  (NO RACE VARIABLE): >60 ML/MIN/1.73 M^2
GLUCOSE SERPL-MCNC: 149 MG/DL (ref 70–110)
HCT VFR BLD AUTO: 35.1 % (ref 40–54)
HGB BLD-MCNC: 11.8 G/DL (ref 14–18)
IMM GRANULOCYTES # BLD AUTO: 0.04 K/UL (ref 0–0.04)
IMM GRANULOCYTES NFR BLD AUTO: 0.4 % (ref 0–0.5)
LYMPHOCYTES # BLD AUTO: 1.4 K/UL (ref 1–4.8)
LYMPHOCYTES NFR BLD: 13.9 % (ref 18–48)
MCH RBC QN AUTO: 30.4 PG (ref 27–31)
MCHC RBC AUTO-ENTMCNC: 33.6 G/DL (ref 32–36)
MCV RBC AUTO: 91 FL (ref 82–98)
MONOCYTES # BLD AUTO: 0.9 K/UL (ref 0.3–1)
MONOCYTES NFR BLD: 9 % (ref 4–15)
NEUTROPHILS # BLD AUTO: 7.5 K/UL (ref 1.8–7.7)
NEUTROPHILS NFR BLD: 75.4 % (ref 38–73)
NRBC BLD-RTO: 0 /100 WBC
PLATELET # BLD AUTO: 288 K/UL (ref 150–450)
PMV BLD AUTO: 9.8 FL (ref 9.2–12.9)
POCT GLUCOSE: 170 MG/DL (ref 70–110)
POCT GLUCOSE: 175 MG/DL (ref 70–110)
POCT GLUCOSE: 197 MG/DL (ref 70–110)
POCT GLUCOSE: 204 MG/DL (ref 70–110)
POCT GLUCOSE: 217 MG/DL (ref 70–110)
POTASSIUM SERPL-SCNC: 3.5 MMOL/L (ref 3.5–5.1)
RBC # BLD AUTO: 3.88 M/UL (ref 4.6–6.2)
SODIUM SERPL-SCNC: 136 MMOL/L (ref 136–145)
WBC # BLD AUTO: 9.88 K/UL (ref 3.9–12.7)

## 2022-08-14 PROCEDURE — 88304 TISSUE EXAM BY PATHOLOGIST: CPT | Performed by: PATHOLOGY

## 2022-08-14 PROCEDURE — 63600175 PHARM REV CODE 636 W HCPCS: Performed by: ORTHOPAEDIC SURGERY

## 2022-08-14 PROCEDURE — 25000003 PHARM REV CODE 250: Performed by: NURSE ANESTHETIST, CERTIFIED REGISTERED

## 2022-08-14 PROCEDURE — 25000003 PHARM REV CODE 250: Performed by: ORTHOPAEDIC SURGERY

## 2022-08-14 PROCEDURE — 88304 TISSUE EXAM BY PATHOLOGIST: CPT | Mod: 26,,, | Performed by: PATHOLOGY

## 2022-08-14 PROCEDURE — 88311 DECALCIFY TISSUE: CPT | Performed by: PATHOLOGY

## 2022-08-14 PROCEDURE — 63600175 PHARM REV CODE 636 W HCPCS: Performed by: NURSE ANESTHETIST, CERTIFIED REGISTERED

## 2022-08-14 PROCEDURE — 27201423 OPTIME MED/SURG SUP & DEVICES STERILE SUPPLY: Performed by: ORTHOPAEDIC SURGERY

## 2022-08-14 PROCEDURE — D9220A PRA ANESTHESIA: ICD-10-PCS | Mod: CRNA,,, | Performed by: NURSE ANESTHETIST, CERTIFIED REGISTERED

## 2022-08-14 PROCEDURE — 80048 BASIC METABOLIC PNL TOTAL CA: CPT | Performed by: STUDENT IN AN ORGANIZED HEALTH CARE EDUCATION/TRAINING PROGRAM

## 2022-08-14 PROCEDURE — 25000003 PHARM REV CODE 250: Performed by: STUDENT IN AN ORGANIZED HEALTH CARE EDUCATION/TRAINING PROGRAM

## 2022-08-14 PROCEDURE — 36415 COLL VENOUS BLD VENIPUNCTURE: CPT | Performed by: STUDENT IN AN ORGANIZED HEALTH CARE EDUCATION/TRAINING PROGRAM

## 2022-08-14 PROCEDURE — 99223 PR INITIAL HOSPITAL CARE,LEVL III: ICD-10-PCS | Mod: ,,, | Performed by: INTERNAL MEDICINE

## 2022-08-14 PROCEDURE — D9220A PRA ANESTHESIA: Mod: CRNA,,, | Performed by: NURSE ANESTHETIST, CERTIFIED REGISTERED

## 2022-08-14 PROCEDURE — 99223 1ST HOSP IP/OBS HIGH 75: CPT | Mod: ,,, | Performed by: INTERNAL MEDICINE

## 2022-08-14 PROCEDURE — 88311 DECALCIFY TISSUE: CPT | Mod: 26,,, | Performed by: PATHOLOGY

## 2022-08-14 PROCEDURE — 88311 PR  DECALCIFY TISSUE: ICD-10-PCS | Mod: 26,,, | Performed by: PATHOLOGY

## 2022-08-14 PROCEDURE — 63600175 PHARM REV CODE 636 W HCPCS: Performed by: STUDENT IN AN ORGANIZED HEALTH CARE EDUCATION/TRAINING PROGRAM

## 2022-08-14 PROCEDURE — D9220A PRA ANESTHESIA: Mod: ANES,,, | Performed by: ANESTHESIOLOGY

## 2022-08-14 PROCEDURE — D9220A PRA ANESTHESIA: ICD-10-PCS | Mod: ANES,,, | Performed by: ANESTHESIOLOGY

## 2022-08-14 PROCEDURE — 37000009 HC ANESTHESIA EA ADD 15 MINS: Performed by: ORTHOPAEDIC SURGERY

## 2022-08-14 PROCEDURE — 36000711: Performed by: ORTHOPAEDIC SURGERY

## 2022-08-14 PROCEDURE — 85025 COMPLETE CBC W/AUTO DIFF WBC: CPT | Performed by: STUDENT IN AN ORGANIZED HEALTH CARE EDUCATION/TRAINING PROGRAM

## 2022-08-14 PROCEDURE — 71000033 HC RECOVERY, INTIAL HOUR: Performed by: ORTHOPAEDIC SURGERY

## 2022-08-14 PROCEDURE — 11000001 HC ACUTE MED/SURG PRIVATE ROOM

## 2022-08-14 PROCEDURE — 37000008 HC ANESTHESIA 1ST 15 MINUTES: Performed by: ORTHOPAEDIC SURGERY

## 2022-08-14 PROCEDURE — 88304 PR  SURG PATH,LEVEL III: ICD-10-PCS | Mod: 26,,, | Performed by: PATHOLOGY

## 2022-08-14 PROCEDURE — 36000710: Performed by: ORTHOPAEDIC SURGERY

## 2022-08-14 PROCEDURE — C1776 JOINT DEVICE (IMPLANTABLE): HCPCS | Performed by: ORTHOPAEDIC SURGERY

## 2022-08-14 DEVICE — HEAD FEM ENDO UNI MOD 53MM: Type: IMPLANTABLE DEVICE | Site: HIP | Status: FUNCTIONAL

## 2022-08-14 DEVICE — IMPLANTABLE DEVICE: Type: IMPLANTABLE DEVICE | Site: HIP | Status: FUNCTIONAL

## 2022-08-14 DEVICE — SLEEVE FEM C-TAPER UNI +0MM: Type: IMPLANTABLE DEVICE | Site: HIP | Status: FUNCTIONAL

## 2022-08-14 RX ORDER — CEFAZOLIN SODIUM 2 G/50ML
2 SOLUTION INTRAVENOUS
Status: COMPLETED | OUTPATIENT
Start: 2022-08-14 | End: 2022-08-14

## 2022-08-14 RX ORDER — SODIUM CHLORIDE 0.9 % (FLUSH) 0.9 %
10 SYRINGE (ML) INJECTION
Status: DISCONTINUED | OUTPATIENT
Start: 2022-08-14 | End: 2022-08-14 | Stop reason: HOSPADM

## 2022-08-14 RX ORDER — HYDROMORPHONE HYDROCHLORIDE 2 MG/ML
0.2 INJECTION, SOLUTION INTRAMUSCULAR; INTRAVENOUS; SUBCUTANEOUS EVERY 5 MIN PRN
Status: DISCONTINUED | OUTPATIENT
Start: 2022-08-14 | End: 2022-08-14 | Stop reason: HOSPADM

## 2022-08-14 RX ORDER — ONDANSETRON 2 MG/ML
INJECTION INTRAMUSCULAR; INTRAVENOUS
Status: DISCONTINUED | OUTPATIENT
Start: 2022-08-14 | End: 2022-08-14

## 2022-08-14 RX ORDER — DEXAMETHASONE SODIUM PHOSPHATE 4 MG/ML
INJECTION, SOLUTION INTRA-ARTICULAR; INTRALESIONAL; INTRAMUSCULAR; INTRAVENOUS; SOFT TISSUE
Status: DISCONTINUED | OUTPATIENT
Start: 2022-08-14 | End: 2022-08-14

## 2022-08-14 RX ORDER — MUPIROCIN 20 MG/G
OINTMENT TOPICAL 2 TIMES DAILY
Status: CANCELLED | OUTPATIENT
Start: 2022-08-14 | End: 2022-08-19

## 2022-08-14 RX ORDER — SODIUM CHLORIDE 0.9 % (FLUSH) 0.9 %
10 SYRINGE (ML) INJECTION
Status: CANCELLED | OUTPATIENT
Start: 2022-08-14

## 2022-08-14 RX ORDER — ROCURONIUM BROMIDE 10 MG/ML
INJECTION, SOLUTION INTRAVENOUS
Status: DISCONTINUED | OUTPATIENT
Start: 2022-08-14 | End: 2022-08-14

## 2022-08-14 RX ORDER — CEFAZOLIN SODIUM 2 G/50ML
2 SOLUTION INTRAVENOUS
Status: CANCELLED | OUTPATIENT
Start: 2022-08-14 | End: 2022-08-15

## 2022-08-14 RX ORDER — BUPIVACAINE HYDROCHLORIDE 2.5 MG/ML
INJECTION, SOLUTION EPIDURAL; INFILTRATION; INTRACAUDAL
Status: DISCONTINUED | OUTPATIENT
Start: 2022-08-14 | End: 2022-08-14 | Stop reason: HOSPADM

## 2022-08-14 RX ORDER — PROPOFOL 10 MG/ML
VIAL (ML) INTRAVENOUS
Status: DISCONTINUED | OUTPATIENT
Start: 2022-08-14 | End: 2022-08-14

## 2022-08-14 RX ORDER — ACETAMINOPHEN 10 MG/ML
1000 INJECTION, SOLUTION INTRAVENOUS ONCE
Status: COMPLETED | OUTPATIENT
Start: 2022-08-14 | End: 2022-08-14

## 2022-08-14 RX ORDER — PHENYLEPHRINE HYDROCHLORIDE 10 MG/ML
INJECTION INTRAVENOUS
Status: DISCONTINUED | OUTPATIENT
Start: 2022-08-14 | End: 2022-08-14

## 2022-08-14 RX ORDER — EPHEDRINE SULFATE 50 MG/ML
INJECTION, SOLUTION INTRAVENOUS
Status: DISCONTINUED | OUTPATIENT
Start: 2022-08-14 | End: 2022-08-14

## 2022-08-14 RX ORDER — OXYCODONE HYDROCHLORIDE 5 MG/1
5 TABLET ORAL EVERY 4 HOURS PRN
Status: CANCELLED | OUTPATIENT
Start: 2022-08-14

## 2022-08-14 RX ORDER — LIDOCAINE HYDROCHLORIDE 20 MG/ML
INJECTION INTRAVENOUS
Status: DISCONTINUED | OUTPATIENT
Start: 2022-08-14 | End: 2022-08-14

## 2022-08-14 RX ORDER — ONDANSETRON 2 MG/ML
4 INJECTION INTRAMUSCULAR; INTRAVENOUS DAILY PRN
Status: DISCONTINUED | OUTPATIENT
Start: 2022-08-14 | End: 2022-08-14 | Stop reason: HOSPADM

## 2022-08-14 RX ORDER — FENTANYL CITRATE 50 UG/ML
INJECTION, SOLUTION INTRAMUSCULAR; INTRAVENOUS
Status: DISCONTINUED | OUTPATIENT
Start: 2022-08-14 | End: 2022-08-14

## 2022-08-14 RX ORDER — SODIUM CHLORIDE, SODIUM LACTATE, POTASSIUM CHLORIDE, CALCIUM CHLORIDE 600; 310; 30; 20 MG/100ML; MG/100ML; MG/100ML; MG/100ML
INJECTION, SOLUTION INTRAVENOUS CONTINUOUS
Status: CANCELLED | OUTPATIENT
Start: 2022-08-14 | End: 2022-08-18

## 2022-08-14 RX ORDER — ENOXAPARIN SODIUM 100 MG/ML
40 INJECTION SUBCUTANEOUS EVERY 24 HOURS
Status: DISCONTINUED | OUTPATIENT
Start: 2022-08-14 | End: 2022-08-17 | Stop reason: HOSPADM

## 2022-08-14 RX ADMIN — AMLODIPINE BESYLATE 10 MG: 5 TABLET ORAL at 08:08

## 2022-08-14 RX ADMIN — FENTANYL CITRATE 50 MCG: 50 INJECTION, SOLUTION INTRAMUSCULAR; INTRAVENOUS at 10:08

## 2022-08-14 RX ADMIN — HEPARIN SODIUM 5000 UNITS: 5000 INJECTION INTRAVENOUS; SUBCUTANEOUS at 05:08

## 2022-08-14 RX ADMIN — SUGAMMADEX 200 MG: 100 INJECTION, SOLUTION INTRAVENOUS at 12:08

## 2022-08-14 RX ADMIN — ENOXAPARIN SODIUM 40 MG: 100 INJECTION SUBCUTANEOUS at 04:08

## 2022-08-14 RX ADMIN — ROCURONIUM BROMIDE 50 MG: 10 INJECTION, SOLUTION INTRAVENOUS at 10:08

## 2022-08-14 RX ADMIN — ISOSORBIDE MONONITRATE 60 MG: 30 TABLET, EXTENDED RELEASE ORAL at 08:08

## 2022-08-14 RX ADMIN — PHENYLEPHRINE HYDROCHLORIDE 100 MCG: 10 INJECTION INTRAVENOUS at 11:08

## 2022-08-14 RX ADMIN — SODIUM CHLORIDE, SODIUM LACTATE, POTASSIUM CHLORIDE, AND CALCIUM CHLORIDE: .6; .31; .03; .02 INJECTION, SOLUTION INTRAVENOUS at 10:08

## 2022-08-14 RX ADMIN — EPHEDRINE SULFATE 10 MG: 50 INJECTION INTRAVENOUS at 11:08

## 2022-08-14 RX ADMIN — TRANEXAMIC ACID 1000 MG: 100 INJECTION, SOLUTION INTRAVENOUS at 11:08

## 2022-08-14 RX ADMIN — ATORVASTATIN CALCIUM 40 MG: 40 TABLET, FILM COATED ORAL at 08:08

## 2022-08-14 RX ADMIN — ACETAMINOPHEN 1000 MG: 10 INJECTION INTRAVENOUS at 12:08

## 2022-08-14 RX ADMIN — PROPOFOL 30 MG: 10 INJECTION, EMULSION INTRAVENOUS at 10:08

## 2022-08-14 RX ADMIN — DEXAMETHASONE SODIUM PHOSPHATE 4 MG: 4 INJECTION, SOLUTION INTRAMUSCULAR; INTRAVENOUS at 11:08

## 2022-08-14 RX ADMIN — GLYCOPYRROLATE 0.1 MG: 0.2 INJECTION, SOLUTION INTRAMUSCULAR; INTRAVITREAL at 10:08

## 2022-08-14 RX ADMIN — ONDANSETRON 4 MG: 2 INJECTION, SOLUTION INTRAMUSCULAR; INTRAVENOUS at 11:08

## 2022-08-14 RX ADMIN — Medication 6 MG: at 08:08

## 2022-08-14 RX ADMIN — PROPOFOL 100 MG: 10 INJECTION, EMULSION INTRAVENOUS at 10:08

## 2022-08-14 RX ADMIN — INSULIN ASPART 2 UNITS: 100 INJECTION, SOLUTION INTRAVENOUS; SUBCUTANEOUS at 05:08

## 2022-08-14 RX ADMIN — LIDOCAINE HYDROCHLORIDE 100 MG: 20 INJECTION, SOLUTION INTRAVENOUS at 10:08

## 2022-08-14 RX ADMIN — ASPIRIN 81 MG: 81 TABLET, COATED ORAL at 08:08

## 2022-08-14 RX ADMIN — PROPOFOL 40 MG: 10 INJECTION, EMULSION INTRAVENOUS at 10:08

## 2022-08-14 RX ADMIN — HYDROCHLOROTHIAZIDE 12.5 MG: 12.5 TABLET ORAL at 08:08

## 2022-08-14 RX ADMIN — ESCITALOPRAM OXALATE 10 MG: 10 TABLET ORAL at 08:08

## 2022-08-14 RX ADMIN — CEFAZOLIN SODIUM 2 G: 2 SOLUTION INTRAVENOUS at 10:08

## 2022-08-14 RX ADMIN — METOPROLOL SUCCINATE 50 MG: 50 TABLET, EXTENDED RELEASE ORAL at 08:08

## 2022-08-14 NOTE — PROGRESS NOTES
Patient seen and examined at bedside this morning nurse reports no acute events overnight    Discussed case with cardiology    Physical exam:  Unchanged    Assessment and plan:  86-year-old male with right displaced femoral neck fracture after mechanical fall from standing height with subsequent inability to bear weight.  Had extensive conversation with the patient as well as the patient's sister regarding the risks benefits and alternatives as well as anticipated convalescence was surgical versus nonsurgical management as well as hemiarthroplasty versus screw fixation.  Given the benefits of early weight-bearing in a patient his age and with his comorbidities as well as displacement seen on CT scan will proceed with right hip hemiarthroplasty.  Informed consent signed    Surgical site marked    Edis Villanueva MD  [facility]

## 2022-08-14 NOTE — ASSESSMENT & PLAN NOTE
Patient's FSGs are controlled on current medication regimen.  Last A1c reviewed-   Lab Results   Component Value Date    LABA1C 6.9 (H) 07/06/2015    HGBA1C 6.7 (H) 12/02/2021     Most recent fingerstick glucose reviewed-   Recent Labs   Lab 08/13/22  1517 08/13/22  1920 08/14/22  0734 08/14/22  1215   POCTGLUCOSE 149* 189* 170* 175*     Current correctional scale  Low  Maintain anti-hyperglycemic dose as follows-   Antihyperglycemics (From admission, onward)            Start     Stop Route Frequency Ordered    08/13/22 1548  insulin aspart U-100 pen 0-5 Units         -- SubQ Before meals & nightly PRN 08/13/22 1450        Hold Oral hypoglycemics while patient is in the hospital.

## 2022-08-14 NOTE — SUBJECTIVE & OBJECTIVE
Past Medical History:   Diagnosis Date    Coronary artery disease     Diabetes mellitus     High cholesterol     Hypertension     NSTEMI (non-ST elevated myocardial infarction) 07/25/2021    Prostate cancer        Past Surgical History:   Procedure Laterality Date    APPLICATION OF FULL-THICKNESS SKIN GRAFT (FTSG) TO UPPER EXTREMITY Right 11/15/2018    Procedure: APPLICATION, GRAFT, SKIN, FULL-THICKNESS, TO UPPER EXTREMITY VS STSG WITH FROZEN SECTIONS;  Surgeon: Alan Arzola MD;  Location: [facility] OR;  Service: Plastics;  Laterality: Right;    EXCISION OF SQUAMOUS CELL CARCINOMA Right 11/15/2018    Procedure: EXCISION, CARCINOMA, SQUAMOUS CELL @ RIGHT HAND;  Surgeon: Alan Arzola MD;  Location: [facility] OR;  Service: Plastics;  Laterality: Right;  RN PREOP 11/13/2018--NEED H/P    JOINT REPLACEMENT      LEFT HEART CATHETERIZATION Left 5/6/2019    Procedure: Left heart cath;  Surgeon: Rashad Nieto MD;  Location: [facility] CATH LAB;  Service: Cardiology;  Laterality: Left;    LEFT HEART CATHETERIZATION Left 7/26/2021    Procedure: Left heart cath, radial;  Surgeon: Rashad Nieto MD;  Location: [facility] CATH LAB;  Service: Cardiology;  Laterality: Left;    TONSILLECTOMY      TOTAL KNEE ARTHROPLASTY         Review of patient's allergies indicates:  No Known Allergies    No current facility-administered medications on file prior to encounter.     Current Outpatient Medications on File Prior to Encounter   Medication Sig    acetaminophen (TYLENOL) 325 MG tablet Take 2 tablets (650 mg total) by mouth every 6 (six) hours as needed for Pain. (Patient not taking: Reported on 2/10/2022)    albuterol (PROVENTIL/VENTOLIN HFA) 90 mcg/actuation inhaler Inhale 2 puffs into the lungs.    AMITIZA 24 mcg Cap 24 mcg 2 (two) times daily with meals.     amLODIPine (NORVASC) 10 MG tablet Take 1 tablet (10 mg total) by mouth once daily.    aspirin (ECOTRIN) 81 MG EC tablet Take 81 mg by mouth once daily.    atorvastatin (LIPITOR) 40 MG  tablet Take 1 tablet (40 mg total) by mouth once daily.    blood-glucose meter Misc by Misc.(Non-Drug; Combo Route) route    clopidogreL (PLAVIX) 75 mg tablet Take 1 tablet (75 mg total) by mouth once daily.    dicyclomine (BENTYL) 10 MG capsule Take 10 mg by mouth.    escitalopram oxalate (LEXAPRO) 10 MG tablet Take 10 mg by mouth.    hydroCHLOROthiazide (MICROZIDE) 12.5 mg capsule     isosorbide mononitrate (IMDUR) 60 MG 24 hr tablet Take 1 tablet (60 mg total) by mouth once daily.    LANCETS & BLOOD GLUCOSE STRIPS MISC by Misc.(Non-Drug; Combo Route) route Check blood sugars  Twice a day.    LIDOcaine (LIDODERM) 5 % Place 1 patch onto the skin daily as needed (neck pain.). Remove & Discard patch within 12 hours or as directed by MD. Apply to posterior neck in case of pain. (Patient not taking: Reported on 2/10/2022)    metformin (GLUCOPHAGE) 1000 MG tablet Take 1 tablet (1,000 mg total) by mouth 2 (two) times daily with meals.    metoprolol succinate (TOPROL-XL) 50 MG 24 hr tablet Take 1 tablet (50 mg total) by mouth once daily.    nitroGLYCERIN (NITROSTAT) 0.4 MG SL tablet Place 1 tablet (0.4 mg total) under the tongue every 5 (five) minutes as needed for Chest pain.    ondansetron (ZOFRAN) 4 MG tablet Take 1 tablet (4 mg total) by mouth every 6 (six) hours as needed for Nausea. (Patient not taking: Reported on 2/10/2022)    varicella-zoster [id number], PF, (SHINGRIX) 50 mcg/0.5 mL injection Inject into the muscle. (Patient not taking: Reported on 2/10/2022)     Family History       Problem Relation (Age of Onset)    Heart disease Mother    Stroke Father          Tobacco Use    Smoking status: Former Smoker     Packs/day: 0.00     Types: Cigarettes     Quit date: 2016     Years since quittin.0    Smokeless tobacco: Never Used    Tobacco comment: 2016   Substance and Sexual Activity    Alcohol use: No    Drug use: No    Sexual activity: Not Currently     Review of Systems   Constitutional: Negative  for chills, diaphoresis, fever and malaise/fatigue.   HENT:  Negative for nosebleeds.    Eyes:  Negative for blurred vision and double vision.   Cardiovascular:  Negative for chest pain, claudication, cyanosis, dyspnea on exertion, leg swelling, orthopnea, palpitations, paroxysmal nocturnal dyspnea and syncope.   Respiratory:  Negative for cough, shortness of breath and wheezing.    Skin:  Negative for dry skin and poor wound healing.   Musculoskeletal:  Positive for joint pain (R hip). Negative for back pain, joint swelling and myalgias.   Gastrointestinal:  Negative for abdominal pain, nausea and vomiting.   Genitourinary:  Negative for hematuria.   Neurological:  Negative for dizziness, headaches, numbness, seizures and weakness.   Psychiatric/Behavioral:  Negative for altered mental status and depression.    Objective:     Vital Signs (Most Recent):  Temp: 97.8 °F (36.6 °C) (08/14/22 0733)  Pulse: (!) 59 (08/14/22 0733)  Resp: 14 (08/14/22 0733)  BP: (!) 169/74 (08/14/22 0733)  SpO2: (!) 94 % (08/14/22 0733)   Vital Signs (24h Range):  Temp:  [97.8 °F (36.6 °C)-98.8 °F (37.1 °C)] 97.8 °F (36.6 °C)  Pulse:  [56-65] 59  Resp:  [14-22] 14  SpO2:  [92 %-96 %] 94 %  BP: (116-169)/(56-74) 169/74     Weight: 85.7 kg (189 lb)  Body mass index is 28.74 kg/m².    SpO2: (!) 94 %  O2 Device (Oxygen Therapy): room air      Intake/Output Summary (Last 24 hours) at 8/14/2022 0841  Last data filed at 8/14/2022 0215  Gross per 24 hour   Intake 240 ml   Output 400 ml   Net -160 ml       Lines/Drains/Airways       Peripheral Intravenous Line  Duration                  Peripheral IV - Single Lumen 01/11/22 2346 20 G Left Hand 214 days         Peripheral IV - Single Lumen 08/13/22 20 G Anterior;Left Wrist 1 day                    Physical Exam  Constitutional:       General: He is not in acute distress.     Appearance: He is well-developed. He is not ill-appearing, toxic-appearing or diaphoretic.   HENT:      Head: Normocephalic and  atraumatic.   Eyes:      General: No scleral icterus.     Extraocular Movements: Extraocular movements intact.      Conjunctiva/sclera: Conjunctivae normal.      Pupils: Pupils are equal, round, and reactive to light.   Neck:      Thyroid: No thyromegaly.      Vascular: No JVD.      Trachea: No tracheal deviation.   Cardiovascular:      Rate and Rhythm: Normal rate and regular rhythm.      Heart sounds: S1 normal and S2 normal. No murmur heard.    No friction rub. No gallop.   Pulmonary:      Effort: Pulmonary effort is normal. No respiratory distress.      Breath sounds: Normal breath sounds. No stridor. No wheezing, rhonchi or rales.   Chest:      Chest wall: No tenderness.   Abdominal:      General: There is no distension.      Palpations: Abdomen is soft.   Musculoskeletal:         General: Tenderness (R hip, RLE ext rotated and shortened) present. No swelling.      Cervical back: Normal range of motion and neck supple. No rigidity.      Right lower leg: No edema.      Left lower leg: No edema.   Skin:     General: Skin is warm and dry.      Coloration: Skin is not jaundiced.      Findings: No rash.   Neurological:      General: No focal deficit present.      Mental Status: He is alert and oriented to person, place, and time.      Cranial Nerves: No cranial nerve deficit.   Psychiatric:         Mood and Affect: Mood normal.         Behavior: Behavior normal.       Current Medications:   amLODIPine  10 mg Oral Daily    aspirin  81 mg Oral Daily    atorvastatin  40 mg Oral Daily    EScitalopram oxalate  10 mg Oral QHS    heparin (porcine)  5,000 Units Subcutaneous Q8H    hydroCHLOROthiazide  12.5 mg Oral Daily    isosorbide mononitrate  60 mg Oral Daily    metoprolol succinate  50 mg Oral Daily       acetaminophen, albuterol, glucagon (human recombinant), glucose, glucose, hydrALAZINE, HYDROcodone-acetaminophen, insulin aspart U-100, melatonin, naloxone, nitroGLYCERIN, ondansetron, oxyCODONE-acetaminophen, sodium  chloride 0.9%    Laboratory (all labs reviewed):  CBC:  Recent Labs   Lab 07/30/21  0116 11/11/21  0609 01/11/22  2335 08/13/22  1128 08/14/22  0451   WBC 7.45 8.22 9.04 8.47 9.88   Hemoglobin 12.3 L 12.7 L 12.3 L 11.2 L 11.8 L   Hematocrit 35.6 L 37.7 L 37.2 L 33.5 L 35.1 L   Platelets 254 285 265 261 288       CHEMISTRIES:  Recent Labs   Lab 07/25/21  0503 07/26/21  0348 07/27/21  0550 07/30/21  0116 11/11/21  0609 01/11/22  2335 08/13/22  1118 08/14/22  0451   Glucose 114 H 126 H 97 119 H 179 H 138 H 138 H 149 H   Sodium 138 137 138 135 L 138 138 136 136   Potassium 4.1 3.4 L 3.6 3.2 L 3.7 3.4 L 4.4 3.5   BUN 13 12 11 15 14 18 13 11   Creatinine 1.0 0.9 0.9 1.0 1.0 1.2 1.3 0.9   eGFR if  >60 >60 >60 >60 >60 >60  --   --    eGFR if non African American >60 >60 >60 >60 >60 55 A  --   --    Calcium 8.8 9.2 8.6 L 9.5 9.6 9.0 8.7 9.0   Magnesium 1.8 1.9  --   --  2.0  --   --   --        CARDIAC BIOMARKERS:  Recent Labs   Lab 07/30/21  0116 07/30/21  0344 11/11/21  0609 11/11/21  0820 01/11/22  2335   Troponin I 0.206 H 0.196 H <0.006 0.011 0.017       COAGS:  Recent Labs   Lab 07/25/21  1138 11/11/21  0609 08/13/22  1118 08/13/22  1540   INR 1.0 1.0 1.0 1.0       LIPIDS/LFTS:  Recent Labs   Lab 07/27/21  0550 07/30/21  0116 11/11/21  0609 01/11/22  2335 08/13/22  1118   AST 17 29 16 15 22   ALT 11 22 12 13 9 L       BNP:  Recent Labs   Lab 11/22/19  0830 07/24/21  2257 07/30/21  0116 11/11/21  0609   BNP 29 146 H 110 H 104 H       TSH:        Free T4:        Diagnostic Results:  ECG (personally reviewed and interpreted tracing(s)):  8/13/22 1434 SR 57, LAHB, NSSTTW changes, similar to 7/19/22    Echo: 7/26/21  The estimated ejection fraction is 60%.  Normal systolic function.  Grade I left ventricular diastolic dysfunction.  Normal right ventricular size with normal right ventricular systolic function.  Moderate left atrial enlargement.  Mild right atrial enlargement.  Normal central venous pressure  (3 mmHg).  The estimated PA systolic pressure is 6 mmHg.  Aortic root dil, 4.3cm.    Cath/PCI dRCA: 7/26/21   The pre-procedure left ventricular end diastolic pressure was 6.  The estimated blood loss was <50 mL.  The Dist RCA lesion was 70% stenosed with 0% stenosis post-intervention.  A STENT RESOLUTE TABATHA [id number] stent was successfully placed at 12 SANDOR for 6 sec.  1. Culprit lesion for the non STEMI was subtotal stenosis in distal RCA extending into RPDA.  Balloon angioplasty followed by [place] from distal RCA into PDA was performed.  Plaque shift into PL occurred requiring balloon angioplasty of ostial and proximal PLB followed by kissing balloon angioplasty of PLB and PDA with excellent angiographic results.  JANINE 3 flow post PCI.  Coronary angiogram:  Left main:  No significant stenosis  Lad:  Proximal and middle 30-40% stenosis.  Diagonal 1 lesion 80% stenosis.  Unchanged from prior angiogram.  Circumflex:  Nonobstructive mild CAD  RCA:  Proximal 20-30% stenosis.  Distal severe stenosis as listed above.  Access:  We accessed the right radial artery using ultrasound guidance. The vessel appeared widely patent, suitable for endovascular access. The images were interpreted by me and saved.  Access was closed with radial band.   Assessment and plan  Aspirin 81 mg daily indefinitely  Plavix 75 mg daily uninterrupted for at least 1 year and longer if no contraindications.  Statins  Follow-up Cardiology Clinic.    LE art US 7/10/19  Right lower extremity arterial doppler with no flow restriction  Left lower extremity arterial doppler with no flow restriction    Carotid US 5/28/19  There is 20-39% right Internal Carotid Stenosis.  There is 20-39% left Internal Carotid Stenosis.

## 2022-08-14 NOTE — ASSESSMENT & PLAN NOTE
Known CAD s/p dRCA PCI 7/2021.  No anginal sxs  Cont ASA/statin  OK to hold Plavix  No prohibitive cardiac contraindication to ORIF/anesthesia.

## 2022-08-14 NOTE — PLAN OF CARE
Problem: Adult Inpatient Plan of Care  Goal: Plan of Care Review  Outcome: Ongoing, Progressing  Goal: Patient-Specific Goal (Individualized)  Outcome: Ongoing, Progressing  Goal: Absence of Hospital-Acquired Illness or Injury  Outcome: Ongoing, Progressing  Goal: Optimal Comfort and Wellbeing  Outcome: Ongoing, Progressing  Goal: Readiness for Transition of Care  Outcome: Ongoing, Progressing     Problem: Skin Injury Risk Increased  Goal: Skin Health and Integrity  Outcome: Ongoing, Progressing     Problem: Diabetes Comorbidity  Goal: Blood Glucose Level Within Targeted Range  Outcome: Ongoing, Progressing     Problem: Impaired Wound Healing  Goal: Optimal Wound Healing  Outcome: Ongoing, Progressing     Problem: Infection  Goal: Absence of Infection Signs and Symptoms  Outcome: Ongoing, Progressing

## 2022-08-14 NOTE — NURSING
[facility]  Nurses Note -- 4 Eyes      8/13/2022       Skin assessed on: 8/13/22 @ 2140      [x] No Pressure Injuries Present    [x]Prevention Measures Documented    [] Yes LDA  for Pressure Injury Previously documented     [] Yes New Pressure Injury Discovered   [] LDA for New Pressure Injury Added      Attending RN:  Wyatt Rangel LPN     Second RN:  Em Avitia RN charge nurse

## 2022-08-14 NOTE — ASSESSMENT & PLAN NOTE
Nonsyncopal fall with subsequent R hip fx, planned for ORIF today.  The pt reports reasonable premorbid exercise capacity.  RCRI 1 pt, class II, 6% 30d MACE risk.  Known unrevascularized CAD.  While pt is not low risk, his cardiac risk is not prohibitive.  OK to proceed to OR.  Dr. Villanueva aware of ongoing DAPT rx, plavix now on hold.  Risks of waiting for ORIF seem to outweigh bleeding risks.

## 2022-08-14 NOTE — HOSPITAL COURSE
86M with pmh of cad with stent place 07/2021, dm, hld, htn who presents for several hour history of right hip pain after pt accidentally stepped on a cat while washing his car causing him to fall onto the hip. Pt states the pain is currently controlled, but he was not able to walk after incident. Pt denies LOC, head injury, sob, or cp during the event. Pt has a h/o cad with a stent placed about 1 year pta and is currently on asa and plavix. Pt denies blood thinner use. Pt is a former smoker and denies alcohol or drug use. Pt denies h/o any known surgeries. In the ED pt had an xray of the hip that was suspicious for hip fracture which was confirmed on CT imaging of the hip. Ortho was consulted in the ed and plan for surgery pending cardiac evaluation. On 8/14 pt evaluated for surgery by cardiology with plan for proceeding. Pts plavix held pre-op and had repair on 8/14. Pt looking good post-op day 1. Per Ortho continue lovenox at prophylaxis dose for 14 days and restart plavix post-op day 3. Pt currently awaiting pt/ot evaluation for rehab needs.

## 2022-08-14 NOTE — CONSULTS
[facility]  Cardiology  Consult Note    Patient Name: Juan Martínez Jr.  MRN: [id number]  Admission Date: 8/13/2022  Hospital Length of Stay: 1 days  Code Status: DNR   Attending Provider: Christian Proctor III, MD   Consulting Provider: Kevin Ceja MD  Primary Care Physician: Lissy Gavin MD  Principal Problem:Hip fracture    Patient information was obtained from patient and ER records.     Inpatient consult to Cardiology  Consult performed by: Kevin Ceja MD  Consult ordered by: Miguel Fang Jr., MD  Reason for consult: preop CV eval        Subjective:     Chief Complaint:  Fall/R hip fx     HPI:   86M with pmh of cad with stent place 07/2021, dm, hld, htn who presents for several hour history of right hip pain after pt accidentally stepped on a cat while washing his car causing him to fall onto the hip. Pt states the pain is currently controlled, but he was not able to walk after incident. Pt denies LOC, head injury, sob, or cp during the event. Pt has a h/o cad with a stent placed about 1 year pta and is currently on asa and plavix. Pt denies blood thinner use. Pt is a former smoker and denies alcohol or drug use. Pt denies h/o any known surgeries. In the ED pt had an xray of the hip that was suspicious for hip fracture which was confirmed on CT imaging of the hip. Ortho was consulted in the ed and plan for surgery pending cardiac evaluation.     Pt follows with Dr. Nieto, last seen 7/2022.    Cardiology consulted for preop CV eval.    The patient is a very pleasant 86-year-old man who presents after a nonsyncopal fall and subsequent right hip fracture.  Prior to the fall, he reports a reasonable exercise capacity and states he is able to walk without any limitations.  He does not often climbs stairs.  He graduated from cardiac rehab after his non STEMI with PCI back in July of 2021.  He has been maintained on dual antiplatelet therapy since that time.  He otherwise denies any  shortness of breath, angina, palpitations, or syncope.  He has had no PND, orthopnea, or other cardiac symptoms.  His revised cardiac risk index is 1, class II giving him a 6% risk of 30 day MACE.      Past Medical History:   Diagnosis Date    Coronary artery disease     Diabetes mellitus     High cholesterol     Hypertension     NSTEMI (non-ST elevated myocardial infarction) 07/25/2021    Prostate cancer        Past Surgical History:   Procedure Laterality Date    APPLICATION OF FULL-THICKNESS SKIN GRAFT (FTSG) TO UPPER EXTREMITY Right 11/15/2018    Procedure: APPLICATION, GRAFT, SKIN, FULL-THICKNESS, TO UPPER EXTREMITY VS STSG WITH FROZEN SECTIONS;  Surgeon: Alan Arzola MD;  Location: [facility] OR;  Service: Plastics;  Laterality: Right;    EXCISION OF SQUAMOUS CELL CARCINOMA Right 11/15/2018    Procedure: EXCISION, CARCINOMA, SQUAMOUS CELL @ RIGHT HAND;  Surgeon: Alan Arzola MD;  Location: [facility] OR;  Service: Plastics;  Laterality: Right;  RN PREOP 11/13/2018--NEED H/P    JOINT REPLACEMENT      LEFT HEART CATHETERIZATION Left 5/6/2019    Procedure: Left heart cath;  Surgeon: Rashad Nieto MD;  Location: [facility] CATH LAB;  Service: Cardiology;  Laterality: Left;    LEFT HEART CATHETERIZATION Left 7/26/2021    Procedure: Left heart cath, radial;  Surgeon: Rashad Nieto MD;  Location: [facility] CATH LAB;  Service: Cardiology;  Laterality: Left;    TONSILLECTOMY      TOTAL KNEE ARTHROPLASTY         Review of patient's allergies indicates:  No Known Allergies    No current facility-administered medications on file prior to encounter.     Current Outpatient Medications on File Prior to Encounter   Medication Sig    acetaminophen (TYLENOL) 325 MG tablet Take 2 tablets (650 mg total) by mouth every 6 (six) hours as needed for Pain. (Patient not taking: Reported on 2/10/2022)    albuterol (PROVENTIL/VENTOLIN HFA) 90 mcg/actuation inhaler Inhale 2 puffs into the lungs.    AMITIZA 24 mcg Cap 24 mcg 2 (two)  times daily with meals.     amLODIPine (NORVASC) 10 MG tablet Take 1 tablet (10 mg total) by mouth once daily.    aspirin (ECOTRIN) 81 MG EC tablet Take 81 mg by mouth once daily.    atorvastatin (LIPITOR) 40 MG tablet Take 1 tablet (40 mg total) by mouth once daily.    blood-glucose meter Misc by Misc.(Non-Drug; Combo Route) route    clopidogreL (PLAVIX) 75 mg tablet Take 1 tablet (75 mg total) by mouth once daily.    dicyclomine (BENTYL) 10 MG capsule Take 10 mg by mouth.    escitalopram oxalate (LEXAPRO) 10 MG tablet Take 10 mg by mouth.    hydroCHLOROthiazide (MICROZIDE) 12.5 mg capsule     isosorbide mononitrate (IMDUR) 60 MG 24 hr tablet Take 1 tablet (60 mg total) by mouth once daily.    LANCETS & BLOOD GLUCOSE STRIPS MISC by Misc.(Non-Drug; Combo Route) route Check blood sugars  Twice a day.    LIDOcaine (LIDODERM) 5 % Place 1 patch onto the skin daily as needed (neck pain.). Remove & Discard patch within 12 hours or as directed by MD. Apply to posterior neck in case of pain. (Patient not taking: Reported on 2/10/2022)    metformin (GLUCOPHAGE) 1000 MG tablet Take 1 tablet (1,000 mg total) by mouth 2 (two) times daily with meals.    metoprolol succinate (TOPROL-XL) 50 MG 24 hr tablet Take 1 tablet (50 mg total) by mouth once daily.    nitroGLYCERIN (NITROSTAT) 0.4 MG SL tablet Place 1 tablet (0.4 mg total) under the tongue every 5 (five) minutes as needed for Chest pain.    ondansetron (ZOFRAN) 4 MG tablet Take 1 tablet (4 mg total) by mouth every 6 (six) hours as needed for Nausea. (Patient not taking: Reported on 2/10/2022)    varicella-zoster gE-AS01B, PF, (SHINGRIX) 50 mcg/0.5 mL injection Inject into the muscle. (Patient not taking: Reported on 2/10/2022)     Family History       Problem Relation (Age of Onset)    Heart disease Mother    Stroke Father          Tobacco Use    Smoking status: Former Smoker     Packs/day: 0.00     Types: Cigarettes     Quit date: 8/12/2016     Years since  quittin.0    Smokeless tobacco: Never Used    Tobacco comment: 2016   Substance and Sexual Activity    Alcohol use: No    Drug use: No    Sexual activity: Not Currently     Review of Systems   Constitutional: Negative for chills, diaphoresis, fever and malaise/fatigue.   HENT:  Negative for nosebleeds.    Eyes:  Negative for blurred vision and double vision.   Cardiovascular:  Negative for chest pain, claudication, cyanosis, dyspnea on exertion, leg swelling, orthopnea, palpitations, paroxysmal nocturnal dyspnea and syncope.   Respiratory:  Negative for cough, shortness of breath and wheezing.    Skin:  Negative for dry skin and poor wound healing.   Musculoskeletal:  Positive for joint pain (R hip). Negative for back pain, joint swelling and myalgias.   Gastrointestinal:  Negative for abdominal pain, nausea and vomiting.   Genitourinary:  Negative for hematuria.   Neurological:  Negative for dizziness, headaches, numbness, seizures and weakness.   Psychiatric/Behavioral:  Negative for altered mental status and depression.    Objective:     Vital Signs (Most Recent):  Temp: 97.8 °F (36.6 °C) (22)  Pulse: (!) 59 (22)  Resp: 14 (22)  BP: (!) 169/74 (22)  SpO2: (!) 94 % (22)   Vital Signs (24h Range):  Temp:  [97.8 °F (36.6 °C)-98.8 °F (37.1 °C)] 97.8 °F (36.6 °C)  Pulse:  [56-65] 59  Resp:  [14-] 14  SpO2:  [92 %-96 %] 94 %  BP: (116-169)/(56-74) 169/74     Weight: 85.7 kg (189 lb)  Body mass index is 28.74 kg/m².    SpO2: (!) 94 %  O2 Device (Oxygen Therapy): room air      Intake/Output Summary (Last 24 hours) at 2022 0841  Last data filed at 2022 0215  Gross per 24 hour   Intake 240 ml   Output 400 ml   Net -160 ml       Lines/Drains/Airways       Peripheral Intravenous Line  Duration                  Peripheral IV - Single Lumen 22 2346 20 G Left Hand 214 days         Peripheral IV - Single Lumen 22 20 G Anterior;Left Wrist  1 day                    Physical Exam  Constitutional:       General: He is not in acute distress.     Appearance: He is well-developed. He is not ill-appearing, toxic-appearing or diaphoretic.   HENT:      Head: Normocephalic and atraumatic.   Eyes:      General: No scleral icterus.     Extraocular Movements: Extraocular movements intact.      Conjunctiva/sclera: Conjunctivae normal.      Pupils: Pupils are equal, round, and reactive to light.   Neck:      Thyroid: No thyromegaly.      Vascular: No JVD.      Trachea: No tracheal deviation.   Cardiovascular:      Rate and Rhythm: Normal rate and regular rhythm.      Heart sounds: S1 normal and S2 normal. No murmur heard.    No friction rub. No gallop.   Pulmonary:      Effort: Pulmonary effort is normal. No respiratory distress.      Breath sounds: Normal breath sounds. No stridor. No wheezing, rhonchi or rales.   Chest:      Chest wall: No tenderness.   Abdominal:      General: There is no distension.      Palpations: Abdomen is soft.   Musculoskeletal:         General: Tenderness (R hip, RLE ext rotated and shortened) present. No swelling.      Cervical back: Normal range of motion and neck supple. No rigidity.      Right lower leg: No edema.      Left lower leg: No edema.   Skin:     General: Skin is warm and dry.      Coloration: Skin is not jaundiced.      Findings: No rash.   Neurological:      General: No focal deficit present.      Mental Status: He is alert and oriented to person, place, and time.      Cranial Nerves: No cranial nerve deficit.   Psychiatric:         Mood and Affect: Mood normal.         Behavior: Behavior normal.       Current Medications:   amLODIPine  10 mg Oral Daily    aspirin  81 mg Oral Daily    atorvastatin  40 mg Oral Daily    EScitalopram oxalate  10 mg Oral QHS    heparin (porcine)  5,000 Units Subcutaneous Q8H    hydroCHLOROthiazide  12.5 mg Oral Daily    isosorbide mononitrate  60 mg Oral Daily    metoprolol succinate   50 mg Oral Daily       acetaminophen, albuterol, glucagon (human recombinant), glucose, glucose, hydrALAZINE, HYDROcodone-acetaminophen, insulin aspart U-100, melatonin, naloxone, nitroGLYCERIN, ondansetron, oxyCODONE-acetaminophen, sodium chloride 0.9%    Laboratory (all labs reviewed):  CBC:  Recent Labs   Lab 07/30/21  0116 11/11/21  0609 01/11/22  2335 08/13/22  1128 08/14/22  0451   WBC 7.45 8.22 9.04 8.47 9.88   Hemoglobin 12.3 L 12.7 L 12.3 L 11.2 L 11.8 L   Hematocrit 35.6 L 37.7 L 37.2 L 33.5 L 35.1 L   Platelets 254 285 265 261 288       CHEMISTRIES:  Recent Labs   Lab 07/25/21  0503 07/26/21  0348 07/27/21  0550 07/30/21  0116 11/11/21  0609 01/11/22  2335 08/13/22  1118 08/14/22  0451   Glucose 114 H 126 H 97 119 H 179 H 138 H 138 H 149 H   Sodium 138 137 138 135 L 138 138 136 136   Potassium 4.1 3.4 L 3.6 3.2 L 3.7 3.4 L 4.4 3.5   BUN 13 12 11 15 14 18 13 11   Creatinine 1.0 0.9 0.9 1.0 1.0 1.2 1.3 0.9   eGFR if  >60 >60 >60 >60 >60 >60  --   --    eGFR if non African American >60 >60 >60 >60 >60 55 A  --   --    Calcium 8.8 9.2 8.6 L 9.5 9.6 9.0 8.7 9.0   Magnesium 1.8 1.9  --   --  2.0  --   --   --        CARDIAC BIOMARKERS:  Recent Labs   Lab 07/30/21  0116 07/30/21  0344 11/11/21  0609 11/11/21  0820 01/11/22  2335   Troponin I 0.206 H 0.196 H <0.006 0.011 0.017       COAGS:  Recent Labs   Lab 07/25/21  1138 11/11/21  0609 08/13/22  1118 08/13/22  1540   INR 1.0 1.0 1.0 1.0       LIPIDS/LFTS:  Recent Labs   Lab 07/27/21  0550 07/30/21  0116 11/11/21  0609 01/11/22  2335 08/13/22  1118   AST 17 29 16 15 22   ALT 11 22 12 13 9 L       BNP:  Recent Labs   Lab 11/22/19  0830 07/24/21  2257 07/30/21  0116 11/11/21  0609   BNP 29 146 H 110 H 104 H       TSH:        Free T4:        Diagnostic Results:  ECG (personally reviewed and interpreted tracing(s)):  8/13/22 1434 SR 57, LAHB, NSSTTW changes, similar to 7/19/22    Echo: 7/26/21  · The estimated ejection fraction is 60%.  · Normal  systolic function.  · Grade I left ventricular diastolic dysfunction.  · Normal right ventricular size with normal right ventricular systolic function.  · Moderate left atrial enlargement.  · Mild right atrial enlargement.  · Normal central venous pressure (3 mmHg).  · The estimated PA systolic pressure is 6 mmHg.  · Aortic root dil, 4.3cm.    Cath/PCI dRCA: 7/26/21   · The pre-procedure left ventricular end diastolic pressure was 6.  · The estimated blood loss was <50 mL.  · The Dist RCA lesion was 70% stenosed with 0% stenosis post-intervention.  · A STENT RESOLUTE TABATHA [id number] stent was successfully placed at 12 SANDOR for 6 sec.  1. Culprit lesion for the non STEMI was subtotal stenosis in distal RCA extending into RPDA.  Balloon angioplasty followed by [place] from distal RCA into PDA was performed.  Plaque shift into PL occurred requiring balloon angioplasty of ostial and proximal PLB followed by kissing balloon angioplasty of PLB and PDA with excellent angiographic results.  JANINE 3 flow post PCI.  Coronary angiogram:  Left main:  No significant stenosis  Lad:  Proximal and middle 30-40% stenosis.  Diagonal 1 lesion 80% stenosis.  Unchanged from prior angiogram.  Circumflex:  Nonobstructive mild CAD  RCA:  Proximal 20-30% stenosis.  Distal severe stenosis as listed above.  Access:  We accessed the right radial artery using ultrasound guidance. The vessel appeared widely patent, suitable for endovascular access. The images were interpreted by me and saved.  Access was closed with radial band.   Assessment and plan  Aspirin 81 mg daily indefinitely  Plavix 75 mg daily uninterrupted for at least 1 year and longer if no contraindications.  Statins  Follow-up Cardiology Clinic.    LE art US 7/10/19  · Right lower extremity arterial doppler with no flow restriction  · Left lower extremity arterial doppler with no flow restriction    Carotid US 5/28/19  · There is 20-39% right Internal Carotid Stenosis.  · There is 20-39%  left Internal Carotid Stenosis.      Assessment and Plan:     * Hip fracture  Nonsyncopal fall with subsequent R hip fx, planned for ORIF today.  The pt reports reasonable premorbid exercise capacity.  RCRI 1 pt, class II, 6% 30d MACE risk.  Known unrevascularized CAD.  While pt is not low risk, his cardiac risk is not prohibitive.  OK to proceed to OR.  Dr. Villanueva aware of ongoing DAPT rx, plavix now on hold.  Risks of waiting for ORIF seem to outweigh bleeding risks.    Coronary artery disease involving native coronary artery of native heart without angina pectoris  Known CAD s/p dRCA PCI 7/2021.  No anginal sxs  Cont ASA/statin  OK to hold Plavix  No prohibitive cardiac contraindication to ORIF/anesthesia.    Essential hypertension, benign  Cont med rx    Type 2 diabetes mellitus  Per IM    Hyperlipidemia  Cont statin    Aortic root dilatation  4.3cm by echo 7/2021        VTE Risk Mitigation (From admission, onward)         Ordered     heparin (porcine) injection 5,000 Units  Every 8 hours         08/13/22 1518     IP VTE HIGH RISK PATIENT  Once         08/13/22 1450     Place sequential compression device  Until discontinued         08/13/22 1450                Thank you for your consult. I will follow-up with patient. Please contact us if you have any additional questions.    Kevin Ceja MD  Cardiology   [facility]

## 2022-08-14 NOTE — ASSESSMENT & PLAN NOTE
Pt with Minimally displaced and slightly impacted subcapital right femoral neck fracture on CT imaging 8/13  RCRI of 1 due to h/o ischemic heart disease.   Pt states he is able to walk several blocks without sob.  CXR and ekg pending  Pain medications ordered prn    -Ortho surgery consulted in ed plan for surgery 8/14 pending Cardiac clearance

## 2022-08-14 NOTE — OP NOTE
08/14/2022    PREOPERATIVE DIAGNOSIS:  Right femoral neck fracture.    POSTOPERATIVE DIAGNOSIS:  Right femoral neck fracture.    PROCEDURE:  Right hip endoprosthesis for femoral neck fracture. (CPT# 74634)    SURGEON:  Edis Villanueva M.D.    ASSISTANT:   OPAL Torres.    ANESTHESIA:  General.    ESTIMATED BLOOD LOSS:  125 mL  IV fluid:  800 cc crystalloid    INDICATIONS:  The patient is a 86 y.o.   male who fell sustaining a displaced right femoral neck fracture.  Treatment options were discussed and it was recommended to place an endoprosthesis.  Risks and complications were discussed including, but not limited to the risks of anesthetic complications, infections, wound healing complications, aseptic loosening, instability, DVT, pulmonary embolism and death among others.     COMPONENTS USED:  The [place] secure fit system with 11 Secure fit stem and a 53+0 mm head.    DESCRIPTION OF PROCEDURE:  The patient was taken to the Operating Room where anesthesia was administered by the Anesthesia Department. He was then placed in the lateral decubitus position.The right hip and lower extremity were then sterilely prepped and draped in the normal fashion.    A 15 cm curvilinear incision was made from the base of the greater trochanter extending proximally and posteriorly.  Subcutaneous tissue was dissected with electrocautery down to the deep fascia, which was incised along the line of the incision.  The gluteus ac was then split along the line of its fibers.  The abductor musculature was retracted anteriorly. The piriformis and conjoined tendon were tagged for later repair with a #5 ethibond and reflected from its insertion on the greater trochanter.  A capsulotomy was made along the base of the femoral neck and extended towards the acetabulum in a trapezoidal fashion. The capsule was tagged superiorly and inferiorly as well with #5 ethibond. The femoral head was extracted and measured.    We then turned our  attention towards proximal femoral preparation.  A box chisel was used to gain access to intramedullary canal followed by straight reamer, lateralizing reamer and then further enlarging straight reamers. Sizes 1-11 broaches were placed.  The size 11 had excellent fit. A calcar planer was then used to plane the calcar.  A 53   mm head was applied and the hip was reduced.  Excellent stability was noted throughout the range of motion and leg lengths were satisfactory.    Trial components were then removed. The proximal femur was then irrigated in a pulse lavage fashion. The size 11 stem was then impacted. The head was then applied and the hip was again reduced.  Again, excellent stability and satisfactory leg lengths were noted.    Wounds once again thoroughly irrigated. The posterior external rotators and capsule were repaired through a bone bridge using a 2.5 drill bit to create he bridge. The gluteal fascia and fascia diego were then closed with interrupted figure-of-eight sutures of #1 Vicryl and oversewn with strata fix.  The deep subcutaneous tissue was closed with figure-of-eight interrupted sutures of  Vicryl.  Superficial subcutaneous tissue was closed with interrupted inverted sutures of #2-0 Vicryl.  Skin was approximated using skin staples.  Sterile dressing was applied.  Anesthesia was reversed and she was returned to the Postanesthesia Care Unit in stable condition.      Postop check afebrile stable vital signs pain control palpable pulse appropriate leg lengths    Edis Villanueva MD  [facility]

## 2022-08-14 NOTE — SUBJECTIVE & OBJECTIVE
Interval History: Pt states he is feeling good today and prepared for surgery this morning. Pt denies cp, sob, fever, chills, n/v at this time.    Review of Systems  Objective:     Vital Signs (Most Recent):  Temp: 97.5 °F (36.4 °C) (08/14/22 1211)  Pulse: (!) 55 (08/14/22 1240)  Resp: 14 (08/14/22 1240)  BP: 134/61 (08/14/22 1230)  SpO2: (!) 92 % (08/14/22 1240)   Vital Signs (24h Range):  Temp:  [97.5 °F (36.4 °C)-98.8 °F (37.1 °C)] 97.5 °F (36.4 °C)  Pulse:  [55-65] 55  Resp:  [13-26] 14  SpO2:  [87 %-99 %] 92 %  BP: (116-169)/(56-74) 134/61     Weight: 85.7 kg (189 lb)  Body mass index is 28.74 kg/m².    Intake/Output Summary (Last 24 hours) at 8/14/2022 1253  Last data filed at 8/14/2022 1208  Gross per 24 hour   Intake 1290 ml   Output 525 ml   Net 765 ml      Physical Exam  Constitutional:       General: He is not in acute distress.     Appearance: He is not ill-appearing.   HENT:      Head: Normocephalic and atraumatic.      Nose: No rhinorrhea.   Eyes:      Extraocular Movements: Extraocular movements intact.   Cardiovascular:      Rate and Rhythm: Normal rate and regular rhythm.      Heart sounds: No murmur heard.  Pulmonary:      Effort: Pulmonary effort is normal. No respiratory distress.      Breath sounds: Normal breath sounds.   Musculoskeletal:      Cervical back: Neck supple. No rigidity.   Skin:     General: Skin is warm and dry.   Neurological:      General: No focal deficit present.      Mental Status: He is alert and oriented to person, place, and time.   Psychiatric:         Mood and Affect: Mood normal.         Behavior: Behavior normal.       Significant Labs: All pertinent labs within the past 24 hours have been reviewed.    Significant Imaging: I have reviewed all pertinent imaging results/findings within the past 24 hours.

## 2022-08-14 NOTE — ANESTHESIA POSTPROCEDURE EVALUATION
Anesthesia Post Evaluation    Patient: Juan Martínez JrRoni    Procedure(s) Performed: Procedure(s) (LRB):  HEMIARTHROPLASTY, HIP (Right)    Final Anesthesia Type: general      Patient location during evaluation: PACU  Patient participation: Yes- Able to Participate  Level of consciousness: awake and alert  Post-procedure vital signs: reviewed and stable  Pain management: adequate  Airway patency: patent    PONV status at discharge: No PONV  Anesthetic complications: no      Cardiovascular status: hemodynamically stable  Respiratory status: unassisted and spontaneous ventilation  Hydration status: euvolemic  Follow-up not needed.          Vitals Value Taken Time   /58 08/14/22 1246   Temp 36.4 °C (97.5 °F) 08/14/22 1211   Pulse 53 08/14/22 1300   Resp 17 08/14/22 1300   SpO2 93 % 08/14/22 1300         Event Time   Out of Recovery 08/14/2022 13:01:30         Pain/Pablo Score: Pain Rating Prior to Med Admin: 0 (8/14/2022 12:52 PM)  Pablo Score: 9 (8/14/2022 12:40 PM)

## 2022-08-14 NOTE — PLAN OF CARE
[facility] - Med Surg  Initial Discharge Assessment       Primary Care Provider: Lissy Gavin MD    Admission Diagnosis: Hip injury [S79.919A]  Preoperative clearance [Z01.818]  Closed fracture of neck of right femur, initial encounter [S72.001A]  Hip injury, initial encounter [S79.919A]    Admission Date: 8/13/2022  Expected Discharge Date:     Discharge Barriers Identified: None    Payor: [employer] / Plan: [employer] 65 / Product Type: Medicare Advantage /     Extended Emergency Contact Information  Primary Emergency Contact: Jennifer Wills  Address: [address] [place] [address] [facility]  Home Phone: [phone]  Mobile Phone: [phone]  Relation: Daughter    Discharge Plan A: Home  Discharge Plan B:  (tbd)      [facility] - [facility] [address]  [address]  [facility] [address]  Phone: [phone] Fax: [phone]    [facility][id number] - [address] AT [address]  [facility] [address]  Phone: [phone] Fax: [phone]    Situation: from home alone; independent prior to admission; sister as bedside stated that she will be help at home    Equipment:  Has RW currently    Services: none; but would like HH with [facility] upon discharge    Needs: temporary assistance once discharged; need referrals to in home care    Dispo: pending clinical course      Initial Assessment (most recent)     Adult Discharge Assessment - 08/14/22 1434        Discharge Assessment    Assessment Type Discharge Planning Assessment     Source of Information patient;family     Communicated KRISTIN with patient/caregiver Date not available/Unable to determine     Reason For Admission Hip fracture     Lives With alone     Do you expect to return to your current living situation? Yes     Do you have help at home or someone to help you manage your care at home? No     Prior to  hospitilization cognitive status: Alert/Oriented     Current cognitive status: Alert/Oriented     Walking or Climbing Stairs Difficulty ambulation difficulty, requires equipment     Mobility Management hip fracture; uses rw     Equipment Currently Used at Home walker, rolling     Patient currently being followed by outpatient case management? No     Do you currently have service(s) that help you manage your care at home? No     Do you take prescription medications? Yes     Do you have prescription coverage? Yes     Coverage phn     Do you have any problems affording any of your prescribed medications? TBD     Is the patient taking medications as prescribed? yes     Who is going to help you get home at discharge? sister     How do you get to doctors appointments? family or friend will provide     Are you on dialysis? No     Do you take coumadin? No     Discharge Plan A Home     Discharge Plan B --   tbd    DME Needed Upon Discharge  --   tbd    Discharge Plan discussed with: Sibling     Discharge Barriers Identified None

## 2022-08-14 NOTE — HPI
86M with pmh of cad with stent place 07/2021, dm, hld, htn who presents for several hour history of right hip pain after pt accidentally stepped on a cat while washing his car causing him to fall onto the hip. Pt states the pain is currently controlled, but he was not able to walk after incident. Pt denies LOC, head injury, sob, or cp during the event. Pt has a h/o cad with a stent placed about 1 year pta and is currently on asa and plavix. Pt denies blood thinner use. Pt is a former smoker and denies alcohol or drug use. Pt denies h/o any known surgeries. In the ED pt had an xray of the hip that was suspicious for hip fracture which was confirmed on CT imaging of the hip. Ortho was consulted in the ed and plan for surgery pending cardiac evaluation.     Pt follows with Dr. Nieto, last seen 7/2022.    Cardiology consulted for preop CV eval.    The patient is a very pleasant 86-year-old man who presents after a nonsyncopal fall and subsequent right hip fracture.  Prior to the fall, he reports a reasonable exercise capacity and states he is able to walk without any limitations.  He does not often climbs stairs.  He graduated from cardiac rehab after his non STEMI with PCI back in July of 2021.  He has been maintained on dual antiplatelet therapy since that time.  He otherwise denies any shortness of breath, angina, palpitations, or syncope.  He has had no PND, orthopnea, or other cardiac symptoms.  His revised cardiac risk index is 1, class II giving him a 6% risk of 30 day MACE.

## 2022-08-14 NOTE — TRANSFER OF CARE
"Anesthesia Transfer of Care Note    Patient: Juan Martínez Jr.    Procedure(s) Performed: Procedure(s) (LRB):  HEMIARTHROPLASTY, HIP (Right)    Patient location: PACU    Anesthesia Type: general    Transport from OR: Transported from OR on room air with adequate spontaneous ventilation    Post pain: adequate analgesia    Post assessment: no apparent anesthetic complications and tolerated procedure well    Post vital signs: stable    Level of consciousness: sedated and responds to stimulation    Nausea/Vomiting: no nausea/vomiting    Complications: none    Transfer of care protocol was followed      Last vitals:   Visit Vitals  BP (!) 148/65 (BP Location: Left arm)   Pulse 60   Temp 36.4 °C (97.5 °F) (Temporal)   Resp 17   Ht 5' 8" (1.727 m)   Wt 85.7 kg (189 lb)   SpO2 98%   BMI 28.74 kg/m²     "

## 2022-08-14 NOTE — ANESTHESIA PROCEDURE NOTES
Intubation    Date/Time: 8/14/2022 10:47 AM  Performed by: Christian Hart CRNA  Authorized by: Colten Go MD     Intubation:     Induction:  Intravenous    Intubated:  Postinduction    Mask Ventilation:  Easy with oral airway    Attempts:  1    Attempted By:  CRNA    Method of Intubation:  Video laryngoscopy    Blade:  Thompson 3    Laryngeal View Grade: Grade I - full view of cords      Difficult Airway Encountered?: No      Complications:  None    Airway Device:  Oral endotracheal tube    Airway Device Size:  7.0    Style/Cuff Inflation:  Cuffed (inflated to minimal occlusive pressure)    Tube secured:  22    Secured at:  The lips    Placement Verified By:  Capnometry    Complicating Factors:  None    Findings Post-Intubation:  BS equal bilateral and atraumatic/condition of teeth unchanged

## 2022-08-14 NOTE — NURSING
Pt aao x4. Able to make needs known. Instructed pt on importance of  Repositioning, verbalized understanding. Turned q2 hours. Pt voids per urinal. Prn pain medication adm per provider order. Denies sob/distress at this time. Hourly rounds made. Will continue to monitor.   bsg monitored  Bed alarm activated  Changed bandage to pts right elbow  Pt remained NPO since midnight

## 2022-08-14 NOTE — PROGRESS NOTES
[facility] Medicine  Progress Note    Patient Name: Juan Martínez Jr.  MRN: [id number]  Patient Class: IP- Inpatient   Admission Date: 8/13/2022  Length of Stay: 1 days  Attending Physician: Christian Proctor III, MD  Primary Care Provider: Lissy Gavin MD        Subjective:     Principal Problem:Hip fracture        HPI:  86M with pmh of cad with stent place 07/2021, dm, hld, htn who presents for several hour history of right hip pain after pt accidentally stepped on a cat while washing his car causing him to fall onto the hip. Pt states the pain is currently controlled, but he was not able to walk after incident. Pt denies LOC, head injury, sob, or cp during the event. Pt has a h/o cad with a stent placed about 1 year pta and is currently on asa and plavix. Pt denies blood thinner use. Pt is a former smoker and denies alcohol or drug use. Pt denies h/o any known surgeries. In the ED pt had an xray of the hip that was suspicious for hip fracture which was confirmed on CT imaging of the hip. Ortho was consulted in the ed and plan for surgery pending cardiac evaluation.       Overview/Hospital Course:  86M with pmh of cad with stent place 07/2021, dm, hld, htn who presents for several hour history of right hip pain after pt accidentally stepped on a cat while washing his car causing him to fall onto the hip. Pt states the pain is currently controlled, but he was not able to walk after incident. Pt denies LOC, head injury, sob, or cp during the event. Pt has a h/o cad with a stent placed about 1 year pta and is currently on asa and plavix. Pt denies blood thinner use. Pt is a former smoker and denies alcohol or drug use. Pt denies h/o any known surgeries. In the ED pt had an xray of the hip that was suspicious for hip fracture which was confirmed on CT imaging of the hip. Ortho was consulted in the ed and plan for surgery pending cardiac evaluation. On 8/14 pt evaluated for surgery by cardiology  with plan for proceeding. Pts plavix held pre-op and headed to or on 8/14.      Interval History: Pt states he is feeling good today and prepared for surgery this morning. Pt denies cp, sob, fever, chills, n/v at this time.    Review of Systems  Objective:     Vital Signs (Most Recent):  Temp: 97.5 °F (36.4 °C) (08/14/22 1211)  Pulse: (!) 55 (08/14/22 1240)  Resp: 14 (08/14/22 1240)  BP: 134/61 (08/14/22 1230)  SpO2: (!) 92 % (08/14/22 1240)   Vital Signs (24h Range):  Temp:  [97.5 °F (36.4 °C)-98.8 °F (37.1 °C)] 97.5 °F (36.4 °C)  Pulse:  [55-65] 55  Resp:  [13-26] 14  SpO2:  [87 %-99 %] 92 %  BP: (116-169)/(56-74) 134/61     Weight: 85.7 kg (189 lb)  Body mass index is 28.74 kg/m².    Intake/Output Summary (Last 24 hours) at 8/14/2022 1253  Last data filed at 8/14/2022 1208  Gross per 24 hour   Intake 1290 ml   Output 525 ml   Net 765 ml      Physical Exam  Constitutional:       General: He is not in acute distress.     Appearance: He is not ill-appearing.   HENT:      Head: Normocephalic and atraumatic.      Nose: No rhinorrhea.   Eyes:      Extraocular Movements: Extraocular movements intact.   Cardiovascular:      Rate and Rhythm: Normal rate and regular rhythm.      Heart sounds: No murmur heard.  Pulmonary:      Effort: Pulmonary effort is normal. No respiratory distress.      Breath sounds: Normal breath sounds.   Musculoskeletal:      Cervical back: Neck supple. No rigidity.   Skin:     General: Skin is warm and dry.   Neurological:      General: No focal deficit present.      Mental Status: He is alert and oriented to person, place, and time.   Psychiatric:         Mood and Affect: Mood normal.         Behavior: Behavior normal.       Significant Labs: All pertinent labs within the past 24 hours have been reviewed.    Significant Imaging: I have reviewed all pertinent imaging results/findings within the past 24 hours.      Assessment/Plan:      * Hip fracture  Pt with Minimally displaced and slightly  impacted subcapital right femoral neck fracture on CT imaging 8/13  RCRI of 1 due to h/o ischemic heart disease.   Pt states he is able to walk several blocks without sob.  CXR and ekg pending  Pain medications ordered prn    -Ortho surgery consulted in ed plan for surgery 8/14 pending Cardiac clearance        Coronary artery disease involving native coronary artery of native heart without angina pectoris  Pt with h/o cad with stent placement in 7/2021 per pt records. Currently on DAPT therapy and statin along with bb.    -continue aspirin and bb  -hold plavix  -cardiology consulted for surgical clearance.      Type 2 diabetes mellitus  Patient's FSGs are controlled on current medication regimen.  Last A1c reviewed-   Lab Results   Component Value Date    LABA1C 6.9 (H) 07/06/2015    HGBA1C 6.7 (H) 12/02/2021     Most recent fingerstick glucose reviewed-   Recent Labs   Lab 08/13/22  1517 08/13/22  1920 08/14/22  0734 08/14/22  1215   POCTGLUCOSE 149* 189* 170* 175*     Current correctional scale  Low  Maintain anti-hyperglycemic dose as follows-   Antihyperglycemics (From admission, onward)            Start     Stop Route Frequency Ordered    08/13/22 1548  insulin aspart U-100 pen 0-5 Units         -- SubQ Before meals & nightly PRN 08/13/22 1450        Hold Oral hypoglycemics while patient is in the hospital.    Essential hypertension, benign  bp currently controlled. Will restart home medications when able  Hydralazine prn for hypertension      Hyperlipidemia  Per pt history. Restart statin        VTE Risk Mitigation (From admission, onward)         Ordered     enoxaparin injection 40 mg  Daily         08/14/22 1253     IP VTE HIGH RISK PATIENT  Once         08/13/22 1450     Place sequential compression device  Until discontinued         08/13/22 1450                Discharge Planning   KRISTIN:      Code Status: DNR   Is the patient medically ready for discharge?:     Reason for patient still in hospital (select  all that apply): Treatment                     Christian Proctor III, MD  Department of [facility] Medicine   [facility] - Surgery

## 2022-08-14 NOTE — PROGRESS NOTES
Uncomplicated surgery     Patient is on 81 mg of aspirin  Will add prophylactic dose of Lovenox okay to resume pre surgery anti-platelet on postop day 3 if recommended by Cardiology        Edis Villanueva MD  [facility]

## 2022-08-15 LAB
ANION GAP SERPL CALC-SCNC: 10 MMOL/L (ref 8–16)
BASOPHILS # BLD AUTO: 0.01 K/UL (ref 0–0.2)
BASOPHILS NFR BLD: 0.1 % (ref 0–1.9)
BUN SERPL-MCNC: 14 MG/DL (ref 8–23)
CALCIUM SERPL-MCNC: 9.1 MG/DL (ref 8.7–10.5)
CHLORIDE SERPL-SCNC: 100 MMOL/L (ref 95–110)
CO2 SERPL-SCNC: 26 MMOL/L (ref 23–29)
CREAT SERPL-MCNC: 0.9 MG/DL (ref 0.5–1.4)
DIFFERENTIAL METHOD: ABNORMAL
EOSINOPHIL # BLD AUTO: 0 K/UL (ref 0–0.5)
EOSINOPHIL NFR BLD: 0 % (ref 0–8)
ERYTHROCYTE [DISTWIDTH] IN BLOOD BY AUTOMATED COUNT: 14.9 % (ref 11.5–14.5)
EST. GFR  (NO RACE VARIABLE): >60 ML/MIN/1.73 M^2
GLUCOSE SERPL-MCNC: 146 MG/DL (ref 70–110)
HCT VFR BLD AUTO: 32.5 % (ref 40–54)
HGB BLD-MCNC: 10.6 G/DL (ref 14–18)
IMM GRANULOCYTES # BLD AUTO: 0.05 K/UL (ref 0–0.04)
IMM GRANULOCYTES NFR BLD AUTO: 0.4 % (ref 0–0.5)
LYMPHOCYTES # BLD AUTO: 0.9 K/UL (ref 1–4.8)
LYMPHOCYTES NFR BLD: 7.6 % (ref 18–48)
MCH RBC QN AUTO: 29.7 PG (ref 27–31)
MCHC RBC AUTO-ENTMCNC: 32.6 G/DL (ref 32–36)
MCV RBC AUTO: 91 FL (ref 82–98)
MONOCYTES # BLD AUTO: 1.1 K/UL (ref 0.3–1)
MONOCYTES NFR BLD: 9.3 % (ref 4–15)
NEUTROPHILS # BLD AUTO: 9.5 K/UL (ref 1.8–7.7)
NEUTROPHILS NFR BLD: 82.6 % (ref 38–73)
NRBC BLD-RTO: 0 /100 WBC
PLATELET # BLD AUTO: 271 K/UL (ref 150–450)
PMV BLD AUTO: 9.8 FL (ref 9.2–12.9)
POCT GLUCOSE: 144 MG/DL (ref 70–110)
POCT GLUCOSE: 172 MG/DL (ref 70–110)
POCT GLUCOSE: 180 MG/DL (ref 70–110)
POCT GLUCOSE: 182 MG/DL (ref 70–110)
POTASSIUM SERPL-SCNC: 4.1 MMOL/L (ref 3.5–5.1)
RBC # BLD AUTO: 3.57 M/UL (ref 4.6–6.2)
SODIUM SERPL-SCNC: 136 MMOL/L (ref 136–145)
WBC # BLD AUTO: 11.51 K/UL (ref 3.9–12.7)

## 2022-08-15 PROCEDURE — 99900035 HC TECH TIME PER 15 MIN (STAT)

## 2022-08-15 PROCEDURE — 99232 PR SUBSEQUENT HOSPITAL CARE,LEVL II: ICD-10-PCS | Mod: ,,, | Performed by: INTERNAL MEDICINE

## 2022-08-15 PROCEDURE — 11000001 HC ACUTE MED/SURG PRIVATE ROOM

## 2022-08-15 PROCEDURE — 97535 SELF CARE MNGMENT TRAINING: CPT

## 2022-08-15 PROCEDURE — 94799 UNLISTED PULMONARY SVC/PX: CPT

## 2022-08-15 PROCEDURE — 80048 BASIC METABOLIC PNL TOTAL CA: CPT | Performed by: STUDENT IN AN ORGANIZED HEALTH CARE EDUCATION/TRAINING PROGRAM

## 2022-08-15 PROCEDURE — 97110 THERAPEUTIC EXERCISES: CPT

## 2022-08-15 PROCEDURE — 25000003 PHARM REV CODE 250: Performed by: STUDENT IN AN ORGANIZED HEALTH CARE EDUCATION/TRAINING PROGRAM

## 2022-08-15 PROCEDURE — 36415 COLL VENOUS BLD VENIPUNCTURE: CPT | Performed by: STUDENT IN AN ORGANIZED HEALTH CARE EDUCATION/TRAINING PROGRAM

## 2022-08-15 PROCEDURE — 99232 SBSQ HOSP IP/OBS MODERATE 35: CPT | Mod: ,,, | Performed by: INTERNAL MEDICINE

## 2022-08-15 PROCEDURE — 97116 GAIT TRAINING THERAPY: CPT

## 2022-08-15 PROCEDURE — 97165 OT EVAL LOW COMPLEX 30 MIN: CPT

## 2022-08-15 PROCEDURE — 94761 N-INVAS EAR/PLS OXIMETRY MLT: CPT

## 2022-08-15 PROCEDURE — 63600175 PHARM REV CODE 636 W HCPCS: Performed by: ORTHOPAEDIC SURGERY

## 2022-08-15 PROCEDURE — 97161 PT EVAL LOW COMPLEX 20 MIN: CPT

## 2022-08-15 PROCEDURE — 85025 COMPLETE CBC W/AUTO DIFF WBC: CPT | Performed by: STUDENT IN AN ORGANIZED HEALTH CARE EDUCATION/TRAINING PROGRAM

## 2022-08-15 RX ADMIN — OXYCODONE AND ACETAMINOPHEN 1 TABLET: 5; 325 TABLET ORAL at 08:08

## 2022-08-15 RX ADMIN — ESCITALOPRAM OXALATE 10 MG: 10 TABLET ORAL at 08:08

## 2022-08-15 RX ADMIN — Medication 6 MG: at 08:08

## 2022-08-15 RX ADMIN — ISOSORBIDE MONONITRATE 60 MG: 30 TABLET, EXTENDED RELEASE ORAL at 08:08

## 2022-08-15 RX ADMIN — HYDROCHLOROTHIAZIDE 12.5 MG: 12.5 TABLET ORAL at 08:08

## 2022-08-15 RX ADMIN — ENOXAPARIN SODIUM 40 MG: 100 INJECTION SUBCUTANEOUS at 04:08

## 2022-08-15 RX ADMIN — AMLODIPINE BESYLATE 10 MG: 5 TABLET ORAL at 08:08

## 2022-08-15 RX ADMIN — ASPIRIN 81 MG: 81 TABLET, COATED ORAL at 08:08

## 2022-08-15 RX ADMIN — ATORVASTATIN CALCIUM 40 MG: 40 TABLET, FILM COATED ORAL at 08:08

## 2022-08-15 RX ADMIN — METOPROLOL SUCCINATE 50 MG: 50 TABLET, EXTENDED RELEASE ORAL at 08:08

## 2022-08-15 RX ADMIN — OXYCODONE AND ACETAMINOPHEN 1 TABLET: 5; 325 TABLET ORAL at 11:08

## 2022-08-15 NOTE — SUBJECTIVE & OBJECTIVE
--- Message -----  From: Ginna Barba MD  Sent: 5/14/2019   8:18 AM  To: Tami Kay, NAEEM    Hopefully you contacted her yesterday about her potassium and starting a potassium supplement.  In addition her BNP is not going back is markedly elevated and would explain her shortness of breath and feeling poorly.  I would try Bumex 2 mg twice a day and still would get a follow-up heart failure clinic with nurse practitioners and at this week or beginning the next week.  Certainly if she is doing poorly according to her daughter can bring her into the hospital.        Called [facility] to discuss medication changes and test results. LM to discuss. -[facility] [place]    Called [facility] and left a VM. Medication list updated and telephone encounter from 5/13/19 addended. They are agreeable to increase in Bumex and appt with HF NP. Maame does have a lot of questions regarding nuclear stress test and what they would do with this information if it were positive. Encouraged her to have further discussion with family and patient regarding this. They did just see Dr. Barba 5/10/19. Family has noticed steady decline in patient's medical and cognitive status.-[facility] [place]       Past Medical History:   Diagnosis Date    Coronary artery disease     Diabetes mellitus     High cholesterol     Hypertension     NSTEMI (non-ST elevated myocardial infarction) 07/25/2021    Prostate cancer        Past Surgical History:   Procedure Laterality Date    APPLICATION OF FULL-THICKNESS SKIN GRAFT (FTSG) TO UPPER EXTREMITY Right 11/15/2018    Procedure: APPLICATION, GRAFT, SKIN, FULL-THICKNESS, TO UPPER EXTREMITY VS STSG WITH FROZEN SECTIONS;  Surgeon: Alan Arzola MD;  Location: [facility] OR;  Service: Plastics;  Laterality: Right;    EXCISION OF SQUAMOUS CELL CARCINOMA Right 11/15/2018    Procedure: EXCISION, CARCINOMA, SQUAMOUS CELL @ RIGHT HAND;  Surgeon: Alan Arzola MD;  Location: [facility] OR;  Service: Plastics;  Laterality: Right;  RN PREOP 11/13/2018--NEED H/P    JOINT REPLACEMENT      LEFT HEART CATHETERIZATION Left 5/6/2019    Procedure: Left heart cath;  Surgeon: Rashad Nieto MD;  Location: [facility] CATH LAB;  Service: Cardiology;  Laterality: Left;    LEFT HEART CATHETERIZATION Left 7/26/2021    Procedure: Left heart cath, radial;  Surgeon: Rashad Nieto MD;  Location: [facility] CATH LAB;  Service: Cardiology;  Laterality: Left;    TONSILLECTOMY      TOTAL KNEE ARTHROPLASTY         Review of patient's allergies indicates:  No Known Allergies    No current facility-administered medications on file prior to encounter.     Current Outpatient Medications on File Prior to Encounter   Medication Sig    acetaminophen (TYLENOL) 325 MG tablet Take 2 tablets (650 mg total) by mouth every 6 (six) hours as needed for Pain. (Patient not taking: Reported on 2/10/2022)    albuterol (PROVENTIL/VENTOLIN HFA) 90 mcg/actuation inhaler Inhale 2 puffs into the lungs.    AMITIZA 24 mcg Cap 24 mcg 2 (two) times daily with meals.     amLODIPine (NORVASC) 10 MG tablet Take 1 tablet (10 mg total) by mouth once daily.    aspirin (ECOTRIN) 81 MG EC tablet Take 81 mg by mouth once daily.    atorvastatin (LIPITOR) 40 MG  tablet Take 1 tablet (40 mg total) by mouth once daily.    blood-glucose meter Misc by Misc.(Non-Drug; Combo Route) route    clopidogreL (PLAVIX) 75 mg tablet Take 1 tablet (75 mg total) by mouth once daily.    dicyclomine (BENTYL) 10 MG capsule Take 10 mg by mouth.    escitalopram oxalate (LEXAPRO) 10 MG tablet Take 10 mg by mouth.    hydroCHLOROthiazide (MICROZIDE) 12.5 mg capsule     isosorbide mononitrate (IMDUR) 60 MG 24 hr tablet Take 1 tablet (60 mg total) by mouth once daily.    LANCETS & BLOOD GLUCOSE STRIPS MISC by Misc.(Non-Drug; Combo Route) route Check blood sugars  Twice a day.    LIDOcaine (LIDODERM) 5 % Place 1 patch onto the skin daily as needed (neck pain.). Remove & Discard patch within 12 hours or as directed by MD. Apply to posterior neck in case of pain. (Patient not taking: Reported on 2/10/2022)    metformin (GLUCOPHAGE) 1000 MG tablet Take 1 tablet (1,000 mg total) by mouth 2 (two) times daily with meals.    metoprolol succinate (TOPROL-XL) 50 MG 24 hr tablet Take 1 tablet (50 mg total) by mouth once daily.    nitroGLYCERIN (NITROSTAT) 0.4 MG SL tablet Place 1 tablet (0.4 mg total) under the tongue every 5 (five) minutes as needed for Chest pain.    ondansetron (ZOFRAN) 4 MG tablet Take 1 tablet (4 mg total) by mouth every 6 (six) hours as needed for Nausea. (Patient not taking: Reported on 2/10/2022)    varicella-zoster gE-AS01B, PF, (SHINGRIX) 50 mcg/0.5 mL injection Inject into the muscle. (Patient not taking: Reported on 2/10/2022)     Family History       Problem Relation (Age of Onset)    Heart disease Mother    Stroke Father          Tobacco Use    Smoking status: Former Smoker     Packs/day: 0.00     Types: Cigarettes     Quit date: 2016     Years since quittin.0    Smokeless tobacco: Never Used    Tobacco comment: 2016   Substance and Sexual Activity    Alcohol use: No    Drug use: No    Sexual activity: Not Currently     Review of Systems   Gastrointestinal:  Negative  for melena.   Genitourinary:  Negative for hematuria.   Objective:     Vital Signs (Most Recent):  Temp: 97.6 °F (36.4 °C) (08/15/22 0714)  Pulse: 60 (08/15/22 0805)  Resp: 18 (08/15/22 0805)  BP: (!) 166/72 (08/15/22 0714)  SpO2: (!) 91 % (08/15/22 0805)   Vital Signs (24h Range):  Temp:  [97.5 °F (36.4 °C)-98.8 °F (37.1 °C)] 97.6 °F (36.4 °C)  Pulse:  [50-62] 60  Resp:  [13-26] 18  SpO2:  [87 %-99 %] 91 %  BP: (123-166)/(58-72) 166/72     Weight: 85.7 kg (189 lb)  Body mass index is 28.74 kg/m².    SpO2: (!) 91 %  O2 Device (Oxygen Therapy): room air      Intake/Output Summary (Last 24 hours) at 8/15/2022 1123  Last data filed at 8/15/2022 0830  Gross per 24 hour   Intake 1740 ml   Output 1345 ml   Net 395 ml         Lines/Drains/Airways       Peripheral Intravenous Line  Duration                  Peripheral IV - Single Lumen 01/11/22 2346 20 G Left Hand 215 days         Peripheral IV - Single Lumen 08/13/22 20 G Anterior;Left Wrist 2 days         Peripheral IV - Single Lumen 08/14/22 1056 20 G Right;Posterior Hand 1 day                    Physical Exam  Constitutional:       General: He is not in acute distress.     Appearance: He is well-developed. He is not ill-appearing, toxic-appearing or diaphoretic.   HENT:      Head: Normocephalic and atraumatic.   Eyes:      General: No scleral icterus.     Extraocular Movements: Extraocular movements intact.      Conjunctiva/sclera: Conjunctivae normal.      Pupils: Pupils are equal, round, and reactive to light.   Neck:      Thyroid: No thyromegaly.      Vascular: No JVD.      Trachea: No tracheal deviation.   Cardiovascular:      Rate and Rhythm: Normal rate and regular rhythm.      Heart sounds: S1 normal and S2 normal. No murmur heard.    No friction rub. No gallop.   Pulmonary:      Effort: Pulmonary effort is normal. No respiratory distress.      Breath sounds: Normal breath sounds. No stridor. No wheezing, rhonchi or rales.   Chest:      Chest wall: No tenderness.    Abdominal:      General: There is no distension.      Palpations: Abdomen is soft.   Musculoskeletal:         General: No swelling.      Cervical back: Normal range of motion and neck supple. No rigidity.      Right lower leg: No edema.      Left lower leg: No edema.   Skin:     General: Skin is warm and dry.      Coloration: Skin is not jaundiced.      Findings: No rash.   Neurological:      General: No focal deficit present.      Mental Status: He is alert and oriented to person, place, and time.      Cranial Nerves: No cranial nerve deficit.   Psychiatric:         Mood and Affect: Mood normal.         Behavior: Behavior normal.       Current Medications:   amLODIPine  10 mg Oral Daily    aspirin  81 mg Oral Daily    atorvastatin  40 mg Oral Daily    enoxaparin  40 mg Subcutaneous Daily    EScitalopram oxalate  10 mg Oral QHS    hydroCHLOROthiazide  12.5 mg Oral Daily    isosorbide mononitrate  60 mg Oral Daily    metoprolol succinate  50 mg Oral Daily       acetaminophen, albuterol, glucagon (human recombinant), glucose, glucose, hydrALAZINE, HYDROcodone-acetaminophen, insulin aspart U-100, melatonin, naloxone, nitroGLYCERIN, ondansetron, oxyCODONE-acetaminophen, sodium chloride 0.9%    Laboratory (all labs reviewed):  CBC:  Recent Labs   Lab 11/11/21  0609 01/11/22  2335 08/13/22  1128 08/14/22  0451 08/15/22  0434   WBC 8.22 9.04 8.47 9.88 11.51   Hemoglobin 12.7 L 12.3 L 11.2 L 11.8 L 10.6 L   Hematocrit 37.7 L 37.2 L 33.5 L 35.1 L 32.5 L   Platelets 285 265 261 288 271         CHEMISTRIES:  Recent Labs   Lab 07/25/21  0503 07/26/21  0348 07/27/21  0550 07/30/21  0116 11/11/21  0609 01/11/22  2335 08/13/22  1118 08/14/22  0451 08/15/22  0434   Glucose 114 H 126 H 97 119 H 179 H 138 H 138 H 149 H 146 H   Sodium 138 137 138 135 L 138 138 136 136 136   Potassium 4.1 3.4 L 3.6 3.2 L 3.7 3.4 L 4.4 3.5 4.1   BUN 13 12 11 15 14 18 13 11 14   Creatinine 1.0 0.9 0.9 1.0 1.0 1.2 1.3 0.9 0.9   eGFR if   >60 >60 >60 >60 >60 >60  --   --   --    eGFR if non African American >60 >60 >60 >60 >60 55 A  --   --   --    Calcium 8.8 9.2 8.6 L 9.5 9.6 9.0 8.7 9.0 9.1   Magnesium 1.8 1.9  --   --  2.0  --   --   --   --          CARDIAC BIOMARKERS:  Recent Labs   Lab 07/30/21  0116 07/30/21  0344 11/11/21  0609 11/11/21  0820 01/11/22  2335   Troponin I 0.206 H 0.196 H <0.006 0.011 0.017         COAGS:  Recent Labs   Lab 07/25/21  1138 11/11/21  0609 08/13/22  1118 08/13/22  1540   INR 1.0 1.0 1.0 1.0         LIPIDS/LFTS:  Recent Labs   Lab 07/27/21  0550 07/30/21  0116 11/11/21  0609 01/11/22  2335 08/13/22  1118   AST 17 29 16 15 22   ALT 11 22 12 13 9 L         BNP:  Recent Labs   Lab 11/22/19  0830 07/24/21  2257 07/30/21  0116 11/11/21  0609   BNP 29 146 H 110 H 104 H         TSH:        Free T4:        Diagnostic Results:  ECG (personally reviewed and interpreted tracing(s)):  8/13/22 1434 SR 57, LAHB, NSSTTW changes, similar to 7/19/22    Echo: 7/26/21  The estimated ejection fraction is 60%.  Normal systolic function.  Grade I left ventricular diastolic dysfunction.  Normal right ventricular size with normal right ventricular systolic function.  Moderate left atrial enlargement.  Mild right atrial enlargement.  Normal central venous pressure (3 mmHg).  The estimated PA systolic pressure is 6 mmHg.  Aortic root dil, 4.3cm.    Cath/PCI dRCA: 7/26/21   The pre-procedure left ventricular end diastolic pressure was 6.  The estimated blood loss was <50 mL.  The Dist RCA lesion was 70% stenosed with 0% stenosis post-intervention.  A STENT RESOLUTE TABATHA 2.47R66RS stent was successfully placed at 12 SANDOR for 6 sec.  1. Culprit lesion for the non STEMI was subtotal stenosis in distal RCA extending into RPDA.  Balloon angioplasty followed by [place] from distal RCA into PDA was performed.  Plaque shift into PL occurred requiring balloon angioplasty of ostial and proximal PLB followed by kissing balloon angioplasty of PLB and PDA  with excellent angiographic results.  JANINE 3 flow post PCI.  Coronary angiogram:  Left main:  No significant stenosis  Lad:  Proximal and middle 30-40% stenosis.  Diagonal 1 lesion 80% stenosis.  Unchanged from prior angiogram.  Circumflex:  Nonobstructive mild CAD  RCA:  Proximal 20-30% stenosis.  Distal severe stenosis as listed above.  Access:  We accessed the right radial artery using ultrasound guidance. The vessel appeared widely patent, suitable for endovascular access. The images were interpreted by me and saved.  Access was closed with radial band.   Assessment and plan  Aspirin 81 mg daily indefinitely  Plavix 75 mg daily uninterrupted for at least 1 year and longer if no contraindications.  Statins  Follow-up Cardiology Clinic.    LE art US 7/10/19  Right lower extremity arterial doppler with no flow restriction  Left lower extremity arterial doppler with no flow restriction    Carotid US 5/28/19  There is 20-39% right Internal Carotid Stenosis.  There is 20-39% left Internal Carotid Stenosis.

## 2022-08-15 NOTE — PT/OT/SLP PROGRESS
Physical Therapy PM Treatment    Patient Name:  Juan Martínez Jr.   MRN:  [id number]    Recommendations:     Discharge Recommendations:  rehabilitation facility   Discharge Equipment Recommendations: bedside commode, bath bench   Barriers to discharge home: Pt with decreased functional mobility and ambulation at this time.  Pt with increased fall risk, now has posterior hip precautions s/p hip sx.    Assessment:     Juan Martínez Jr. is a 86 y.o. male admitted with a medical diagnosis of Hip fracture.  He presents with the following impairments/functional limitations:  weakness, impaired endurance, impaired self care skills, impaired functional mobility, gait instability, impaired balance, impaired cognition, decreased lower extremity function, decreased safety awareness, pain, decreased ROM, impaired skin, edema, orthopedic precautions.    Rehab Prognosis: Good; patient would benefit from acute skilled PT services to address these deficits and reach maximum level of function.    Recent Surgery: Procedure(s) (LRB):  HEMIARTHROPLASTY, HIP (Right) 1 Day Post-Op    Plan:     During this hospitalization, patient to be seen  (daily, BID as tolerated) to address the identified rehab impairments via gait training, therapeutic activities, therapeutic exercises, neuromuscular re-education and progress toward the following goals:    · Plan of Care Expires:  08/29/22    Subjective     Chief Complaint: pain with movement  Patient/Family Comments/goals: Pt agreeable to ambulation.   Pain/Comfort:  · Pain Rating 1:  (Pt reported minimal pain to RLE.  Pt stated that his pain is less with pain medicine.)  · Location - Side 1: Right  · Location 1: leg  · Pain Addressed 1: Pre-medicate for activity      Objective:     Patient found up in chair on seat cushion reclined with RLE elevated on pillow with peripheral IV upon PT entry to room.     General Precautions: Standard, fall, diabetic, [place]   Orthopedic Precautions:RLE weight  bearing as tolerated, RLE posterior precautions   Braces: Knee immobilizer (in bed due to posterior hip precautions)  Respiratory Status: Room air     · Functional Mobility: Pt with delayed initiation, required time to initiate and complete tasks.  Pt required more assistance and time during sit<>stand transitions to adhere to posterior hip precautions.    · Bed Mobility:     · Scooting: contact guard assistance  · Sit to Supine: minimum assistance with RLE  · Transfers:     · Sit to Stand:  moderate assistance with rolling walker from low bedside chair; Pt required min VC's for posterior hip precaution to avoid excessive trunk flexion during sit>stand transition.  Pt was min A for stand>sit at bedside, required extra time for cognitive/motor processing with good technique for descent.    · Gait: Pt ambulated ~40 ft with CGA using RW.  Pt with narrow [place], decreased weight shifting, decreased foot clearance, decreased step length, and decreased rachelle.  Pt required min VC's and assistance for RW management.  · Balance: Pt with fair dynamic standing balance.       AM-PAC 6 CLICK MOBILITY  Turning over in bed (including adjusting bedclothes, sheets and blankets)?: 3  Sitting down on and standing up from a chair with arms (e.g., wheelchair, bedside commode, etc.): 3  Moving from lying on back to sitting on the side of the bed?: 2  Moving to and from a bed to a chair (including a wheelchair)?: 3  Need to walk in hospital room?: 3  Climbing 3-5 steps with a railing?: 1  Basic Mobility Total Score: 15       Therapeutic Activities and Exercises:  BLE supine therex x10 rep: AP, QS, and GS    Pt re-educated on posterior hip precautions, RLE knee immobilizer placed back on while in bed.  Pt verbalized understanding.     Patient left HOB elevated and RLE elevated on pillow with all lines intact, call button in reach and bed alarm on.  Tray table close by and door left opened.      GOALS:   Multidisciplinary Problems      Physical Therapy Goals        Problem: Physical Therapy    Goal Priority Disciplines Outcome Goal Variances Interventions   Physical Therapy Goal     PT, PT/OT Ongoing, Progressing     Description: Goals to be met by: 22     Patient will increase functional independence with mobility by performin. Supine to sit with Modified [place]  2. Rolling to Left and Right with Modified [place]  3. Sit to stand transfer with Modified [place] using RW  4. Bed to chair transfer with Modified [place] using Rolling Walker  5. Gait >200 feet with Modified [place] using Rolling Walker   6. Lower extremity exercise program 2 sets x15 reps per handout, with independence                     Time Tracking:     PT Received On: 08/15/22  PT Start Time: 1415     PT Stop Time: 1426  PT Total Time (min): 11 min     Billable Minutes: Gait Training 11 min    Treatment Type: Treatment              08/15/2022

## 2022-08-15 NOTE — PROGRESS NOTES
[facility] Surg  Cardiology  Progress Note    Patient Name: Juan Martínez Jr.  MRN: [id number]  Admission Date: 8/13/2022  Hospital Length of Stay: 2 days  Code Status: DNR   Attending Physician: Christian Proctor III, MD   Primary Care Physician: Lissy Gavin MD  Expected Discharge Date:   Principal Problem:Hip fracture    Subjective:     Interval Hx: doing well post-op. No cp/sob, ambulating on unit with PT.  Incisional pain noted.        Past Medical History:   Diagnosis Date    Coronary artery disease     Diabetes mellitus     High cholesterol     Hypertension     NSTEMI (non-ST elevated myocardial infarction) 07/25/2021    Prostate cancer        Past Surgical History:   Procedure Laterality Date    APPLICATION OF FULL-THICKNESS SKIN GRAFT (FTSG) TO UPPER EXTREMITY Right 11/15/2018    Procedure: APPLICATION, GRAFT, SKIN, FULL-THICKNESS, TO UPPER EXTREMITY VS STSG WITH FROZEN SECTIONS;  Surgeon: Alan Arzola MD;  Location: [facility] OR;  Service: Plastics;  Laterality: Right;    EXCISION OF SQUAMOUS CELL CARCINOMA Right 11/15/2018    Procedure: EXCISION, CARCINOMA, SQUAMOUS CELL @ RIGHT HAND;  Surgeon: Alan Arzola MD;  Location: [facility] OR;  Service: Plastics;  Laterality: Right;  RN PREOP 11/13/2018--NEED H/P    JOINT REPLACEMENT      LEFT HEART CATHETERIZATION Left 5/6/2019    Procedure: Left heart cath;  Surgeon: Rashad Nieto MD;  Location: [facility] CATH LAB;  Service: Cardiology;  Laterality: Left;    LEFT HEART CATHETERIZATION Left 7/26/2021    Procedure: Left heart cath, radial;  Surgeon: Rashad Nieto MD;  Location: [facility] CATH LAB;  Service: Cardiology;  Laterality: Left;    TONSILLECTOMY      TOTAL KNEE ARTHROPLASTY         Review of patient's allergies indicates:  No Known Allergies    No current facility-administered medications on file prior to encounter.     Current Outpatient Medications on File Prior to Encounter   Medication Sig    acetaminophen (TYLENOL) 325 MG tablet Take  2 tablets (650 mg total) by mouth every 6 (six) hours as needed for Pain. (Patient not taking: Reported on 2/10/2022)    albuterol (PROVENTIL/VENTOLIN HFA) 90 mcg/actuation inhaler Inhale 2 puffs into the lungs.    AMITIZA 24 mcg Cap 24 mcg 2 (two) times daily with meals.     amLODIPine (NORVASC) 10 MG tablet Take 1 tablet (10 mg total) by mouth once daily.    aspirin (ECOTRIN) 81 MG EC tablet Take 81 mg by mouth once daily.    atorvastatin (LIPITOR) 40 MG tablet Take 1 tablet (40 mg total) by mouth once daily.    blood-glucose meter Misc by Misc.(Non-Drug; Combo Route) route    clopidogreL (PLAVIX) 75 mg tablet Take 1 tablet (75 mg total) by mouth once daily.    dicyclomine (BENTYL) 10 MG capsule Take 10 mg by mouth.    escitalopram oxalate (LEXAPRO) 10 MG tablet Take 10 mg by mouth.    hydroCHLOROthiazide (MICROZIDE) 12.5 mg capsule     isosorbide mononitrate (IMDUR) 60 MG 24 hr tablet Take 1 tablet (60 mg total) by mouth once daily.    LANCETS & BLOOD GLUCOSE STRIPS MISC by Misc.(Non-Drug; Combo Route) route Check blood sugars  Twice a day.    LIDOcaine (LIDODERM) 5 % Place 1 patch onto the skin daily as needed (neck pain.). Remove & Discard patch within 12 hours or as directed by MD. Apply to posterior neck in case of pain. (Patient not taking: Reported on 2/10/2022)    metformin (GLUCOPHAGE) 1000 MG tablet Take 1 tablet (1,000 mg total) by mouth 2 (two) times daily with meals.    metoprolol succinate (TOPROL-XL) 50 MG 24 hr tablet Take 1 tablet (50 mg total) by mouth once daily.    nitroGLYCERIN (NITROSTAT) 0.4 MG SL tablet Place 1 tablet (0.4 mg total) under the tongue every 5 (five) minutes as needed for Chest pain.    ondansetron (ZOFRAN) 4 MG tablet Take 1 tablet (4 mg total) by mouth every 6 (six) hours as needed for Nausea. (Patient not taking: Reported on 2/10/2022)    varicella-zoster gE-AS01B, PF, (SHINGRIX) 50 mcg/0.5 mL injection Inject into the muscle. (Patient not taking: Reported  on 2/10/2022)     Family History       Problem Relation (Age of Onset)    Heart disease Mother    Stroke Father          Tobacco Use    Smoking status: Former Smoker     Packs/day: 0.00     Types: Cigarettes     Quit date: 2016     Years since quittin.0    Smokeless tobacco: Never Used    Tobacco comment: 2016   Substance and Sexual Activity    Alcohol use: No    Drug use: No    Sexual activity: Not Currently     Review of Systems   Gastrointestinal:  Negative for melena.   Genitourinary:  Negative for hematuria.   Objective:     Vital Signs (Most Recent):  Temp: 97.6 °F (36.4 °C) (08/15/22 0714)  Pulse: 60 (08/15/22 0805)  Resp: 18 (08/15/22 0805)  BP: (!) 166/72 (08/15/22 0714)  SpO2: (!) 91 % (08/15/22 0805)   Vital Signs (24h Range):  Temp:  [97.5 °F (36.4 °C)-98.8 °F (37.1 °C)] 97.6 °F (36.4 °C)  Pulse:  [50-62] 60  Resp:  [13-26] 18  SpO2:  [87 %-99 %] 91 %  BP: (123-166)/(58-72) 166/72     Weight: 85.7 kg (189 lb)  Body mass index is 28.74 kg/m².    SpO2: (!) 91 %  O2 Device (Oxygen Therapy): room air      Intake/Output Summary (Last 24 hours) at 8/15/2022 1123  Last data filed at 8/15/2022 0830  Gross per 24 hour   Intake 1740 ml   Output 1345 ml   Net 395 ml         Lines/Drains/Airways       Peripheral Intravenous Line  Duration                  Peripheral IV - Single Lumen 22 2346 20 G Left Hand 215 days         Peripheral IV - Single Lumen 22 20 G Anterior;Left Wrist 2 days         Peripheral IV - Single Lumen 22 1056 20 G Right;Posterior Hand 1 day                    Physical Exam  Constitutional:       General: He is not in acute distress.     Appearance: He is well-developed. He is not ill-appearing, toxic-appearing or diaphoretic.   HENT:      Head: Normocephalic and atraumatic.   Eyes:      General: No scleral icterus.     Extraocular Movements: Extraocular movements intact.      Conjunctiva/sclera: Conjunctivae normal.      Pupils: Pupils are equal, round, and  reactive to light.   Neck:      Thyroid: No thyromegaly.      Vascular: No JVD.      Trachea: No tracheal deviation.   Cardiovascular:      Rate and Rhythm: Normal rate and regular rhythm.      Heart sounds: S1 normal and S2 normal. No murmur heard.    No friction rub. No gallop.   Pulmonary:      Effort: Pulmonary effort is normal. No respiratory distress.      Breath sounds: Normal breath sounds. No stridor. No wheezing, rhonchi or rales.   Chest:      Chest wall: No tenderness.   Abdominal:      General: There is no distension.      Palpations: Abdomen is soft.   Musculoskeletal:         General: No swelling.      Cervical back: Normal range of motion and neck supple. No rigidity.      Right lower leg: No edema.      Left lower leg: No edema.   Skin:     General: Skin is warm and dry.      Coloration: Skin is not jaundiced.      Findings: No rash.   Neurological:      General: No focal deficit present.      Mental Status: He is alert and oriented to person, place, and time.      Cranial Nerves: No cranial nerve deficit.   Psychiatric:         Mood and Affect: Mood normal.         Behavior: Behavior normal.       Current Medications:   amLODIPine  10 mg Oral Daily    aspirin  81 mg Oral Daily    atorvastatin  40 mg Oral Daily    enoxaparin  40 mg Subcutaneous Daily    EScitalopram oxalate  10 mg Oral QHS    hydroCHLOROthiazide  12.5 mg Oral Daily    isosorbide mononitrate  60 mg Oral Daily    metoprolol succinate  50 mg Oral Daily       acetaminophen, albuterol, glucagon (human recombinant), glucose, glucose, hydrALAZINE, HYDROcodone-acetaminophen, insulin aspart U-100, melatonin, naloxone, nitroGLYCERIN, ondansetron, oxyCODONE-acetaminophen, sodium chloride 0.9%    Laboratory (all labs reviewed):  CBC:  Recent Labs   Lab 11/11/21  0609 01/11/22  2335 08/13/22  1128 08/14/22  0451 08/15/22  0434   WBC 8.22 9.04 8.47 9.88 11.51   Hemoglobin 12.7 L 12.3 L 11.2 L 11.8 L 10.6 L   Hematocrit 37.7 L 37.2 L 33.5  L 35.1 L 32.5 L   Platelets 285 265 261 288 271         CHEMISTRIES:  Recent Labs   Lab 07/25/21  0503 07/26/21  0348 07/27/21  0550 07/30/21  0116 11/11/21  0609 01/11/22  2335 08/13/22  1118 08/14/22  0451 08/15/22  0434   Glucose 114 H 126 H 97 119 H 179 H 138 H 138 H 149 H 146 H   Sodium 138 137 138 135 L 138 138 136 136 136   Potassium 4.1 3.4 L 3.6 3.2 L 3.7 3.4 L 4.4 3.5 4.1   BUN 13 12 11 15 14 18 13 11 14   Creatinine 1.0 0.9 0.9 1.0 1.0 1.2 1.3 0.9 0.9   eGFR if  >60 >60 >60 >60 >60 >60  --   --   --    eGFR if non African American >60 >60 >60 >60 >60 55 A  --   --   --    Calcium 8.8 9.2 8.6 L 9.5 9.6 9.0 8.7 9.0 9.1   Magnesium 1.8 1.9  --   --  2.0  --   --   --   --          CARDIAC BIOMARKERS:  Recent Labs   Lab 07/30/21  0116 07/30/21  0344 11/11/21  0609 11/11/21  0820 01/11/22  2335   Troponin I 0.206 H 0.196 H <0.006 0.011 0.017         COAGS:  Recent Labs   Lab 07/25/21  1138 11/11/21  0609 08/13/22  1118 08/13/22  1540   INR 1.0 1.0 1.0 1.0         LIPIDS/LFTS:  Recent Labs   Lab 07/27/21  0550 07/30/21  0116 11/11/21  0609 01/11/22  2335 08/13/22  1118   AST 17 29 16 15 22   ALT 11 22 12 13 9 L         BNP:  Recent Labs   Lab 11/22/19  0830 07/24/21  2257 07/30/21  0116 11/11/21  0609   BNP 29 146 H 110 H 104 H         TSH:        Free T4:        Diagnostic Results:  ECG (personally reviewed and interpreted tracing(s)):  8/13/22 1434 SR 57, LAHB, NSSTTW changes, similar to 7/19/22    Echo: 7/26/21  · The estimated ejection fraction is 60%.  · Normal systolic function.  · Grade I left ventricular diastolic dysfunction.  · Normal right ventricular size with normal right ventricular systolic function.  · Moderate left atrial enlargement.  · Mild right atrial enlargement.  · Normal central venous pressure (3 mmHg).  · The estimated PA systolic pressure is 6 mmHg.  · Aortic root dil, 4.3cm.    Cath/PCI dRCA: 7/26/21   · The pre-procedure left ventricular end diastolic pressure was  6.  · The estimated blood loss was <50 mL.  · The Dist RCA lesion was 70% stenosed with 0% stenosis post-intervention.  · A STENT RESOLUTE TABATHA 2.75C84IC stent was successfully placed at 12 SANDOR for 6 sec.  1. Culprit lesion for the non STEMI was subtotal stenosis in distal RCA extending into RPDA.  Balloon angioplasty followed by [place] from distal RCA into PDA was performed.  Plaque shift into PL occurred requiring balloon angioplasty of ostial and proximal PLB followed by kissing balloon angioplasty of PLB and PDA with excellent angiographic results.  JANINE 3 flow post PCI.  Coronary angiogram:  Left main:  No significant stenosis  Lad:  Proximal and middle 30-40% stenosis.  Diagonal 1 lesion 80% stenosis.  Unchanged from prior angiogram.  Circumflex:  Nonobstructive mild CAD  RCA:  Proximal 20-30% stenosis.  Distal severe stenosis as listed above.  Access:  We accessed the right radial artery using ultrasound guidance. The vessel appeared widely patent, suitable for endovascular access. The images were interpreted by me and saved.  Access was closed with radial band.   Assessment and plan  Aspirin 81 mg daily indefinitely  Plavix 75 mg daily uninterrupted for at least 1 year and longer if no contraindications.  Statins  Follow-up Cardiology Clinic.    LE art US 7/10/19  · Right lower extremity arterial doppler with no flow restriction  · Left lower extremity arterial doppler with no flow restriction    Carotid US 5/28/19  · There is 20-39% right Internal Carotid Stenosis.  · There is 20-39% left Internal Carotid Stenosis.      Assessment and Plan:     * Hip fracture  S/p ORIF  Doing well.    Coronary artery disease involving native coronary artery of native heart without angina pectoris  Known CAD s/p dRCA PCI 7/2021.  No anginal sxs  Cont ASA/statin  Resume Plavix when OK with Ortho    Essential hypertension, benign  Cont med rx    Type 2 diabetes mellitus  Per IM    Hyperlipidemia  Cont statin    Aortic root  dilatation  4.3cm by echo 7/2021  Consider repeat echo as outpatient        VTE Risk Mitigation (From admission, onward)         Ordered     enoxaparin injection 40 mg  Daily         08/14/22 1253     IP VTE HIGH RISK PATIENT  Once         08/13/22 1450     Place sequential compression device  Until discontinued         08/13/22 1450              Cardiology will sign off, pls call with questions.  Pt to follow up with Dr. Nieto after discharge.    Kevin Ceja MD  Cardiology  [facility] Surg

## 2022-08-15 NOTE — PLAN OF CARE
Problem: Occupational Therapy  Goal: Occupational Therapy Goal  Description: Goals to be met by: 8/29/22    Patient will increase functional independence with ADLs by performing:    LE Dressing with Supervision and use of AE for threading LB clothing to adhere to posterior precautions.   Grooming while standing at sink with Supervision and use of AD.  Toileting from bedside commode with Supervision for hygiene and clothing management.   Supine to sit with Modified [place].  Step transfer with Supervision and use of AD for safety.  Toilet transfer to bedside commode with Supervision.  Upper extremity exercise program x15 reps per handout, with independence.  Recall 4/4 posterior hip precautions w/o cueing.     Outcome: Ongoing, Progressing      Plan: Most consistent with periorificial dermatitis. Considering patch testing for possible contact dermatitis on lip, will avoid topical therapies at this time; she will take doxycycline 20mg bid instead Detail Level: Simple Render In Strict Bullet Format?: No

## 2022-08-15 NOTE — CONSULTS
TN answered consult for rehab, preference is for [facility].  1. [place] 2.  [facility] 3. [facility], 4. [facility].  TN called Kelly SNYDER with [phone] informed of recommendations for in pt Rehab.

## 2022-08-15 NOTE — ASSESSMENT & PLAN NOTE
Pt with Minimally displaced and slightly impacted subcapital right femoral neck fracture on CT imaging 8/13  RCRI of 1 due to h/o ischemic heart disease.   Pt states he is able to walk several blocks without sob.  CXR and ekg pending  Pain medications ordered prn    -Repair on 8/14 successful  -pt/ot pending  -lovenox for 14 days post-op

## 2022-08-15 NOTE — SUBJECTIVE & OBJECTIVE
Interval History: Pt states he is feeling good and has not pain currently. Discussed with pt that he will need to be evaluated by pt to decide needs on discharge. Pt is agreeable to evaluation. Pt denies cp, sob, n/v, fever, or chills.    Review of Systems  Objective:     Vital Signs (Most Recent):  Temp: 97.6 °F (36.4 °C) (08/15/22 1128)  Pulse: (!) 56 (08/15/22 1128)  Resp: 18 (08/15/22 1130)  BP: 139/64 (08/15/22 1128)  SpO2: 96 % (08/15/22 1128)   Vital Signs (24h Range):  Temp:  [97.5 °F (36.4 °C)-98.8 °F (37.1 °C)] 97.6 °F (36.4 °C)  Pulse:  [50-62] 56  Resp:  [13-26] 18  SpO2:  [87 %-99 %] 96 %  BP: (123-166)/(58-72) 139/64     Weight: 85.7 kg (189 lb)  Body mass index is 28.74 kg/m².    Intake/Output Summary (Last 24 hours) at 8/15/2022 1145  Last data filed at 8/15/2022 0830  Gross per 24 hour   Intake 1640 ml   Output 1245 ml   Net 395 ml      Physical Exam  Constitutional:       General: He is not in acute distress.     Appearance: He is not ill-appearing.   HENT:      Head: Normocephalic and atraumatic.      Mouth/Throat:      Mouth: Mucous membranes are moist.   Eyes:      General: No scleral icterus.     Extraocular Movements: Extraocular movements intact.   Pulmonary:      Effort: Pulmonary effort is normal. No respiratory distress.   Abdominal:      Palpations: Abdomen is soft.      Tenderness: There is no abdominal tenderness.   Musculoskeletal:         General: No swelling or tenderness.      Cervical back: Neck supple.   Skin:     General: Skin is warm and dry.   Neurological:      General: No focal deficit present.      Mental Status: He is alert and oriented to person, place, and time.   Psychiatric:         Mood and Affect: Mood normal.         Behavior: Behavior normal.       Significant Labs: All pertinent labs within the past 24 hours have been reviewed.    Significant Imaging: I have reviewed all pertinent imaging results/findings within the past 24 hours.

## 2022-08-15 NOTE — PT/OT/SLP EVAL
Occupational Therapy   Evaluation    Name: Juan Martínez Jr.  MRN: [id number]  Admitting Diagnosis:  Hip fracture  Recent Surgery: Procedure(s) (LRB):  HEMIARTHROPLASTY, HIP (Right) 1 Day Post-Op    Recommendations:     Discharge Recommendations: rehabilitation facility  Discharge Equipment Recommendations:  bath bench, bedside commode, walker, rolling (if d/c home)  Barriers to discharge:   (Pt is at risk for falls, readmission and morbidity if d/c home at this time.)    Assessment:     Juan Martínez Jr. is a 86 y.o. male with a medical diagnosis of Hip fracture.  Performance deficits affecting function: weakness, impaired endurance, impaired self care skills, impaired functional mobility, gait instability, impaired balance, decreased upper extremity function, decreased lower extremity function, decreased safety awareness, decreased ROM, impaired coordination, orthopedic precautions, edema, impaired skin, pain.      Pt very pleasant and willing to participate in tx session this date; pt verbalized understanding of posterior hip precautions prior to OOB activity but required reinforcement/reminders throughout. Pt was able to perform bed mobility w/ min A to stand and ambulate short, functional distances w/ CGA and use of RW. Pt w/ pain and general weakness but tolerated well w/o concerns. Pt will continue to benefit from skilled acute OT services to maximize functional capacity for safe performance w/ ADLs and functional mobility. PTA, pt was completely (I) w/ all ADLs, IADLs and functional mobility; pt will benefit from multidisciplinary approach in an [facility] setting for returning to PLOF.     Rehab Prognosis: Good; patient would benefit from acute skilled OT services to address these deficits and reach maximum level of function.       Plan:     Patient to be seen 5 x/week, 6 x/week to address the above listed problems via self-care/home management, therapeutic activities, therapeutic exercises  · Plan of Care  "Expires: 08/29/22  · Plan of Care Reviewed with: patient    Subjective     Chief Complaint: Pain w/ mobility   Patient/Family Comments/goals: Agreeable to participate     Occupational Profile:  Living Environment: Pt lives alone in 1st level apt w/ 1 threshold to enter; pt has access to t/s combo w/ SC.   Previous level of function: Pt was (I) w/ all ADLs and functional mobility PTA; driving.   Roles and Routines: Retired from [employer]   Equipment Used at Home:  rollator, shower chair   Assistance upon Discharge: Pt's sister lives "down the street."    Pain/Comfort:  · Pain Rating 1: 10/10  · Location - Side 1: Right  · Location 1: hip  · Pain Addressed 1: Pre-medicate for activity, Reposition, Cessation of Activity    Patients cultural, spiritual, Protestant conflicts given the current situation: no    Objective:     Communicated with: Nurse prior to session.  Patient found HOB elevated with peripheral IV, knee immobilizer upon OT entry to room.    General Precautions: Standard, fall, diabetic   Orthopedic Precautions:RLE weight bearing as tolerated, RLE posterior precautions   Braces: Knee immobilizer  Respiratory Status: Room air    Occupational Performance:    Bed Mobility:    · Patient completed Scooting hips to EOB with minimum assistance for managing RLE   · Patient completed Supine to Sit with minimum assistance    Functional Mobility/Transfers:  · Patient completed Sit <> Stand Transfer with contact guard assistance  with  rolling walker; bed elevated to maintain hip precautions   · Patient completed Bed <> Chair Transfer using Step Transfer technique with contact guard assistance with rolling walker  · Functional Mobility: Pt was able to ambulate functional distances in hallway w/ CGA and use of RW; pt initially noted w/ shakiness 2* pain and weakness but tolerated well. No LOB occurred.     Activities of Daily Living:  · Upper Body Dressing: minimum assistance for donning gown over back   · Lower Body " Dressing: dependence for donning socks to ensure compliance w/ posterior hip precautions    · Pt performed oral care prior to entry; denied toileting needs.     Cognitive/Visual Perceptual:  Cognitive/Psychosocial Skills:     -       Oriented to: Person, Place, Time and Situation   -       Follows Commands/attention:Follows multistep  commands  -       Communication: clear/fluent  -       Memory: No Deficits noted  -       Safety awareness/insight to disability: grossly intact     Physical Exam:  Balance:    -       good sitting balance; fair + standing balance  Postural examination/scapula alignment:    -       No postural abnormalities identified  Skin integrity: Visible skin intact and dressing dry/clean  Edema:  Mild R thigh   Sensation:    -       Intact  Upper Extremity Range of Motion:     -       Right Upper Extremity: WFL  -       Left Upper Extremity: WFL  Upper Extremity Strength:    -       Right Upper Extremity: WFL  -       Left Upper Extremity: WFL   Strength:    -       Right Upper Extremity: WFL  -       Left Upper Extremity: WFL    AMPAC 6 Click ADL:  AMPAC Total Score: 20    Treatment & Education:  -Initial eval complete.   -Pt educated on role of OT and POC.   -Pt educated on posterior hip precautions per handout including: no bending/flexing surgical hip >90 degrees, no crossing of surgical leg beyond midline, and no internal rotation of surgical leg beyond neutral   -Pt educated on purpose/benefits of hip kit use for safe LB dressing to adhere to posterior hip precautions; will benefit from demonstration during f/u.    -Questions and concerns addressed within OT scope.   Education:    Patient left up in chair with all lines intact and call button in reach    GOALS:   Multidisciplinary Problems     Occupational Therapy Goals        Problem: Occupational Therapy    Goal Priority Disciplines Outcome Interventions   Occupational Therapy Goal     OT, PT/OT Ongoing, Progressing    Description:  Goals to be met by: 8/29/22    Patient will increase functional independence with ADLs by performing:    LE Dressing with Supervision and use of AE for threading LB clothing to adhere to posterior precautions.   Grooming while standing at sink with Supervision and use of AD.  Toileting from bedside commode with Supervision for hygiene and clothing management.   Supine to sit with Modified [place].  Step transfer with Supervision and use of AD for safety.  Toilet transfer to bedside commode with Supervision.  Upper extremity exercise program x15 reps per handout, with independence.  Recall 4/4 posterior hip precautions w/o cueing.                      History:     Past Medical History:   Diagnosis Date    Coronary artery disease     Diabetes mellitus     High cholesterol     Hypertension     NSTEMI (non-ST elevated myocardial infarction) 07/25/2021    Prostate cancer        Past Surgical History:   Procedure Laterality Date    APPLICATION OF FULL-THICKNESS SKIN GRAFT (FTSG) TO UPPER EXTREMITY Right 11/15/2018    Procedure: APPLICATION, GRAFT, SKIN, FULL-THICKNESS, TO UPPER EXTREMITY VS STSG WITH FROZEN SECTIONS;  Surgeon: Alan Arzola MD;  Location: [facility] OR;  Service: Plastics;  Laterality: Right;    EXCISION OF SQUAMOUS CELL CARCINOMA Right 11/15/2018    Procedure: EXCISION, CARCINOMA, SQUAMOUS CELL @ RIGHT HAND;  Surgeon: Alan Arzola MD;  Location: [facility] OR;  Service: Plastics;  Laterality: Right;  RN PREOP 11/13/2018--NEED H/P    JOINT REPLACEMENT      LEFT HEART CATHETERIZATION Left 5/6/2019    Procedure: Left heart cath;  Surgeon: Rashad Nieto MD;  Location: [facility] CATH LAB;  Service: Cardiology;  Laterality: Left;    LEFT HEART CATHETERIZATION Left 7/26/2021    Procedure: Left heart cath, radial;  Surgeon: Rashad Nieto MD;  Location: [facility] CATH LAB;  Service: Cardiology;  Laterality: Left;    TONSILLECTOMY      TOTAL KNEE ARTHROPLASTY         Time Tracking:     OT Date of  Treatment: 08/15/22  OT Start Time: 1100  OT Stop Time: 1125  OT Total Time (min): 25 min    Billable Minutes:Evaluation 15  Total Time 25 (co-marci w/ PT)     8/15/2022

## 2022-08-15 NOTE — ASSESSMENT & PLAN NOTE
Pt with h/o cad with stent placement in 7/2021 per pt records. Currently on DAPT therapy and statin along with bb.    -continue aspirin and bb  -restart plavix post-op day 3

## 2022-08-15 NOTE — ASSESSMENT & PLAN NOTE
Patient's FSGs are controlled on current medication regimen.  Last A1c reviewed-   Lab Results   Component Value Date    LABA1C 6.9 (H) 07/06/2015    HGBA1C 6.7 (H) 12/02/2021     Most recent fingerstick glucose reviewed-   Recent Labs   Lab 08/14/22  1643 08/14/22  1909 08/15/22  0714 08/15/22  1130   POCTGLUCOSE 217* 204* 144* 182*     Current correctional scale  Low  Maintain anti-hyperglycemic dose as follows-   Antihyperglycemics (From admission, onward)            Start     Stop Route Frequency Ordered    08/13/22 1548  insulin aspart U-100 pen 0-5 Units         -- SubQ Before meals & nightly PRN 08/13/22 1450        Hold Oral hypoglycemics while patient is in the hospital.

## 2022-08-15 NOTE — PLAN OF CARE
Problem: Adult Inpatient Plan of Care  Goal: Plan of Care Review  Outcome: Ongoing, Progressing  Goal: Patient-Specific Goal (Individualized)  Outcome: Ongoing, Progressing  Goal: Absence of Hospital-Acquired Illness or Injury  Outcome: Ongoing, Progressing  Goal: Optimal Comfort and Wellbeing  Outcome: Ongoing, Progressing  Goal: Readiness for Transition of Care  Outcome: Ongoing, Progressing     Problem: Skin Injury Risk Increased  Goal: Skin Health and Integrity  Outcome: Ongoing, Progressing  Intervention: Optimize Skin Protection  Flowsheets (Taken 8/15/2022 0444)  Pressure Reduction Techniques:   frequent weight shift encouraged   weight shift assistance provided  Head of Bed (HOB) Positioning: HOB lowered     Problem: Diabetes Comorbidity  Goal: Blood Glucose Level Within Targeted Range  Outcome: Ongoing, Progressing  Intervention: Monitor and Manage Glycemia  Flowsheets (Taken 8/15/2022 0444)  Glycemic Management: blood glucose monitored

## 2022-08-15 NOTE — PLAN OF CARE
Problem: Physical Therapy  Goal: Physical Therapy Goal  Description: Goals to be met by: 22     Patient will increase functional independence with mobility by performin. Supine to sit with Modified [place]  2. Rolling to Left and Right with Modified [place]  3. Sit to stand transfer with Modified [place] using RW  4. Bed to chair transfer with Modified [place] using Rolling Walker  5. Gait >200 feet with Modified [place] using Rolling Walker   6. Lower extremity exercise program 2 sets x15 reps per handout, with independence    Outcome: Ongoing, Progressing    Pt ambulated ~40 ft with min A-CGA using RW.

## 2022-08-15 NOTE — PROGRESS NOTES
Ortho/Spine Postop Progress Note    Postop day: 1    ID: The patient is a 86 y.o. male status post:  Right hemiarthroplasty for femoral neck fracture.  Patient reports he is doing very well he reports he walked with physical therapy    Overnight Events:  No acute events overnight    Vitals:    08/15/22 1130   BP:    Pulse:    Resp: 18   Temp:        Drain Output  08/14 0701 - 08/15 0700  In: 1650   Out: 1345     Physical Exam:  NAD, A/O x 3.  Wound c/d/i with clean dressing.  No focal motor or sensory deficits noted.    Assessment: The patient is a 86 y.o. male status post:  Right hip hemiarthroplasty    Plan:  1) Antibiotics:  Postop Ancef times 24 hours  2) Weight bearing status:  Weight-bearing as tolerated posterior hip precautions  3) Labs:  Reviewed  4) DVT Prophylaxis:  Lovenox x2 weeks  5) Lines/Drains:  Peripheral IV  6) Dispo:  Per primary team    Edis Villanueva MD  [facility]

## 2022-08-15 NOTE — NURSING
pt awake, alert, and oriented x4. able to make needs known.   dressing to right hip c/d/i. denies pain/sob/distress at this time.   pt turned q 2 hours. bed alarm activated for pt safety. hourly   rounds made. will continue to monitor.  bsg monitored

## 2022-08-15 NOTE — PROGRESS NOTES
[facility]  Progress Note    Patient Name: Juan Martínez Jr.  MRN: [id number]  Patient Class: IP- Inpatient   Admission Date: 8/13/2022  Length of Stay: 2 days  Attending Physician: Christian Proctor III, MD  Primary Care Provider: Lissy Gavin MD        Subjective:     Principal Problem:Hip fracture        HPI:  86M with pmh of cad with stent place 07/2021, dm, hld, htn who presents for several hour history of right hip pain after pt accidentally stepped on a cat while washing his car causing him to fall onto the hip. Pt states the pain is currently controlled, but he was not able to walk after incident. Pt denies LOC, head injury, sob, or cp during the event. Pt has a h/o cad with a stent placed about 1 year pta and is currently on asa and plavix. Pt denies blood thinner use. Pt is a former smoker and denies alcohol or drug use. Pt denies h/o any known surgeries. In the ED pt had an xray of the hip that was suspicious for hip fracture which was confirmed on CT imaging of the hip. Ortho was consulted in the ed and plan for surgery pending cardiac evaluation.       Overview/Hospital Course:  86M with pmh of cad with stent place 07/2021, dm, hld, htn who presents for several hour history of right hip pain after pt accidentally stepped on a cat while washing his car causing him to fall onto the hip. Pt states the pain is currently controlled, but he was not able to walk after incident. Pt denies LOC, head injury, sob, or cp during the event. Pt has a h/o cad with a stent placed about 1 year pta and is currently on asa and plavix. Pt denies blood thinner use. Pt is a former smoker and denies alcohol or drug use. Pt denies h/o any known surgeries. In the ED pt had an xray of the hip that was suspicious for hip fracture which was confirmed on CT imaging of the hip. Ortho was consulted in the ed and plan for surgery pending cardiac evaluation. On 8/14 pt evaluated for surgery by cardiology  with plan for proceeding. Pts plavix held pre-op and had repair on 8/14. Pt looking good post-op day 1. Per Ortho continue lovenox at prophylaxis dose for 14 days and restart plavix post-op day 3. Pt currently awaiting pt/ot evaluation for rehab needs.      Interval History: Pt states he is feeling good and has not pain currently. Discussed with pt that he will need to be evaluated by pt to decide needs on discharge. Pt is agreeable to evaluation. Pt denies cp, sob, n/v, fever, or chills.    Review of Systems  Objective:     Vital Signs (Most Recent):  Temp: 97.6 °F (36.4 °C) (08/15/22 1128)  Pulse: (!) 56 (08/15/22 1128)  Resp: 18 (08/15/22 1130)  BP: 139/64 (08/15/22 1128)  SpO2: 96 % (08/15/22 1128)   Vital Signs (24h Range):  Temp:  [97.5 °F (36.4 °C)-98.8 °F (37.1 °C)] 97.6 °F (36.4 °C)  Pulse:  [50-62] 56  Resp:  [13-26] 18  SpO2:  [87 %-99 %] 96 %  BP: (123-166)/(58-72) 139/64     Weight: 85.7 kg (189 lb)  Body mass index is 28.74 kg/m².    Intake/Output Summary (Last 24 hours) at 8/15/2022 1145  Last data filed at 8/15/2022 0830  Gross per 24 hour   Intake 1640 ml   Output 1245 ml   Net 395 ml      Physical Exam  Constitutional:       General: He is not in acute distress.     Appearance: He is not ill-appearing.   HENT:      Head: Normocephalic and atraumatic.      Mouth/Throat:      Mouth: Mucous membranes are moist.   Eyes:      General: No scleral icterus.     Extraocular Movements: Extraocular movements intact.   Pulmonary:      Effort: Pulmonary effort is normal. No respiratory distress.   Abdominal:      Palpations: Abdomen is soft.      Tenderness: There is no abdominal tenderness.   Musculoskeletal:         General: No swelling or tenderness.      Cervical back: Neck supple.   Skin:     General: Skin is warm and dry.   Neurological:      General: No focal deficit present.      Mental Status: He is alert and oriented to person, place, and time.   Psychiatric:         Mood and Affect: Mood normal.          Behavior: Behavior normal.       Significant Labs: All pertinent labs within the past 24 hours have been reviewed.    Significant Imaging: I have reviewed all pertinent imaging results/findings within the past 24 hours.      Assessment/Plan:      * Hip fracture  Pt with Minimally displaced and slightly impacted subcapital right femoral neck fracture on CT imaging 8/13  RCRI of 1 due to h/o ischemic heart disease.   Pt states he is able to walk several blocks without sob.  CXR and ekg pending  Pain medications ordered prn    -Repair on 8/14 successful  -pt/ot pending  -lovenox for 14 days post-op        Coronary artery disease involving native coronary artery of native heart without angina pectoris  Pt with h/o cad with stent placement in 7/2021 per pt records. Currently on DAPT therapy and statin along with bb.    -continue aspirin and bb  -restart plavix post-op day 3        Type 2 diabetes mellitus  Patient's FSGs are controlled on current medication regimen.  Last A1c reviewed-   Lab Results   Component Value Date    LABA1C 6.9 (H) 07/06/2015    HGBA1C 6.7 (H) 12/02/2021     Most recent fingerstick glucose reviewed-   Recent Labs   Lab 08/14/22  1643 08/14/22  1909 08/15/22  0714 08/15/22  1130   POCTGLUCOSE 217* 204* 144* 182*     Current correctional scale  Low  Maintain anti-hyperglycemic dose as follows-   Antihyperglycemics (From admission, onward)            Start     Stop Route Frequency Ordered    08/13/22 1548  insulin aspart U-100 pen 0-5 Units         -- SubQ Before meals & nightly PRN 08/13/22 1450        Hold Oral hypoglycemics while patient is in the hospital.    Essential hypertension, benign  bp currently controlled. Restarted home medications  Hydralazine prn for hypertension      Hyperlipidemia  Per pt history. Restart statin      Aortic root dilatation          VTE Risk Mitigation (From admission, onward)         Ordered     enoxaparin injection 40 mg  Daily         08/14/22 1253     IP  VTE HIGH RISK PATIENT  Once         08/13/22 1450     Place sequential compression device  Until discontinued         08/13/22 1450                Discharge Planning   KRISTIN:      Code Status: DNR   Is the patient medically ready for discharge?:     Reason for patient still in hospital (select all that apply): PT / OT recommendations  Discharge Plan A: Home                  Christian Proctor III, MD  Department of Hospital Medicine   [facility]

## 2022-08-15 NOTE — PT/OT/SLP EVAL
Physical Therapy Evaluation    Patient Name:  Juan Martínez Jr.   MRN:  [id number]    Recommendations:     Discharge Recommendations:  rehabilitation facility   Discharge Equipment Recommendations: bedside commode, bath bench   Barriers to discharge home: Pt with decreased functional mobility and ambulation at this time.  Pt with increased fall risk, now has posterior hip precautions s/p hip sx.    Assessment:     Juan Martínez Jr. is a 86 y.o. male admitted with a medical diagnosis of Hip fracture.  He presents with the following impairments/functional limitations:  weakness, impaired endurance, impaired self care skills, impaired functional mobility, gait instability, impaired balance, impaired cognition, decreased lower extremity function, decreased safety awareness, pain, decreased ROM, impaired skin, edema, orthopedic precautions.    Rehab Prognosis: Good; patient would benefit from acute skilled PT services to address these deficits and reach maximum level of function.    Recent Surgery: Procedure(s) (LRB):  HEMIARTHROPLASTY, HIP (Right) 1 Day Post-Op    Plan:     During this hospitalization, patient to be seen  (daily, BID as tolerated) to address the identified rehab impairments via gait training, therapeutic activities, therapeutic exercises, neuromuscular re-education and progress toward the following goals:    · Plan of Care Expires:  08/29/22    Subjective     Chief Complaint: Pt reported no pain at rest, only pain with movement.  Patient/Family Comments/goals: Pt agreeable to therapy.   Pain/Comfort:  · Pain Rating 1: 10/10  · Location - Side 1: Right  · Location 1: leg  · Pain Addressed 1: Reposition, Distraction, Cessation of Activity, Nurse notified (Pt declined pain meds from nursing prior to tx.  Pt educated on pain meds prior to therapy.  Pt verbalized understanding.)    Living Environment:  Pt lives alone on the 1st floor apartment with no concerns at entry.   Prior to admission, patients  level of function was independent and driving.  Equipment at home: rollator.  Upon discharge, patient will have assistance from sister.    Objective:     Communicated with nurse Мария prior to session.  Patient found HOB elevated with peripheral IV, knee immobilizer upon PT entry to room.    General Precautions: Standard, fall, diabetic, [place]   Orthopedic Precautions:RLE weight bearing as tolerated, RLE posterior precautions; Per Dr. Villanueva @1004.  Braces: Knee immobilizer (in bed due to posterior hip precautions)  Respiratory Status: Room air    Exams:  · Cognitive Exam:  Patient is oriented to Person, Place and Situation.  Pt able to follow simple commands.   · Gross Motor Coordination:  WFL  · Postural Exam:  Patient presented with the following abnormalities:    · -       No postural abnormalities identified  · Sensation:    · -       Intact  light/touch BLE  · Skin Integrity/Edema:      · -       Skin integrity: R hip dressing intact  · -       Edema: Mild RLE  · BLE ROM: WFL; Pt with decreased R knee flex 2* h/o knee sx.    · BLE Strength: WFL    Functional Mobility:  Pt was alert, but [place].  Pt with delayed initiation and communication, required time to initiate tasks and answer questions.  Pt now has posterior hip precautions s/p sx, will need reinforcement; pt was unable to recall precautions at end of therapy.  Pt very motivated to regain independence.    · Bed Mobility:     · Scooting: minimum assistance  · Supine to Sit: moderate assistance with HOB elevated   · Transfers:     · Sit to Stand: minimum assistance with rolling walker for sit>stand; mod A for stand>sit  · Bed to Chair: contact guard assistance and minimum assistance with  rolling walker  using  Step Transfer  · Gait: Pt ambulated ~40 ft with min A-CGA using RW and chair to follow.  Pt with narrow [place], decreased weight shifting, decreased foot clearance, decreased step length, and decreased rachelle.  Pt required min VC's and assistance for RW  management.   · Balance: Pt with fair dynamic standing balance.    Therapeutic Activities and Exercises:  RLE seated therex as tolerated: LAQ/HS  BLE seated therex as tolerated: AP    BLE supine therex x10 reps: AP, QS, and GS    Pt educated on posterior hip precautions: no bending/flexing surgical hip >90 degrees, no crossing of surgical leg beyond midline, and no internal rotation of surgical leg beyond neutral (don't twist body toward surgical leg). Handout provided.  Pt will required reinforcement, unable to recall hip precautions after education and demonstration by therapist.     Pt also provided handout on HEP for seated/supine LE s/p BLAISE.  Pt educated on LE therex and acute skilled PT services and goals s/p hip sx.  Pt encouraged to call for nursing assistance with OOB activities.  Pt verbalized understanding, will need reinforcement.      AM-PAC 6 CLICK MOBILITY  Total Score:15     Patient left up in chair on seat cushion reclined with RLE elevated on pillow with all lines intact and call button in reach.  Tray table in front and pt's door left opened.      GOALS:   Multidisciplinary Problems     Physical Therapy Goals        Problem: Physical Therapy    Goal Priority Disciplines Outcome Goal Variances Interventions   Physical Therapy Goal     PT, PT/OT      Description: Goals to be met by: 22     Patient will increase functional independence with mobility by performin. Supine to sit with Modified [place]  2. Rolling to Left and Right with Modified [place]  3. Sit to stand transfer with Modified [place] using RW  4. Bed to chair transfer with Modified [place] using Rolling Walker  5. Gait >200 feet with Modified [place] using Rolling Walker   6. Lower extremity exercise program 2 sets x15 reps per handout, with independence                     History:     Past Medical History:   Diagnosis Date    Coronary artery disease     Diabetes mellitus     High cholesterol      Hypertension     NSTEMI (non-ST elevated myocardial infarction) 07/25/2021    Prostate cancer        Past Surgical History:   Procedure Laterality Date    APPLICATION OF FULL-THICKNESS SKIN GRAFT (FTSG) TO UPPER EXTREMITY Right 11/15/2018    Procedure: APPLICATION, GRAFT, SKIN, FULL-THICKNESS, TO UPPER EXTREMITY VS STSG WITH FROZEN SECTIONS;  Surgeon: Alan Arzola MD;  Location: [facility] OR;  Service: Plastics;  Laterality: Right;    EXCISION OF SQUAMOUS CELL CARCINOMA Right 11/15/2018    Procedure: EXCISION, CARCINOMA, SQUAMOUS CELL @ RIGHT HAND;  Surgeon: Alan Arzola MD;  Location: [facility] OR;  Service: Plastics;  Laterality: Right;  RN PREOP 11/13/2018--NEED H/P    JOINT REPLACEMENT      LEFT HEART CATHETERIZATION Left 5/6/2019    Procedure: Left heart cath;  Surgeon: Rashad Nieto MD;  Location: [facility] CATH LAB;  Service: Cardiology;  Laterality: Left;    LEFT HEART CATHETERIZATION Left 7/26/2021    Procedure: Left heart cath, radial;  Surgeon: Rashad Nieto MD;  Location: [facility] CATH LAB;  Service: Cardiology;  Laterality: Left;    TONSILLECTOMY      TOTAL KNEE ARTHROPLASTY         Time Tracking:     PT Received On: 08/15/22  PT Start Time: 1059     PT Stop Time: 1125  PT Total Time (min): 26 min     Billable Minutes: Evaluation 18 min co-eval with OT and Therapeutic Exercise 8 min      08/15/2022

## 2022-08-16 PROBLEM — D62 ACUTE BLOOD LOSS ANEMIA: Status: ACTIVE | Noted: 2022-08-16

## 2022-08-16 LAB
ANION GAP SERPL CALC-SCNC: 11 MMOL/L (ref 8–16)
BASOPHILS # BLD AUTO: 0.01 K/UL (ref 0–0.2)
BASOPHILS NFR BLD: 0.1 % (ref 0–1.9)
BUN SERPL-MCNC: 15 MG/DL (ref 8–23)
CALCIUM SERPL-MCNC: 8.9 MG/DL (ref 8.7–10.5)
CHLORIDE SERPL-SCNC: 101 MMOL/L (ref 95–110)
CO2 SERPL-SCNC: 24 MMOL/L (ref 23–29)
CREAT SERPL-MCNC: 0.9 MG/DL (ref 0.5–1.4)
DIFFERENTIAL METHOD: ABNORMAL
EOSINOPHIL # BLD AUTO: 0.1 K/UL (ref 0–0.5)
EOSINOPHIL NFR BLD: 0.7 % (ref 0–8)
ERYTHROCYTE [DISTWIDTH] IN BLOOD BY AUTOMATED COUNT: 15.3 % (ref 11.5–14.5)
EST. GFR  (NO RACE VARIABLE): >60 ML/MIN/1.73 M^2
GLUCOSE SERPL-MCNC: 137 MG/DL (ref 70–110)
HCT VFR BLD AUTO: 30.1 % (ref 40–54)
HGB BLD-MCNC: 9.8 G/DL (ref 14–18)
IMM GRANULOCYTES # BLD AUTO: 0.04 K/UL (ref 0–0.04)
IMM GRANULOCYTES NFR BLD AUTO: 0.4 % (ref 0–0.5)
LYMPHOCYTES # BLD AUTO: 1.3 K/UL (ref 1–4.8)
LYMPHOCYTES NFR BLD: 13.8 % (ref 18–48)
MCH RBC QN AUTO: 29.7 PG (ref 27–31)
MCHC RBC AUTO-ENTMCNC: 32.6 G/DL (ref 32–36)
MCV RBC AUTO: 91 FL (ref 82–98)
MONOCYTES # BLD AUTO: 1.2 K/UL (ref 0.3–1)
MONOCYTES NFR BLD: 12.9 % (ref 4–15)
NEUTROPHILS # BLD AUTO: 6.5 K/UL (ref 1.8–7.7)
NEUTROPHILS NFR BLD: 72.1 % (ref 38–73)
NRBC BLD-RTO: 0 /100 WBC
PLATELET # BLD AUTO: 264 K/UL (ref 150–450)
PMV BLD AUTO: 10 FL (ref 9.2–12.9)
POCT GLUCOSE: 141 MG/DL (ref 70–110)
POCT GLUCOSE: 147 MG/DL (ref 70–110)
POCT GLUCOSE: 156 MG/DL (ref 70–110)
POTASSIUM SERPL-SCNC: 3.7 MMOL/L (ref 3.5–5.1)
RBC # BLD AUTO: 3.3 M/UL (ref 4.6–6.2)
SARS-COV-2 RDRP RESP QL NAA+PROBE: NEGATIVE
SODIUM SERPL-SCNC: 136 MMOL/L (ref 136–145)
WBC # BLD AUTO: 9.05 K/UL (ref 3.9–12.7)

## 2022-08-16 PROCEDURE — 63600175 PHARM REV CODE 636 W HCPCS: Performed by: ORTHOPAEDIC SURGERY

## 2022-08-16 PROCEDURE — 97116 GAIT TRAINING THERAPY: CPT | Mod: CQ

## 2022-08-16 PROCEDURE — 11000001 HC ACUTE MED/SURG PRIVATE ROOM

## 2022-08-16 PROCEDURE — 97110 THERAPEUTIC EXERCISES: CPT | Mod: CQ

## 2022-08-16 PROCEDURE — 99900035 HC TECH TIME PER 15 MIN (STAT)

## 2022-08-16 PROCEDURE — 94799 UNLISTED PULMONARY SVC/PX: CPT

## 2022-08-16 PROCEDURE — 80048 BASIC METABOLIC PNL TOTAL CA: CPT | Performed by: STUDENT IN AN ORGANIZED HEALTH CARE EDUCATION/TRAINING PROGRAM

## 2022-08-16 PROCEDURE — 85025 COMPLETE CBC W/AUTO DIFF WBC: CPT | Performed by: STUDENT IN AN ORGANIZED HEALTH CARE EDUCATION/TRAINING PROGRAM

## 2022-08-16 PROCEDURE — 25000003 PHARM REV CODE 250: Performed by: STUDENT IN AN ORGANIZED HEALTH CARE EDUCATION/TRAINING PROGRAM

## 2022-08-16 PROCEDURE — 94761 N-INVAS EAR/PLS OXIMETRY MLT: CPT

## 2022-08-16 PROCEDURE — 97535 SELF CARE MNGMENT TRAINING: CPT

## 2022-08-16 PROCEDURE — 36415 COLL VENOUS BLD VENIPUNCTURE: CPT | Performed by: STUDENT IN AN ORGANIZED HEALTH CARE EDUCATION/TRAINING PROGRAM

## 2022-08-16 PROCEDURE — U0002 COVID-19 LAB TEST NON-CDC: HCPCS | Performed by: HOSPITALIST

## 2022-08-16 RX ORDER — CLOPIDOGREL BISULFATE 75 MG/1
75 TABLET ORAL DAILY
Status: DISCONTINUED | OUTPATIENT
Start: 2022-08-17 | End: 2022-08-17 | Stop reason: HOSPADM

## 2022-08-16 RX ADMIN — ESCITALOPRAM OXALATE 10 MG: 10 TABLET ORAL at 08:08

## 2022-08-16 RX ADMIN — ISOSORBIDE MONONITRATE 60 MG: 30 TABLET, EXTENDED RELEASE ORAL at 08:08

## 2022-08-16 RX ADMIN — ASPIRIN 81 MG: 81 TABLET, COATED ORAL at 08:08

## 2022-08-16 RX ADMIN — METOPROLOL SUCCINATE 50 MG: 50 TABLET, EXTENDED RELEASE ORAL at 08:08

## 2022-08-16 RX ADMIN — ENOXAPARIN SODIUM 40 MG: 100 INJECTION SUBCUTANEOUS at 05:08

## 2022-08-16 RX ADMIN — OXYCODONE AND ACETAMINOPHEN 1 TABLET: 5; 325 TABLET ORAL at 08:08

## 2022-08-16 RX ADMIN — AMLODIPINE BESYLATE 10 MG: 5 TABLET ORAL at 08:08

## 2022-08-16 RX ADMIN — Medication 6 MG: at 08:08

## 2022-08-16 RX ADMIN — HYDROCHLOROTHIAZIDE 12.5 MG: 12.5 TABLET ORAL at 08:08

## 2022-08-16 RX ADMIN — ATORVASTATIN CALCIUM 40 MG: 40 TABLET, FILM COATED ORAL at 08:08

## 2022-08-16 RX ADMIN — OXYCODONE AND ACETAMINOPHEN 1 TABLET: 5; 325 TABLET ORAL at 10:08

## 2022-08-16 NOTE — PLAN OF CARE
Problem: Occupational Therapy  Goal: Occupational Therapy Goal  Description: Goals to be met by: 8/29/22    Patient will increase functional independence with ADLs by performing:    LE Dressing with Supervision and use of AE for threading LB clothing to adhere to posterior precautions.   Grooming while standing at sink with Supervision and use of AD.  Toileting from bedside commode with Supervision for hygiene and clothing management.   Supine to sit with Modified [place].  Step transfer with Supervision and use of AD for safety.  Toilet transfer to bedside commode with Supervision.  Upper extremity exercise program x15 reps per handout, with independence.  Recall 4/4 posterior hip precautions w/o cueing.     Outcome: Ongoing, Progressing     Pt progressing well towards goals and will greatly benefit from IPR at d/c in order to increase safety and independence with ADLs and all aspects of functional mobility.

## 2022-08-16 NOTE — PLAN OF CARE
Patient rested well throughout night. VSS. NAD distress. Patient denies pain at this time. Discussed care plan with patient. All questions answered. Verbalized understanding. Will continue to monitor.

## 2022-08-16 NOTE — PT/OT/SLP PROGRESS
Occupational Therapy   Treatment    Name: Juan Martínez Jr.  MRN: [id number]  Admitting Diagnosis:  Hip fracture  2 Days Post-Op    Recommendations:     Discharge Recommendations: rehabilitation facility  Discharge Equipment Recommendations:  bath bench, bedside commode, walker, rolling, hip kit  Barriers to discharge:   (not at PLOF; high risk of falls, unplanned readmission, and morbidity if d/c home)    Assessment:     Juan Martínez Jr. is a 86 y.o. male with a medical diagnosis of Hip fracture. Performance deficits affecting function are weakness, impaired endurance, impaired cognition, decreased ROM, decreased coordination, orthopedic precautions, impaired self care skills, decreased lower extremity function, impaired skin, impaired functional mobility, gait instability, decreased safety awareness, impaired balance.     Pt progressing well towards goals and will greatly benefit from IPR at d/c in order to increase safety and independence with ADLs and all aspects of functional mobility    Rehab Prognosis:  Good; patient would benefit from acute skilled OT services to address these deficits and reach maximum level of function.       Plan:     Patient to be seen  (5-6x/wk) to address the above listed problems via self-care/home management, therapeutic activities, therapeutic exercises  · Plan of Care Expires: 08/29/22  · Plan of Care Reviewed with: patient    Subjective     Chief complaint: doesn't like the food here   Patient/family comments/ goals: agreeable to session     Pain/Comfort:  Pain Rating 1: 0/10    Objective:     Communicated with: nursing prior to session. Patient found reclined in the chair with BLE heels offloaded by pillow with peripheral IV, knee immobilizer upon OT entry to room.    General Precautions: Standard, fall, diabetic   Orthopedic Precautions:RLE weight bearing as tolerated, RLE posterior precautions   Braces: Knee immobilizer  Respiratory Status: Room air     Occupational  Performance:     Bed Mobility:    · Patient completed Scooting with stand by assistance  · Patient completed Sit to Supine with moderate assistance for BLE  · OT edu with demo on use of LLE to hook under RLE to assist     Functional Mobility/Transfers:  · Patient completed Sit <> Stand Transfer with minimum assistance  with  rolling walker   · Functional Mobility: pt completed in-room functional mobility using RW with CGA. Min verbal cueing for body mechanics with each sit<>stand for compliance with posterior hip precautions.     Activities of Daily Living:  · Grooming: contact guard assistance standing at the sink to wash his hands, brush his teeth, and use mouthwash  · Upper Body Dressing: supervision seated EOB to doff back gown   · Lower Body Dressing: OT demo use of reacher then use of sock aide then pt demo'd back seated EOB with CGA and verbal/tactile cueing        [facility] 6 Click ADL: 19    Treatment & Education:  · Pt re-educated on OT role/POC.   · Upon arrival, pt able to state 50% of posterior hip precautions and min prompting to recall the rest; at end of session, pt able to recall 50%; handout at bedside reviewed  · Handout at bedside for hip kit and pt practiced with dressing DME as described above. OT demo use of reacher with example of pillow case as pant leg and edu on sequencing of BLE for safety with dressing   · x10 use of incentive spirometer   · Importance of OOB activity with staff assistance. edu PCT on posterior hip precautions and pt OOB to chair/BSC using RW with nursing staff   · Safety during functional t/f and mobility  · Multiple self-care tasks/functional mobility completed- assistance level noted above   · All questions/concerns answered within OT scope of practice       Patient left HOB elevated with BLE heels offloaded by pillow and RLE knee immobilizer in place with all lines intact, call button in reach, bed alarm on, PCT, Charity, notified and all needs met/within  reachEducation:      GOALS:   Multidisciplinary Problems     Occupational Therapy Goals        Problem: Occupational Therapy    Goal Priority Disciplines Outcome Interventions   Occupational Therapy Goal     OT, PT/OT Ongoing, Progressing    Description: Goals to be met by: 8/29/22    Patient will increase functional independence with ADLs by performing:    LE Dressing with Supervision and use of AE for threading LB clothing to adhere to posterior precautions.   Grooming while standing at sink with Supervision and use of AD.  Toileting from bedside commode with Supervision for hygiene and clothing management.   Supine to sit with Modified [place].  Step transfer with Supervision and use of AD for safety.  Toilet transfer to bedside commode with Supervision.  Upper extremity exercise program x15 reps per handout, with independence.  Recall 4/4 posterior hip precautions w/o cueing.                      Time Tracking:     OT Date of Treatment: 08/16/22  OT Start Time: 1440  OT Stop Time: 1503  OT Total Time (min): 23 min    Billable Minutes:Self Care/Home Management 23 min    OT/DEMOND: OT     DEMOND Visit Number: 0    8/16/2022

## 2022-08-16 NOTE — PLAN OF CARE
Problem: Physical Therapy  Goal: Physical Therapy Goal  Description: Goals to be met by: 22     Patient will increase functional independence with mobility by performin. Supine to sit with Modified [place]  2. Rolling to Left and Right with Modified [place]  3. Sit to stand transfer with Modified [place] using RW  4. Bed to chair transfer with Modified [place] using Rolling Walker  5. Gait >200 feet with Modified [place] using Rolling Walker   6. Lower extremity exercise program 2 sets x15 reps per handout, with independence    Outcome: Ongoing, Progressing   Patient ambulated WBAT: right lower extremity ~60  feet on level tile with Rolling Walker with Contact Guard Assistance. Pt with demonstrating a 3-point gait pattern with decreased rachelle, increased time in double stance, decreased velocity of limb motion, decreased step length and decreased weight-shifting ability. Fair Dynamic Standing Balance. Pt required etra time to perform task due to pain and Hip precautions. Pt also required Min/Mod A for Sup>Sit and Sit<>Stand from EOB.

## 2022-08-16 NOTE — PLAN OF CARE
08/16/22 1025   Medicare Message   Important Message from Medicare regarding Discharge Appeal Rights Given to patient/caregiver;Explained to patient/caregiver;Signed/date by patient/caregiver;Other (comments)   Date IMM was signed 08/16/22   Time IMM was signed 1029   [facility] mail [phone]

## 2022-08-16 NOTE — PT/OT/SLP PROGRESS
Physical Therapy Treatment    Patient Name:  Juan Martínez Jr.   MRN:  [id number]    Recommendations:     Discharge Recommendations:  rehabilitation facility    Discharge Equipment Recommendations: bath bench, bedside commode  Barriers to discharge: Pt with decreased functional mobility and ambulation at this time.  Pt with increased fall risk, now has posterior hip precautions s/p hip sx.    Assessment:     Juan Martínez Jr. is a 86 y.o. male admitted with a medical diagnosis of Hip fracture.  He presents with the following impairments/functional limitations:  weakness, impaired endurance, impaired self care skills, impaired functional mobility, gait instability, impaired balance, decreased lower extremity function, pain, impaired skin .    Pt agreeable to work with Therapist. Patient ambulated WBAT: right lower extremity ~60  feet on level tile with Rolling Walker with Contact Guard Assistance. Pt with demonstrating a 3-point gait pattern with decreased rachelle, increased time in double stance, decreased velocity of limb motion, decreased step length and decreased weight-shifting ability. Fair Dynamic Standing Balance. Pt required etra time to perform task due to pain and Hip precautions. Pt also required Min/Mod A for Sup>Sit and Sit<>Stand from EOB.    Rehab Prognosis: Good; patient would benefit from acute skilled PT services to address these deficits and reach maximum level of function.    Recent Surgery: Procedure(s) (LRB):  HEMIARTHROPLASTY, HIP (Right) 2 Days Post-Op    Plan:     During this hospitalization, patient to be seen daily to address the identified rehab impairments via gait training, therapeutic activities, therapeutic exercises, neuromuscular re-education and progress toward the following goals:    · Plan of Care Expires:  08/29/22    Subjective     Chief Complaint: 1st attempt at 10:25 am Pt requested to take his pain medication before the treatment started. Therapist notified nurse Hsieh.  Therapist was able to work with pt when coming back 2nd attempt at 11:15 am   Patient/Family Comments/goals: Pt agreeable to work with Therapist.  Pain/Comfort:  · Pain Rating 1: 0/10  · Pain Addressed 1: Pre-medicate for activity  · Pain Rating Post-Intervention 1:  (soreness to RLE)      Objective:     Communicated with nurse Hsieh prior to session.  Patient found HOB elevated with peripheral IV, knee immobilizer upon PT entry to room.     General Precautions: Standard, fall, diabetic   Orthopedic Precautions:RLE weight bearing as tolerated, RLE posterior precautions   Braces: Knee immobilizer  Respiratory Status: Room air     Functional Mobility: Knee brace was taken out and put it back on for readjustment. Pt was able to recall 1 DON'T task (not bending above 90 degree) and was re-educated the rest of precautions list.  · Bed Mobility:     · Rolling Right: contact guard assistance log roll using bed rail with BLE slightly bend less than 90 degree  · Scooting: anterior scoot to EOB with contact guard assistance  · Supine to Sit: moderate assistance for trunk support and BLE management  · Transfers:     · Sit to Stand:  minimum assistance with rolling walker from EOB, required v/c for Posterior Hip precaution to avoid having Hip Flex at or above 90 degree  · Gait: Patient ambulated WBAT: right lower extremity ~60  feet on level tile with Rolling Walker with Contact Guard Assistance. Pt with demonstrating a 3-point gait pattern with decreased rachelle, increased time in double stance, decreased velocity of limb motion, decreased step length and decreased weight-shifting ability. Impairments contributing to gait deviations include impaired balance, pain and decreased strength.  · Balance: Fair Dynamic Standing      AM-PAC 6 CLICK MOBILITY  Turning over in bed (including adjusting bedclothes, sheets and blankets)?: 3  Sitting down on and standing up from a chair with arms (e.g., wheelchair, bedside commode, etc.):  3  Moving from lying on back to sitting on the side of the bed?: 2  Moving to and from a bed to a chair (including a wheelchair)?: 3  Need to walk in hospital room?: 3  Climbing 3-5 steps with a railing?: 1  Basic Mobility Total Score: 15       Therapeutic Activities and Exercises: Pt performed ex in seated position on BS Chair.   AP 10 reps   LAQ 10 reps  GS 10 reps 3 sec hold  QS 10 reps 3 sec hold    Patient left up in chair in reclined position with BLE offloaded, tray table, all lines intact, call button in reach, nurse Мария notified and Sibling (Sister) present..    GOALS:   Multidisciplinary Problems     Physical Therapy Goals        Problem: Physical Therapy    Goal Priority Disciplines Outcome Goal Variances Interventions   Physical Therapy Goal     PT, PT/OT Ongoing, Progressing     Description: Goals to be met by: 22     Patient will increase functional independence with mobility by performin. Supine to sit with Modified [place]  2. Rolling to Left and Right with Modified [place]  3. Sit to stand transfer with Modified [place] using RW  4. Bed to chair transfer with Modified [place] using Rolling Walker  5. Gait >200 feet with Modified [place] using Rolling Walker   6. Lower extremity exercise program 2 sets x15 reps per handout, with independence                     Time Tracking:     PT Received On:    PT Start Time: 1115     PT Stop Time: 1145  PT Total Time (min): 30 min     Billable Minutes: Gait Training 15 min and Therapeutic Exercise 15 min    Treatment Type: Treatment  PT/PTA: PTA     PTA Visit Number: 2022

## 2022-08-16 NOTE — PLAN OF CARE
10:19 AM  Corina with [facility] accepted patient [phone]    10:30 AM TN called [phone] Kelly CM at [facility] informed her that patient was accepted at  in Rehab.  Medical Director, Kevin Orozco MD.   TN awaiting [facility]  Rehab authorization.    11:37 AM Kelly with [facility] says she can approve SNF. Kelly says pt does not meet criteria for IPR. Kelly offered peer to peer, sending ot MRU.  TN sent secure chat to Dr. Curtis if he would participate with in a peer to peer for  IPR.       08/16/22 1048   Post-Acute Status   Post-Acute Authorization Placement   Post-Acute Placement Status Pending payor review/awaiting authorization (if required)   [facility]   Patient choice form signed by patient/caregiver List with quality metrics by geographic area provided   Discharge Delays None known at this time   Discharge Plan   Discharge Plan A Rehab   Discharge Plan B Rehab

## 2022-08-17 VITALS
TEMPERATURE: 99 F | DIASTOLIC BLOOD PRESSURE: 68 MMHG | RESPIRATION RATE: 19 BRPM | SYSTOLIC BLOOD PRESSURE: 148 MMHG | OXYGEN SATURATION: 96 % | WEIGHT: 189 LBS | HEART RATE: 64 BPM | BODY MASS INDEX: 28.64 KG/M2 | HEIGHT: 68 IN

## 2022-08-17 LAB
BASOPHILS # BLD AUTO: 0.03 K/UL (ref 0–0.2)
BASOPHILS NFR BLD: 0.4 % (ref 0–1.9)
BLD PROD TYP BPU: NORMAL
BLOOD UNIT EXPIRATION DATE: NORMAL
BLOOD UNIT TYPE CODE: 5100
BLOOD UNIT TYPE: NORMAL
CODING SYSTEM: NORMAL
DIFFERENTIAL METHOD: ABNORMAL
DISPENSE STATUS: NORMAL
EOSINOPHIL # BLD AUTO: 0.3 K/UL (ref 0–0.5)
EOSINOPHIL NFR BLD: 3.5 % (ref 0–8)
ERYTHROCYTE [DISTWIDTH] IN BLOOD BY AUTOMATED COUNT: 15.1 % (ref 11.5–14.5)
ESTIMATED AVG GLUCOSE: 137 MG/DL (ref 68–131)
HBA1C MFR BLD: 6.4 % (ref 4–5.6)
HCT VFR BLD AUTO: 29.1 % (ref 40–54)
HGB BLD-MCNC: 9.8 G/DL (ref 14–18)
IMM GRANULOCYTES # BLD AUTO: 0.08 K/UL (ref 0–0.04)
IMM GRANULOCYTES NFR BLD AUTO: 0.9 % (ref 0–0.5)
LYMPHOCYTES # BLD AUTO: 1.3 K/UL (ref 1–4.8)
LYMPHOCYTES NFR BLD: 14.6 % (ref 18–48)
MCH RBC QN AUTO: 30.7 PG (ref 27–31)
MCHC RBC AUTO-ENTMCNC: 33.7 G/DL (ref 32–36)
MCV RBC AUTO: 91 FL (ref 82–98)
MONOCYTES # BLD AUTO: 0.9 K/UL (ref 0.3–1)
MONOCYTES NFR BLD: 10.7 % (ref 4–15)
NEUTROPHILS # BLD AUTO: 6 K/UL (ref 1.8–7.7)
NEUTROPHILS NFR BLD: 69.9 % (ref 38–73)
NRBC BLD-RTO: 0 /100 WBC
PLATELET # BLD AUTO: 253 K/UL (ref 150–450)
PMV BLD AUTO: 9.8 FL (ref 9.2–12.9)
POCT GLUCOSE: 139 MG/DL (ref 70–110)
POCT GLUCOSE: 153 MG/DL (ref 70–110)
POCT GLUCOSE: 159 MG/DL (ref 70–110)
RBC # BLD AUTO: 3.19 M/UL (ref 4.6–6.2)
TRANS ERYTHROCYTES VOL PATIENT: NORMAL ML
WBC # BLD AUTO: 8.54 K/UL (ref 3.9–12.7)

## 2022-08-17 PROCEDURE — 86580 TB INTRADERMAL TEST: CPT | Performed by: HOSPITALIST

## 2022-08-17 PROCEDURE — 30200315 PPD INTRADERMAL TEST REV CODE 302: Performed by: HOSPITALIST

## 2022-08-17 PROCEDURE — 25000003 PHARM REV CODE 250: Performed by: HOSPITALIST

## 2022-08-17 PROCEDURE — 27000221 HC OXYGEN, UP TO 24 HOURS

## 2022-08-17 PROCEDURE — 36415 COLL VENOUS BLD VENIPUNCTURE: CPT | Performed by: HOSPITALIST

## 2022-08-17 PROCEDURE — 25000003 PHARM REV CODE 250: Performed by: STUDENT IN AN ORGANIZED HEALTH CARE EDUCATION/TRAINING PROGRAM

## 2022-08-17 PROCEDURE — 94761 N-INVAS EAR/PLS OXIMETRY MLT: CPT

## 2022-08-17 PROCEDURE — 94799 UNLISTED PULMONARY SVC/PX: CPT

## 2022-08-17 PROCEDURE — 83036 HEMOGLOBIN GLYCOSYLATED A1C: CPT | Performed by: HOSPITALIST

## 2022-08-17 PROCEDURE — 85025 COMPLETE CBC W/AUTO DIFF WBC: CPT | Performed by: HOSPITALIST

## 2022-08-17 RX ORDER — OXYCODONE AND ACETAMINOPHEN 5; 325 MG/1; MG/1
1 TABLET ORAL EVERY 4 HOURS PRN
Qty: 10 TABLET | Refills: 0 | Status: SHIPPED | OUTPATIENT
Start: 2022-08-17 | End: 2022-08-24

## 2022-08-17 RX ORDER — ENOXAPARIN SODIUM 100 MG/ML
40 INJECTION SUBCUTANEOUS EVERY 12 HOURS
Qty: 8.8 ML | Refills: 0
Start: 2022-08-17 | End: 2022-08-28

## 2022-08-17 RX ORDER — OXYCODONE AND ACETAMINOPHEN 5; 325 MG/1; MG/1
1 TABLET ORAL EVERY 4 HOURS PRN
Qty: 10 TABLET | Refills: 0 | Status: SHIPPED | OUTPATIENT
Start: 2022-08-17 | End: 2022-08-17 | Stop reason: SDUPTHER

## 2022-08-17 RX ADMIN — CLOPIDOGREL 75 MG: 75 TABLET, FILM COATED ORAL at 08:08

## 2022-08-17 RX ADMIN — TUBERCULIN PURIFIED PROTEIN DERIVATIVE 5 UNITS: 5 INJECTION, SOLUTION INTRADERMAL at 08:08

## 2022-08-17 RX ADMIN — ISOSORBIDE MONONITRATE 60 MG: 30 TABLET, EXTENDED RELEASE ORAL at 08:08

## 2022-08-17 RX ADMIN — METOPROLOL SUCCINATE 50 MG: 50 TABLET, EXTENDED RELEASE ORAL at 08:08

## 2022-08-17 RX ADMIN — ATORVASTATIN CALCIUM 40 MG: 40 TABLET, FILM COATED ORAL at 08:08

## 2022-08-17 RX ADMIN — HYDROCHLOROTHIAZIDE 12.5 MG: 12.5 TABLET ORAL at 08:08

## 2022-08-17 RX ADMIN — ASPIRIN 81 MG: 81 TABLET, COATED ORAL at 08:08

## 2022-08-17 RX ADMIN — AMLODIPINE BESYLATE 10 MG: 5 TABLET ORAL at 08:08

## 2022-08-17 NOTE — NURSING
Call placed to [facility] [phone] report given tp Cookie WALDROP, verbalized understanding.

## 2022-08-17 NOTE — PROGRESS NOTES
ADT 30 order placed for Van Transportation.  Requested  time:3:00PM room 206.  Patient to travel via wheel chair van with oxygen to [address]; [place] [address]    If transportation does not arrive at ETA time nurse will be instructed to follow protocol for transportation below:   How can I get in touch directly with dispatch, if needed?                 Non-emergent dispatch: [phone]      +++NURSING:  If Van does not arrive at requested time please call the above Non Emergent Dispatcher.  If issue not resolved please escalate to your charge nurse for further instructions.

## 2022-08-17 NOTE — SUBJECTIVE & OBJECTIVE
Interval History: No acute events overnight.  Denies pain and shortness of breath.  Medically readhy for discharge to post-acute.  All questions answered and patient had no further complaints.    Review of Systems   Constitutional:  Negative for activity change, appetite change and fever.   HENT:  Negative for congestion and dental problem.    Eyes:  Negative for discharge and itching.   Respiratory:  Negative for apnea, cough, chest tightness and shortness of breath.    Cardiovascular:  Negative for chest pain and leg swelling.   Gastrointestinal:  Negative for abdominal distention and abdominal pain.   Endocrine: Negative for cold intolerance and heat intolerance.   Genitourinary:  Negative for difficulty urinating and dysuria.   Musculoskeletal:  Negative for arthralgias and back pain.   Allergic/Immunologic: Negative for environmental allergies and food allergies.   Neurological:  Positive for weakness. Negative for dizziness, facial asymmetry and light-headedness.   Hematological:  Negative for adenopathy. Does not bruise/bleed easily.   Psychiatric/Behavioral:  Negative for agitation and behavioral problems.    Objective:     Vital Signs (Most Recent):  Temp: 98.1 °F (36.7 °C) (08/16/22 1908)  Pulse: 67 (08/16/22 1908)  Resp: 16 (08/16/22 1908)  BP: 136/61 (08/16/22 1908)  SpO2: (!) 92 % (08/16/22 1908)   Vital Signs (24h Range):  Temp:  [96.6 °F (35.9 °C)-98.3 °F (36.8 °C)] 98.1 °F (36.7 °C)  Pulse:  [53-70] 67  Resp:  [15-20] 16  SpO2:  [92 %-97 %] 92 %  BP: (134-156)/(61-69) 136/61     Weight: 85.7 kg (189 lb)  Body mass index is 28.74 kg/m².    Intake/Output Summary (Last 24 hours) at 8/16/2022 1951  Last data filed at 8/16/2022 1718  Gross per 24 hour   Intake 1200 ml   Output 1000 ml   Net 200 ml        Physical Exam  Vitals and nursing note reviewed.   Constitutional:       General: He is not in acute distress.     Appearance: He is not ill-appearing, toxic-appearing or diaphoretic.   HENT:      Head:  Normocephalic and atraumatic.      Right Ear: External ear normal.      Left Ear: External ear normal.      Mouth/Throat:      Mouth: Mucous membranes are moist.   Eyes:      General: No scleral icterus.     Extraocular Movements: Extraocular movements intact.      Conjunctiva/sclera: Conjunctivae normal.   Cardiovascular:      Rate and Rhythm: Normal rate and regular rhythm.   Pulmonary:      Effort: Pulmonary effort is normal. No respiratory distress.   Abdominal:      General: There is no distension.      Palpations: Abdomen is soft.      Tenderness: There is no abdominal tenderness.   Musculoskeletal:         General: No swelling or tenderness.      Cervical back: Normal range of motion. No rigidity.      Comments: Incision c/d/i   Skin:     General: Skin is warm and dry.   Neurological:      General: No focal deficit present.      Mental Status: He is alert and oriented to person, place, and time.   Psychiatric:         Mood and Affect: Mood normal.         Behavior: Behavior normal.       Significant Labs: All pertinent labs within the past 24 hours have been reviewed.    Significant Imaging: I have reviewed all pertinent imaging results/findings within the past 24 hours.

## 2022-08-17 NOTE — ASSESSMENT & PLAN NOTE
-History noted  -Had PCI 7/2021 with stents placed to RCA  -Denies chest pain  -Continue aspirin, statin and beta blocker  -Resuming plavix on post-op day 3 (tomorrow)

## 2022-08-17 NOTE — PROGRESS NOTES
Patient preference ofr snfs [facility].  1. Isreal, 2. Raphael, 3. LAVERN. 4. Karen, 5. WB.  Sent through [facility].    [facility] called in and pasrr faxed.  Awaiting 142.

## 2022-08-17 NOTE — PT/OT/SLP PROGRESS
Occupational Therapy      Patient Name:  Juan Martínez Jr.   MRN:  [id number]    Upon DEMOND arrival at 2:48pm, nurse present in patient's room speaking with patient and spouse about discharge planning. OT will follow-up later this afternoon as able. Second attempt for treatment made at 3:18pm, SW  in patient's room with spouse present to discuss discharge. OT will follow-up at the next available date/time as able.    8/17/2022

## 2022-08-17 NOTE — PLAN OF CARE
[facility]     Department of Hospital Medicine     [address] [place] [address]     [phone] after hours  [phone] fax       NURSING HOME ORDERS    08/17/2022    Admit to Nursing Home:  Skilled Bed                                                 Diagnoses:  Active Hospital Problems    Diagnosis  POA    *Hip fracture [S72.009A]  Yes     Priority: 1 - High    Acute blood loss anemia [D62]  No     Priority: 2     Aortic root dilatation [I77.810]  Yes    Coronary artery disease involving native coronary artery of native heart without angina pectoris [I25.10]  Yes    Hyperlipidemia [E78.5]  Yes    Essential hypertension, benign [I10]  Yes    Type 2 diabetes mellitus [E11.9]  Yes      Resolved Hospital Problems   No resolved problems to display.       Patient is homebound due to:  Hip fracture    Allergies:Review of patient's allergies indicates:  No Known Allergies    Vitals:    Every shift (Skilled Nursing patients)    Diet: cardiac diabetic diet   Supplement:  1 can every three times a day with meals                         Type:  Glucerna        Acitivities:      - Up in a chair each morning as tolerated   - Ambulate with assistance to bathroom   - Scheduled walks once each shift (every 8 hours)   - Weight bearing: as tolerated with right posterior hip precautions    LABS:  CBC weekly on Monday x 2 weeks    Nursing Precautions:     - Aspiration precautions:             - Total assistance with meals            -  Upright 90 degrees befor during and after meals             -  Suction at bedside          - Fall precautions per nursing home protocol   - Seizure precaution per alf protocol   - Decubitus precautions:        -  for positioning   - Pressure reducing foam mattress   - Turn patient every two hours. Use wedge pillows to anchor patient    CONSULTS:      Physical Therapy to evaluate and treat   Occupational Therapy to evaluate  and treat   Nutrition to evaluate and recommend diet    MISCELLANEOUS CARE:     Routine Skin for Bedridden Patients:  Apply moisture barrier cream to all    skin folds and wet areas in perineal area daily and after baths and                           all bowel movements.    DIABETES CARE:      Check blood sugar:   Fingerstick blood sugar AC and HS      Report CBG < 60 or > 400 to physician.                                          Insulin Sliding Scale          Glucose  Novolog Insulin Subcutaneous        0 - 60   Orange juice or glucose tablet, hold insulin      No insulin   201-250  2 units   251-300  4 units   301-350  6 units   351-400  8 units   >400   10 units then call physician      Medications:      Medication List      START taking these medications    enoxaparin 40 mg/0.4 mL Syrg  Commonly known as: LOVENOX  Inject 0.4 mLs (40 mg total) into the skin every 12 (twelve) hours. for 11 days     oxyCODONE-acetaminophen 5-325 mg per tablet  Commonly known as: PERCOCET  Take 1 tablet by mouth every 4 (four) hours as needed for Pain.        CONTINUE taking these medications    albuterol 90 mcg/actuation inhaler  Commonly known as: PROVENTIL/VENTOLIN HFA  Inhale 2 puffs into the lungs.     amLODIPine 10 MG tablet  Commonly known as: NORVASC  Take 1 tablet (10 mg total) by mouth once daily.     aspirin 81 MG EC tablet  Commonly known as: ECOTRIN  Take 81 mg by mouth once daily.     atorvastatin 40 MG tablet  Commonly known as: LIPITOR  Take 1 tablet (40 mg total) by mouth once daily.     blood-glucose meter Misc  by Misc.(Non-Drug; Combo Route) route     clopidogreL 75 mg tablet  Commonly known as: PLAVIX  Take 1 tablet (75 mg total) by mouth once daily.     EScitalopram oxalate 10 MG tablet  Commonly known as: LEXAPRO  Take 10 mg by mouth.     hydroCHLOROthiazide 12.5 mg capsule  Commonly known as: MICROZIDE     isosorbide mononitrate 60 MG 24 hr tablet  Commonly known as: IMDUR  Take 1 tablet (60 mg total)  by mouth once daily.     LANCETS & BLOOD GLUCOSE STRIPS MISC  by Misc.(Non-Drug; Combo Route) route Check blood sugars  Twice a day.     metFORMIN 1000 MG tablet  Commonly known as: GLUCOPHAGE  Take 1 tablet (1,000 mg total) by mouth 2 (two) times daily with meals.     metoprolol succinate 50 MG 24 hr tablet  Commonly known as: TOPROL-XL  Take 1 tablet (50 mg total) by mouth once daily.     nitroGLYCERIN 0.4 MG SL tablet  Commonly known as: NITROSTAT  Place 1 tablet (0.4 mg total) under the tongue every 5 (five) minutes as needed for Chest pain.        STOP taking these medications    acetaminophen 325 MG tablet  Commonly known as: TYLENOL     AMITIZA 24 MCG Cap  Generic drug: lubiprostone     dicyclomine 10 MG capsule  Commonly known as: BENTYL     LIDOcaine 5 %  Commonly known as: LIDODERM     ondansetron 4 MG tablet  Commonly known as: ZOFRAN     varicella-zoster gE-AS01B (PF) 50 mcg/0.5 mL injection  Commonly known as: SHINGRIX                  _________________________________  José Luis Curtis MD  08/17/2022

## 2022-08-17 NOTE — PLAN OF CARE
[facility] - Med Surg  Discharge Final Note  TN provided med surg nurse with call report information for [facility] [phone] to room 206.  Patient to travel via wheel chair van with oxygen to [address]; [place] [address].Primary Care Provider: Lissy Gavin MD    Expected Discharge Date: 8/17/2022    Final Discharge Note (most recent)     Final Note - 08/17/22 1444        Final Note    Assessment Type Final Discharge Note     Anticipated Discharge Disposition Skilled Nursing Facility     What phone number can be called within the next 1-3 days to see how you are doing after discharge? --   [phone]    Hospital Resources/Appts/Education Provided Provided patient/caregiver with written discharge plan information;Appointments scheduled and added to AVS        Post-Acute Status    Post-Acute Placement Status Set-up Complete/Auth obtained     Coverage [employer] - [place] CHOICES 65     Patient choice form signed by patient/caregiver List with quality metrics by geographic area provided     Discharge Delays None known at this time                 Important Message from Medicare  Important Message from Medicare regarding Discharge Appeal Rights: Given to patient/caregiver, Explained to patient/caregiver, Signed/date by patient/caregiver, Other (comments)     Date IMM was signed: 08/16/22  Time IMM was signed: 1029    Contact Info     [facility]   Specialty: LTAC, Nursing Home Agency    [address] [place] [address]  [place] [address]   Phone: [phone]       Next Steps: Follow up

## 2022-08-17 NOTE — PLAN OF CARE
11:10 AM Awaiting SNF Plan of Care orders. Accepted by [facility]. Family to sign admit papers today.  11:40 AM  TN called Kellychase SNYDER at [facility] requesting SNF auth [id number], left message requesting return call.    12:02 PM TN sent SNF orders through [facility] to [facility].     08/17/22 1110   Post-Acute Status   Post-Acute Authorization Placement   Post-Acute Placement Status Pending payor review/awaiting authorization (if required)   Coverage [employer] - [employer] 65   Hospital Resources/Appts/Education Provided Provided patient/caregiver with written discharge plan information   Discharge Delays Orders Needed   Discharge Plan   Discharge Plan A Skilled Nursing Facility   Discharge Plan B Skilled Nursing Facility

## 2022-08-17 NOTE — NURSING
Discharge via wheelchair with [facility], no distress noted, right knee with immobilizer in place, safety maintained.

## 2022-08-17 NOTE — ASSESSMENT & PLAN NOTE
-A1c 6.7 12/2/21 - repeat in AM  -Home med list includes metformin 1 g bid which is held during his stay  -Glucose well controlled  -Continue SSI ac/hs

## 2022-08-17 NOTE — PROGRESS NOTES
[facility] Medicine  Progress Note    Patient Name: Juan Martínez Jr.  MRN: [id number]  Patient Class: IP- Inpatient   Admission Date: 8/13/2022  Length of Stay: 3 days  Attending Physician: José Luis Curtis MD  Primary Care Provider: Lissy Gavin MD        Subjective:     Principal Problem:Hip fracture        HPI:  86M with pmh of cad with stent place 07/2021, dm, hld, htn who presents for several hour history of right hip pain after pt accidentally stepped on a cat while washing his car causing him to fall onto the hip. Pt states the pain is currently controlled, but he was not able to walk after incident. Pt denies LOC, head injury, sob, or cp during the event. Pt has a h/o cad with a stent placed about 1 year pta and is currently on asa and plavix. Pt denies blood thinner use. Pt is a former smoker and denies alcohol or drug use. Pt denies h/o any known surgeries. In the ED pt had an xray of the hip that was suspicious for hip fracture which was confirmed on CT imaging of the hip. Ortho was consulted in the ed and plan for surgery pending cardiac evaluation.       Overview/Hospital Course:  86M with pmh of cad with stent place 07/2021, dm, hld, htn who presents for several hour history of right hip pain after pt accidentally stepped on a cat while washing his car causing him to fall onto the hip. Pt states the pain is currently controlled, but he was not able to walk after incident. Pt denies LOC, head injury, sob, or cp during the event. Pt has a h/o cad with a stent placed about 1 year pta and is currently on asa and plavix. Pt denies blood thinner use. Pt is a former smoker and denies alcohol or drug use. Pt denies h/o any known surgeries. In the ED pt had an xray of the hip that was suspicious for hip fracture which was confirmed on CT imaging of the hip. Ortho was consulted in the ed and plan for surgery pending cardiac evaluation. On 8/14 pt evaluated for surgery by cardiology with  plan for proceeding. Pts plavix held pre-op and had repair on 8/14. Pt looking good post-op day 1. Per Ortho continue lovenox at prophylaxis dose for 14 days and restart plavix post-op day 3. Pt currently awaiting pt/ot evaluation for rehab needs.      Interval History: No acute events overnight.  Denies pain and shortness of breath.  Medically readhy for discharge to post-acute.  All questions answered and patient had no further complaints.    Review of Systems   Constitutional:  Negative for activity change, appetite change and fever.   HENT:  Negative for congestion and dental problem.    Eyes:  Negative for discharge and itching.   Respiratory:  Negative for apnea, cough, chest tightness and shortness of breath.    Cardiovascular:  Negative for chest pain and leg swelling.   Gastrointestinal:  Negative for abdominal distention and abdominal pain.   Endocrine: Negative for cold intolerance and heat intolerance.   Genitourinary:  Negative for difficulty urinating and dysuria.   Musculoskeletal:  Negative for arthralgias and back pain.   Allergic/Immunologic: Negative for environmental allergies and food allergies.   Neurological:  Positive for weakness. Negative for dizziness, facial asymmetry and light-headedness.   Hematological:  Negative for adenopathy. Does not bruise/bleed easily.   Psychiatric/Behavioral:  Negative for agitation and behavioral problems.    Objective:     Vital Signs (Most Recent):  Temp: 98.1 °F (36.7 °C) (08/16/22 1908)  Pulse: 67 (08/16/22 1908)  Resp: 16 (08/16/22 1908)  BP: 136/61 (08/16/22 1908)  SpO2: (!) 92 % (08/16/22 1908)   Vital Signs (24h Range):  Temp:  [96.6 °F (35.9 °C)-98.3 °F (36.8 °C)] 98.1 °F (36.7 °C)  Pulse:  [53-70] 67  Resp:  [15-20] 16  SpO2:  [92 %-97 %] 92 %  BP: (134-156)/(61-69) 136/61     Weight: 85.7 kg (189 lb)  Body mass index is 28.74 kg/m².    Intake/Output Summary (Last 24 hours) at 8/16/2022 1951  Last data filed at 8/16/2022 1718  Gross per 24 hour    Intake 1200 ml   Output 1000 ml   Net 200 ml        Physical Exam  Vitals and nursing note reviewed.   Constitutional:       General: He is not in acute distress.     Appearance: He is not ill-appearing, toxic-appearing or diaphoretic.   HENT:      Head: Normocephalic and atraumatic.      Right Ear: External ear normal.      Left Ear: External ear normal.      Mouth/Throat:      Mouth: Mucous membranes are moist.   Eyes:      General: No scleral icterus.     Extraocular Movements: Extraocular movements intact.      Conjunctiva/sclera: Conjunctivae normal.   Cardiovascular:      Rate and Rhythm: Normal rate and regular rhythm.   Pulmonary:      Effort: Pulmonary effort is normal. No respiratory distress.   Abdominal:      General: There is no distension.      Palpations: Abdomen is soft.      Tenderness: There is no abdominal tenderness.   Musculoskeletal:         General: No swelling or tenderness.      Cervical back: Normal range of motion. No rigidity.      Comments: Incision c/d/i   Skin:     General: Skin is warm and dry.   Neurological:      General: No focal deficit present.      Mental Status: He is alert and oriented to person, place, and time.   Psychiatric:         Mood and Affect: Mood normal.         Behavior: Behavior normal.       Significant Labs: All pertinent labs within the past 24 hours have been reviewed.    Significant Imaging: I have reviewed all pertinent imaging results/findings within the past 24 hours.      Assessment/Plan:      * Hip fracture  -Admitted to inpatient status  -Suffered slip and fall without LOC resulting in fracture  -CT revealed a minimally displaced and slightly impacted subcapital right femoral neck fracture   -Orthopedic surgery consulted and patient was taken for surgical repair 8/14  -Needs to be on lovenox for DVT Ppx until 8/28.  -WBAT with poserior hip precautions.  -PT/OT consulted and recommended IPR.  Insurance only approving SNF which patient is agreeable  to  -Medically stable for discharge pending accepting facility.      Acute blood loss anemia  -Hb 11.2 on admit and has trended down to 9.8  -Likely due to expected blood loss from surgery  -Monitor cbc daily while in hospital.    Aortic root dilatation  -Noted on prior echo  -No symptoms at this time.      Coronary artery disease involving native coronary artery of native heart without angina pectoris  -History noted  -Had PCI 7/2021 with stents placed to RCA  -Denies chest pain  -Continue aspirin, statin and beta blocker  -Resuming plavix on post-op day 3 (tomorrow)    Hyperlipidemia  -Continue statin    Type 2 diabetes mellitus  -A1c 6.7 12/2/21 - repeat in AM  -Home med list includes metformin 1 g bid which is held during his stay  -Glucose well controlled  -Continue SSI ac/hs    Essential hypertension, benign  -Reasonably controlled with occasional moderate elevation  -Continue norvasc, hctz, imdur and metoprolol      VTE Risk Mitigation (From admission, onward)         Ordered     enoxaparin injection 40 mg  Daily         08/14/22 1253     IP VTE HIGH RISK PATIENT  Once         08/13/22 1450     Place sequential compression device  Until discontinued         08/13/22 1450                Discharge Planning   KRISTIN: 8/16/2022     Code Status: DNR   Is the patient medically ready for discharge?:     Reason for patient still in hospital (select all that apply): Pending disposition  Discharge Plan A: Rehab   Discharge Delays: None known at this time              José Luis Curtis MD  Department of Hospital Medicine   [facility]

## 2022-08-17 NOTE — ASSESSMENT & PLAN NOTE
-Hb 11.2 on admit and has trended down to 9.8  -Likely due to expected blood loss from surgery  -Monitor cbc daily while in hospital.

## 2022-08-17 NOTE — DISCHARGE INSTRUCTIONS
Take all medications as prescribed.  Eat a strict low salt cardiac and diabetic diet.  Follow up with your physicians as scheduled - pcp within 1 week and orthopedic surgeon within 2 weeks  Thank you for trusting [facility] and Dr. Curtis with your care.  We are honored that you entrusted us with your healthcare needs. Your satisfaction is very important to us and we hope you have been very pleased with your experience at [facility]. After your discharge you may receive a survey asking you to rate your hospital experience. We encourage you to take the time to complete the survey as your feedback allows us to identify areas for improvement as well as recognize our staff.   We hope that you have received the very best care possible during your hospitalization at [facility], as your satisfaction is our top priority.

## 2022-08-17 NOTE — ASSESSMENT & PLAN NOTE
-Reasonably controlled with occasional moderate elevation  -Continue norvasc, hctz, imdur and metoprolol

## 2022-08-17 NOTE — ASSESSMENT & PLAN NOTE
-Admitted to inpatient status  -Suffered slip and fall without LOC resulting in fracture  -CT revealed a minimally displaced and slightly impacted subcapital right femoral neck fracture   -Orthopedic surgery consulted and patient was taken for surgical repair 8/14  -Needs to be on lovenox for DVT Ppx until 8/28.  -WBAT with poserior hip precautions.  -PT/OT consulted and recommended IPR.  Insurance only approving SNF which patient is agreeable to  -Medically stable for discharge pending accepting facility.

## 2022-08-18 LAB
FINAL PATHOLOGIC DIAGNOSIS: NORMAL
GROSS: NORMAL
Lab: NORMAL

## 2022-08-29 NOTE — DISCHARGE SUMMARY
[facility] Medicine  Discharge Summary      Patient Name: Juan Martínez Jr.  MRN: [id number]  Patient Class: IP- Inpatient  Admission Date: 8/13/2022  Hospital Length of Stay: 4 days  Discharge Date and Time: 8/17/2022  6:33 PM  Attending Physician: No att. providers found   Discharging Provider: Ambrose Curtis MD  Primary Care Provider: Lissy Gavin MD      HPI:   86M with pmh of cad with stent place 07/2021, dm, hld, htn who presents for several hour history of right hip pain after pt accidentally stepped on a cat while washing his car causing him to fall onto the hip. Pt states the pain is currently controlled, but he was not able to walk after incident. Pt denies LOC, head injury, sob, or cp during the event. Pt has a h/o cad with a stent placed about 1 year pta and is currently on asa and plavix. Pt denies blood thinner use. Pt is a former smoker and denies alcohol or drug use. Pt denies h/o any known surgeries. In the ED pt had an xray of the hip that was suspicious for hip fracture which was confirmed on CT imaging of the hip. Ortho was consulted in the ed and plan for surgery pending cardiac evaluation.       Procedure(s) (LRB):  HEMIARTHROPLASTY, HIP (Right)      Goals of Care Treatment Preferences:  Code Status: DNR      Consults:   Consults (From admission, onward)        Status Ordering Provider     Inpatient consult to Social Work/Case Management  Once        Provider:  (Not yet assigned)    Completed AMBROSE CURTIS     Inpatient consult to Social Work/Case Management  Once        Provider:  (Not yet assigned)    Completed AMBROSE CURTIS     Inpatient consult to Social Work/Case Management  Once        Provider:  (Not yet assigned)    Completed YANNI BLAIR     Inpatient consult to Cardiology  Once        Provider:  Kevin Ceja MD    Completed MARIMAR FRANKS JR        Hospital Course By Problem:   * Hip fracture  -Admitted to inpatient status  -Suffered slip  and fall without LOC resulting in fracture  -CT revealed a minimally displaced and slightly impacted subcapital right femoral neck fracture   -Orthopedic surgery consulted and patient was taken for surgical repair 8/14  -Needs to be on lovenox for DVT Ppx until 8/28.  -WBAT with poserior hip precautions.  -PT/OT consulted and recommended IPR.  Insurance only approving SNF which patient is agreeable to  -Medically stable for discharge to SNF now    Acute blood loss anemia  -Hb 11.2 on admit and has trended down to 9.8  -Likely due to expected blood loss from surgery  -No active bleeding at discharge    Aortic root dilatation  -Noted on prior echo  -No symptoms at this time.    Coronary artery disease involving native coronary artery of native heart without angina pectoris  -History noted  -Had PCI 7/2021 with stents placed to RCA  -Denies chest pain  -Continue aspirin, statin and beta blocker  -Resumed plavix on post-op day 3     Hyperlipidemia  -Continue statin    Type 2 diabetes mellitus  -A1c 6.7 12/2/21   -Home med list includes metformin 1 g bid which is held during his stay  -Glucose well controlled  -Continue SSI ac/hs  -Resume metformin at discharge    Essential hypertension, benign  -Reasonably controlled with occasional moderate elevation  -Continue norvasc, hctz, imdur and metoprolol      Final Active Diagnoses:    Diagnosis Date Noted POA    PRINCIPAL PROBLEM:  Hip fracture [S72.009A] 08/13/2022 Yes    Acute blood loss anemia [D62] 08/16/2022 No    Aortic root dilatation [I77.810] 08/14/2022 Yes    Coronary artery disease involving native coronary artery of native heart without angina pectoris [I25.10] 05/06/2019 Yes    Hyperlipidemia [E78.5] 09/25/2014 Yes    Essential hypertension, benign [I10] 09/25/2014 Yes    Type 2 diabetes mellitus [E11.9] 09/25/2014 Yes      Problems Resolved During this Admission:       Discharged Condition: stable    Disposition: Skilled Nursing Facility    Follow Up:    Follow-up Information     [facility] Follow up.    Specialties: LTAC, Nursing Home Agency  Contact information:  [address] [place], [place] [address] [address]  [phone]                       Patient Instructions:      Diet Cardiac     Diet diabetic     Notify your health care provider if you experience any of the following:  increased confusion or weakness     Notify your health care provider if you experience any of the following:  persistent dizziness, light-headedness, or visual disturbances     Notify your health care provider if you experience any of the following:  worsening rash     Notify your health care provider if you experience any of the following:  severe persistent headache     Notify your health care provider if you experience any of the following:  severe uncontrolled pain     Notify your health care provider if you experience any of the following:  persistent nausea and vomiting or diarrhea     Notify your health care provider if you experience any of the following:  temperature >100.4     Activity as tolerated       Significant Diagnostic Studies: Labs:   BMP: No results for input(s): GLU, NA, K, CL, CO2, BUN, CREATININE, CALCIUM, MG in the last 48 hours., CMP No results for input(s): NA, K, CL, CO2, GLU, BUN, CREATININE, CALCIUM, PROT, ALBUMIN, BILITOT, ALKPHOS, AST, ALT, ANIONGAP, ESTGFRAFRICA, EGFRNONAA in the last 48 hours., CBC No results for input(s): WBC, HGB, HCT, PLT in the last 48 hours., INR   Lab Results   Component Value Date    INR 1.0 08/13/2022    INR 1.0 08/13/2022    INR 1.0 11/11/2021   , Lipid Panel   Lab Results   Component Value Date    CHOL 134 05/06/2019    HDL 45 05/06/2019    LDLCALC 73.0 05/06/2019    TRIG 80 05/06/2019    CHOLHDL 33.6 05/06/2019   , Troponin No results for input(s): TROPONINI in the last 168 hours. and A1C:   Recent Labs   Lab 08/17/22  0448   HGBA1C 6.4*       Pending Diagnostic Studies:     None         Medications:  Reconciled Home  Medications:      Medication List      CONTINUE taking these medications    albuterol 90 mcg/actuation inhaler  Commonly known as: PROVENTIL/VENTOLIN HFA  Inhale 2 puffs into the lungs.     amLODIPine 10 MG tablet  Commonly known as: NORVASC  Take 1 tablet (10 mg total) by mouth once daily.     aspirin 81 MG EC tablet  Commonly known as: ECOTRIN  Take 81 mg by mouth once daily.     atorvastatin 40 MG tablet  Commonly known as: LIPITOR  Take 1 tablet (40 mg total) by mouth once daily.     blood-glucose meter Misc  by Misc.(Non-Drug; Combo Route) route     clopidogreL 75 mg tablet  Commonly known as: PLAVIX  Take 1 tablet (75 mg total) by mouth once daily.     EScitalopram oxalate 10 MG tablet  Commonly known as: LEXAPRO  Take 10 mg by mouth.     hydroCHLOROthiazide 12.5 mg capsule  Commonly known as: MICROZIDE     isosorbide mononitrate 60 MG 24 hr tablet  Commonly known as: IMDUR  Take 1 tablet (60 mg total) by mouth once daily.     LANCETS & BLOOD GLUCOSE STRIPS MISC  by Misc.(Non-Drug; Combo Route) route Check blood sugars  Twice a day.     metFORMIN 1000 MG tablet  Commonly known as: GLUCOPHAGE  Take 1 tablet (1,000 mg total) by mouth 2 (two) times daily with meals.     metoprolol succinate 50 MG 24 hr tablet  Commonly known as: TOPROL-XL  Take 1 tablet (50 mg total) by mouth once daily.     nitroGLYCERIN 0.4 MG SL tablet  Commonly known as: NITROSTAT  Place 1 tablet (0.4 mg total) under the tongue every 5 (five) minutes as needed for Chest pain.        STOP taking these medications    acetaminophen 325 MG tablet  Commonly known as: TYLENOL     AMITIZA 24 MCG Cap  Generic drug: lubiprostone     dicyclomine 10 MG capsule  Commonly known as: BENTYL     LIDOcaine 5 %  Commonly known as: LIDODERM     ondansetron 4 MG tablet  Commonly known as: ZOFRAN     varicella-zoster gE-AS01B (PF) 50 mcg/0.5 mL injection  Commonly known as: SHINGRIX        ASK your doctor about these medications    enoxaparin 40 mg/0.4 mL  Syrg  Commonly known as: LOVENOX  Inject 0.4 mLs (40 mg total) into the skin every 12 (twelve) hours. for 11 days  Ask about: Should I take this medication?     oxyCODONE-acetaminophen 5-325 mg per tablet  Commonly known as: PERCOCET  Take 1 tablet by mouth every 4 (four) hours as needed for Pain.  Ask about: Should I take this medication?            Indwelling Lines/Drains at time of discharge:   Lines/Drains/Airways     None                 Time spent on the discharge of patient: 35 minutes         José Luis Curtis MD  Department of Hospital Medicine  [facility]

## 2022-09-28 NOTE — NURSING
Pt discharged. PIV removed catheter intact and tele monitor discontinued. Discharge orders reviewed with pt and pt's sister whom verbalized understanding. Due to pt's preferred pharmacy on the [facility]\"" being closed on the weekends, pt's medication has been sent to [facility] on the [facility] [address].  Copy placed in blue folder and blue folder given to pt. Pt's belongings removed from room and given to pt. Denies having any pain and appears to be in no distress. Pt assisted off unit via wheelchair by Nicci THAKKAR and pt's sister is at pt's side.    Acute asthma exacerbation

## 2022-10-04 ENCOUNTER — OFFICE VISIT (OUTPATIENT)
Dept: CARDIOLOGY | Facility: CLINIC | Age: 87
End: 2022-10-04
Payer: MEDICARE

## 2022-10-04 VITALS
RESPIRATION RATE: 18 BRPM | WEIGHT: 192.25 LBS | HEART RATE: 61 BPM | BODY MASS INDEX: 29.14 KG/M2 | SYSTOLIC BLOOD PRESSURE: 146 MMHG | HEIGHT: 68 IN | OXYGEN SATURATION: 96 % | DIASTOLIC BLOOD PRESSURE: 67 MMHG

## 2022-10-04 DIAGNOSIS — E11.9 TYPE 2 DIABETES MELLITUS WITHOUT COMPLICATION, WITHOUT LONG-TERM CURRENT USE OF INSULIN: ICD-10-CM

## 2022-10-04 DIAGNOSIS — I95.1 ORTHOSTATIC HYPOTENSION: ICD-10-CM

## 2022-10-04 DIAGNOSIS — L97.522 DIABETIC ULCER OF TOE OF LEFT FOOT ASSOCIATED WITH TYPE 2 DIABETES MELLITUS, WITH FAT LAYER EXPOSED: ICD-10-CM

## 2022-10-04 DIAGNOSIS — E78.2 MIXED HYPERLIPIDEMIA: ICD-10-CM

## 2022-10-04 DIAGNOSIS — C44.92 SCC (SQUAMOUS CELL CARCINOMA): ICD-10-CM

## 2022-10-04 DIAGNOSIS — R00.2 PALPITATIONS: ICD-10-CM

## 2022-10-04 DIAGNOSIS — I25.10 CORONARY ARTERY DISEASE INVOLVING NATIVE CORONARY ARTERY OF NATIVE HEART WITHOUT ANGINA PECTORIS: ICD-10-CM

## 2022-10-04 DIAGNOSIS — E11.621 DIABETIC ULCER OF TOE OF LEFT FOOT ASSOCIATED WITH TYPE 2 DIABETES MELLITUS, WITH FAT LAYER EXPOSED: ICD-10-CM

## 2022-10-04 DIAGNOSIS — R60.9 EDEMA, UNSPECIFIED TYPE: Primary | ICD-10-CM

## 2022-10-04 DIAGNOSIS — I20.0 UNSTABLE ANGINA: ICD-10-CM

## 2022-10-04 DIAGNOSIS — I77.810 AORTIC ROOT DILATATION: ICD-10-CM

## 2022-10-04 DIAGNOSIS — I21.4 NSTEMI (NON-ST ELEVATED MYOCARDIAL INFARCTION): ICD-10-CM

## 2022-10-04 DIAGNOSIS — I10 ESSENTIAL HYPERTENSION, BENIGN: ICD-10-CM

## 2022-10-04 PROCEDURE — 1101F PR PT FALLS ASSESS DOC 0-1 FALLS W/OUT INJ PAST YR: ICD-10-PCS | Mod: CPTII,S$GLB,, | Performed by: INTERNAL MEDICINE

## 2022-10-04 PROCEDURE — 1159F MED LIST DOCD IN RCRD: CPT | Mod: CPTII,S$GLB,, | Performed by: INTERNAL MEDICINE

## 2022-10-04 PROCEDURE — 99999 PR PBB SHADOW E&M-EST. PATIENT-LVL IV: ICD-10-PCS | Mod: PBBFAC,,, | Performed by: INTERNAL MEDICINE

## 2022-10-04 PROCEDURE — 3078F PR MOST RECENT DIASTOLIC BLOOD PRESSURE < 80 MM HG: ICD-10-PCS | Mod: CPTII,S$GLB,, | Performed by: INTERNAL MEDICINE

## 2022-10-04 PROCEDURE — 1160F RVW MEDS BY RX/DR IN RCRD: CPT | Mod: CPTII,S$GLB,, | Performed by: INTERNAL MEDICINE

## 2022-10-04 PROCEDURE — 1101F PT FALLS ASSESS-DOCD LE1/YR: CPT | Mod: CPTII,S$GLB,, | Performed by: INTERNAL MEDICINE

## 2022-10-04 PROCEDURE — 1126F AMNT PAIN NOTED NONE PRSNT: CPT | Mod: CPTII,S$GLB,, | Performed by: INTERNAL MEDICINE

## 2022-10-04 PROCEDURE — 99999 PR PBB SHADOW E&M-EST. PATIENT-LVL IV: CPT | Mod: PBBFAC,,, | Performed by: INTERNAL MEDICINE

## 2022-10-04 PROCEDURE — 3288F FALL RISK ASSESSMENT DOCD: CPT | Mod: CPTII,S$GLB,, | Performed by: INTERNAL MEDICINE

## 2022-10-04 PROCEDURE — 3077F PR MOST RECENT SYSTOLIC BLOOD PRESSURE >= 140 MM HG: ICD-10-PCS | Mod: CPTII,S$GLB,, | Performed by: INTERNAL MEDICINE

## 2022-10-04 PROCEDURE — 99214 OFFICE O/P EST MOD 30 MIN: CPT | Mod: S$GLB,,, | Performed by: INTERNAL MEDICINE

## 2022-10-04 PROCEDURE — 99214 PR OFFICE/OUTPT VISIT, EST, LEVL IV, 30-39 MIN: ICD-10-PCS | Mod: S$GLB,,, | Performed by: INTERNAL MEDICINE

## 2022-10-04 PROCEDURE — 3077F SYST BP >= 140 MM HG: CPT | Mod: CPTII,S$GLB,, | Performed by: INTERNAL MEDICINE

## 2022-10-04 PROCEDURE — 3078F DIAST BP <80 MM HG: CPT | Mod: CPTII,S$GLB,, | Performed by: INTERNAL MEDICINE

## 2022-10-04 PROCEDURE — 3288F PR FALLS RISK ASSESSMENT DOCUMENTED: ICD-10-PCS | Mod: CPTII,S$GLB,, | Performed by: INTERNAL MEDICINE

## 2022-10-04 PROCEDURE — 1126F PR PAIN SEVERITY QUANTIFIED, NO PAIN PRESENT: ICD-10-PCS | Mod: CPTII,S$GLB,, | Performed by: INTERNAL MEDICINE

## 2022-10-04 PROCEDURE — 1159F PR MEDICATION LIST DOCUMENTED IN MEDICAL RECORD: ICD-10-PCS | Mod: CPTII,S$GLB,, | Performed by: INTERNAL MEDICINE

## 2022-10-04 PROCEDURE — 1160F PR REVIEW ALL MEDS BY PRESCRIBER/CLIN PHARMACIST DOCUMENTED: ICD-10-PCS | Mod: CPTII,S$GLB,, | Performed by: INTERNAL MEDICINE

## 2022-10-04 RX ORDER — HYDROCHLOROTHIAZIDE 12.5 MG/1
25 CAPSULE ORAL DAILY
Qty: 90 EACH | Refills: 3 | Status: SHIPPED | OUTPATIENT
Start: 2022-10-04

## 2022-10-04 RX ORDER — ISOSORBIDE MONONITRATE 60 MG/1
60 TABLET, EXTENDED RELEASE ORAL DAILY
Qty: 90 TABLET | Refills: 3 | Status: SHIPPED | OUTPATIENT
Start: 2022-10-04 | End: 2023-11-28 | Stop reason: SDUPTHER

## 2022-10-04 RX ORDER — AMLODIPINE BESYLATE 10 MG/1
10 TABLET ORAL DAILY
Qty: 90 TABLET | Refills: 3 | Status: SHIPPED | OUTPATIENT
Start: 2022-10-04 | End: 2023-11-28 | Stop reason: SDUPTHER

## 2022-10-04 RX ORDER — CLOPIDOGREL BISULFATE 75 MG/1
75 TABLET ORAL DAILY
Qty: 90 EACH | Refills: 3 | Status: SHIPPED | OUTPATIENT
Start: 2022-10-04 | End: 2023-06-12 | Stop reason: SDUPTHER

## 2022-10-04 RX ORDER — ATORVASTATIN CALCIUM 40 MG/1
40 TABLET, FILM COATED ORAL DAILY
Qty: 90 TABLET | Refills: 3 | Status: SHIPPED | OUTPATIENT
Start: 2022-10-04 | End: 2023-11-28 | Stop reason: SDUPTHER

## 2022-10-04 RX ORDER — FUROSEMIDE 20 MG/1
20 TABLET ORAL
Qty: 90 TABLET | Refills: 3 | Status: SHIPPED | OUTPATIENT
Start: 2022-10-04 | End: 2023-10-30 | Stop reason: SDUPTHER

## 2022-10-04 RX ORDER — NITROGLYCERIN 0.4 MG/1
0.4 TABLET SUBLINGUAL EVERY 5 MIN PRN
Qty: 90 TABLET | Refills: 12 | Status: SHIPPED | OUTPATIENT
Start: 2022-10-04 | End: 2022-11-03

## 2022-10-04 RX ORDER — ASPIRIN 81 MG/1
81 TABLET ORAL DAILY
Qty: 90 TABLET | Refills: 3 | Status: SHIPPED | OUTPATIENT
Start: 2022-10-04

## 2022-10-04 RX ORDER — METOPROLOL SUCCINATE 50 MG/1
50 TABLET, EXTENDED RELEASE ORAL DAILY
Qty: 90 TABLET | Refills: 3 | Status: SHIPPED | OUTPATIENT
Start: 2022-10-04 | End: 2023-11-28 | Stop reason: SDUPTHER

## 2022-10-04 NOTE — PROGRESS NOTES
CARDIOVASCULAR CONSULTATION    REASON FOR CONSULT:   Juan Martínez Jr. is a 87 y.o. male who presents for follow-up recent hospitalization for a non-STEMI..      HISTORY OF PRESENT ILLNESS:     Notes from August 2019:   Patient is here for follow-up today.  Was recently admitted for hypertensive urgency.  Did not have any chest pains during the hypertensive urgency.  Troponins were found to be mildly elevated.  Was evaluated by Cardiology and Norvasc was restarted.  His blood pressure has been well controlled since then.  Denies any chest pains at rest on exertion, orthopnea, PND, swelling of feet.      Notes from July 2019  Patient is here for follow-up today.  Denies any chest pains at rest on exertion, orthopnea, PND.  Denies any claudication symptoms.  Denies any dyspnea at rest on exertion    Note from clinic visit in May 2019:  Patient is 83-year-old man pleasant man with a past medical history significant for diabetes, dyslipidemia, hypertension, coronary artery disease who recently presented to the hospital with a non-STEMI.  Coronary angiogram performed at that time revealed nonischemic coronary artery disease of the LAD and RCA as well as 80% stenosis in his diagonal 1.  Which is being managed medically.  A week after that he developed some right-sided chest pain, different from his anginal pain which he had presented with his non-STEMI.  He states that right-sided chest pain resolved on its own in less than 20 min, but since this is all new for him he decided to come to the hospital to make sure everything was okay.  He was admitted and serial troponins did not reveal any elevation and he was discharged home.  He has not had any further episodes of chest pains.  Denies orthopnea, PND chest pains at rest or on exertion.  Denies typical claudication pain, has knee pains and atypical leg pain.      Notes from October 2019:  Patient here for follow-up.  States that had a brief episode of right-sided  chest pain which lasted a few seconds to few minutes and then went away.  This was different than the anginal pain he had in the past.  Denies any orthopnea, PND, swelling of feet.  States that he tried telling is  that this he felt was related to his lungs, and an checks x-ray done yesterday.  Denies any recent fevers or chills.    Notes from December 2019:  Patient here for follow-up.  Denies any chest pains at rest on exertion, orthopnea, PND, swelling of feet.  Blood pressure mildly elevated at clinic.  At home states blood pressure stays around 140-145 mm systolic.  I will increase his Toprol-XL to 75 mg daily.    Notes from January 2020:  Patient here because states that yesterday he felt some chest pain.  It was right-sided.  States did not feel like it was from his heart but just wanted to make sure.  He took nitroglycerines and had no response to nitroglycerin.  States that did not feel like his earlier anginal pains.    Notes from May 2020:  Patient here for follow-up did not have any further episodes of chest pain.  Is looking for clearance for EGD.  Denies any orthopnea, PND, swelling of feet.    Notes from September 2020:  Patient here for follow-up.  Denies any anginal sounding chest pains, orthopnea, PND.  States he had an episode last Friday where he took a deep breath and felt a sharp pain which lasted 1 sec and then went away.  Denies any anginal sounding chest pains on exertion.  Denies any orthopnea, PND, swelling of feet.  EKG done in the clinic today was personally reviewed and demonstrated normal sinus rhythm, left axis deviation.  Abnormal EKG.    Notes from December 2020:  Patient here follow-up.  Denies any chest pains rest exertion PND.  Doing fine.  No cardiac issues.    Notes from February 2021:  Patient here follow-up.  Denies any chest pains at rest on exertion, orthopnea, PND.  EKG done in the clinic today was personally reviewed and demonstrated normal sinus rhythm with left  anterior fascicular block.  No other cardiac symptomatology.    Notes from June 2021:  Patient here for follow-up.  Doing fine.  Denies any chest pains at rest on exertion, orthopnea PND.    Notes from July 21:  Patient here for after recent hospitalization.  Had come in with non-STEMI.  Culprit lesion was distal RCA.  Underwent PCI distal RCA.  Doing fine.  No chest pains.  No complications from cardiac catheterization.    October 21:  Patient here for follow-up.  Denies any chest pains at rest on exertion, orthopnea, PND.  Doing fine.    February 2022 patient here for follow-up.  Doing fine.  No anginal sounding chest pains, orthopnea, PND.    Notes from July 2022:  Patient here for follow-up.  Denies any chest at rest on exertion, orthopnea, PND, swelling of feet \    Notes from October 2022: Patient here for follow-up.  Denies any chest pains at rest on exertion, orthopnea, PND.  Had hip replacement recently.  Bilateral pedal edema since then P    PAST MEDICAL HISTORY:     Past Medical History:   Diagnosis Date    Coronary artery disease     Diabetes mellitus     High cholesterol     Hypertension     NSTEMI (non-ST elevated myocardial infarction) 07/25/2021    Prostate cancer        PAST SURGICAL HISTORY:     Past Surgical History:   Procedure Laterality Date    APPLICATION OF FULL-THICKNESS SKIN GRAFT (FTSG) TO UPPER EXTREMITY Right 11/15/2018    Procedure: APPLICATION, GRAFT, SKIN, FULL-THICKNESS, TO UPPER EXTREMITY VS STSG WITH FROZEN SECTIONS;  Surgeon: Alan Arzola MD;  Location: [facility];  Service: Plastics;  Laterality: Right;    EXCISION OF SQUAMOUS CELL CARCINOMA Right 11/15/2018    Procedure: EXCISION, CARCINOMA, SQUAMOUS CELL @ RIGHT HAND;  Surgeon: Alan Arzola MD;  Location: [facility] OR;  Service: Plastics;  Laterality: Right;  RN PREOP 11/13/2018--NEED H/P    HEMIARTHROPLASTY OF HIP Right 8/14/2022    Procedure: HEMIARTHROPLASTY, HIP;  Surgeon: Edis Villanueva MD;  Location: [facility] OR;   Service: Orthopedics;  Laterality: Right;    JOINT REPLACEMENT      LEFT HEART CATHETERIZATION Left 5/6/2019    Procedure: Left heart cath;  Surgeon: Rashad Nieto MD;  Location: [facility] CATH LAB;  Service: Cardiology;  Laterality: Left;    LEFT HEART CATHETERIZATION Left 7/26/2021    Procedure: Left heart cath, radial;  Surgeon: Rashad Nieto MD;  Location: [facility] CATH LAB;  Service: Cardiology;  Laterality: Left;    TONSILLECTOMY      TOTAL KNEE ARTHROPLASTY         ALLERGIES AND MEDICATION:   Review of patient's allergies indicates:  No Known Allergies     Medication List            Accurate as of October 4, 2022 11:50 AM. If you have any questions, ask your nurse or doctor.                CONTINUE taking these medications      albuterol 90 mcg/actuation inhaler  Commonly known as: PROVENTIL/VENTOLIN HFA     amLODIPine 10 MG tablet  Commonly known as: NORVASC  Take 1 tablet (10 mg total) by mouth once daily.     aspirin 81 MG EC tablet  Commonly known as: ECOTRIN     atorvastatin 40 MG tablet  Commonly known as: LIPITOR  Take 1 tablet (40 mg total) by mouth once daily.     blood-glucose meter Misc     clopidogreL 75 mg tablet  Commonly known as: PLAVIX  Take 1 tablet (75 mg total) by mouth once daily.     EScitalopram oxalate 10 MG tablet  Commonly known as: LEXAPRO     hydroCHLOROthiazide 12.5 mg capsule  Commonly known as: MICROZIDE     isosorbide mononitrate 60 MG 24 hr tablet  Commonly known as: IMDUR  Take 1 tablet (60 mg total) by mouth once daily.     LANCETS & BLOOD GLUCOSE STRIPS MISC     metFORMIN 1000 MG tablet  Commonly known as: GLUCOPHAGE  Take 1 tablet (1,000 mg total) by mouth 2 (two) times daily with meals.     metoprolol succinate 50 MG 24 hr tablet  Commonly known as: TOPROL-XL  Take 1 tablet (50 mg total) by mouth once daily.     nitroGLYCERIN 0.4 MG SL tablet  Commonly known as: NITROSTAT  Place 1 tablet (0.4 mg total) under the tongue every 5 (five) minutes as needed for Chest pain.         "      SOCIAL HISTORY:     Social History     Socioeconomic History    Marital status:    Tobacco Use    Smoking status: Former     Packs/day: 0.00     Types: Cigarettes     Quit date: 2016     Years since quittin.1    Smokeless tobacco: Never    Tobacco comments:     2016   Substance and Sexual Activity    Alcohol use: No    Drug use: No    Sexual activity: Not Currently       FAMILY HISTORY:     Family History   Problem Relation Age of Onset    Heart disease Mother     Stroke Father        REVIEW OF SYSTEMS:   Review of Systems   Constitutional: Negative.    HENT: Negative.     Eyes: Negative.    Respiratory: Negative.     Cardiovascular:  Positive for leg swelling.   Gastrointestinal: Negative.    Genitourinary: Negative.    Musculoskeletal:  Positive for joint pain.   Skin: Negative.    Neurological: Negative.    Endo/Heme/Allergies: Negative.      A 10 point review of systems was performed and all the pertinent positives have been mentioned. Rest of review of systems was negative.        PHYSICAL EXAM:     Vitals:    10/04/22 1125   BP: (!) 146/67   Pulse: 61   Resp: 18    Body mass index is 29.23 kg/m².  Weight: 87.2 kg (192 lb 3.9 oz)   Height: 5' 8" (172.7 cm)     Physical Exam  Vitals and nursing note reviewed.   Constitutional:       Appearance: Normal appearance. He is well-developed.   HENT:      Head: Normocephalic and atraumatic.      Right Ear: Hearing normal.      Left Ear: Hearing normal.      Nose: Nose normal.   Eyes:      General: Lids are normal.      Conjunctiva/sclera: Conjunctivae normal.      Pupils: Pupils are equal, round, and reactive to light.   Cardiovascular:      Rate and Rhythm: Normal rate and regular rhythm.      Pulses: Normal pulses.      Heart sounds: Normal heart sounds.   Pulmonary:      Effort: Pulmonary effort is normal.      Breath sounds: Normal breath sounds.   Abdominal:      Palpations: Abdomen is soft.      Tenderness: There is no abdominal " tenderness.   Musculoskeletal:         General: No deformity.      Cervical back: Normal range of motion and neck supple.      Right lower leg: Edema present.      Left lower leg: Edema present.   Skin:     General: Skin is warm and dry.   Neurological:      Mental Status: He is alert and oriented to person, place, and time.   Psychiatric:         Speech: Speech normal.         DATA:     Laboratory:  CBC:  Recent Labs   Lab 08/15/22  0434 08/16/22  0356 08/17/22  0448   WBC 11.51 9.05 8.54   Hemoglobin 10.6 L 9.8 L 9.8 L   Hematocrit 32.5 L 30.1 L 29.1 L   Platelets 271 264 253         CHEMISTRIES:  Recent Labs   Lab 07/25/21  0503 07/26/21  0348 07/27/21  0550 07/30/21  0116 11/11/21  0609 01/11/22  2335 08/13/22  1118 08/14/22  0451 08/15/22  0434 08/16/22  0356   Glucose 114 H 126 H   < > 119 H 179 H 138 H   < > 149 H 146 H 137 H   Sodium 138 137   < > 135 L 138 138   < > 136 136 136   Potassium 4.1 3.4 L   < > 3.2 L 3.7 3.4 L   < > 3.5 4.1 3.7   BUN 13 12   < > 15 14 18   < > 11 14 15   Creatinine 1.0 0.9   < > 1.0 1.0 1.2   < > 0.9 0.9 0.9   eGFR if African American >60 >60   < > >60 >60 >60  --   --   --   --    eGFR if non African American >60 >60   < > >60 >60 55 A  --   --   --   --    Calcium 8.8 9.2   < > 9.5 9.6 9.0   < > 9.0 9.1 8.9   Magnesium 1.8 1.9  --   --  2.0  --   --   --   --   --     < > = values in this interval not displayed.         CARDIAC BIOMARKERS:  Recent Labs   Lab 11/11/21  0609 11/11/21  0820 01/11/22  2335   Troponin I <0.006 0.011 0.017         COAGS:  Recent Labs   Lab 11/11/21  0609 08/13/22  1118 08/13/22  1540   INR 1.0 1.0 1.0         LIPIDS/LFTS:  Recent Labs   Lab 11/11/21  0609 01/11/22  2335 08/13/22  1118   AST 16 15 22   ALT 12 13 9 L         Hemoglobin A1C   Date Value Ref Range Status   08/17/2022 6.4 (H) 4.0 - 5.6 % Final     Comment:     ADA Screening Guidelines:  5.7-6.4%  Consistent with prediabetes  >or=6.5%  Consistent with diabetes    High levels of fetal  hemoglobin interfere with the HbA1C  assay. Heterozygous hemoglobin variants (HbS, HgC, etc)do  not significantly interfere with this assay.   However, presence of multiple variants may affect accuracy.     05/07/2019 7.0 (H) 4.0 - 5.6 % Final     Comment:     ADA Screening Guidelines:  5.7-6.4%  Consistent with prediabetes  >or=6.5%  Consistent with diabetes  High levels of fetal hemoglobin interfere with the HbA1C  assay. Heterozygous hemoglobin variants (HbS, HgC, etc)do  not significantly interfere with this assay.   However, presence of multiple variants may affect accuracy.     06/19/2018 7.3 (H) 4.0 - 5.6 % Final     Comment:     ADA Screening Guidelines:  5.7-6.4%  Consistent with prediabetes  >or=6.5%  Consistent with diabetes  High levels of fetal hemoglobin interfere with the HbA1C  assay. Heterozygous hemoglobin variants (HbS, HgC, etc)do  not significantly interfere with this assay.   However, presence of multiple variants may affect accuracy.       Hemoglobin A1c   Date Value Ref Range Status   12/02/2021 6.7 (H) <5.7 % of total Hgb Final     Comment:     For someone without known diabetes, a hemoglobin A1c  value of 6.5% or greater indicates that they may have   diabetes and this should be confirmed with a follow-up   test.    For someone with known diabetes, a value <7% indicates   that their diabetes is well controlled and a value   greater than or equal to 7% indicates suboptimal   control. A1c targets should be individualized based on   duration of diabetes, age, comorbid conditions, and   other considerations.    Currently, no consensus exists regarding use of  hemoglobin A1c for diagnosis of diabetes for children.       07/23/2021 6.8 (H) <5.7 % of total Hgb Final     Comment:     For someone without known diabetes, a hemoglobin A1c  value of 6.5% or greater indicates that they may have   diabetes and this should be confirmed with a follow-up   test.    For someone with known diabetes, a value  <7% indicates   that their diabetes is well controlled and a value   greater than or equal to 7% indicates suboptimal   control. A1c targets should be individualized based on   duration of diabetes, age, comorbid conditions, and   other considerations.    Currently, no consensus exists regarding use of  hemoglobin A1c for diagnosis of diabetes for children.       03/16/2021 6.4 (H) <5.7 % of total Hgb Final     Comment:     For someone without known diabetes, a hemoglobin   A1c value between 5.7% and 6.4% is consistent with  prediabetes and should be confirmed with a   follow-up test.    For someone with known diabetes, a value <7%  indicates that their diabetes is well controlled. A1c  targets should be individualized based on duration of  diabetes, age, comorbid conditions, and other  considerations.    This assay result is consistent with an increased risk  of diabetes.    Currently, no consensus exists regarding use of  hemoglobin A1c for diagnosis of diabetes for children.       TSH        The ASCVD Risk score (Guanako CHENG, et al., 2019) failed to calculate for the following reasons:    The 2019 ASCVD risk score is only valid for ages 40 to 79    The patient has a prior MI or stroke diagnosis           Cardiovascular Testing:    EKG: (personally reviewed tracing)  May 2019:  Normal sinus rhythm, left axis deviation.  Abnormal EKG.    2D echocardiogram May 2019:  Normal left ventricular systolic function. The estimated ejection fraction is 65%  Normal LV diastolic function.  Mild left atrial enlargement.  Normal central venous pressure (3 mm Hg).  The estimated PA systolic pressure is 10 mm Hg       Coronary angiogram 6th May 2019:    No acute thrombotic lesion identified.  Coronary artery disease as described below  LVEDP: 12 mmHg    Carotid ultrasound May 28, 2019:    There is 20-39% right Internal Carotid Stenosis.  There is 20-39% left Internal Carotid Stenosis.     ABIs May 28, 2019:    Noncompressible LOW  bilaterally.  Mildly decrease TBI bilaterally.  Normal PVR waveforms bilaterally.       Coronary Anatomy:  LM:  No significant stenosis  LAD:  Mid LAD 60-70% stenosis.  Distal LAD 50% stenosis.  IFR 0.92 (nonischemic).  Diagonal 1 is a tortuous vessel with mid 80% stenosis.  Will medically manage this diagonal stenosis  LCx :  Mild nonobstructive CAD  RCA:  50% distal RCA/proximal PDA lesion.  IFR 1 (nonischemic)     Impression / Plan  Coronary artery disease as described above.  Continue medical management with aggressive risk factor modification.  Continue aspirin 81 mg daily. Change simvastatin to atorvastatin 80 mg daily.        ASSESSMENT AND PLAN     Patient Active Problem List   Diagnosis    Essential hypertension, benign    Type 2 diabetes mellitus    Hyperlipidemia    Body mass index 28.0-28.9, adult    History of prostate cancer    Orthostatic hypotension    Diabetic ulcer of toe of left foot associated with type 2 diabetes mellitus, with fat layer exposed    SCC (squamous cell carcinoma)    Coronary artery disease involving native coronary artery of native heart without angina pectoris    Unstable angina    Palpitations    Elevated troponin    NSTEMI (non-ST elevated myocardial infarction)    Hip fracture    Aortic root dilatation    Acute blood loss anemia       1.  Patient with coronary artery disease, now angina free.  Status post PCI distal RCA in July of 2021. Continue aspirin 81 mg daily indefinitely and Plavix 75 mg daily uninterrupted for at least 1 year from date of PCI.  Diagonal 1 disease being managed medically.  Referral has been made to cardiac rehab.    2.  Hypertension:  Well controlled on current therapy.      3.  Cardiovascular screening with rest and stress ABIs and carotid ultrasound performed.  ABIs demonstrated noncompressible ABIs bilaterally, mildly decreased ABIs bilaterally with normal PVR waveforms bilaterally.  I got a peripheral ultrasound for further evaluation of his  abnormal TBI.   ultrasound did not show any flow restriction in the right or the left lower extremity.    4.  Dyslipidemia:  Continue medical management with atorvastatin.    5. Bilateral pedal edema after hip surgery.  Lasix as needed.  Check venous ultrasound to rule out DVT.    6.  May hold Plavix as needed for procedures.        Follow-up after venous ultrasound    Thank you very much for involving me in the care of your patient.  Please do not hesitate to contact me if there are any questions.      Rashad Nieto MD, FACC, [facility]  Interventional Cardiologist, [facility].     Follow-up in 4 months      This note was dictated with the help of speech recognition software.  There might be un-intended errors and/or substitutions.

## 2022-11-22 ENCOUNTER — HOSPITAL ENCOUNTER (EMERGENCY)
Facility: HOSPITAL | Age: 87
Discharge: HOME | End: 2022-11-22
Attending: EMERGENCY MEDICINE
Payer: MEDICARE

## 2022-11-22 VITALS
OXYGEN SATURATION: 97 % | HEART RATE: 59 BPM | WEIGHT: 175 LBS | RESPIRATION RATE: 17 BRPM | DIASTOLIC BLOOD PRESSURE: 69 MMHG | TEMPERATURE: 98 F | SYSTOLIC BLOOD PRESSURE: 149 MMHG | BODY MASS INDEX: 26.61 KG/M2

## 2022-11-22 DIAGNOSIS — K59.00 CONSTIPATION, UNSPECIFIED CONSTIPATION TYPE: ICD-10-CM

## 2022-11-22 DIAGNOSIS — R14.0 BLOATING SYMPTOM: ICD-10-CM

## 2022-11-22 DIAGNOSIS — R14.0 ABDOMINAL BLOATING: Primary | ICD-10-CM

## 2022-11-22 PROCEDURE — 99283 EMERGENCY DEPT VISIT LOW MDM: CPT

## 2022-11-22 PROCEDURE — 25000003 PHARM REV CODE 250: Performed by: PHYSICIAN ASSISTANT

## 2022-11-22 RX ORDER — MAG HYDROX/ALUMINUM HYD/SIMETH 200-200-20
30 SUSPENSION, ORAL (FINAL DOSE FORM) ORAL
Status: COMPLETED | OUTPATIENT
Start: 2022-11-22 | End: 2022-11-22

## 2022-11-22 RX ORDER — MAG HYDROX/ALUMINUM HYD/SIMETH 200-200-20
30 SUSPENSION, ORAL (FINAL DOSE FORM) ORAL
Qty: 354 ML | Refills: 0 | Status: SHIPPED | OUTPATIENT
Start: 2022-11-22 | End: 2022-11-29

## 2022-11-22 RX ORDER — POLYETHYLENE GLYCOL 3350 17 G/17G
17 POWDER, FOR SOLUTION ORAL
Status: COMPLETED | OUTPATIENT
Start: 2022-11-22 | End: 2022-11-22

## 2022-11-22 RX ORDER — MORPHINE SULFATE 4 MG/ML
4 INJECTION, SOLUTION INTRAMUSCULAR; INTRAVENOUS
Status: DISCONTINUED | OUTPATIENT
Start: 2022-11-22 | End: 2022-11-22

## 2022-11-22 RX ADMIN — POLYETHYLENE GLYCOL 3350 17 G: 17 POWDER, FOR SOLUTION ORAL at 02:11

## 2022-11-22 RX ADMIN — ALUMINUM HYDROXIDE, MAGNESIUM HYDROXIDE, AND DIMETHICONE 30 ML: 200; 20; 200 SUSPENSION ORAL at 02:11

## 2022-11-22 NOTE — ED PROVIDER NOTES
Encounter Date: 11/22/2022    SCRIBE #1 NOTE: I, Roselia Haddad, am scribing for, and in the presence of,  Renee Pan PA-C. I have scribed the following portions of the note - Other sections scribed: HPI, ROS.     History     Chief Complaint   Patient presents with    Bloated     Noted abdominal enlargement starting on yesterday, denies any pain/n/v/d     This 87 y.o male, with a medical history of Coronary artery disease, Diabetes mellitus, High cholesterol, Hypertension, NSTEMI, and Prostate cancer, presents to the ED c/o abdominal bloating that began 2 days ago. Pt reports that he has been experiencing constipation issues intermittently since August 2022. He states that he was seen by his PCP for the issues and was prescribed MiraLAX and stool softeners for treatment. Pt notes that the constipation improved as he was having bowel movements regularly, however, it has returned. He reports that he typically experiences a bowel movement every couple of days. Pt's last bowel movement was yesterday and was normal. He states that he continues to pass large amounts of gas. Pt notes that he has continued to take his prescribed medications and is also taking a Fiber supplement (last took today). Pt denies fever, nausea, emesis, blood in stool, abdominal pain, rectal pain, dysuria, or urinary frequency. No other associated symptoms.     The history is provided by the patient.   Review of patient's allergies indicates:  No Known Allergies  Past Medical History:   Diagnosis Date    Coronary artery disease     Diabetes mellitus     High cholesterol     Hypertension     NSTEMI (non-ST elevated myocardial infarction) 07/25/2021    Prostate cancer      Past Surgical History:   Procedure Laterality Date    APPLICATION OF FULL-THICKNESS SKIN GRAFT (FTSG) TO UPPER EXTREMITY Right 11/15/2018    Procedure: APPLICATION, GRAFT, SKIN, FULL-THICKNESS, TO UPPER EXTREMITY VS STSG WITH FROZEN SECTIONS;  Surgeon: Alan Arzola MD;   Location: [facility] OR;  Service: Plastics;  Laterality: Right;    EXCISION OF SQUAMOUS CELL CARCINOMA Right 11/15/2018    Procedure: EXCISION, CARCINOMA, SQUAMOUS CELL @ RIGHT HAND;  Surgeon: Alan Arzola MD;  Location: [facility] OR;  Service: Plastics;  Laterality: Right;  RN PREOP 2018--NEED H/P    HEMIARTHROPLASTY OF HIP Right 2022    Procedure: HEMIARTHROPLASTY, HIP;  Surgeon: Edis Villanueva MD;  Location: [facility] OR;  Service: Orthopedics;  Laterality: Right;    JOINT REPLACEMENT      LEFT HEART CATHETERIZATION Left 2019    Procedure: Left heart cath;  Surgeon: Rashad Nieto MD;  Location: [facility] CATH LAB;  Service: Cardiology;  Laterality: Left;    LEFT HEART CATHETERIZATION Left 2021    Procedure: Left heart cath, radial;  Surgeon: Rashad Nieto MD;  Location: [facility] CATH LAB;  Service: Cardiology;  Laterality: Left;    TONSILLECTOMY      TOTAL KNEE ARTHROPLASTY       Family History   Problem Relation Age of Onset    Heart disease Mother     Stroke Father      Social History     Tobacco Use    Smoking status: Former     Packs/day: 0.00     Types: Cigarettes     Quit date: 2016     Years since quittin.2    Smokeless tobacco: Never    Tobacco comments:     2016   Substance Use Topics    Alcohol use: No    Drug use: No     Review of Systems   Constitutional:  Negative for chills and fever.   HENT:  Negative for congestion, ear pain, rhinorrhea and sore throat.    Eyes:  Negative for redness.   Respiratory:  Negative for shortness of breath and stridor.    Cardiovascular:  Negative for chest pain.   Gastrointestinal:  Negative for abdominal pain, blood in stool, constipation, diarrhea, nausea, rectal pain and vomiting.        (+) abdominal bloating   Genitourinary:  Negative for dysuria, frequency, hematuria and urgency.   Musculoskeletal:  Negative for back pain and neck pain.   Skin:  Negative for rash.   Neurological:  Negative for dizziness, speech difficulty, weakness,  light-headedness and numbness.   Hematological:  Does not bruise/bleed easily.   Psychiatric/Behavioral:  Negative for confusion.      Physical Exam     Initial Vitals [11/22/22 1251]   BP Pulse Resp Temp SpO2   (!) 156/75 63 18 98 °F (36.7 °C) 98 %      MAP       --         Physical Exam    Nursing note and vitals reviewed.  Constitutional: He appears well-developed and well-nourished. No distress.   HENT:   Head: Normocephalic.   Right Ear: External ear normal.   Left Ear: External ear normal.   Eyes: Conjunctivae are normal.   Cardiovascular:  Normal rate and regular rhythm.     Exam reveals no gallop and no friction rub.       No murmur heard.  Pulmonary/Chest: Breath sounds normal. No respiratory distress. He has no wheezes. He has no rhonchi. He has no rales.   Abdominal: Abdomen is soft. He exhibits no distension. There is no abdominal tenderness. There is no rebound and no guarding.   Musculoskeletal:         General: Normal range of motion.     Neurological: He is alert.   Skin: Skin is warm and dry. No rash noted.   Psychiatric: He has a normal mood and affect. His behavior is normal. Judgment and thought content normal.       ED Course   Procedures  Labs Reviewed - No data to display       Imaging Results              X-Ray Abdomen Flat And Erect (Final result)  Result time 11/22/22 14:16:38      Final result by Leonardo Hernandez MD (11/22/22 14:16:38)                   Impression:      Nonobstructive bowel gas pattern.      Electronically signed by: Leonardo Hernandez MD  Date:    11/22/2022  Time:    14:16               Narrative:    EXAMINATION:  XR ABDOMEN FLAT AND ERECT    CLINICAL HISTORY:  Abdominal distension (gaseous)    TECHNIQUE:  Flat and erect frontal views of the abdomen were performed.    COMPARISON:  None.    FINDINGS:  Bowel gas pattern is nonobstructive.  No evidence of pneumoperitoneum.  No large volume fecal burden.  Probable radiation seeds overlie the prostate.  Postoperative changes  of right hip total arthroplasty.  Bones demonstrate degenerative changes.                                       Medications   aluminum-magnesium hydroxide-simethicone 200-200-20 mg/5 mL suspension 30 mL (30 mLs Oral Given 11/22/22 1406)   polyethylene glycol packet 17 g (17 g Oral Given 11/22/22 1406)     Medical Decision Making:   Clinical Tests:   Radiological Study: Reviewed and Ordered  ED Management:  87-year-old male presenting for evaluation of constipation abdominal bloating.  Denies associated abdominal pain.  Last normal bowel movement yesterday.  He is passing excessive flatus.  He denies any vomiting.  Patient is afebrile nontoxic appearing in no distress.  Exam above.  X-ray abdomen flat and rectus negative for obstructive bowel gas pattern.  Abdomen soft nontender.  Considered but doubt bowel obstruction, appendicitis, diverticulitis, perforation, peritonitis.  Will have patient follow up with Gastroenterology.  He is currently prescribed stool softener and MiraLax by his primary care doctor.  Will add Maalox which contains simethicone that may help with his excessive flatus.  Will have him return to ER for worsening symptoms or as needed.        Scribe Attestation:   Scribe #1: I performed the above scribed service and the documentation accurately describes the services I performed. I attest to the accuracy of the note.                 I, octavio hu pa-c, personally performed the services described in this documentation. All medical record entries made by the scribe were at my direction and in my presence. I have reviewed the chart and agree that the record reflects my personal performance and is accurate and complete.   Clinical Impression:   Final diagnoses:  [R14.0] Bloating symptom  [R14.0] Abdominal bloating (Primary)  [K59.00] Constipation, unspecified constipation type        ED Disposition Condition    Discharge Stable          ED Prescriptions       Medication Sig Dispense Start Date End  Date Auth. Provider    aluminum-magnesium hydroxide-simethicone (MAALOX ADVANCED) 200-200-20 mg/5 mL Susp Take 30 mLs by mouth 4 (four) times daily before meals and nightly. for 7 days 354 mL 11/22/2022 11/29/2022 Renee Pan PA-C          Follow-up Information       Follow up With Specialties Details Why Contact Info    Lissy Gavin MD Internal Medicine Schedule an appointment as soon as possible for a visit in 2 days for follow up [address] [facility] [address]  [phone]      [facility] Emergency Medicine Go to  As needed, If symptoms worsen [address] [facility] [address] [phone]             Renee Pan PA-C  11/22/22 7832

## 2022-11-22 NOTE — DISCHARGE INSTRUCTIONS

## 2022-11-22 NOTE — ED TRIAGE NOTES
PT states he is bloated. No pain, He ate a mini [place] and says he is feels full. He states that he usually has a hard time having a bowel movement. He takes miralax and at first it was working then he had to start taking stool softeners to be regular and now none of them are working. Patient is [place]. Pt. Is calm and alert. Pat has no c/o n/v or headache. Abdomen is soft and non tender. Pt had his right hip replaced August 15,2022. Patient is on Plavix. Patient had skin cancer removed out of bilateral ears X4 days ago.

## 2022-12-12 ENCOUNTER — HOSPITAL ENCOUNTER (EMERGENCY)
Facility: HOSPITAL | Age: 87
Discharge: HOME | End: 2022-12-12
Attending: EMERGENCY MEDICINE
Payer: MEDICARE

## 2022-12-12 VITALS
BODY MASS INDEX: 26.81 KG/M2 | RESPIRATION RATE: 18 BRPM | TEMPERATURE: 98 F | OXYGEN SATURATION: 97 % | DIASTOLIC BLOOD PRESSURE: 64 MMHG | SYSTOLIC BLOOD PRESSURE: 137 MMHG | HEART RATE: 54 BPM | HEIGHT: 69 IN | WEIGHT: 181 LBS

## 2022-12-12 DIAGNOSIS — R14.0 ABDOMINAL BLOATING: Primary | ICD-10-CM

## 2022-12-12 DIAGNOSIS — K59.00 CONSTIPATION, UNSPECIFIED CONSTIPATION TYPE: ICD-10-CM

## 2022-12-12 LAB
ALBUMIN SERPL BCP-MCNC: 4 G/DL (ref 3.5–5.2)
ALP SERPL-CCNC: 93 U/L (ref 55–135)
ALT SERPL W/O P-5'-P-CCNC: 11 U/L (ref 10–44)
ANION GAP SERPL CALC-SCNC: 15 MMOL/L (ref 8–16)
ANION GAP SERPL CALC-SCNC: 9 MMOL/L (ref 8–16)
AST SERPL-CCNC: 15 U/L (ref 10–40)
BASOPHILS # BLD AUTO: 0.04 K/UL (ref 0–0.2)
BASOPHILS NFR BLD: 0.5 % (ref 0–1.9)
BILIRUB SERPL-MCNC: 0.5 MG/DL (ref 0.1–1)
BUN SERPL-MCNC: 10 MG/DL (ref 6–30)
BUN SERPL-MCNC: 10 MG/DL (ref 8–23)
CALCIUM SERPL-MCNC: 9 MG/DL (ref 8.7–10.5)
CHLORIDE SERPL-SCNC: 100 MMOL/L (ref 95–110)
CHLORIDE SERPL-SCNC: 99 MMOL/L (ref 95–110)
CO2 SERPL-SCNC: 28 MMOL/L (ref 23–29)
CREAT SERPL-MCNC: 0.8 MG/DL (ref 0.5–1.4)
CREAT SERPL-MCNC: 1 MG/DL (ref 0.5–1.4)
DIFFERENTIAL METHOD: ABNORMAL
EOSINOPHIL # BLD AUTO: 0.1 K/UL (ref 0–0.5)
EOSINOPHIL NFR BLD: 1.3 % (ref 0–8)
ERYTHROCYTE [DISTWIDTH] IN BLOOD BY AUTOMATED COUNT: 15.5 % (ref 11.5–14.5)
EST. GFR  (NO RACE VARIABLE): >60 ML/MIN/1.73 M^2
GLUCOSE SERPL-MCNC: 144 MG/DL (ref 70–110)
GLUCOSE SERPL-MCNC: 145 MG/DL (ref 70–110)
HCT VFR BLD AUTO: 36.8 % (ref 40–54)
HCT VFR BLD CALC: 41 %PCV (ref 36–54)
HGB BLD-MCNC: 12.2 G/DL (ref 14–18)
IMM GRANULOCYTES # BLD AUTO: 0.03 K/UL (ref 0–0.04)
IMM GRANULOCYTES NFR BLD AUTO: 0.4 % (ref 0–0.5)
LIPASE SERPL-CCNC: 48 U/L (ref 4–60)
LYMPHOCYTES # BLD AUTO: 1.4 K/UL (ref 1–4.8)
LYMPHOCYTES NFR BLD: 18.9 % (ref 18–48)
MCH RBC QN AUTO: 29.2 PG (ref 27–31)
MCHC RBC AUTO-ENTMCNC: 33.2 G/DL (ref 32–36)
MCV RBC AUTO: 88 FL (ref 82–98)
MONOCYTES # BLD AUTO: 0.6 K/UL (ref 0.3–1)
MONOCYTES NFR BLD: 8.2 % (ref 4–15)
NEUTROPHILS # BLD AUTO: 5.3 K/UL (ref 1.8–7.7)
NEUTROPHILS NFR BLD: 70.7 % (ref 38–73)
NRBC BLD-RTO: 0 /100 WBC
PLATELET # BLD AUTO: 300 K/UL (ref 150–450)
PMV BLD AUTO: 9.4 FL (ref 9.2–12.9)
POC IONIZED CALCIUM: 1.14 MMOL/L (ref 1.06–1.42)
POC TCO2 (MEASURED): 26 MMOL/L (ref 23–29)
POTASSIUM BLD-SCNC: 3.9 MMOL/L (ref 3.5–5.1)
POTASSIUM SERPL-SCNC: 4 MMOL/L (ref 3.5–5.1)
PROT SERPL-MCNC: 7.5 G/DL (ref 6–8.4)
RBC # BLD AUTO: 4.18 M/UL (ref 4.6–6.2)
SAMPLE: ABNORMAL
SODIUM BLD-SCNC: 135 MMOL/L (ref 136–145)
SODIUM SERPL-SCNC: 137 MMOL/L (ref 136–145)
WBC # BLD AUTO: 7.53 K/UL (ref 3.9–12.7)

## 2022-12-12 PROCEDURE — 84295 ASSAY OF SERUM SODIUM: CPT

## 2022-12-12 PROCEDURE — 25500020 PHARM REV CODE 255: Performed by: PHYSICIAN ASSISTANT

## 2022-12-12 PROCEDURE — 84132 ASSAY OF SERUM POTASSIUM: CPT

## 2022-12-12 PROCEDURE — 82565 ASSAY OF CREATININE: CPT | Mod: 59

## 2022-12-12 PROCEDURE — 82565 ASSAY OF CREATININE: CPT

## 2022-12-12 PROCEDURE — 82330 ASSAY OF CALCIUM: CPT

## 2022-12-12 PROCEDURE — 25000003 PHARM REV CODE 250: Performed by: PHYSICIAN ASSISTANT

## 2022-12-12 PROCEDURE — 99900035 HC TECH TIME PER 15 MIN (STAT)

## 2022-12-12 PROCEDURE — 83690 ASSAY OF LIPASE: CPT | Performed by: PHYSICIAN ASSISTANT

## 2022-12-12 PROCEDURE — 80053 COMPREHEN METABOLIC PANEL: CPT | Performed by: PHYSICIAN ASSISTANT

## 2022-12-12 PROCEDURE — 82962 GLUCOSE BLOOD TEST: CPT

## 2022-12-12 PROCEDURE — 85025 COMPLETE CBC W/AUTO DIFF WBC: CPT | Performed by: PHYSICIAN ASSISTANT

## 2022-12-12 PROCEDURE — 99285 EMERGENCY DEPT VISIT HI MDM: CPT | Mod: 25

## 2022-12-12 PROCEDURE — 85014 HEMATOCRIT: CPT | Mod: 59

## 2022-12-12 RX ORDER — SIMETHICONE 125 MG
125 TABLET,CHEWABLE ORAL EVERY 6 HOURS PRN
Qty: 30 TABLET | Refills: 0 | Status: SHIPPED | OUTPATIENT
Start: 2022-12-12

## 2022-12-12 RX ORDER — DOCUSATE SODIUM 100 MG/1
100 CAPSULE, LIQUID FILLED ORAL 2 TIMES DAILY
Qty: 60 CAPSULE | Refills: 0 | Status: SHIPPED | OUTPATIENT
Start: 2022-12-12 | End: 2023-01-11

## 2022-12-12 RX ORDER — MAG HYDROX/ALUMINUM HYD/SIMETH 200-200-20
30 SUSPENSION, ORAL (FINAL DOSE FORM) ORAL
Status: COMPLETED | OUTPATIENT
Start: 2022-12-12 | End: 2022-12-12

## 2022-12-12 RX ADMIN — IOHEXOL 85 ML: 350 INJECTION, SOLUTION INTRAVENOUS at 10:12

## 2022-12-12 RX ADMIN — ALUMINUM HYDROXIDE, MAGNESIUM HYDROXIDE, AND SIMETHICONE 30 ML: 200; 200; 20 SUSPENSION ORAL at 11:12

## 2022-12-12 NOTE — DISCHARGE INSTRUCTIONS

## 2022-12-12 NOTE — ED PROVIDER NOTES
Encounter Date: 12/12/2022    SCRIBE #1 NOTE: I, Roselia Haddad, am scribing for, and in the presence of,  Renee Pan PA-C. I have scribed the following portions of the note - Other sections scribed: HPI, ROS, PE.     History     Chief Complaint   Patient presents with    Bloated     Pt to ED via personal transportation with complaints of abdominal bloating X1 month. Pt reports previous visit for same issues- treated for constipation. Pt states no longer constipated, last bowel movement this AM. Pt states passing excessive gas. Denies any associated abdominal pain.     This 87 y.o male, with a medical history of Coronary artery disease, Diabetes mellitus, High cholesterol, Hypertension, NSTEMI, and Prostate cancer, presents to the ED c/o abdominal bloating that began for the last 1x month. Pt reports that he has been experiencing constipation issues intermittently since August 2022. He states that he was seen by his PCP for the issues and was prescribed MiraLAX and stool softeners for treatment. He reports that he was seen in this ED last month (11/22/22) for the same symptoms and has been taking the prescribed medications as well as saline laxatives and Maalox. He notes that prior to his visit he was having bowel movements daily, but is now experiencing episodes every 3 days. Pt's last bowel movement was this morning. He returns to the ED today stating that he is concerned about the persisting abdominal bloating. Of note, pt reports that he has been seen by gastroenterology in the past. He states that at his last colonoscopy he ws told that he did not need a repeat at 87. He presently notes that he is unsure if he still needs to have a colonoscopy. Pt denies fever, nausea, emesis, blood in stool, abdominal pain, rectal pain, dysuria, or urinary frequency. No other associated symptoms.     The history is provided by the patient.   Review of patient's allergies indicates:  No Known Allergies  Past Medical  History:   Diagnosis Date    Coronary artery disease     Diabetes mellitus     High cholesterol     Hypertension     NSTEMI (non-ST elevated myocardial infarction) 2021    Prostate cancer      Past Surgical History:   Procedure Laterality Date    APPLICATION OF FULL-THICKNESS SKIN GRAFT (FTSG) TO UPPER EXTREMITY Right 11/15/2018    Procedure: APPLICATION, GRAFT, SKIN, FULL-THICKNESS, TO UPPER EXTREMITY VS STSG WITH FROZEN SECTIONS;  Surgeon: Alan Arzola MD;  Location: [facility] OR;  Service: Plastics;  Laterality: Right;    EXCISION OF SQUAMOUS CELL CARCINOMA Right 11/15/2018    Procedure: EXCISION, CARCINOMA, SQUAMOUS CELL @ RIGHT HAND;  Surgeon: Alan Arzola MD;  Location: [facility] OR;  Service: Plastics;  Laterality: Right;  RN PREOP 2018--NEED H/P    HEMIARTHROPLASTY OF HIP Right 2022    Procedure: HEMIARTHROPLASTY, HIP;  Surgeon: Edis Villanueva MD;  Location[facility];  Service: Orthopedics;  Laterality: Right;    JOINT REPLACEMENT      LEFT HEART CATHETERIZATION Left 2019    Procedure: Left heart cath;  Surgeon: Rashad Nieto MD;  Location: [facility] CATH LAB;  Service: Cardiology;  Laterality: Left;    LEFT HEART CATHETERIZATION Left 2021    Procedure: Left heart cath, radial;  Surgeon: Rashad Nieto MD;  Location: [facility] CATH LAB;  Service: Cardiology;  Laterality: Left;    TONSILLECTOMY      TOTAL KNEE ARTHROPLASTY       Family History   Problem Relation Age of Onset    Heart disease Mother     Stroke Father      Social History     Tobacco Use    Smoking status: Former     Packs/day: 0.00     Types: Cigarettes     Quit date: 2016     Years since quittin.3    Smokeless tobacco: Never    Tobacco comments:     2016   Substance Use Topics    Alcohol use: No    Drug use: No     Review of Systems   Constitutional:  Negative for chills and fever.   HENT:  Negative for congestion, ear pain, rhinorrhea and sore throat.    Eyes:  Negative for redness.   Respiratory:  Negative  for shortness of breath and stridor.    Cardiovascular:  Negative for chest pain.   Gastrointestinal:  Positive for abdominal distention and constipation. Negative for abdominal pain, anal bleeding, blood in stool, diarrhea, nausea, rectal pain and vomiting.        (+) abdominal bloating   Genitourinary:  Negative for dysuria, frequency, hematuria and urgency.   Musculoskeletal:  Negative for back pain and neck pain.   Skin:  Negative for rash.   Neurological:  Negative for dizziness, speech difficulty, weakness, light-headedness and numbness.   Hematological:  Does not bruise/bleed easily.   Psychiatric/Behavioral:  Negative for confusion.      Physical Exam     Initial Vitals [12/12/22 0802]   BP Pulse Resp Temp SpO2   137/64 64 18 97.9 °F (36.6 °C) 97 %      MAP       --         Physical Exam    Nursing note and vitals reviewed.  Constitutional: He appears well-developed and well-nourished. He is not diaphoretic. No distress.   HENT:   Head: Normocephalic and atraumatic.   Right Ear: External ear normal.   Left Ear: External ear normal.   Eyes: Conjunctivae are normal.   Neck:   Normal range of motion.  Cardiovascular:  Normal rate and regular rhythm.     Exam reveals no gallop and no friction rub.       No murmur heard.  Pulmonary/Chest: Breath sounds normal. No respiratory distress. He has no wheezes. He has no rhonchi. He has no rales.   Abdominal: Abdomen is soft. Bowel sounds are normal. He exhibits no distension. There is no abdominal tenderness. There is no rebound and no guarding.   Genitourinary:    Genitourinary Comments: Soft light brown stool in the rectal vault. No fecal impaction.     Musculoskeletal:         General: No edema. Normal range of motion.      Cervical back: Normal range of motion.     Neurological: He is alert and oriented to person, place, and time.   Skin: Skin is warm and dry. No rash noted.   Psychiatric: He has a normal mood and affect. His behavior is normal. Judgment and thought  content normal.       ED Course   Procedures  Labs Reviewed   CBC W/ AUTO DIFFERENTIAL - Abnormal; Notable for the following components:       Result Value    RBC 4.18 (*)     Hemoglobin 12.2 (*)     Hematocrit 36.8 (*)     RDW 15.5 (*)     All other components within normal limits   COMPREHENSIVE METABOLIC PANEL - Abnormal; Notable for the following components:    Glucose 144 (*)     All other components within normal limits   ISTAT PROCEDURE - Abnormal; Notable for the following components:    POC Glucose 145 (*)     POC Sodium 135 (*)     All other components within normal limits   LIPASE          Imaging Results              CT Abdomen Pelvis With Contrast (Final result)  Result time 12/12/22 11:34:35      Final result by Kelsi Mccollum MD (12/12/22 11:34:35)                   Impression:      No evidence of bowel obstruction.    Bilateral kidney, nonobstructive forming calculi.    Bilateral small hypoattenuating lesions of the kidneys, too small to be characterized suggestive of small cysts.    Atherosclerotic plaque of the abdominal aorta and its branches.    Prostatic seed implants.    Postoperative changes of the right hip joint.      Electronically signed by: Kelsi Mccollum MD  Date:    12/12/2022  Time:    11:34               Narrative:    EXAMINATION:  CT ABDOMEN PELVIS WITH CONTRAST    CLINICAL HISTORY:  Bowel obstruction suspected;    TECHNIQUE:  Low dose axial images, sagittal and coronal reformations were obtained from the lung bases to the pubic symphysis following the IV administration of 85 mL of Omnipaque 350 .  Oral contrast was not given. Axial and coronal images reformatted.    COMPARISON:  None    FINDINGS:  Lung bases are clear.    No pericardial effusion, coronary artery calcification.    No liver lesions.  The gallbladder is present, no radiopaque stones seen within.    The stomach, bilateral adrenal glands, and spleen appear normal.    There is a hypoattenuating lesion within  the body of the pancreas measuring 0.9 cm (02:50) in retrospect unchanged from CT 01/12/2022 most suggestive of a small benign lipoma.    The right kidney enhances normally, no hydronephrosis. Three subcentimeter hypoattenuating lesions of the right kidney, too small to characterize possibly small cysts.  Small stones forming at the lower pole of the right kidney, the largest 3 mm.  The right ureter appears normal.    The left kidney enhances normally.  No hydronephrosis.  1.5 cm cyst noted at the midpole.  Two smaller hypoattenuating lesions noted at the lower pole of the left kidney, too small to characterize, favored to represent benign cysts.  Tiny punctate calculi noted at the lower pole of the left kidney.  The left ureter appears normal.    The bladder appears normal.  Prostatic seeds noted.    Mild stool retention, no inflammatory changes of the bowel seen, no bowel dilation.  Few scattered colonic diverticula.  The appendix is normal.    The abdominal aorta tapers normally, there is moderate circumferential atherosclerotic plaque of the abdominal aorta and its branches.    No ascites, no free air.    The inguinal regions appear normal.    Postoperative changes of the right hip joint. No worrisome osseous lesion seen.                                       Medications   iohexoL (OMNIPAQUE 350) injection 85 mL (85 mLs Intravenous Given 12/12/22 1017)   aluminum-magnesium hydroxide-simethicone 200-200-20 mg/5 mL suspension 30 mL (30 mLs Oral Given 12/12/22 1153)     Medical Decision Making:   Clinical Tests:   Lab Tests: Ordered and Reviewed  Radiological Study: Ordered and Reviewed  ED Management:  87-year-old male presenting for constipation abdominal bloating.  Patient reports he has had colonoscopy previously.  Can not recall his last colonoscopy however things that was over 5 years ago.  He denies fever, nausea vomiting, melena, hematochezia, difficulty urinating or other associated symptoms.  Was  evaluated this facility and had a negative x-ray.  Was evaluated by his primary care who prescribed him additional medications to help with constipation.  Did have bowel movement this morning.  Exam above.  Abdomen soft nontender.  No appreciated distention.  Rectal exam with no evidence of fecal impaction.  Labs unremarkable.  CT abdomen pelvis is negative for bowel obstruction or findings to explain his abdominal bloating.  Will refer patient to Gastroenterology for further evaluation management.  Will prescribe school softeners as well as medications for his symptoms.  Will have him return to the emergency department worsening symptoms or as needed.        Scribe Attestation:   Scribe #1: I performed the above scribed service and the documentation accurately describes the services I performed. I attest to the accuracy of the note.                 I, octavio pan pa-c, personally performed the services described in this documentation. All medical record entries made by the scribe were at my direction and in my presence. I have reviewed the chart and agree that the record reflects my personal performance and is accurate and complete.   Clinical Impression:   Final diagnoses:  [R14.0] Abdominal bloating (Primary)  [K59.00] Constipation, unspecified constipation type        ED Disposition Condition    Discharge Stable          ED Prescriptions       Medication Sig Dispense Start Date End Date Auth. Provider    docusate sodium (COLACE) 100 MG capsule Take 1 capsule (100 mg total) by mouth 2 (two) times daily. 60 capsule 12/12/2022 1/11/2023 Octavio Pan PA-C    simethicone (MYLICON) 125 MG chewable tablet Take 1 tablet (125 mg total) by mouth every 6 (six) hours as needed for Flatulence. 30 tablet 12/12/2022 -- Octavio Pan PA-C          Follow-up Information       Follow up With Specialties Details Why Contact Info    Lissy Gavin MD Internal Medicine   [address] [place]  [address]  [phone]      Edwin Bourne MD Gastroenterology   [address] [facility] - Dept of Gastroenterology 4th floor, [facility] [address]  [phone]      [facility] - Emergency Dept Emergency Medicine Go to  If symptoms worsen, As needed [address] [facility] [id number] [phone]             Renee Pan PA-C  12/12/22 1532

## 2022-12-18 ENCOUNTER — HOSPITAL ENCOUNTER (OUTPATIENT)
Facility: HOSPITAL | Age: 87
Discharge: HOME | End: 2022-12-18
Attending: EMERGENCY MEDICINE | Admitting: INTERNAL MEDICINE
Payer: MEDICARE

## 2022-12-18 VITALS
OXYGEN SATURATION: 97 % | WEIGHT: 181 LBS | DIASTOLIC BLOOD PRESSURE: 79 MMHG | SYSTOLIC BLOOD PRESSURE: 179 MMHG | TEMPERATURE: 98 F | HEART RATE: 72 BPM | RESPIRATION RATE: 17 BRPM | BODY MASS INDEX: 26.73 KG/M2

## 2022-12-18 DIAGNOSIS — R61 DIAPHORESIS: ICD-10-CM

## 2022-12-18 DIAGNOSIS — I20.0 UNSTABLE ANGINA: ICD-10-CM

## 2022-12-18 DIAGNOSIS — R79.89 TROPONIN LEVEL ELEVATED: ICD-10-CM

## 2022-12-18 DIAGNOSIS — R53.1 WEAKNESS: ICD-10-CM

## 2022-12-18 DIAGNOSIS — I25.10 CORONARY ARTERY DISEASE INVOLVING NATIVE CORONARY ARTERY OF NATIVE HEART WITHOUT ANGINA PECTORIS: Primary | ICD-10-CM

## 2022-12-18 DIAGNOSIS — R07.9 CHEST PAIN: ICD-10-CM

## 2022-12-18 LAB
ALBUMIN SERPL BCP-MCNC: 3.7 G/DL (ref 3.5–5.2)
ALP SERPL-CCNC: 85 U/L (ref 55–135)
ALT SERPL W/O P-5'-P-CCNC: 10 U/L (ref 10–44)
ANION GAP SERPL CALC-SCNC: 10 MMOL/L (ref 8–16)
AST SERPL-CCNC: 13 U/L (ref 10–40)
BASOPHILS # BLD AUTO: 0.03 K/UL (ref 0–0.2)
BASOPHILS NFR BLD: 0.4 % (ref 0–1.9)
BILIRUB SERPL-MCNC: 0.5 MG/DL (ref 0.1–1)
BUN SERPL-MCNC: 17 MG/DL (ref 8–23)
CALCIUM SERPL-MCNC: 9 MG/DL (ref 8.7–10.5)
CHLORIDE SERPL-SCNC: 103 MMOL/L (ref 95–110)
CO2 SERPL-SCNC: 24 MMOL/L (ref 23–29)
CREAT SERPL-MCNC: 1.1 MG/DL (ref 0.5–1.4)
CTP QC/QA: YES
DIFFERENTIAL METHOD: ABNORMAL
EOSINOPHIL # BLD AUTO: 0.1 K/UL (ref 0–0.5)
EOSINOPHIL NFR BLD: 1.6 % (ref 0–8)
ERYTHROCYTE [DISTWIDTH] IN BLOOD BY AUTOMATED COUNT: 15.3 % (ref 11.5–14.5)
EST. GFR  (NO RACE VARIABLE): >60 ML/MIN/1.73 M^2
GLUCOSE SERPL-MCNC: 155 MG/DL (ref 70–110)
HCT VFR BLD AUTO: 34.4 % (ref 40–54)
HGB BLD-MCNC: 11.6 G/DL (ref 14–18)
IMM GRANULOCYTES # BLD AUTO: 0.02 K/UL (ref 0–0.04)
IMM GRANULOCYTES NFR BLD AUTO: 0.3 % (ref 0–0.5)
LYMPHOCYTES # BLD AUTO: 1.1 K/UL (ref 1–4.8)
LYMPHOCYTES NFR BLD: 16.9 % (ref 18–48)
MCH RBC QN AUTO: 29.6 PG (ref 27–31)
MCHC RBC AUTO-ENTMCNC: 33.7 G/DL (ref 32–36)
MCV RBC AUTO: 88 FL (ref 82–98)
MONOCYTES # BLD AUTO: 0.6 K/UL (ref 0.3–1)
MONOCYTES NFR BLD: 9.2 % (ref 4–15)
NEUTROPHILS # BLD AUTO: 4.8 K/UL (ref 1.8–7.7)
NEUTROPHILS NFR BLD: 71.6 % (ref 38–73)
NRBC BLD-RTO: 0 /100 WBC
PLATELET # BLD AUTO: 289 K/UL (ref 150–450)
PMV BLD AUTO: 9.3 FL (ref 9.2–12.9)
POTASSIUM SERPL-SCNC: 3.4 MMOL/L (ref 3.5–5.1)
PROT SERPL-MCNC: 6.8 G/DL (ref 6–8.4)
RBC # BLD AUTO: 3.92 M/UL (ref 4.6–6.2)
SARS-COV-2 RDRP RESP QL NAA+PROBE: NEGATIVE
SODIUM SERPL-SCNC: 137 MMOL/L (ref 136–145)
TROPONIN I SERPL DL<=0.01 NG/ML-MCNC: 0.01 NG/ML (ref 0–0.03)
TROPONIN I SERPL DL<=0.01 NG/ML-MCNC: 0.02 NG/ML (ref 0–0.03)
WBC # BLD AUTO: 6.76 K/UL (ref 3.9–12.7)

## 2022-12-18 PROCEDURE — G0378 HOSPITAL OBSERVATION PER HR: HCPCS

## 2022-12-18 PROCEDURE — 93005 ELECTROCARDIOGRAM TRACING: CPT

## 2022-12-18 PROCEDURE — 93010 ELECTROCARDIOGRAM REPORT: CPT | Mod: ,,, | Performed by: INTERNAL MEDICINE

## 2022-12-18 PROCEDURE — 63600175 PHARM REV CODE 636 W HCPCS

## 2022-12-18 PROCEDURE — 80053 COMPREHEN METABOLIC PANEL: CPT | Performed by: EMERGENCY MEDICINE

## 2022-12-18 PROCEDURE — 93010 EKG 12-LEAD: ICD-10-PCS | Mod: 76,,, | Performed by: INTERNAL MEDICINE

## 2022-12-18 PROCEDURE — 99285 EMERGENCY DEPT VISIT HI MDM: CPT | Mod: 25

## 2022-12-18 PROCEDURE — 87635 SARS-COV-2 COVID-19 AMP PRB: CPT | Performed by: INTERNAL MEDICINE

## 2022-12-18 PROCEDURE — 96374 THER/PROPH/DIAG INJ IV PUSH: CPT

## 2022-12-18 PROCEDURE — 84484 ASSAY OF TROPONIN QUANT: CPT | Mod: 91

## 2022-12-18 PROCEDURE — 84484 ASSAY OF TROPONIN QUANT: CPT | Mod: 91 | Performed by: EMERGENCY MEDICINE

## 2022-12-18 PROCEDURE — 25000003 PHARM REV CODE 250: Performed by: EMERGENCY MEDICINE

## 2022-12-18 PROCEDURE — 85025 COMPLETE CBC W/AUTO DIFF WBC: CPT | Performed by: EMERGENCY MEDICINE

## 2022-12-18 PROCEDURE — 93010 ELECTROCARDIOGRAM REPORT: CPT | Mod: 76,,, | Performed by: INTERNAL MEDICINE

## 2022-12-18 RX ORDER — ACETAMINOPHEN 325 MG/1
650 TABLET ORAL EVERY 4 HOURS PRN
Status: DISCONTINUED | OUTPATIENT
Start: 2022-12-18 | End: 2022-12-18 | Stop reason: HOSPADM

## 2022-12-18 RX ORDER — METOPROLOL SUCCINATE 50 MG/1
50 TABLET, EXTENDED RELEASE ORAL DAILY
Status: DISCONTINUED | OUTPATIENT
Start: 2022-12-18 | End: 2022-12-18 | Stop reason: HOSPADM

## 2022-12-18 RX ORDER — HYDRALAZINE HYDROCHLORIDE 20 MG/ML
10 INJECTION INTRAMUSCULAR; INTRAVENOUS EVERY 8 HOURS PRN
Status: DISCONTINUED | OUTPATIENT
Start: 2022-12-18 | End: 2022-12-18 | Stop reason: HOSPADM

## 2022-12-18 RX ORDER — AMLODIPINE BESYLATE 5 MG/1
10 TABLET ORAL DAILY
Status: DISCONTINUED | OUTPATIENT
Start: 2022-12-18 | End: 2022-12-18 | Stop reason: HOSPADM

## 2022-12-18 RX ORDER — PROCHLORPERAZINE EDISYLATE 5 MG/ML
5 INJECTION INTRAMUSCULAR; INTRAVENOUS EVERY 6 HOURS PRN
Status: DISCONTINUED | OUTPATIENT
Start: 2022-12-18 | End: 2022-12-18 | Stop reason: HOSPADM

## 2022-12-18 RX ORDER — HYDRALAZINE HYDROCHLORIDE 20 MG/ML
5 INJECTION INTRAMUSCULAR; INTRAVENOUS ONCE
Status: COMPLETED | OUTPATIENT
Start: 2022-12-18 | End: 2022-12-18

## 2022-12-18 RX ORDER — THIAMINE HCL 250 MG
250 TABLET ORAL DAILY
COMMUNITY

## 2022-12-18 RX ORDER — NITROGLYCERIN 0.4 MG/1
0.4 TABLET SUBLINGUAL EVERY 5 MIN PRN
Status: DISCONTINUED | OUTPATIENT
Start: 2022-12-18 | End: 2022-12-18 | Stop reason: HOSPADM

## 2022-12-18 RX ORDER — IBUPROFEN 200 MG
16 TABLET ORAL
Status: DISCONTINUED | OUTPATIENT
Start: 2022-12-18 | End: 2022-12-18 | Stop reason: HOSPADM

## 2022-12-18 RX ORDER — DOCUSATE SODIUM 100 MG/1
100 CAPSULE, LIQUID FILLED ORAL 2 TIMES DAILY
Status: DISCONTINUED | OUTPATIENT
Start: 2022-12-18 | End: 2022-12-18 | Stop reason: HOSPADM

## 2022-12-18 RX ORDER — ATORVASTATIN CALCIUM 10 MG/1
40 TABLET, FILM COATED ORAL DAILY
Status: DISCONTINUED | OUTPATIENT
Start: 2022-12-18 | End: 2022-12-18 | Stop reason: HOSPADM

## 2022-12-18 RX ORDER — ONDANSETRON 2 MG/ML
4 INJECTION INTRAMUSCULAR; INTRAVENOUS EVERY 8 HOURS PRN
Status: DISCONTINUED | OUTPATIENT
Start: 2022-12-18 | End: 2022-12-18 | Stop reason: HOSPADM

## 2022-12-18 RX ORDER — HYDROCHLOROTHIAZIDE 12.5 MG/1
12.5 TABLET ORAL DAILY
Status: DISCONTINUED | OUTPATIENT
Start: 2022-12-18 | End: 2022-12-18 | Stop reason: HOSPADM

## 2022-12-18 RX ORDER — INSULIN ASPART 100 [IU]/ML
0-5 INJECTION, SOLUTION INTRAVENOUS; SUBCUTANEOUS
Status: DISCONTINUED | OUTPATIENT
Start: 2022-12-18 | End: 2022-12-18 | Stop reason: HOSPADM

## 2022-12-18 RX ORDER — IBUPROFEN 200 MG
24 TABLET ORAL
Status: DISCONTINUED | OUTPATIENT
Start: 2022-12-18 | End: 2022-12-18 | Stop reason: HOSPADM

## 2022-12-18 RX ORDER — CLOPIDOGREL BISULFATE 75 MG/1
75 TABLET ORAL DAILY
Status: DISCONTINUED | OUTPATIENT
Start: 2022-12-18 | End: 2022-12-18 | Stop reason: HOSPADM

## 2022-12-18 RX ORDER — ENOXAPARIN SODIUM 100 MG/ML
40 INJECTION SUBCUTANEOUS EVERY 24 HOURS
Status: DISCONTINUED | OUTPATIENT
Start: 2022-12-18 | End: 2022-12-18 | Stop reason: HOSPADM

## 2022-12-18 RX ORDER — ASPIRIN 81 MG/1
81 TABLET ORAL DAILY
Status: DISCONTINUED | OUTPATIENT
Start: 2022-12-18 | End: 2022-12-18 | Stop reason: HOSPADM

## 2022-12-18 RX ORDER — TALC
6 POWDER (GRAM) TOPICAL NIGHTLY PRN
Status: DISCONTINUED | OUTPATIENT
Start: 2022-12-18 | End: 2022-12-18 | Stop reason: HOSPADM

## 2022-12-18 RX ORDER — GLUCAGON 1 MG
1 KIT INJECTION
Status: DISCONTINUED | OUTPATIENT
Start: 2022-12-18 | End: 2022-12-18 | Stop reason: HOSPADM

## 2022-12-18 RX ORDER — NALOXONE HCL 0.4 MG/ML
0.02 VIAL (ML) INJECTION
Status: DISCONTINUED | OUTPATIENT
Start: 2022-12-18 | End: 2022-12-18 | Stop reason: HOSPADM

## 2022-12-18 RX ORDER — SODIUM CHLORIDE 0.9 % (FLUSH) 0.9 %
10 SYRINGE (ML) INJECTION EVERY 12 HOURS PRN
Status: DISCONTINUED | OUTPATIENT
Start: 2022-12-18 | End: 2022-12-18 | Stop reason: HOSPADM

## 2022-12-18 RX ORDER — ISOSORBIDE MONONITRATE 30 MG/1
60 TABLET, EXTENDED RELEASE ORAL DAILY
Status: DISCONTINUED | OUTPATIENT
Start: 2022-12-18 | End: 2022-12-18 | Stop reason: HOSPADM

## 2022-12-18 RX ADMIN — HYDRALAZINE HYDROCHLORIDE 5 MG: 20 INJECTION INTRAMUSCULAR; INTRAVENOUS at 03:12

## 2022-12-18 RX ADMIN — POTASSIUM BICARBONATE 25 MEQ: 977.5 TABLET, EFFERVESCENT ORAL at 10:12

## 2022-12-18 NOTE — ASSESSMENT & PLAN NOTE
Continue ASA/Statin/Plavix  Troponin x2 in ED: 0.008, 0.013; will continue to trend; if trending flat or down, and patient continues to be asymptomatic, patient stable for discharge  Patient is currently chest pain free and asymptomatic.

## 2022-12-18 NOTE — PLAN OF CARE
[facility] - Emergency Dept  Discharge Final Note    Primary Care Provider: Lissy Gavin MD    Expected Discharge Date: 12/18/2022    Final Discharge Note (most recent)       Final Note - 12/18/22 1634          Final Note    Assessment Type Final Discharge Note     Anticipated Discharge Disposition Home or Self Care     What phone number can be called within the next 1-3 days to see how you are doing after discharge? [phone]     Hospital Resources/Appts/Education Provided Post-Acute resouces added to AVS        Post-Acute Status    Discharge Delays None known at this time                     Important Message from Medicare             Contact Info       Lissy Gavin MD   Specialty: Internal Medicine   Relationship: PCP - General    [address] [place] [address]   Phone: [phone]       Next Steps: Schedule an appointment as soon as possible for a visit in 7 day(s)    Instructions: Call your primary care provider to schedule a hospital followup appointment by 12/25/2022.

## 2022-12-18 NOTE — ASSESSMENT & PLAN NOTE
Somewhat controlled, continue home medications  Continue to monitor blood pressure, and adjust therapy as needed.  PRN Hydralazine ordered for SBP >180

## 2022-12-18 NOTE — PLAN OF CARE
[facility] - Emergency Dept  Discharge Assessment    Primary Care Provider: Lissy Gavin MD     Discharge Assessment (most recent)       BRIEF DISCHARGE ASSESSMENT - 12/18/22 0883          Discharge Planning    Assessment Type Discharge Planning Brief Assessment     Resource/Environmental Concerns none     Support Systems Family members   Sister; Samanta Ayers    Assistance Needed None     Equipment Currently Used at Home none     Current Living Arrangements home;apartment     Care Facility Name n/a     Patient/Family Anticipates Transition to home     Patient/Family Anticipated Services at Transition outpatient care   PCP followup appt    DME Needed Upon Discharge  none     Discharge Plan A Home     Discharge Plan B Home Health                   Discharge planning assessment.  Patient from home alone and independent.  Patient's sister, Samanta Ayers available to help at home if needed.  Patient will need a PCP f/u appt.

## 2022-12-18 NOTE — ED PROVIDER NOTES
"Encounter Date: 12/18/2022    SCRIBE #1 NOTE: I, Mateo Chu, am scribing for, and in the presence of,  José Luis Pan MD. I have scribed the following portions of the note - Other sections scribed: HPI, ROS, PE.     History     Chief Complaint   Patient presents with    cold sweats     Woke up with cold sweats, denies N/V/D, SOB. Or chest pain     Juan Martínez Jr. is an 87 y.o male with a PMHx of HTN, CAD, DM, NSTEMI, and high cholesterol, that comes to the ED complaining of diaphoresis beginning at 0230 this morning. Patient reports waking up to complaints of "cold sweats," endorsing he got concerned due to his cardiac history and wants to be evaluated. No medications taken PTA. Compliant with anticoagulants. No alleviating or exacerbating factors noted. Denies CP, headache, abdominal pain, nausea, cough, SOB, dizziness, or other associated symptoms. Patient reports being a former smoker, and denies EtOH usage. No known allergies.    The history is provided by the patient. No  was used.   Review of patient's allergies indicates:  No Known Allergies  Past Medical History:   Diagnosis Date    Coronary artery disease     Diabetes mellitus     High cholesterol     Hypertension     NSTEMI (non-ST elevated myocardial infarction) 07/25/2021    Prostate cancer      Past Surgical History:   Procedure Laterality Date    APPLICATION OF FULL-THICKNESS SKIN GRAFT (FTSG) TO UPPER EXTREMITY Right 11/15/2018    Procedure: APPLICATION, GRAFT, SKIN, FULL-THICKNESS, TO UPPER EXTREMITY VS STSG WITH FROZEN SECTIONS;  Surgeon: Alan Arzola MD;  Location: [facility] OR;  Service: Plastics;  Laterality: Right;    EXCISION OF SQUAMOUS CELL CARCINOMA Right 11/15/2018    Procedure: EXCISION, CARCINOMA, SQUAMOUS CELL @ RIGHT HAND;  Surgeon: Alan Arzola MD;  Location: [facility] OR;  Service: Plastics;  Laterality: Right;  RN PREOP 11/13/2018--NEED H/P    HEMIARTHROPLASTY OF HIP Right 8/14/2022    Procedure: " HEMIARTHROPLASTY, HIP;  Surgeon: Edis Villanueva MD;  Location: [facility] OR;  Service: Orthopedics;  Laterality: Right;    JOINT REPLACEMENT      LEFT HEART CATHETERIZATION Left 2019    Procedure: Left heart cath;  Surgeon: Rashad Nieto MD;  Location: [facility] CATH LAB;  Service: Cardiology;  Laterality: Left;    LEFT HEART CATHETERIZATION Left 2021    Procedure: Left heart cath, radial;  Surgeon: Rashad Nieto MD;  Location: [facility] CATH LAB;  Service: Cardiology;  Laterality: Left;    TONSILLECTOMY      TOTAL KNEE ARTHROPLASTY       Family History   Problem Relation Age of Onset    Heart disease Mother     Stroke Father      Social History     Tobacco Use    Smoking status: Former     Packs/day: 0.00     Types: Cigarettes     Quit date: 2016     Years since quittin.3    Smokeless tobacco: Never    Tobacco comments:     2016   Substance Use Topics    Alcohol use: No    Drug use: No     Review of Systems   Constitutional:  Positive for diaphoresis. Negative for chills and fever.   HENT:  Negative for congestion.    Respiratory:  Negative for cough and shortness of breath.    Cardiovascular:  Negative for chest pain.   Gastrointestinal:  Negative for abdominal pain, diarrhea, nausea and vomiting.   Genitourinary:  Negative for dysuria.   Musculoskeletal:  Negative for back pain.   Skin:  Negative for rash.   Neurological:  Negative for headaches.     Physical Exam     Initial Vitals [22 0448]   BP Pulse Resp Temp SpO2   (!) 159/70 85 16 97.7 °F (36.5 °C) 98 %      MAP       --         Physical Exam    Nursing note and vitals reviewed.  Constitutional: He appears well-developed. He is not diaphoretic. No distress.   HENT:   Head: Normocephalic.   Eyes: EOM are normal.   Cardiovascular:  Normal rate and regular rhythm.           No murmur heard.  Pulmonary/Chest: Effort normal and breath sounds normal. He has no wheezes.   Abdominal: Abdomen is soft. He exhibits no distension. There is no  abdominal tenderness.   Musculoskeletal:         General: Normal range of motion.     Neurological: He is alert.   Skin: Skin is warm.       ED Course   Procedures  Labs Reviewed   CBC W/ AUTO DIFFERENTIAL - Abnormal; Notable for the following components:       Result Value    RBC 3.92 (*)     Hemoglobin 11.6 (*)     Hematocrit 34.4 (*)     RDW 15.3 (*)     Lymph % 16.9 (*)     All other components within normal limits   COMPREHENSIVE METABOLIC PANEL - Abnormal; Notable for the following components:    Potassium 3.4 (*)     Glucose 155 (*)     All other components within normal limits   TROPONIN I   TROPONIN I   POCT GLUCOSE MONITORING CONTINUOUS          Imaging Results              X-Ray Chest AP Portable (Final result)  Result time 12/18/22 06:01:12      Final result by Jett Duval MD (12/18/22 06:01:12)                   Impression:      Diminished depth of inspiration without additional radiographic evidence for superimposed acute intrathoracic process.      Electronically signed by: Jett Duval  Date:    12/18/2022  Time:    06:01               Narrative:    EXAMINATION:  XR CHEST AP PORTABLE    CLINICAL HISTORY:  Generalized hyperhidrosis    TECHNIQUE:  Single frontal view of the chest was performed.    COMPARISON:  Chest radiograph August 13, 2022    FINDINGS:  AP portable chest radiographic examination is submitted.  There is diminished depth of inspiration and there is mild rotation, when accounting for the aforementioned the cardiomediastinal silhouette appears stable.    Accentuation of pulmonary bronchovascular markings consistent with diminished depth of inspiration noted.  Mild atelectatic change noted.  There is no evidence for superimposed confluent infiltrate or consolidation, significant pleural effusion or pneumothorax.  The visualized osseous structures appear intact with chronic change noted.                                       Medications   potassium bicarbonate disintegrating  tablet 25 mEq (has no administration in time range)     Medical Decision Making:   History:   Old Medical Records: I decided to obtain old medical records.  Initial Assessment:   87-year-old male presenting with episode of diaphoresis that awoke him from sleep.  Patient denied any chest pain nausea dyspnea dizziness or facial or extremity weakness.  Patient reports symptoms have completely resolved.  Reports single episode that occurred last night.  Patient does have history of CAD.  Workup for possible ACS.  Differential Diagnosis:   ACS versus infection  Independently Interpreted Test(s):   I have ordered and independently interpreted EKG Reading(s) - see summary below  Clinical Tests:   Lab Tests: Ordered and Reviewed  Radiological Study: Ordered and Reviewed  Medical Tests: Ordered and Reviewed  Other:   I have discussed this case with another health care provider.       <> Summary of the Discussion: Hospital medicine:  Admission for unstable angina        Scribe Attestation:   Scribe #1: I performed the above scribed service and the documentation accurately describes the services I performed. I attest to the accuracy of the note.      ED Course as of 12/18/22 0955   Sun Dec 18, 2022   0510 EKG 12-lead  Time 5:00 a.m.     Rate 75, sinus, regular rhythm, left axis deviation.    .  No ST elevation or depression no T-wave inversion no Q waves present.      Normal sinus rhythm with left anterior fascicular block. [JM]   0602 Assumed care at change of shift.  Awoke with cold sweats at 2 a.m. No CP/SOB.. pending repeat trop. [MH]   0608 Consider admitting the patient if the troponin is elevated. [MH]   0645 Troponin is normal [MH]   0647 CMP reveals a potassium of 3.4.  Glucose of 155 with a normal anion gap. [MH]   0647 CBC reveals mild anemia of 11 and 34.  No leukocytosis.  Normal platelets. [MH]   0648 Patient has a history of anemia on chart review and compared to prior CBC his anemia is not  worse. [MH]   0649 Chest x-ray reviewed.  There was no infiltrate or pneumothorax.  No pulmonary edema. [MH]   0651 My independent interpretation of the initial EKG is normal sinus rhythm at a rate of 76.  There is an intraventricular block which was present when compared to prior EKGs.  There is no ST segment elevation or depression concerning for acute occlusive infarct.  There is left axis deviation.   [MH]   0656 Patient handoff from PACO Pan to NIXON Rogers.  Pending repeat delta troponin. []   0929 My independent interpretation of the repeat EKG is normal sinus rhythm at a rate of 64.  There is an intraventricular block as noted on the initial EKG.  QT interval is normal.  There is left axis deviation.  There is no ST segment elevation concerning for infarction [MH]   0930 Due to this patient's age in the abundance of caution we have repeated his troponin if it is abnormal I will admit him to the hospital.  If it is normal I will refer him to his primary care physician. [MH]   0945 The troponin is elevated compared to the initial 1.  It is still within normal therefore I will place the patient in observation for further trending of troponin and consulting Cardiology in the morning. []      ED Course User Index  [] José Luis Pan MD  [] Claudia Rogers MD          Medical Decision Making  Problems Addressed:  Diaphoresis: acute illness or injury  Unstable angina: acute illness or injury that poses a threat to life or bodily functions    Amount and/or Complexity of Data Reviewed  External Data Reviewed: labs, radiology and ECG.  Labs: ordered. Decision-making details documented in ED Course.  Radiology: ordered. Decision-making details documented in ED Course.  ECG/medicine tests: independent interpretation performed. Decision-making details documented in ED Course.    Risk  Decision regarding hospitalization.    Critical Care  Total time providing critical care: 30-74 minutes          Clinical  Impression:   Final diagnoses:  [R61] Diaphoresis  [R07.9] Chest pain  [R53.1] Weakness  [I20.0] Unstable angina (Primary)  [R77.8] Troponin level elevated            I attest that I personally performed the services documented by the scribe and acknowledged and confirm the content of the note.   Nurses notes were reviewed.  Claudia Rogers    9:55 AM  The medical management of Juan Martínez JrRoni has been transferred to the admitting team.  At this time, the patient's condition is unchanged, and this was relayed to the accepting team.  Please see notes from the admitting team for continued care.  Claudia Rogers 9:55 AM             Claudia Rogers MD  12/18/22 0955

## 2022-12-18 NOTE — ASSESSMENT & PLAN NOTE
"Patient presented to the ED after waking up from his sleep from an episode of diaphoresis, with concern due to his cardiac history.  See "Coronary artery disease involving native coronary artery of native heart without angina pectoris"  "

## 2022-12-18 NOTE — HOSPITAL COURSE
Juan Martínez Jr. was placed under observation to trend his troponin.    Mr. Ambriz' troponin was trended and showed: 0.008, 0.013, 0.020 and down trended to 0.012. Patient continued to be chest pain free and without any discomfort. Patient's SBP was elevated as high as 178, and was administered IV Hydralazine 5 mg to stabilize his blood pressure. Prior to discharge, blood pressure was rechecked showed 171/82.  Based on the patient's presentation with diaphoresis, and placement in observation for troponin trend, with his troponin down trending, and patient continuing to be chest pain free, I am comfortable discharging the patient home as he is medically stable for discharge.    All findings and plan were explained to the patient. All questions and concerns were answered. Patient verbalized understanding. Patient is in stable condition to d/c home and has been informed to follow up with his PCP at [facility] within the next 7-10 days to discuss his observation stay and complaint of diaphoresis and to follow-up with Cardiology as needed. Patient has been educated to return to the ED if he experiences any chest pain, shortness of breath, lightheadness, weakness, or discomfort.

## 2022-12-18 NOTE — H&P
[facility] Medicine  History & Physical    Patient Name: Juan Martínez Jr.  MRN: [id number]  Patient Class: OP- Observation  Admission Date: 12/18/2022  Attending Physician: Ming Miranda MD   Primary Care Provider: Lissy Gavin MD         Patient information was obtained from patient, past medical records and ER records.     Subjective:     Principal Problem:Coronary artery disease involving native coronary artery of native heart without angina pectoris    Chief Complaint:   Chief Complaint   Patient presents with    cold sweats     Woke up with cold sweats, denies N/V/D, SOB. Or chest pain        HPI: Juan Martínez Jr. is a 87 y.o. male with PMHx of HTN, CAD, DM, and HLD who presented to the ED c/o of 2 episodes of significant diaphoresis (1 yesterday and one this morning at 230 am, which woke him from his sleep.) Patient denies having any other episodes of such symptoms, but came to the ED due to his cardiac history. Patient states that he took all of his home medications prior to arrival to the ED this morning, but nothing else. Patient could not answer whether anything made the diaphoresis better or worse. Patient denies any chest pain, shortness of breath, headaches, abdominal pain, N/V/D, loss of consicoussness, dizziness or any other associated symptoms.     In the ED: Patient is HDS; afebrile with no leukocytosis, H/H 11/34, K+ 3.4, EKG is NSR with rate of 64. Troponin: 0.008 & 0.013. CXR is without any infiltrates or acute processes.     Patient will be placed under observation for troponin trend.      Past Medical History:   Diagnosis Date    Coronary artery disease     Diabetes mellitus     High cholesterol     Hypertension     NSTEMI (non-ST elevated myocardial infarction) 07/25/2021    Prostate cancer        Past Surgical History:   Procedure Laterality Date    APPLICATION OF FULL-THICKNESS SKIN GRAFT (FTSG) TO UPPER EXTREMITY Right 11/15/2018     Procedure: APPLICATION, GRAFT, SKIN, FULL-THICKNESS, TO UPPER EXTREMITY VS STSG WITH FROZEN SECTIONS;  Surgeon: Alan Arzola MD;  Location: [facility] OR;  Service: Plastics;  Laterality: Right;    EXCISION OF SQUAMOUS CELL CARCINOMA Right 11/15/2018    Procedure: EXCISION, CARCINOMA, SQUAMOUS CELL @ RIGHT HAND;  Surgeon: Alan Arzola MD;  Location: [facility] OR;  Service: Plastics;  Laterality: Right;  RN PREOP 11/13/2018--NEED H/P    HEMIARTHROPLASTY OF HIP Right 8/14/2022    Procedure: HEMIARTHROPLASTY, HIP;  Surgeon: Edis Villanueva MD;  Location: [facility] OR;  Service: Orthopedics;  Laterality: Right;    JOINT REPLACEMENT      LEFT HEART CATHETERIZATION Left 5/6/2019    Procedure: Left heart cath;  Surgeon: Rashad Nieto MD;  Location: [facility] CATH LAB;  Service: Cardiology;  Laterality: Left;    LEFT HEART CATHETERIZATION Left 7/26/2021    Procedure: Left heart cath, radial;  Surgeon: Rashad Nieto MD;  Location: [facility] CATH LAB;  Service: Cardiology;  Laterality: Left;    TONSILLECTOMY      TOTAL KNEE ARTHROPLASTY         Review of patient's allergies indicates:  No Known Allergies    No current facility-administered medications on file prior to encounter.     Current Outpatient Medications on File Prior to Encounter   Medication Sig    amLODIPine (NORVASC) 10 MG tablet Take 1 tablet (10 mg total) by mouth once daily.    aspirin (ECOTRIN) 81 MG EC tablet Take 1 tablet (81 mg total) by mouth once daily.    atorvastatin (LIPITOR) 40 MG tablet Take 1 tablet (40 mg total) by mouth once daily.    clopidogreL (PLAVIX) 75 mg tablet Take 1 tablet (75 mg total) by mouth once daily.    docusate sodium (COLACE) 100 MG capsule Take 1 capsule (100 mg total) by mouth 2 (two) times daily.    escitalopram oxalate (LEXAPRO) 10 MG tablet Take 10 mg by mouth.    hydroCHLOROthiazide (MICROZIDE) 12.5 mg capsule Take 2 capsules (25 mg total) by mouth once daily.    isosorbide mononitrate (IMDUR) 60 MG 24 hr tablet Take  1 tablet (60 mg total) by mouth once daily.    metformin (GLUCOPHAGE) 1000 MG tablet Take 1 tablet (1,000 mg total) by mouth 2 (two) times daily with meals.    metoprolol succinate (TOPROL-XL) 50 MG 24 hr tablet Take 1 tablet (50 mg total) by mouth once daily.    thiamine (VITAMIN B-1) 250 MG tablet Take 250 mg by mouth once daily.    albuterol (PROVENTIL/VENTOLIN HFA) 90 mcg/actuation inhaler Inhale 2 puffs into the lungs.    aluminum-magnesium hydroxide-simethicone (MAALOX ADVANCED) 200-200-20 mg/5 mL Susp Take 30 mLs by mouth 4 (four) times daily before meals and nightly. for 7 days    blood-glucose meter Misc by Misc.(Non-Drug; Combo Route) route    furosemide (LASIX) 20 MG tablet Take 1 tablet (20 mg total) by mouth as needed (use as needed for fluid buildup).    LANCETS & BLOOD GLUCOSE STRIPS MISC by Misc.(Non-Drug; Combo Route) route Check blood sugars  Twice a day.    nitroGLYCERIN (NITROSTAT) 0.4 MG SL tablet Place 1 tablet (0.4 mg total) under the tongue every 5 (five) minutes as needed for Chest pain.    simethicone (MYLICON) 125 MG chewable tablet Take 1 tablet (125 mg total) by mouth every 6 (six) hours as needed for Flatulence.     Family History       Problem Relation (Age of Onset)    Heart disease Mother    Stroke Father          Tobacco Use    Smoking status: Former     Packs/day: 0.00     Types: Cigarettes     Quit date: 2016     Years since quittin.3    Smokeless tobacco: Never    Tobacco comments:     2016   Substance and Sexual Activity    Alcohol use: No    Drug use: No    Sexual activity: Not Currently     Review of Systems   Constitutional:  Positive for diaphoresis. Negative for activity change, appetite change, chills and fever.   HENT:  Positive for hearing loss. Negative for congestion, ear pain, nosebleeds, sinus pressure, sinus pain, sneezing, sore throat and voice change.    Eyes:  Negative for photophobia, pain and redness.   Respiratory:  Negative for  cough, chest tightness, shortness of breath and wheezing.    Cardiovascular:  Negative for chest pain, palpitations and leg swelling.   Gastrointestinal:  Negative for abdominal distention, abdominal pain, constipation, diarrhea, nausea and vomiting.   Genitourinary:  Positive for frequency. Negative for difficulty urinating, dysuria, hematuria and urgency.   Musculoskeletal:  Positive for arthralgias. Negative for back pain, gait problem, joint swelling, neck pain and neck stiffness.   Neurological:  Negative for dizziness, syncope, speech difficulty, weakness, light-headedness and headaches.   Psychiatric/Behavioral:  Negative for agitation, behavioral problems and confusion.    Objective:     Vital Signs (Most Recent):  Temp: 97.8 °F (36.6 °C) (12/18/22 0758)  Pulse: 71 (12/18/22 0838)  Resp: 16 (12/18/22 0838)  BP: (!) 154/71 (12/18/22 0838)  SpO2: 97 % (12/18/22 0838)   Vital Signs (24h Range):  Temp:  [97.7 °F (36.5 °C)-97.8 °F (36.6 °C)] 97.8 °F (36.6 °C)  Pulse:  [61-85] 71  Resp:  [16-20] 16  SpO2:  [97 %-98 %] 97 %  BP: (139-176)/(65-78) 154/71     Weight: 82.1 kg (181 lb)  Body mass index is 26.73 kg/m².    Physical Exam  Vitals reviewed.   Constitutional:       General: He is not in acute distress.     Appearance: Normal appearance. He is normal weight. He is not ill-appearing, toxic-appearing or diaphoretic.   HENT:      Head: Normocephalic and atraumatic.      Right Ear: External ear normal.      Left Ear: External ear normal.      Nose: Nose normal.      Mouth/Throat:      Mouth: Mucous membranes are moist.      Pharynx: Oropharynx is clear. No oropharyngeal exudate.   Eyes:      General:         Right eye: No discharge.         Left eye: No discharge.      Extraocular Movements: Extraocular movements intact.      Pupils: Pupils are equal, round, and reactive to light.   Cardiovascular:      Rate and Rhythm: Normal rate and regular rhythm.      Pulses: Normal pulses.      Heart sounds: Normal heart  sounds. No murmur heard.    No friction rub. No gallop.   Pulmonary:      Effort: Pulmonary effort is normal. No respiratory distress.      Breath sounds: Normal breath sounds. No wheezing.   Abdominal:      General: Abdomen is flat. There is no distension.      Palpations: Abdomen is soft.      Tenderness: There is no abdominal tenderness.   Musculoskeletal:         General: No swelling. Normal range of motion.      Cervical back: Normal range of motion.      Right lower leg: No edema.      Left lower leg: No edema.   Skin:     General: Skin is warm and dry.      Capillary Refill: Capillary refill takes less than 2 seconds.   Neurological:      General: No focal deficit present.      Mental Status: He is alert and oriented to person, place, and time.         CRANIAL NERVES     CN III, IV, VI   Pupils are equal, round, and reactive to light.     Significant Labs: All pertinent labs within the past 24 hours have been reviewed.  A1C:   Recent Labs   Lab 08/17/22  0448   HGBA1C 6.4*     Bilirubin:   Recent Labs   Lab 12/12/22  0840 12/18/22  0519   BILITOT 0.5 0.5     BMP:   Recent Labs   Lab 12/18/22  0519   *      K 3.4*      CO2 24   BUN 17   CREATININE 1.1   CALCIUM 9.0     CBC:   Recent Labs   Lab 12/18/22  0519   WBC 6.76   HGB 11.6*   HCT 34.4*        CMP:   Recent Labs   Lab 12/18/22  0519      K 3.4*      CO2 24   *   BUN 17   CREATININE 1.1   CALCIUM 9.0   PROT 6.8   ALBUMIN 3.7   BILITOT 0.5   ALKPHOS 85   AST 13   ALT 10   ANIONGAP 10     Troponin:   Recent Labs   Lab 12/18/22  0519 12/18/22  0835   TROPONINI 0.008 0.013     Significant Imaging: I have reviewed all pertinent imaging results/findings within the past 24 hours.    Assessment/Plan:     * Coronary artery disease involving native coronary artery of native heart without angina pectoris  Continue ASA/Statin/Plavix  Troponin x2 in ED: 0.008, 0.013; will continue to trend; if trending flat or down, and  "patient continues to be asymptomatic, patient stable for discharge  Patient is currently chest pain free and asymptomatic.    Diaphoresis  Patient presented to the ED after waking up from his sleep from an episode of diaphoresis, with concern due to his cardiac history.  See "Coronary artery disease involving native coronary artery of native heart without angina pectoris"    Hyperlipidemia  Continue home statin    Type 2 diabetes mellitus  Patient's FSGs are controlled on current medication regimen.  Last A1c reviewed-   Lab Results   Component Value Date    LABA1C 6.9 (H) 07/06/2015    HGBA1C 6.4 (H) 08/17/2022     Most recent fingerstick glucose reviewed- No results for input(s): POCTGLUCOSE in the last 24 hours.  Current correctional scale  Low  Maintain anti-hyperglycemic dose as follows-   Antihyperglycemics (From admission, onward)    Start     Stop Route Frequency Ordered    12/18/22 1118  insulin aspart U-100 pen 0-5 Units         -- SubQ Before meals & nightly PRN 12/18/22 1021      Hold Oral hypoglycemics while patient is in the hospital.    Essential hypertension, benign  Somewhat controlled, continue home medications  Continue to monitor blood pressure, and adjust therapy as needed.  PRN Hydralazine ordered for SBP >180      VTE Risk Mitigation (From admission, onward)         Ordered     enoxaparin injection 40 mg  Daily         12/18/22 1021     IP VTE HIGH RISK PATIENT  Once         12/18/22 1021     Place sequential compression device  Until discontinued         12/18/22 1021              As clarification, on 12/18/2022, patient should be placed in hospital observation services under my care in collaboration with Ming Miranda MD.    Hubert Fitch PA-C  Department of Hospital Medicine  [facility]  "

## 2022-12-18 NOTE — ED TRIAGE NOTES
"Pt presents to ED via POV with c/o "cold sweats" that woke him from sleep. Pt denies CP, SOB, N/V, dizziness or any other symptoms. States he came in to be evaluated because if his age and past cardiac issues. NAD noted.   "

## 2022-12-18 NOTE — ASSESSMENT & PLAN NOTE
Patient's FSGs are controlled on current medication regimen.  Last A1c reviewed-   Lab Results   Component Value Date    LABA1C 6.9 (H) 07/06/2015    HGBA1C 6.4 (H) 08/17/2022     Most recent fingerstick glucose reviewed- No results for input(s): POCTGLUCOSE in the last 24 hours.  Current correctional scale  Low  Maintain anti-hyperglycemic dose as follows-   Antihyperglycemics (From admission, onward)    Start     Stop Route Frequency Ordered    12/18/22 1118  insulin aspart U-100 pen 0-5 Units         -- SubQ Before meals & nightly PRN 12/18/22 1021      Hold Oral hypoglycemics while patient is in the hospital.

## 2022-12-18 NOTE — DISCHARGE SUMMARY
[facility] Emergency Dept  [facility] Medicine  Discharge Summary      Patient Name: Juan Martínez Jr.  MRN: [id number]  GODFREY: [id number]  Patient Class: OP- Observation  Admission Date: 12/18/2022  Hospital Length of Stay: 0 days  Discharge Date and Time:  12/18/2022 4:32 PM  Attending Physician: Ming Miranda MD   Discharging Provider: Hubert Fitch PA-C  Primary Care Provider: Lissy Gavin MD    Primary Care Team: Networked reference to record PCT     HPI:   Juan Martínez Jr. is a 87 y.o. male with PMHx of HTN, CAD, DM, and HLD who presented to the ED c/o of 2 episodes of significant diaphoresis (1 yesterday and one this morning at 230 am, which woke him from his sleep.) Patient denies having any other episodes of such symptoms, but came to the ED due to his cardiac history. Patient states that he took all of his home medications prior to arrival to the ED this morning, but nothing else. Patient could not answer whether anything made the diaphoresis better or worse. Patient denies any chest pain, shortness of breath, headaches, abdominal pain, N/V/D, loss of consicoussness, dizziness or any other associated symptoms.     In the ED: Patient is HDS; afebrile with no leukocytosis, H/H 11/34, K+ 3.4, EKG is NSR with rate of 64. Troponin: 0.008 & 0.013. CXR is without any infiltrates or acute processes.     Patient will be placed under observation for troponin trend.      * No surgery found *      Hospital Course:   Juan Martínez Jr. was placed under observation to trend his troponin.    Mr. Sethi troponin was trended and showed: 0.008, 0.013, 0.020 and down trended to 0.012. Patient continued to be chest pain free and without any discomfort. Patient's SBP was elevated as high as 178, and was administered IV Hydralazine 5 mg to stabilize his blood pressure. Prior to discharge, blood pressure was rechecked showed 171/82.  Based on the patient's presentation with diaphoresis, and placement in  observation for troponin trend, with his troponin down trending, and patient continuing to be chest pain free, I am comfortable discharging the patient home as he is medically stable for discharge.    All findings and plan were explained to the patient. All questions and concerns were answered. Patient verbalized understanding. Patient is in stable condition to d/c home and has been informed to follow up with his PCP at [facility] within the next 7-10 days to discuss his observation stay and complaint of diaphoresis and to follow-up with Cardiology as needed. Patient has been educated to return to the ED if he experiences any chest pain, shortness of breath, lightheadness, weakness, or discomfort.       Goals of Care Treatment Preferences:  Code Status: Full Code      Consults:     No new Assessment & Plan notes have been filed under this hospital service since the last note was generated.  Service: Hospital Medicine    Final Active Diagnoses:    Diagnosis Date Noted POA    PRINCIPAL PROBLEM:  Coronary artery disease involving native coronary artery of native heart without angina pectoris [I25.10] 05/06/2019 Yes    Diaphoresis [R61] 12/18/2022 Yes    Essential hypertension, benign [I10] 09/25/2014 Yes    Type 2 diabetes mellitus [E11.9] 09/25/2014 Yes    Hyperlipidemia [E78.5] 09/25/2014 Yes      Problems Resolved During this Admission:       Discharged Condition: stable    Disposition: Home or Self Care    Follow Up:    Patient Instructions:      Diet Cardiac     Notify your health care provider if you experience any of the following:  temperature >100.4     Notify your health care provider if you experience any of the following:  severe uncontrolled pain     Notify your health care provider if you experience any of the following:  severe persistent headache     Notify your health care provider if you experience any of the following:  persistent dizziness, light-headedness, or visual disturbances     Notify your  health care provider if you experience any of the following:  increased confusion or weakness     Activity as tolerated       Significant Diagnostic Studies: Labs:   BMP:   Recent Labs   Lab 12/18/22 0519   *      K 3.4*      CO2 24   BUN 17   CREATININE 1.1   CALCIUM 9.0     CMP   Recent Labs   Lab 12/18/22  0519      K 3.4*      CO2 24   *   BUN 17   CREATININE 1.1   CALCIUM 9.0   PROT 6.8   ALBUMIN 3.7   BILITOT 0.5   ALKPHOS 85   AST 13   ALT 10   ANIONGAP 10     CBC   Recent Labs   Lab 12/18/22 0519   WBC 6.76   HGB 11.6*   HCT 34.4*        INR   Lab Results   Component Value Date    INR 1.0 08/13/2022    INR 1.0 08/13/2022    INR 1.0 11/11/2021   , Lipid Panel   Lab Results   Component Value Date    CHOL 134 05/06/2019    HDL 45 05/06/2019    LDLCALC 73.0 05/06/2019    TRIG 80 05/06/2019    CHOLHDL 33.6 05/06/2019     Troponin   Recent Labs   Lab 12/18/22  0835 12/18/22  1223 12/18/22  1428   TROPONINI 0.013 0.020 0.012      A1C:   Recent Labs   Lab 08/17/22  0448   HGBA1C 6.4*       Pending Diagnostic Studies:     None         Medications:  Reconciled Home Medications:      Medication List      CONTINUE taking these medications    albuterol 90 mcg/actuation inhaler  Commonly known as: PROVENTIL/VENTOLIN HFA  Inhale 2 puffs into the lungs.     aluminum-magnesium hydroxide-simethicone 200-200-20 mg/5 mL Susp  Commonly known as: MAALOX ADVANCED  Take 30 mLs by mouth 4 (four) times daily before meals and nightly. for 7 days     amLODIPine 10 MG tablet  Commonly known as: NORVASC  Take 1 tablet (10 mg total) by mouth once daily.     aspirin 81 MG EC tablet  Commonly known as: ECOTRIN  Take 1 tablet (81 mg total) by mouth once daily.     atorvastatin 40 MG tablet  Commonly known as: LIPITOR  Take 1 tablet (40 mg total) by mouth once daily.     blood-glucose meter Misc  by Misc.(Non-Drug; Combo Route) route     clopidogreL 75 mg tablet  Commonly known as: PLAVIX  Take 1  tablet (75 mg total) by mouth once daily.     docusate sodium 100 MG capsule  Commonly known as: COLACE  Take 1 capsule (100 mg total) by mouth 2 (two) times daily.     EScitalopram oxalate 10 MG tablet  Commonly known as: LEXAPRO  Take 10 mg by mouth.     furosemide 20 MG tablet  Commonly known as: LASIX  Take 1 tablet (20 mg total) by mouth as needed (use as needed for fluid buildup).     hydroCHLOROthiazide 12.5 mg capsule  Commonly known as: MICROZIDE  Take 2 capsules (25 mg total) by mouth once daily.     isosorbide mononitrate 60 MG 24 hr tablet  Commonly known as: IMDUR  Take 1 tablet (60 mg total) by mouth once daily.     LANCETS & BLOOD GLUCOSE STRIPS MISC  by Misc.(Non-Drug; Combo Route) route Check blood sugars  Twice a day.     metFORMIN 1000 MG tablet  Commonly known as: GLUCOPHAGE  Take 1 tablet (1,000 mg total) by mouth 2 (two) times daily with meals.     metoprolol succinate 50 MG 24 hr tablet  Commonly known as: TOPROL-XL  Take 1 tablet (50 mg total) by mouth once daily.     nitroGLYCERIN 0.4 MG SL tablet  Commonly known as: NITROSTAT  Place 1 tablet (0.4 mg total) under the tongue every 5 (five) minutes as needed for Chest pain.     simethicone 125 MG chewable tablet  Commonly known as: MYLICON  Take 1 tablet (125 mg total) by mouth every 6 (six) hours as needed for Flatulence.     VITAMIN B-1 250 MG tablet  Generic drug: thiamine  Take 250 mg by mouth once daily.            Indwelling Lines/Drains at time of discharge:   Lines/Drains/Airways     None                 Time spent on the discharge of patient: 50 minutes       Hubert Fitch PA-C  Department of Hospital Medicine  [facility]

## 2022-12-18 NOTE — PHARMACY MED REC
"Admission Medication History     The home medication history was taken by Mahnaz Lee.    You may go to "Admission" then "Reconcile Home Medications" tabs to review and/or act upon these items.     The home medication list has been updated by the Pharmacy department.   Please read ALL comments highlighted in yellow.   Please address this information as you see fit.    Feel free to contact us if you have any questions or require assistance.    Medications listed below were obtained from: Patient/family, Medications brought from home, and Analytic software- [employer] and added to home medication:   Vitamin B-1    The medication reconciliation was completed by the patient's bedside.      Mahnaz Lee  [phone]                      .        "

## 2022-12-18 NOTE — HPI
Juan Martínez Jr. is a 87 y.o. male with PMHx of HTN, CAD, DM, and HLD who presented to the ED c/o of 2 episodes of significant diaphoresis (1 yesterday and one this morning at 230 am, which woke him from his sleep.) Patient denies having any other episodes of such symptoms, but came to the ED due to his cardiac history. Patient states that he took all of his home medications prior to arrival to the ED this morning, but nothing else. Patient could not answer whether anything made the diaphoresis better or worse. Patient denies any chest pain, shortness of breath, headaches, abdominal pain, N/V/D, loss of consicoussness, dizziness or any other associated symptoms.     In the ED: Patient is HDS; afebrile with no leukocytosis, H/H 11/34, K+ 3.4, EKG is NSR with rate of 64. Troponin: 0.008 & 0.013. CXR is without any infiltrates or acute processes.     Patient will be placed under observation for troponin trend.

## 2022-12-18 NOTE — SUBJECTIVE & OBJECTIVE
Past Medical History:   Diagnosis Date    Coronary artery disease     Diabetes mellitus     High cholesterol     Hypertension     NSTEMI (non-ST elevated myocardial infarction) 07/25/2021    Prostate cancer        Past Surgical History:   Procedure Laterality Date    APPLICATION OF FULL-THICKNESS SKIN GRAFT (FTSG) TO UPPER EXTREMITY Right 11/15/2018    Procedure: APPLICATION, GRAFT, SKIN, FULL-THICKNESS, TO UPPER EXTREMITY VS STSG WITH FROZEN SECTIONS;  Surgeon: Alan Arzola MD;  Location: [facility] OR;  Service: Plastics;  Laterality: Right;    EXCISION OF SQUAMOUS CELL CARCINOMA Right 11/15/2018    Procedure: EXCISION, CARCINOMA, SQUAMOUS CELL @ RIGHT HAND;  Surgeon: Alan Arzola MD;  Location: [facility] OR;  Service: Plastics;  Laterality: Right;  RN PREOP 11/13/2018--NEED H/P    HEMIARTHROPLASTY OF HIP Right 8/14/2022    Procedure: HEMIARTHROPLASTY, HIP;  Surgeon: Edis Villanueva MD;  Location: [facility] OR;  Service: Orthopedics;  Laterality: Right;    JOINT REPLACEMENT      LEFT HEART CATHETERIZATION Left 5/6/2019    Procedure: Left heart cath;  Surgeon: Rashad Nieto MD;  Location: [facility] CATH LAB;  Service: Cardiology;  Laterality: Left;    LEFT HEART CATHETERIZATION Left 7/26/2021    Procedure: Left heart cath, radial;  Surgeon: Rashad Nieto MD;  Location: [facility] CATH LAB;  Service: Cardiology;  Laterality: Left;    TONSILLECTOMY      TOTAL KNEE ARTHROPLASTY         Review of patient's allergies indicates:  No Known Allergies    No current facility-administered medications on file prior to encounter.     Current Outpatient Medications on File Prior to Encounter   Medication Sig    amLODIPine (NORVASC) 10 MG tablet Take 1 tablet (10 mg total) by mouth once daily.    aspirin (ECOTRIN) 81 MG EC tablet Take 1 tablet (81 mg total) by mouth once daily.    atorvastatin (LIPITOR) 40 MG tablet Take 1 tablet (40 mg total) by mouth once daily.    clopidogreL (PLAVIX) 75 mg tablet Take 1 tablet (75 mg total) by mouth  once daily.    docusate sodium (COLACE) 100 MG capsule Take 1 capsule (100 mg total) by mouth 2 (two) times daily.    escitalopram oxalate (LEXAPRO) 10 MG tablet Take 10 mg by mouth.    hydroCHLOROthiazide (MICROZIDE) 12.5 mg capsule Take 2 capsules (25 mg total) by mouth once daily.    isosorbide mononitrate (IMDUR) 60 MG 24 hr tablet Take 1 tablet (60 mg total) by mouth once daily.    metformin (GLUCOPHAGE) 1000 MG tablet Take 1 tablet (1,000 mg total) by mouth 2 (two) times daily with meals.    metoprolol succinate (TOPROL-XL) 50 MG 24 hr tablet Take 1 tablet (50 mg total) by mouth once daily.    thiamine (VITAMIN B-1) 250 MG tablet Take 250 mg by mouth once daily.    albuterol (PROVENTIL/VENTOLIN HFA) 90 mcg/actuation inhaler Inhale 2 puffs into the lungs.    aluminum-magnesium hydroxide-simethicone (MAALOX ADVANCED) 200-200-20 mg/5 mL Susp Take 30 mLs by mouth 4 (four) times daily before meals and nightly. for 7 days    blood-glucose meter Misc by Misc.(Non-Drug; Combo Route) route    furosemide (LASIX) 20 MG tablet Take 1 tablet (20 mg total) by mouth as needed (use as needed for fluid buildup).    LANCETS & BLOOD GLUCOSE STRIPS MISC by Misc.(Non-Drug; Combo Route) route Check blood sugars  Twice a day.    nitroGLYCERIN (NITROSTAT) 0.4 MG SL tablet Place 1 tablet (0.4 mg total) under the tongue every 5 (five) minutes as needed for Chest pain.    simethicone (MYLICON) 125 MG chewable tablet Take 1 tablet (125 mg total) by mouth every 6 (six) hours as needed for Flatulence.     Family History       Problem Relation (Age of Onset)    Heart disease Mother    Stroke Father          Tobacco Use    Smoking status: Former     Packs/day: 0.00     Types: Cigarettes     Quit date: 2016     Years since quittin.3    Smokeless tobacco: Never    Tobacco comments:     2016   Substance and Sexual Activity    Alcohol use: No    Drug use: No    Sexual activity: Not Currently     Review of Systems   Constitutional:   Positive for diaphoresis. Negative for activity change, appetite change, chills and fever.   HENT:  Positive for hearing loss. Negative for congestion, ear pain, nosebleeds, sinus pressure, sinus pain, sneezing, sore throat and voice change.    Eyes:  Negative for photophobia, pain and redness.   Respiratory:  Negative for cough, chest tightness, shortness of breath and wheezing.    Cardiovascular:  Negative for chest pain, palpitations and leg swelling.   Gastrointestinal:  Negative for abdominal distention, abdominal pain, constipation, diarrhea, nausea and vomiting.   Genitourinary:  Positive for frequency. Negative for difficulty urinating, dysuria, hematuria and urgency.   Musculoskeletal:  Positive for arthralgias. Negative for back pain, gait problem, joint swelling, neck pain and neck stiffness.   Neurological:  Negative for dizziness, syncope, speech difficulty, weakness, light-headedness and headaches.   Psychiatric/Behavioral:  Negative for agitation, behavioral problems and confusion.    Objective:     Vital Signs (Most Recent):  Temp: 97.8 °F (36.6 °C) (12/18/22 0758)  Pulse: 71 (12/18/22 0838)  Resp: 16 (12/18/22 0838)  BP: (!) 154/71 (12/18/22 0838)  SpO2: 97 % (12/18/22 0838)   Vital Signs (24h Range):  Temp:  [97.7 °F (36.5 °C)-97.8 °F (36.6 °C)] 97.8 °F (36.6 °C)  Pulse:  [61-85] 71  Resp:  [16-20] 16  SpO2:  [97 %-98 %] 97 %  BP: (139-176)/(65-78) 154/71     Weight: 82.1 kg (181 lb)  Body mass index is 26.73 kg/m².    Physical Exam  Vitals reviewed.   Constitutional:       General: He is not in acute distress.     Appearance: Normal appearance. He is normal weight. He is not ill-appearing, toxic-appearing or diaphoretic.   HENT:      Head: Normocephalic and atraumatic.      Right Ear: External ear normal.      Left Ear: External ear normal.      Nose: Nose normal.      Mouth/Throat:      Mouth: Mucous membranes are moist.      Pharynx: Oropharynx is clear. No oropharyngeal exudate.   Eyes:       General:         Right eye: No discharge.         Left eye: No discharge.      Extraocular Movements: Extraocular movements intact.      Pupils: Pupils are equal, round, and reactive to light.   Cardiovascular:      Rate and Rhythm: Normal rate and regular rhythm.      Pulses: Normal pulses.      Heart sounds: Normal heart sounds. No murmur heard.    No friction rub. No gallop.   Pulmonary:      Effort: Pulmonary effort is normal. No respiratory distress.      Breath sounds: Normal breath sounds. No wheezing.   Abdominal:      General: Abdomen is flat. There is no distension.      Palpations: Abdomen is soft.      Tenderness: There is no abdominal tenderness.   Musculoskeletal:         General: No swelling. Normal range of motion.      Cervical back: Normal range of motion.      Right lower leg: No edema.      Left lower leg: No edema.   Skin:     General: Skin is warm and dry.      Capillary Refill: Capillary refill takes less than 2 seconds.   Neurological:      General: No focal deficit present.      Mental Status: He is alert and oriented to person, place, and time.         CRANIAL NERVES     CN III, IV, VI   Pupils are equal, round, and reactive to light.     Significant Labs: All pertinent labs within the past 24 hours have been reviewed.  A1C:   Recent Labs   Lab 08/17/22  0448   HGBA1C 6.4*     Bilirubin:   Recent Labs   Lab 12/12/22  0840 12/18/22  0519   BILITOT 0.5 0.5     BMP:   Recent Labs   Lab 12/18/22  0519   *      K 3.4*      CO2 24   BUN 17   CREATININE 1.1   CALCIUM 9.0     CBC:   Recent Labs   Lab 12/18/22 0519   WBC 6.76   HGB 11.6*   HCT 34.4*        CMP:   Recent Labs   Lab 12/18/22  0519      K 3.4*      CO2 24   *   BUN 17   CREATININE 1.1   CALCIUM 9.0   PROT 6.8   ALBUMIN 3.7   BILITOT 0.5   ALKPHOS 85   AST 13   ALT 10   ANIONGAP 10     Troponin:   Recent Labs   Lab 12/18/22  0519 12/18/22  0835   TROPONINI 0.008 0.013     Significant  Imaging: I have reviewed all pertinent imaging results/findings within the past 24 hours.

## 2023-01-05 ENCOUNTER — OFFICE VISIT (OUTPATIENT)
Dept: CARDIOLOGY | Facility: CLINIC | Age: 88
End: 2023-01-05
Payer: MEDICARE

## 2023-01-05 ENCOUNTER — HOSPITAL ENCOUNTER (EMERGENCY)
Facility: HOSPITAL | Age: 88
Discharge: HOME | End: 2023-01-05
Attending: EMERGENCY MEDICINE
Payer: MEDICARE

## 2023-01-05 ENCOUNTER — TELEPHONE (OUTPATIENT)
Dept: CARDIOLOGY | Facility: CLINIC | Age: 88
End: 2023-01-05
Payer: MEDICARE

## 2023-01-05 VITALS
OXYGEN SATURATION: 95 % | TEMPERATURE: 98 F | RESPIRATION RATE: 18 BRPM | HEIGHT: 69 IN | HEART RATE: 59 BPM | WEIGHT: 180 LBS | BODY MASS INDEX: 26.66 KG/M2 | SYSTOLIC BLOOD PRESSURE: 164 MMHG | DIASTOLIC BLOOD PRESSURE: 74 MMHG

## 2023-01-05 VITALS
SYSTOLIC BLOOD PRESSURE: 132 MMHG | OXYGEN SATURATION: 97 % | WEIGHT: 176.56 LBS | DIASTOLIC BLOOD PRESSURE: 84 MMHG | HEART RATE: 74 BPM | HEIGHT: 69 IN | BODY MASS INDEX: 26.15 KG/M2 | RESPIRATION RATE: 18 BRPM

## 2023-01-05 DIAGNOSIS — E78.2 MIXED HYPERLIPIDEMIA: ICD-10-CM

## 2023-01-05 DIAGNOSIS — R00.2 PALPITATIONS: Primary | ICD-10-CM

## 2023-01-05 DIAGNOSIS — I10 ESSENTIAL HYPERTENSION, BENIGN: ICD-10-CM

## 2023-01-05 DIAGNOSIS — L97.522 DIABETIC ULCER OF TOE OF LEFT FOOT ASSOCIATED WITH TYPE 2 DIABETES MELLITUS, WITH FAT LAYER EXPOSED: ICD-10-CM

## 2023-01-05 DIAGNOSIS — E11.621 DIABETIC ULCER OF TOE OF LEFT FOOT ASSOCIATED WITH TYPE 2 DIABETES MELLITUS, WITH FAT LAYER EXPOSED: ICD-10-CM

## 2023-01-05 DIAGNOSIS — I77.810 AORTIC ROOT DILATATION: ICD-10-CM

## 2023-01-05 DIAGNOSIS — R79.89 ELEVATED TROPONIN: ICD-10-CM

## 2023-01-05 DIAGNOSIS — I21.4 NSTEMI (NON-ST ELEVATED MYOCARDIAL INFARCTION): ICD-10-CM

## 2023-01-05 DIAGNOSIS — E11.9 TYPE 2 DIABETES MELLITUS WITHOUT COMPLICATION, WITHOUT LONG-TERM CURRENT USE OF INSULIN: ICD-10-CM

## 2023-01-05 DIAGNOSIS — C44.92 SCC (SQUAMOUS CELL CARCINOMA): ICD-10-CM

## 2023-01-05 LAB
ALBUMIN SERPL BCP-MCNC: 3.4 G/DL (ref 3.5–5.2)
ALP SERPL-CCNC: 76 U/L (ref 55–135)
ALT SERPL W/O P-5'-P-CCNC: 12 U/L (ref 10–44)
ANION GAP SERPL CALC-SCNC: 13 MMOL/L (ref 8–16)
AST SERPL-CCNC: 15 U/L (ref 10–40)
BASOPHILS # BLD AUTO: 0.03 K/UL (ref 0–0.2)
BASOPHILS NFR BLD: 0.5 % (ref 0–1.9)
BILIRUB SERPL-MCNC: 0.6 MG/DL (ref 0.1–1)
BNP SERPL-MCNC: 143 PG/ML (ref 0–99)
BUN SERPL-MCNC: 14 MG/DL (ref 8–23)
CALCIUM SERPL-MCNC: 8.9 MG/DL (ref 8.7–10.5)
CHLORIDE SERPL-SCNC: 104 MMOL/L (ref 95–110)
CO2 SERPL-SCNC: 23 MMOL/L (ref 23–29)
CREAT SERPL-MCNC: 0.9 MG/DL (ref 0.5–1.4)
CTP QC/QA: YES
CTP QC/QA: YES
DIFFERENTIAL METHOD: ABNORMAL
EOSINOPHIL # BLD AUTO: 0.2 K/UL (ref 0–0.5)
EOSINOPHIL NFR BLD: 3.4 % (ref 0–8)
ERYTHROCYTE [DISTWIDTH] IN BLOOD BY AUTOMATED COUNT: 15.8 % (ref 11.5–14.5)
EST. GFR  (NO RACE VARIABLE): >60 ML/MIN/1.73 M^2
GLUCOSE SERPL-MCNC: 99 MG/DL (ref 70–110)
HCT VFR BLD AUTO: 33.6 % (ref 40–54)
HGB BLD-MCNC: 11.3 G/DL (ref 14–18)
IMM GRANULOCYTES # BLD AUTO: 0.01 K/UL (ref 0–0.04)
IMM GRANULOCYTES NFR BLD AUTO: 0.2 % (ref 0–0.5)
LYMPHOCYTES # BLD AUTO: 1.2 K/UL (ref 1–4.8)
LYMPHOCYTES NFR BLD: 18.6 % (ref 18–48)
MCH RBC QN AUTO: 29.4 PG (ref 27–31)
MCHC RBC AUTO-ENTMCNC: 33.6 G/DL (ref 32–36)
MCV RBC AUTO: 87 FL (ref 82–98)
MONOCYTES # BLD AUTO: 0.5 K/UL (ref 0.3–1)
MONOCYTES NFR BLD: 7.9 % (ref 4–15)
NEUTROPHILS # BLD AUTO: 4.3 K/UL (ref 1.8–7.7)
NEUTROPHILS NFR BLD: 69.4 % (ref 38–73)
NRBC BLD-RTO: 0 /100 WBC
PLATELET # BLD AUTO: 256 K/UL (ref 150–450)
PMV BLD AUTO: 9.1 FL (ref 9.2–12.9)
POC MOLECULAR INFLUENZA A AGN: NEGATIVE
POC MOLECULAR INFLUENZA B AGN: NEGATIVE
POTASSIUM SERPL-SCNC: 3.4 MMOL/L (ref 3.5–5.1)
PROT SERPL-MCNC: 6.7 G/DL (ref 6–8.4)
RBC # BLD AUTO: 3.85 M/UL (ref 4.6–6.2)
SARS-COV-2 RDRP RESP QL NAA+PROBE: NEGATIVE
SODIUM SERPL-SCNC: 140 MMOL/L (ref 136–145)
TROPONIN I SERPL DL<=0.01 NG/ML-MCNC: 0.01 NG/ML (ref 0–0.03)
TROPONIN I SERPL DL<=0.01 NG/ML-MCNC: 0.01 NG/ML (ref 0–0.03)
WBC # BLD AUTO: 6.17 K/UL (ref 3.9–12.7)

## 2023-01-05 PROCEDURE — 1101F PR PT FALLS ASSESS DOC 0-1 FALLS W/OUT INJ PAST YR: ICD-10-PCS | Mod: CPTII,S$GLB,, | Performed by: INTERNAL MEDICINE

## 2023-01-05 PROCEDURE — 93010 ELECTROCARDIOGRAM REPORT: CPT | Mod: ,,, | Performed by: INTERNAL MEDICINE

## 2023-01-05 PROCEDURE — 1160F RVW MEDS BY RX/DR IN RCRD: CPT | Mod: CPTII,S$GLB,, | Performed by: INTERNAL MEDICINE

## 2023-01-05 PROCEDURE — 84484 ASSAY OF TROPONIN QUANT: CPT

## 2023-01-05 PROCEDURE — 93010 EKG 12-LEAD: ICD-10-PCS | Mod: ,,, | Performed by: INTERNAL MEDICINE

## 2023-01-05 PROCEDURE — 99999 PR PBB SHADOW E&M-EST. PATIENT-LVL IV: ICD-10-PCS | Mod: PBBFAC,,, | Performed by: INTERNAL MEDICINE

## 2023-01-05 PROCEDURE — 93005 ELECTROCARDIOGRAM TRACING: CPT

## 2023-01-05 PROCEDURE — 83880 ASSAY OF NATRIURETIC PEPTIDE: CPT

## 2023-01-05 PROCEDURE — 1126F PR PAIN SEVERITY QUANTIFIED, NO PAIN PRESENT: ICD-10-PCS | Mod: CPTII,S$GLB,, | Performed by: INTERNAL MEDICINE

## 2023-01-05 PROCEDURE — 1126F AMNT PAIN NOTED NONE PRSNT: CPT | Mod: CPTII,S$GLB,, | Performed by: INTERNAL MEDICINE

## 2023-01-05 PROCEDURE — 1159F MED LIST DOCD IN RCRD: CPT | Mod: CPTII,S$GLB,, | Performed by: INTERNAL MEDICINE

## 2023-01-05 PROCEDURE — 84484 ASSAY OF TROPONIN QUANT: CPT | Mod: 91 | Performed by: EMERGENCY MEDICINE

## 2023-01-05 PROCEDURE — 99999 PR PBB SHADOW E&M-EST. PATIENT-LVL IV: CPT | Mod: PBBFAC,,, | Performed by: INTERNAL MEDICINE

## 2023-01-05 PROCEDURE — 99214 OFFICE O/P EST MOD 30 MIN: CPT | Mod: S$GLB,,, | Performed by: INTERNAL MEDICINE

## 2023-01-05 PROCEDURE — 1159F PR MEDICATION LIST DOCUMENTED IN MEDICAL RECORD: ICD-10-PCS | Mod: CPTII,S$GLB,, | Performed by: INTERNAL MEDICINE

## 2023-01-05 PROCEDURE — 87635 SARS-COV-2 COVID-19 AMP PRB: CPT

## 2023-01-05 PROCEDURE — 3079F DIAST BP 80-89 MM HG: CPT | Mod: CPTII,S$GLB,, | Performed by: INTERNAL MEDICINE

## 2023-01-05 PROCEDURE — 3079F PR MOST RECENT DIASTOLIC BLOOD PRESSURE 80-89 MM HG: ICD-10-PCS | Mod: CPTII,S$GLB,, | Performed by: INTERNAL MEDICINE

## 2023-01-05 PROCEDURE — 87502 INFLUENZA DNA AMP PROBE: CPT

## 2023-01-05 PROCEDURE — 3075F SYST BP GE 130 - 139MM HG: CPT | Mod: CPTII,S$GLB,, | Performed by: INTERNAL MEDICINE

## 2023-01-05 PROCEDURE — 3288F FALL RISK ASSESSMENT DOCD: CPT | Mod: CPTII,S$GLB,, | Performed by: INTERNAL MEDICINE

## 2023-01-05 PROCEDURE — 1160F PR REVIEW ALL MEDS BY PRESCRIBER/CLIN PHARMACIST DOCUMENTED: ICD-10-PCS | Mod: CPTII,S$GLB,, | Performed by: INTERNAL MEDICINE

## 2023-01-05 PROCEDURE — 1101F PT FALLS ASSESS-DOCD LE1/YR: CPT | Mod: CPTII,S$GLB,, | Performed by: INTERNAL MEDICINE

## 2023-01-05 PROCEDURE — 99285 EMERGENCY DEPT VISIT HI MDM: CPT | Mod: 25

## 2023-01-05 PROCEDURE — 80053 COMPREHEN METABOLIC PANEL: CPT

## 2023-01-05 PROCEDURE — 3288F PR FALLS RISK ASSESSMENT DOCUMENTED: ICD-10-PCS | Mod: CPTII,S$GLB,, | Performed by: INTERNAL MEDICINE

## 2023-01-05 PROCEDURE — 3075F PR MOST RECENT SYSTOLIC BLOOD PRESS GE 130-139MM HG: ICD-10-PCS | Mod: CPTII,S$GLB,, | Performed by: INTERNAL MEDICINE

## 2023-01-05 PROCEDURE — 85025 COMPLETE CBC W/AUTO DIFF WBC: CPT

## 2023-01-05 PROCEDURE — 99214 PR OFFICE/OUTPT VISIT, EST, LEVL IV, 30-39 MIN: ICD-10-PCS | Mod: S$GLB,,, | Performed by: INTERNAL MEDICINE

## 2023-01-05 NOTE — ED NOTES
Received report from MABEL Grubbs. Pt is layne, vss. Call light within reach, will continue to monitor

## 2023-01-05 NOTE — PROVIDER PROGRESS NOTES - EMERGENCY DEPT.
Encounter Date: 1/5/2023    ED Physician Progress Notes        Physician Note:   87-year-old male signed out to me pending 2nd troponin.  Went into evaluate he has no symptoms at this time whatsoever.  States that he felt palpitations earlier.  No palpitations now.  Already seeing Cardiology for getting his Holter monitor set up and waiting for insurance to process this.  Repeat troponin is reassuring.  Vitals reassuring.  No current chest pain or palpitations or shortness of breath or any complaints.  Discharged with outpatient follow-up. I discussed with the patient/family the diagnosis, treatment plan, indications for return to the emergency department, and for expected follow-up. The patient/family verbalized an understanding. The patient/family is asked if there are any questions or concerns. We discuss the case, until all issues are addressed to the patient/family's satisfaction. Patient/family understands and is agreeable to the plan.   Garcia Palomares

## 2023-01-05 NOTE — ED PROVIDER NOTES
Encounter Date: 1/5/2023       History     Chief Complaint   Patient presents with    multiple complaints     Woke up this am with hot sweat and checked his BP which was slightly elevated, here multiple times with same symptoms, very vague with complaints     87-year-old male with a history of coronary artery disease, type 2 diabetes, hypertension presents with a chief complaint of palpitations and feeling cool when he woke up from sleep a couple hours ago.  The patient says that this has happened to him before and he says that he was evaluated and they could not find anything wrong.  He says that he has had a cough the last 2 weeks.  He says that he checked his temperature and it was 98.1.  He denies any shortness of breath, chest pain, nausea, vomiting, diarrhea, dysuria or new rashes.    The history is provided by the patient. No  was used.   Review of patient's allergies indicates:  No Known Allergies  Past Medical History:   Diagnosis Date    Coronary artery disease     Diabetes mellitus     High cholesterol     Hypertension     NSTEMI (non-ST elevated myocardial infarction) 07/25/2021    Prostate cancer      Past Surgical History:   Procedure Laterality Date    APPLICATION OF FULL-THICKNESS SKIN GRAFT (FTSG) TO UPPER EXTREMITY Right 11/15/2018    Procedure: APPLICATION, GRAFT, SKIN, FULL-THICKNESS, TO UPPER EXTREMITY VS STSG WITH FROZEN SECTIONS;  Surgeon: Alan Arzola MD;  Location: [facility] OR;  Service: Plastics;  Laterality: Right;    EXCISION OF SQUAMOUS CELL CARCINOMA Right 11/15/2018    Procedure: EXCISION, CARCINOMA, SQUAMOUS CELL @ RIGHT HAND;  Surgeon: Alan Arzola MD;  Location: [facility] OR;  Service: Plastics;  Laterality: Right;  RN PREOP 11/13/2018--NEED H/P    HEMIARTHROPLASTY OF HIP Right 8/14/2022    Procedure: HEMIARTHROPLASTY, HIP;  Surgeon: Edis Villanueva MD;  Location: [facility] OR;  Service: Orthopedics;  Laterality: Right;    JOINT REPLACEMENT      LEFT HEART  CATHETERIZATION Left 2019    Procedure: Left heart cath;  Surgeon: Rashad Nieto MD;  Location: [facility] CATH LAB;  Service: Cardiology;  Laterality: Left;    LEFT HEART CATHETERIZATION Left 2021    Procedure: Left heart cath, radial;  Surgeon: Rashad Nieto MD;  Location: [facility] CATH LAB;  Service: Cardiology;  Laterality: Left;    TONSILLECTOMY      TOTAL KNEE ARTHROPLASTY       Family History   Problem Relation Age of Onset    Heart disease Mother     Stroke Father      Social History     Tobacco Use    Smoking status: Former     Packs/day: 0.00     Types: Cigarettes     Quit date: 2016     Years since quittin.4    Smokeless tobacco: Never    Tobacco comments:     2016   Substance Use Topics    Alcohol use: No    Drug use: No     Review of Systems   Constitutional:  Positive for chills. Negative for fever.   HENT:  Negative for congestion, ear pain and sore throat.    Eyes:  Negative for visual disturbance.   Respiratory:  Positive for cough. Negative for shortness of breath.    Cardiovascular:  Negative for chest pain and leg swelling.   Gastrointestinal:  Negative for abdominal pain, diarrhea, nausea and vomiting.   Genitourinary:  Negative for dysuria and flank pain.   Musculoskeletal:  Negative for back pain and gait problem.   Skin:  Negative for pallor and rash.   Neurological:  Negative for dizziness, syncope, weakness, numbness and headaches.   Psychiatric/Behavioral:  Negative for confusion and dysphoric mood.      Physical Exam     Initial Vitals [23 0328]   BP Pulse Resp Temp SpO2   (!) 172/73 75 18 98 °F (36.7 °C) 98 %      MAP       --         Physical Exam    Nursing note and vitals reviewed.  Constitutional: He appears well-developed and well-nourished.   HENT:   Head: Normocephalic and atraumatic.   The patient is hard of hearing and did not bring his hearing aids   Eyes: EOM are normal. Pupils are equal, round, and reactive to light.   Neck: Neck supple.   Normal range  of motion.  Cardiovascular:  Normal rate, S1 normal, normal heart sounds, intact distal pulses and normal pulses.           Pulmonary/Chest: No respiratory distress. He has no wheezes. He has rhonchi. He has no rales. He exhibits no tenderness.   Abdominal: Abdomen is soft. Bowel sounds are normal. There is no abdominal tenderness. There is no rebound.   Musculoskeletal:         General: No tenderness or edema. Normal range of motion.      Cervical back: Normal range of motion and neck supple.     Neurological: He is alert and oriented to person, place, and time. He has normal strength. GCS score is 15. GCS eye subscore is 4. GCS verbal subscore is 5. GCS motor subscore is 6.   Skin: Skin is warm, dry and intact. Capillary refill takes less than 2 seconds. No rash noted. No erythema. No pallor.   Psychiatric: He has a normal mood and affect. His speech is normal and behavior is normal. Judgment and thought content normal. Cognition and memory are normal.       ED Course   Procedures  Labs Reviewed   CBC W/ AUTO DIFFERENTIAL - Abnormal; Notable for the following components:       Result Value    RBC 3.85 (*)     Hemoglobin 11.3 (*)     Hematocrit 33.6 (*)     RDW 15.8 (*)     MPV 9.1 (*)     All other components within normal limits   COMPREHENSIVE METABOLIC PANEL - Abnormal; Notable for the following components:    Potassium 3.4 (*)     Albumin 3.4 (*)     All other components within normal limits   B-TYPE NATRIURETIC PEPTIDE - Abnormal; Notable for the following components:     (*)     All other components within normal limits   INFLUENZA A & B BY MOLECULAR   TROPONIN I   TROPONIN I   SARS-COV-2 RDRP GENE   POCT INFLUENZA A/B MOLECULAR          Imaging Results              X-Ray Chest 1 View (Final result)  Result time 01/05/23 04:29:48      Final result by Leonardo Hernandez MD (01/05/23 04:29:48)                   Impression:      No acute cardiopulmonary finding identified on this single  "view.      Electronically signed by: Leonardo Hernandez MD  Date:    01/05/2023  Time:    04:29               Narrative:    EXAMINATION:  XR CHEST 1 VIEW    CLINICAL HISTORY:  Provided history is "  Palpitations".    TECHNIQUE:  One view of the chest.    COMPARISON:  12/18/2022.    FINDINGS:  Cardiac wires overlie the chest.  Cardiomediastinal silhouette is stable, and not significantly enlarged.  No confluent area of consolidation.  No large pleural effusion.  No pneumothorax.                                       Medications - No data to display  Medical Decision Making:   History:   Old Medical Records: I decided to obtain old medical records.  Old Records Summarized: records from clinic visits and records from previous admission(s).       <> Summary of Records: Prior records reviewed for past medical history and current medications  Clinical Tests:   Lab Tests: Reviewed  Radiological Study: Reviewed  Medical Tests: Reviewed           ED Course as of 01/05/23 0616   Thu Jan 05, 2023   0359 87-year-old male in no acute distress.  The patient's only complaints or palpitations, waking up in a cold sweat and hypertension.  He is denying any chest pain, shortness of breath other signs concerning for ACS.  Physical exam was significant for rhonchi.  Differential includes but is not limited to ACS, infection, anemia, dysrhythmia, malignancy [BP]   0407 EKG 12-lead  Sinus rhythm 59 beats per minute, left anterior fascicular block all other intervals within normal limits, no ST elevations that are consistent with acute STEMI [BP]   0423 SARS-CoV-2 RNA, Amplification, Qual: Negative [BP]   0614 Chest x-ray was negative for any acute process, initial troponin was wnl.  Infectious testing was negative.  Patient was signed out to Dr. Palomares pending delta trop and likely discharge with Cardiology follow-up. [BP]   0616 POC Molecular Influenza A Ag: Negative [BP]   0616 POC Molecular Influenza B Ag: Negative [BP]   0616 SARS-CoV-2 " RNA, Amplification, Qual: Negative [BP]   0616 Troponin I: 0.012 [BP]   0616 BNP(!): 143 [BP]      ED Course User Index  [BP] Boni Banks MD                   Clinical Impression:   Final diagnoses:  [R00.2] Palpitations               Boni Banks MD  Resident  01/05/23 0617

## 2023-01-05 NOTE — TELEPHONE ENCOUNTER
[facility] called on yesterday asking about a clearance on this patient .  Clearance was fazed over on 12/29/2022. By Sherri Conrad and was also faxed over on today by verenice Campos. Rey SÁNCHEZ was notified that faxes were sent over.

## 2023-01-05 NOTE — PROGRESS NOTES
CARDIOVASCULAR CONSULTATION    REASON FOR CONSULT:   Juan Martínez Jr. is a 87 y.o. male who presents for follow-up recent hospitalization for a non-STEMI..      HISTORY OF PRESENT ILLNESS:     Notes from August 2019:   Patient is here for follow-up today.  Was recently admitted for hypertensive urgency.  Did not have any chest pains during the hypertensive urgency.  Troponins were found to be mildly elevated.  Was evaluated by Cardiology and Norvasc was restarted.  His blood pressure has been well controlled since then.  Denies any chest pains at rest on exertion, orthopnea, PND, swelling of feet.      Notes from July 2019  Patient is here for follow-up today.  Denies any chest pains at rest on exertion, orthopnea, PND.  Denies any claudication symptoms.  Denies any dyspnea at rest on exertion    Note from clinic visit in May 2019:  Patient is 83-year-old man pleasant man with a past medical history significant for diabetes, dyslipidemia, hypertension, coronary artery disease who recently presented to the hospital with a non-STEMI.  Coronary angiogram performed at that time revealed nonischemic coronary artery disease of the LAD and RCA as well as 80% stenosis in his diagonal 1.  Which is being managed medically.  A week after that he developed some right-sided chest pain, different from his anginal pain which he had presented with his non-STEMI.  He states that right-sided chest pain resolved on its own in less than 20 min, but since this is all new for him he decided to come to the hospital to make sure everything was okay.  He was admitted and serial troponins did not reveal any elevation and he was discharged home.  He has not had any further episodes of chest pains.  Denies orthopnea, PND chest pains at rest or on exertion.  Denies typical claudication pain, has knee pains and atypical leg pain.      Notes from October 2019:  Patient here for follow-up.  States that had a brief episode of right-sided  chest pain which lasted a few seconds to few minutes and then went away.  This was different than the anginal pain he had in the past.  Denies any orthopnea, PND, swelling of feet.  States that he tried telling is  that this he felt was related to his lungs, and an checks x-ray done yesterday.  Denies any recent fevers or chills.    Notes from December 2019:  Patient here for follow-up.  Denies any chest pains at rest on exertion, orthopnea, PND, swelling of feet.  Blood pressure mildly elevated at clinic.  At home states blood pressure stays around 140-145 mm systolic.  I will increase his Toprol-XL to 75 mg daily.    Notes from January 2020:  Patient here because states that yesterday he felt some chest pain.  It was right-sided.  States did not feel like it was from his heart but just wanted to make sure.  He took nitroglycerines and had no response to nitroglycerin.  States that did not feel like his earlier anginal pains.    Notes from May 2020:  Patient here for follow-up did not have any further episodes of chest pain.  Is looking for clearance for EGD.  Denies any orthopnea, PND, swelling of feet.    Notes from September 2020:  Patient here for follow-up.  Denies any anginal sounding chest pains, orthopnea, PND.  States he had an episode last Friday where he took a deep breath and felt a sharp pain which lasted 1 sec and then went away.  Denies any anginal sounding chest pains on exertion.  Denies any orthopnea, PND, swelling of feet.  EKG done in the clinic today was personally reviewed and demonstrated normal sinus rhythm, left axis deviation.  Abnormal EKG.    Notes from December 2020:  Patient here follow-up.  Denies any chest pains rest exertion PND.  Doing fine.  No cardiac issues.    Notes from February 2021:  Patient here follow-up.  Denies any chest pains at rest on exertion, orthopnea, PND.  EKG done in the clinic today was personally reviewed and demonstrated normal sinus rhythm with left  anterior fascicular block.  No other cardiac symptomatology.    Notes from June 2021:  Patient here for follow-up.  Doing fine.  Denies any chest pains at rest on exertion, orthopnea PND.    Notes from July 21:  Patient here for after recent hospitalization.  Had come in with non-STEMI.  Culprit lesion was distal RCA.  Underwent PCI distal RCA.  Doing fine.  No chest pains.  No complications from cardiac catheterization.    October 21:  Patient here for follow-up.  Denies any chest pains at rest on exertion, orthopnea, PND.  Doing fine.    February 2022 patient here for follow-up.  Doing fine.  No anginal sounding chest pains, orthopnea, PND.    Notes from July 2022:  Patient here for follow-up.  Denies any chest at rest on exertion, orthopnea, PND, swelling of feet \    Notes from October 2022: Patient here for follow-up.  Denies any chest pains at rest on exertion, orthopnea, PND.  Had hip replacement recently.  Bilateral pedal edema since then P    Notes from January 23: Patient here for follow-up.  States lately has been experiencing night sweats and wakes up sweaty in the morning.  Also sometimes feels his heart is beating very fast.  Occurs about once a week.  Has had multiple ER visits for evaluation of this.  Denies any angina.    PAST MEDICAL HISTORY:     Past Medical History:   Diagnosis Date    Coronary artery disease     Diabetes mellitus     High cholesterol     Hypertension     NSTEMI (non-ST elevated myocardial infarction) 07/25/2021    Prostate cancer        PAST SURGICAL HISTORY:     Past Surgical History:   Procedure Laterality Date    APPLICATION OF FULL-THICKNESS SKIN GRAFT (FTSG) TO UPPER EXTREMITY Right 11/15/2018    Procedure: APPLICATION, GRAFT, SKIN, FULL-THICKNESS, TO UPPER EXTREMITY VS STSG WITH FROZEN SECTIONS;  Surgeon: Alan Arzola MD;  Location: [facility];  Service: Plastics;  Laterality: Right;    EXCISION OF SQUAMOUS CELL CARCINOMA Right 11/15/2018    Procedure: EXCISION,  CARCINOMA, SQUAMOUS CELL @ RIGHT HAND;  Surgeon: Alan Arzola MD;  Location: [facility] OR;  Service: Plastics;  Laterality: Right;  RN PREOP 11/13/2018--NEED H/P    HEMIARTHROPLASTY OF HIP Right 8/14/2022    Procedure: HEMIARTHROPLASTY, HIP;  Surgeon: Edis Villanueva MD;  Location: [facility] OR;  Service: Orthopedics;  Laterality: Right;    JOINT REPLACEMENT      LEFT HEART CATHETERIZATION Left 5/6/2019    Procedure: Left heart cath;  Surgeon: Rashad Nieto MD;  Location: [facility] CATH LAB;  Service: Cardiology;  Laterality: Left;    LEFT HEART CATHETERIZATION Left 7/26/2021    Procedure: Left heart cath, radial;  Surgeon: Rashad Nieto MD;  Location: [facility] CATH LAB;  Service: Cardiology;  Laterality: Left;    TONSILLECTOMY      TOTAL KNEE ARTHROPLASTY         ALLERGIES AND MEDICATION:   Review of patient's allergies indicates:  No Known Allergies     Medication List            Accurate as of January 5, 2023 11:24 AM. If you have any questions, ask your nurse or doctor.                CONTINUE taking these medications      albuterol 90 mcg/actuation inhaler  Commonly known as: PROVENTIL/VENTOLIN HFA     aluminum-magnesium hydroxide-simethicone 200-200-20 mg/5 mL Susp  Commonly known as: MAALOX ADVANCED  Take 30 mLs by mouth 4 (four) times daily before meals and nightly. for 7 days     amLODIPine 10 MG tablet  Commonly known as: NORVASC  Take 1 tablet (10 mg total) by mouth once daily.     aspirin 81 MG EC tablet  Commonly known as: ECOTRIN  Take 1 tablet (81 mg total) by mouth once daily.     atorvastatin 40 MG tablet  Commonly known as: LIPITOR  Take 1 tablet (40 mg total) by mouth once daily.     blood-glucose meter Misc     clopidogreL 75 mg tablet  Commonly known as: PLAVIX  Take 1 tablet (75 mg total) by mouth once daily.     docusate sodium 100 MG capsule  Commonly known as: COLACE  Take 1 capsule (100 mg total) by mouth 2 (two) times daily.     EScitalopram oxalate 10 MG tablet  Commonly known as: LEXAPRO      furosemide 20 MG tablet  Commonly known as: LASIX  Take 1 tablet (20 mg total) by mouth as needed (use as needed for fluid buildup).     hydroCHLOROthiazide 12.5 mg capsule  Commonly known as: MICROZIDE  Take 2 capsules (25 mg total) by mouth once daily.     isosorbide mononitrate 60 MG 24 hr tablet  Commonly known as: IMDUR  Take 1 tablet (60 mg total) by mouth once daily.     LANCETS & BLOOD GLUCOSE STRIPS MISC     metFORMIN 1000 MG tablet  Commonly known as: GLUCOPHAGE  Take 1 tablet (1,000 mg total) by mouth 2 (two) times daily with meals.     metoprolol succinate 50 MG 24 hr tablet  Commonly known as: TOPROL-XL  Take 1 tablet (50 mg total) by mouth once daily.     nitroGLYCERIN 0.4 MG SL tablet  Commonly known as: NITROSTAT  Place 1 tablet (0.4 mg total) under the tongue every 5 (five) minutes as needed for Chest pain.     simethicone 125 MG chewable tablet  Commonly known as: MYLICON  Take 1 tablet (125 mg total) by mouth every 6 (six) hours as needed for Flatulence.     VITAMIN B-1 250 MG tablet  Generic drug: thiamine              SOCIAL HISTORY:     Social History     Socioeconomic History    Marital status:    Tobacco Use    Smoking status: Former     Packs/day: 0.00     Types: Cigarettes     Quit date: 2016     Years since quittin.4    Smokeless tobacco: Never    Tobacco comments:     2016   Substance and Sexual Activity    Alcohol use: No    Drug use: No    Sexual activity: Not Currently       FAMILY HISTORY:     Family History   Problem Relation Age of Onset    Heart disease Mother     Stroke Father        REVIEW OF SYSTEMS:   Review of Systems   Constitutional: Negative.    HENT: Negative.     Eyes: Negative.    Respiratory: Negative.     Cardiovascular:  Positive for leg swelling.   Gastrointestinal: Negative.    Genitourinary: Negative.    Musculoskeletal:  Positive for joint pain.   Skin: Negative.    Neurological: Negative.    Endo/Heme/Allergies: Negative.      A 10 point  "review of systems was performed and all the pertinent positives have been mentioned. Rest of review of systems was negative.        PHYSICAL EXAM:     Vitals:    01/05/23 1112   BP: 132/84   Pulse: 74   Resp: 18    Body mass index is 26.08 kg/m².  Weight: 80.1 kg (176 lb 9.4 oz)   Height: 5' 9" (175.3 cm)     Physical Exam  Vitals and nursing note reviewed.   Constitutional:       Appearance: Normal appearance. He is well-developed.   HENT:      Head: Normocephalic and atraumatic.      Right Ear: Hearing normal.      Left Ear: Hearing normal.      Nose: Nose normal.   Eyes:      General: Lids are normal.      Conjunctiva/sclera: Conjunctivae normal.      Pupils: Pupils are equal, round, and reactive to light.   Cardiovascular:      Rate and Rhythm: Normal rate and regular rhythm.      Pulses: Normal pulses.      Heart sounds: Normal heart sounds.   Pulmonary:      Effort: Pulmonary effort is normal.      Breath sounds: Normal breath sounds.   Abdominal:      Palpations: Abdomen is soft.      Tenderness: There is no abdominal tenderness.   Musculoskeletal:         General: No deformity.      Cervical back: Normal range of motion and neck supple.      Right lower leg: Edema present.      Left lower leg: Edema present.   Skin:     General: Skin is warm and dry.   Neurological:      Mental Status: He is alert and oriented to person, place, and time.   Psychiatric:         Speech: Speech normal.         DATA:     Laboratory:  CBC:  Recent Labs   Lab 12/12/22  0840 12/18/22  0519 01/05/23  0400   WBC 7.53 6.76 6.17   Hemoglobin 12.2 L 11.6 L 11.3 L   Hematocrit 36.8 L 34.4 L 33.6 L   Platelets 300 289 256         CHEMISTRIES:  Recent Labs   Lab 07/25/21  0503 07/26/21  0348 07/27/21  0550 07/30/21  0116 11/11/21  0609 01/11/22  2335 08/13/22  1118 12/12/22  0840 12/18/22  0519 01/05/23  0400   Glucose 114 H 126 H   < > 119 H 179 H 138 H   < > 144 H 155 H 99   Sodium 138 137   < > 135 L 138 138   < > 137 137 140 "   Potassium 4.1 3.4 L   < > 3.2 L 3.7 3.4 L   < > 4.0 3.4 L 3.4 L   BUN 13 12   < > 15 14 18   < > 10 17 14   Creatinine 1.0 0.9   < > 1.0 1.0 1.2   < > 1.0 1.1 0.9   eGFR if African American >60 >60   < > >60 >60 >60  --   --   --   --    eGFR if non African American >60 >60   < > >60 >60 55 A  --   --   --   --    Calcium 8.8 9.2   < > 9.5 9.6 9.0   < > 9.0 9.0 8.9   Magnesium 1.8 1.9  --   --  2.0  --   --   --   --   --     < > = values in this interval not displayed.         CARDIAC BIOMARKERS:  Recent Labs   Lab 12/18/22  1428 01/05/23  0400 01/05/23  0632   Troponin I 0.012 0.012 0.008         COAGS:  Recent Labs   Lab 11/11/21  0609 08/13/22  1118 08/13/22  1540   INR 1.0 1.0 1.0         LIPIDS/LFTS:  Recent Labs   Lab 12/12/22  0840 12/18/22  0519 01/05/23  0400   AST 15 13 15   ALT 11 10 12         Hemoglobin A1C   Date Value Ref Range Status   08/17/2022 6.4 (H) 4.0 - 5.6 % Final     Comment:     ADA Screening Guidelines:  5.7-6.4%  Consistent with prediabetes  >or=6.5%  Consistent with diabetes    High levels of fetal hemoglobin interfere with the HbA1C  assay. Heterozygous hemoglobin variants (HbS, HgC, etc)do  not significantly interfere with this assay.   However, presence of multiple variants may affect accuracy.     05/07/2019 7.0 (H) 4.0 - 5.6 % Final     Comment:     ADA Screening Guidelines:  5.7-6.4%  Consistent with prediabetes  >or=6.5%  Consistent with diabetes  High levels of fetal hemoglobin interfere with the HbA1C  assay. Heterozygous hemoglobin variants (HbS, HgC, etc)do  not significantly interfere with this assay.   However, presence of multiple variants may affect accuracy.     06/19/2018 7.3 (H) 4.0 - 5.6 % Final     Comment:     ADA Screening Guidelines:  5.7-6.4%  Consistent with prediabetes  >or=6.5%  Consistent with diabetes  High levels of fetal hemoglobin interfere with the HbA1C  assay. Heterozygous hemoglobin variants (HbS, HgC, etc)do  not significantly interfere with this  assay.   However, presence of multiple variants may affect accuracy.       Hemoglobin A1c   Date Value Ref Range Status   12/02/2021 6.7 (H) <5.7 % of total Hgb Final     Comment:     For someone without known diabetes, a hemoglobin A1c  value of 6.5% or greater indicates that they may have   diabetes and this should be confirmed with a follow-up   test.    For someone with known diabetes, a value <7% indicates   that their diabetes is well controlled and a value   greater than or equal to 7% indicates suboptimal   control. A1c targets should be individualized based on   duration of diabetes, age, comorbid conditions, and   other considerations.    Currently, no consensus exists regarding use of  hemoglobin A1c for diagnosis of diabetes for children.       07/23/2021 6.8 (H) <5.7 % of total Hgb Final     Comment:     For someone without known diabetes, a hemoglobin A1c  value of 6.5% or greater indicates that they may have   diabetes and this should be confirmed with a follow-up   test.    For someone with known diabetes, a value <7% indicates   that their diabetes is well controlled and a value   greater than or equal to 7% indicates suboptimal   control. A1c targets should be individualized based on   duration of diabetes, age, comorbid conditions, and   other considerations.    Currently, no consensus exists regarding use of  hemoglobin A1c for diagnosis of diabetes for children.       03/16/2021 6.4 (H) <5.7 % of total Hgb Final     Comment:     For someone without known diabetes, a hemoglobin   A1c value between 5.7% and 6.4% is consistent with  prediabetes and should be confirmed with a   follow-up test.    For someone with known diabetes, a value <7%  indicates that their diabetes is well controlled. A1c  targets should be individualized based on duration of  diabetes, age, comorbid conditions, and other  considerations.    This assay result is consistent with an increased risk  of diabetes.    Currently, no  consensus exists regarding use of  hemoglobin A1c for diagnosis of diabetes for children.       TSH        The ASCVD Risk score (Guanako CHENG, et al., 2019) failed to calculate for the following reasons:    The 2019 ASCVD risk score is only valid for ages 40 to 79    The patient has a prior MI or stroke diagnosis           Cardiovascular Testing:    EKG: (personally reviewed tracing)  May 2019:  Normal sinus rhythm, left axis deviation.  Abnormal EKG.    2D echocardiogram May 2019:  Normal left ventricular systolic function. The estimated ejection fraction is 65%  Normal LV diastolic function.  Mild left atrial enlargement.  Normal central venous pressure (3 mm Hg).  The estimated PA systolic pressure is 10 mm Hg       Coronary angiogram 6th May 2019:    No acute thrombotic lesion identified.  Coronary artery disease as described below  LVEDP: 12 mmHg    Carotid ultrasound May 28, 2019:    There is 20-39% right Internal Carotid Stenosis.  There is 20-39% left Internal Carotid Stenosis.     ABIs May 28, 2019:    Noncompressible LOW bilaterally.  Mildly decrease TBI bilaterally.  Normal PVR waveforms bilaterally.       Coronary Anatomy:  LM:  No significant stenosis  LAD:  Mid LAD 60-70% stenosis.  Distal LAD 50% stenosis.  IFR 0.92 (nonischemic).  Diagonal 1 is a tortuous vessel with mid 80% stenosis.  Will medically manage this diagonal stenosis  LCx :  Mild nonobstructive CAD  RCA:  50% distal RCA/proximal PDA lesion.  IFR 1 (nonischemic)     Impression / Plan  Coronary artery disease as described above.  Continue medical management with aggressive risk factor modification.  Continue aspirin 81 mg daily. Change simvastatin to atorvastatin 80 mg daily.        ASSESSMENT AND PLAN     Patient Active Problem List   Diagnosis    Essential hypertension, benign    Type 2 diabetes mellitus    Hyperlipidemia    Body mass index 28.0-28.9, adult    History of prostate cancer    Orthostatic hypotension    Diabetic ulcer of toe of  left foot associated with type 2 diabetes mellitus, with fat layer exposed    SCC (squamous cell carcinoma)    Coronary artery disease involving native coronary artery of native heart without angina pectoris    Unstable angina    Palpitations    Elevated troponin    NSTEMI (non-ST elevated myocardial infarction)    Hip fracture    Aortic root dilatation    Acute blood loss anemia    Diaphoresis       1.  Patient with coronary artery disease, now angina free.  Status post PCI distal RCA in July of 2021. Continue aspirin 81 mg daily indefinitely and Plavix 75 mg daily uninterrupted for at least 1 year from date of PCI.  Diagonal 1 disease being managed medically.  Continues to be angina free    2.  Hypertension:  Well controlled on current therapy.      3.  Cardiovascular screening with rest and stress ABIs and carotid ultrasound performed.  ABIs demonstrated noncompressible ABIs bilaterally, mildly decreased ABIs bilaterally with normal PVR waveforms bilaterally.  I got a peripheral ultrasound for further evaluation of his abnormal TBI.   ultrasound did not show any flow restriction in the right or the left lower extremity.    4.  Dyslipidemia:  Continue medical management with atorvastatin.    5. Bilateral pedal edema after hip surgery.  Lasix as needed.  Check venous ultrasound to rule out DVT.    6.  May hold Plavix as needed for procedures.    7. Palpitations and night sweats.  Further evaluation of night sweats by primary care physician.  Check event monitor and echo for palpitations        Thank you very much for involving me in the care of your patient.  Please do not hesitate to contact me if there are any questions.      Rashad Nieto MD, FACC, [facility]  Interventional Cardiologist, [facility].     Follow-up in 3 months      This note was dictated with the help of speech recognition software.  There might be un-intended errors and/or substitutions.

## 2023-01-05 NOTE — DISCHARGE INSTRUCTIONS
Diagnosis: Palpitations    Return to the emergency department at any time if you think that you are getting worse.    Tests today showed:   Labs Reviewed   CBC W/ AUTO DIFFERENTIAL - Abnormal; Notable for the following components:       Result Value    RBC 3.85 (*)     Hemoglobin 11.3 (*)     Hematocrit 33.6 (*)     RDW 15.8 (*)     MPV 9.1 (*)     All other components within normal limits   COMPREHENSIVE METABOLIC PANEL - Abnormal; Notable for the following components:    Potassium 3.4 (*)     Albumin 3.4 (*)     All other components within normal limits   B-TYPE NATRIURETIC PEPTIDE - Abnormal; Notable for the following components:     (*)     All other components within normal limits   INFLUENZA A & B BY MOLECULAR   TROPONIN I   TROPONIN I   SARS-COV-2 RDRP GENE   POCT INFLUENZA A/B MOLECULAR     X-Ray Chest 1 View   Final Result      No acute cardiopulmonary finding identified on this single view.         Electronically signed by: Leonardo Hernandez MD   Date:    01/05/2023   Time:    04:29          Treatments you had today:   Medications - No data to display    Home Care Instructions:  - Continue taking other home medications as previously prescribed    Follow-Up Plan:  - Follow-up with: Your cardiologist  - Additional outpatient testing and/or evaluation as directed by your doctor     Thank you for coming to our Emergency Department today. It is important to remember that some problems are difficult to diagnose and may not be found during your first visit. Be sure to follow up with your primary care doctor and review any labs/imaging that was performed with them. If you do not have a primary care doctor, you may contact the one listed on your discharge paperwork or you may also call the [facility] Appointment Desk at [phone] to schedule an appointment with one.     All medications may potentially have side effects and it is impossible to predict which medications may give you side effects. If you  feel that you are having a negative effect of any medication you should immediately stop taking them and seek medical attention.    Return to the ER with any questions/concerns, new/concerning symptoms, worsening or failure to improve. Do not drive or make any important decisions for 24 hours if you have received any pain medications, sedatives or mood altering drugs during your ER visit.

## 2023-01-09 ENCOUNTER — TELEPHONE (OUTPATIENT)
Dept: CARDIOLOGY | Facility: HOSPITAL | Age: 88
End: 2023-01-09
Payer: MEDICARE

## 2023-01-09 NOTE — TELEPHONE ENCOUNTER
----- Message from Janelle Pate sent at 1/9/2023 11:01 AM CST -----  Regarding: Event monitor  Samanta Armen [phone] pt sister have questions about his event monitor.    Thanks

## 2023-01-10 ENCOUNTER — TELEPHONE (OUTPATIENT)
Dept: CARDIOLOGY | Facility: CLINIC | Age: 88
End: 2023-01-10
Payer: MEDICARE

## 2023-01-10 ENCOUNTER — CLINICAL SUPPORT (OUTPATIENT)
Dept: CARDIOLOGY | Facility: HOSPITAL | Age: 88
End: 2023-01-10
Attending: INTERNAL MEDICINE
Payer: MEDICARE

## 2023-01-10 DIAGNOSIS — R00.2 PALPITATIONS: ICD-10-CM

## 2023-01-10 PROCEDURE — 93272 CARDIAC EVENT MONITOR (CUPID ONLY): ICD-10-PCS | Mod: ,,, | Performed by: STUDENT IN AN ORGANIZED HEALTH CARE EDUCATION/TRAINING PROGRAM

## 2023-01-10 PROCEDURE — 93272 ECG/REVIEW INTERPRET ONLY: CPT | Mod: ,,, | Performed by: STUDENT IN AN ORGANIZED HEALTH CARE EDUCATION/TRAINING PROGRAM

## 2023-01-10 PROCEDURE — 93270 REMOTE 30 DAY ECG REV/REPORT: CPT

## 2023-01-10 NOTE — TELEPHONE ENCOUNTER
I have attempted to contact this patient by phone with the following results: This message was in regards to  Clearance.     Patient sister reports she has received the clearance letter.     Sherri Conrad  01/10/2023  9:51 AM        ----- Message from Rashad Nieto MD sent at 1/4/2023  3:53 PM CST -----  Regarding: RE: [id number]  I remember feeling this last month.  Check with my nurse or my MA  ----- Message -----  From: Cari Sevilla  Sent: 1/4/2023   3:30 PM CST  To: [facility] Staff  Subject: [id number]                                 Type: Patient Call Back    Who called:Samanta-     What is the request in detail: pt sister states that's she contacted yesterday  at the [facility] to find out if they have gotten approval for the upper GI. They said it was faxed over to Dr. Nieto office on 12/19/22.     Can the clinic reply by [facility]?no    Would the patient rather a call back or a response via [facility]? Call back    Best call back number:[phone]    Additional Information:pt states this is her second time calling to check on the status and no one has gotten back with her.

## 2023-01-11 ENCOUNTER — HOSPITAL ENCOUNTER (OUTPATIENT)
Dept: CARDIOLOGY | Facility: HOSPITAL | Age: 88
Discharge: HOME | End: 2023-01-11
Attending: INTERNAL MEDICINE
Payer: MEDICARE

## 2023-01-11 DIAGNOSIS — R00.2 PALPITATIONS: ICD-10-CM

## 2023-01-11 LAB
AV INDEX (PROSTH): 0.94
AV MEAN GRADIENT: 5 MMHG
AV PEAK GRADIENT: 7 MMHG
AV VALVE AREA: 4.11 CM2
AV VELOCITY RATIO: 0.91
CV ECHO LV RWT: 0.44 CM
DOP CALC AO PEAK VEL: 1.33 M/S
DOP CALC AO VTI: 28 CM
DOP CALC LVOT AREA: 4.4 CM2
DOP CALC LVOT DIAMETER: 2.36 CM
DOP CALC LVOT PEAK VEL: 1.21 M/S
DOP CALC LVOT STROKE VOLUME: 114.99 CM3
DOP CALCLVOT PEAK VEL VTI: 26.3 CM
E WAVE DECELERATION TIME: 305.14 MSEC
E/A RATIO: 0.76
E/E' RATIO: 9.29 M/S
ECHO LV POSTERIOR WALL: 1.35 CM (ref 0.6–1.1)
EJECTION FRACTION: 55 %
FRACTIONAL SHORTENING: 29 % (ref 28–44)
INTERVENTRICULAR SEPTUM: 0.69 CM (ref 0.6–1.1)
LA MINOR: 4.77 CM
LA WIDTH: 4.8 CM
LEFT ATRIUM SIZE: 5.19 CM
LEFT INTERNAL DIMENSION IN SYSTOLE: 4.38 CM (ref 2.1–4)
LEFT VENTRICLE DIASTOLIC VOLUME: 191.9 ML
LEFT VENTRICLE SYSTOLIC VOLUME: 86.58 ML
LEFT VENTRICULAR INTERNAL DIMENSION IN DIASTOLE: 6.17 CM (ref 3.5–6)
LEFT VENTRICULAR MASS: 265.59 G
LV LATERAL E/E' RATIO: 9.29 M/S
LV SEPTAL E/E' RATIO: 9.29 M/S
LVOT MG: 3.95 MMHG
LVOT MV: 0.97 CM/S
MV PEAK A VEL: 0.85 M/S
MV PEAK E VEL: 0.65 M/S
MV STENOSIS PRESSURE HALF TIME: 88.49 MS
MV VALVE AREA P 1/2 METHOD: 2.49 CM2
PV PEAK VELOCITY: 0.85 CM/S
RA MAJOR: 5.88 CM
RA PRESSURE: 3 MMHG
RA WIDTH: 3.64 CM
RIGHT VENTRICULAR END-DIASTOLIC DIMENSION: 3.69 CM
SINUS: 3.96 CM
STJ: 3.52 CM
TDI LATERAL: 0.07 M/S
TDI SEPTAL: 0.07 M/S
TDI: 0.07 M/S
TRICUSPID ANNULAR PLANE SYSTOLIC EXCURSION: 1.98 CM

## 2023-01-11 PROCEDURE — 93306 ECHO (CUPID ONLY): ICD-10-PCS | Mod: 26,,, | Performed by: INTERNAL MEDICINE

## 2023-01-11 PROCEDURE — 93306 TTE W/DOPPLER COMPLETE: CPT

## 2023-01-11 PROCEDURE — 93306 TTE W/DOPPLER COMPLETE: CPT | Mod: 26,,, | Performed by: INTERNAL MEDICINE

## 2023-02-07 ENCOUNTER — HOSPITAL ENCOUNTER (EMERGENCY)
Facility: HOSPITAL | Age: 88
Discharge: HOME | End: 2023-02-07
Attending: EMERGENCY MEDICINE
Payer: MEDICARE

## 2023-02-07 VITALS
RESPIRATION RATE: 15 BRPM | TEMPERATURE: 98 F | HEIGHT: 68 IN | SYSTOLIC BLOOD PRESSURE: 197 MMHG | DIASTOLIC BLOOD PRESSURE: 80 MMHG | HEART RATE: 79 BPM | OXYGEN SATURATION: 98 % | WEIGHT: 183 LBS | BODY MASS INDEX: 27.74 KG/M2

## 2023-02-07 DIAGNOSIS — R73.9 HYPERGLYCEMIA: Primary | ICD-10-CM

## 2023-02-07 LAB — POCT GLUCOSE: 201 MG/DL (ref 70–110)

## 2023-02-07 PROCEDURE — 99283 EMERGENCY DEPT VISIT LOW MDM: CPT

## 2023-02-07 PROCEDURE — 25000003 PHARM REV CODE 250: Performed by: EMERGENCY MEDICINE

## 2023-02-07 PROCEDURE — 82962 GLUCOSE BLOOD TEST: CPT

## 2023-02-07 RX ORDER — AMLODIPINE BESYLATE 5 MG/1
10 TABLET ORAL
Status: COMPLETED | OUTPATIENT
Start: 2023-02-07 | End: 2023-02-07

## 2023-02-07 RX ORDER — METOPROLOL SUCCINATE 50 MG/1
50 TABLET, EXTENDED RELEASE ORAL DAILY
Status: DISCONTINUED | OUTPATIENT
Start: 2023-02-07 | End: 2023-02-07 | Stop reason: HOSPADM

## 2023-02-07 RX ORDER — METFORMIN HYDROCHLORIDE 500 MG/1
500 TABLET ORAL 2 TIMES DAILY WITH MEALS
Status: DISCONTINUED | OUTPATIENT
Start: 2023-02-07 | End: 2023-02-07 | Stop reason: HOSPADM

## 2023-02-07 RX ADMIN — METOPROLOL SUCCINATE 50 MG: 50 TABLET, EXTENDED RELEASE ORAL at 08:02

## 2023-02-07 RX ADMIN — METFORMIN HYDROCHLORIDE 500 MG: 500 TABLET ORAL at 09:02

## 2023-02-07 RX ADMIN — AMLODIPINE BESYLATE 10 MG: 5 TABLET ORAL at 08:02

## 2023-02-07 NOTE — ED PROVIDER NOTES
Encounter Date: 2/7/2023       History     Chief Complaint   Patient presents with    Hyperglycemia     Pt c/o hyperglycemia. Pt states he checked his CBG 30 min PTA which read 362. Pt states he took CBG at 0400 which read in the 150's, went for a walk to bring his sugar down. When he got home he ate dry cheerios, rechecked cbg and it was in the 300's. CBG is 206 in triage. Pt denies cp, sob, n/v/d. Metformin taken last night. Pt did not take Metformin this morning.     87-year-old male with a history of diabetes, coronary artery disease, hypertension presenting with elevated glucose.  Patient reports he checked his sugar this morning and it was 150.  The patient reports he then went for a walk and a dry cereal.  Patient reports he then checked it it said 350.  The patient states he did not take his metformin this morning.  Additionally, the patient notes that his glucometer seemed to be giving him an error when it red 360.  Patient denies any other significant complaints at this time.  Otherwise in usual state of health.    Review of patient's allergies indicates:  No Known Allergies  Past Medical History:   Diagnosis Date    Coronary artery disease     Diabetes mellitus     High cholesterol     Hypertension     NSTEMI (non-ST elevated myocardial infarction) 07/25/2021    Prostate cancer      Past Surgical History:   Procedure Laterality Date    APPLICATION OF FULL-THICKNESS SKIN GRAFT (FTSG) TO UPPER EXTREMITY Right 11/15/2018    Procedure: APPLICATION, GRAFT, SKIN, FULL-THICKNESS, TO UPPER EXTREMITY VS STSG WITH FROZEN SECTIONS;  Surgeon: Alan Arzola MD;  Location: [facility] OR;  Service: Plastics;  Laterality: Right;    EXCISION OF SQUAMOUS CELL CARCINOMA Right 11/15/2018    Procedure: EXCISION, CARCINOMA, SQUAMOUS CELL @ RIGHT HAND;  Surgeon: Alan Arzola MD;  Location: [facility] OR;  Service: Plastics;  Laterality: Right;  RN PREOP 11/13/2018--NEED H/P    HEMIARTHROPLASTY OF HIP Right 8/14/2022    Procedure:  HEMIARTHROPLASTY, HIP;  Surgeon: Edis Villanueva MD;  Location: [facility] OR;  Service: Orthopedics;  Laterality: Right;    JOINT REPLACEMENT      LEFT HEART CATHETERIZATION Left 2019    Procedure: Left heart cath;  Surgeon: Rashad Nieto MD;  Location: [facility] CATH LAB;  Service: Cardiology;  Laterality: Left;    LEFT HEART CATHETERIZATION Left 2021    Procedure: Left heart cath, radial;  Surgeon: Rashad Nieto MD;  Location: [facility] CATH LAB;  Service: Cardiology;  Laterality: Left;    TONSILLECTOMY      TOTAL KNEE ARTHROPLASTY       Family History   Problem Relation Age of Onset    Heart disease Mother     Stroke Father      Social History     Tobacco Use    Smoking status: Former     Packs/day: 0.00     Types: Cigarettes     Quit date: 2016     Years since quittin.4    Smokeless tobacco: Never    Tobacco comments:     2016   Substance Use Topics    Alcohol use: No    Drug use: No     Review of Systems   Constitutional:  Negative for fever.   HENT:  Negative for sore throat.    Respiratory:  Negative for shortness of breath.    Cardiovascular:  Negative for chest pain.   Gastrointestinal:  Negative for nausea.   Genitourinary:  Negative for dysuria.   Musculoskeletal:  Negative for back pain.   Skin:  Negative for rash.   Neurological:  Negative for weakness.   Psychiatric/Behavioral:  Negative for confusion.      Physical Exam     Initial Vitals [23 0753]   BP Pulse Resp Temp SpO2   (!) 171/77 90 18 98.3 °F (36.8 °C) 98 %      MAP       --         Physical Exam    Nursing note and vitals reviewed.  Constitutional: He appears well-developed and well-nourished. He is not diaphoretic. No distress.   HENT:   Head: Normocephalic and atraumatic.   Right Ear: External ear normal.   Left Ear: External ear normal.   Eyes: EOM are normal. Pupils are equal, round, and reactive to light. Right eye exhibits no discharge. Left eye exhibits no discharge.   Neck:   Normal range of  motion.  Cardiovascular:  Normal rate.           Pulmonary/Chest: No respiratory distress.   Abdominal: He exhibits no distension. There is no guarding.   Musculoskeletal:         General: No edema. Normal range of motion.      Cervical back: Normal range of motion.     Neurological: He is alert and oriented to person, place, and time. He has normal strength. GCS score is 15. GCS eye subscore is 4. GCS verbal subscore is 5. GCS motor subscore is 6.   Skin: Skin is warm and dry.   Psychiatric: He has a normal mood and affect. His behavior is normal.       ED Course   Procedures  Labs Reviewed   POCT GLUCOSE - Abnormal; Notable for the following components:       Result Value    POCT Glucose 201 (*)     All other components within normal limits          Imaging Results    None          Medications   amLODIPine tablet 10 mg (10 mg Oral Given 2/7/23 0842)     Medical Decision Making:   Initial Assessment:   87-year-old male presenting with concerns for hyperglycemia.  Blood glucose 200 at triage.  Patient has no further complaints.  Patient ate breakfast this morning consisted of dry cereal.  He did not take his metformin.  Patient has moist mucous membranes.  He is no other complaints.  Exam otherwise unremarkable.  Will give home dose of metformin and blood pressure medication.  Otherwise safe for discharge home.                        Clinical Impression:   Final diagnoses:  [R73.9] Hyperglycemia (Primary)        ED Disposition Condition    Discharge Stable          ED Prescriptions    None       Follow-up Information       Follow up With Specialties Details Why Contact Info    Lissy Gavin MD Internal Medicine Schedule an appointment as soon as possible for a visit  As needed [address]  [place] [address]  [phone]      [facility] Emergency Dept Emergency Medicine  As needed, If symptoms worsen [address]  [facility] [address] [phone]             Chadd Aly MD  02/07/23  0198

## 2023-02-07 NOTE — DISCHARGE INSTRUCTIONS
You were seen in the emergency department for high blood sugar. Your labs and exam are reassuring.  We think you're safe for discharge home.  Please follow up with her primary care provider this week if you continue to have high readings.  Please return for any new or worsening concerns, chest pain, shortness of breath, nausea, vomiting, dizziness, abdominal pain, or become concerning in any other way.

## 2023-02-07 NOTE — ED TRIAGE NOTES
Pt c/o hyperglycemia. Pt states he checked his CBG 30 min PTA which read 362. Pt denies cp, sob, n/v/d. Metformin taken last night. Pt did not take Metformin this morning.   Pt AAOX4.

## 2023-02-10 ENCOUNTER — TELEPHONE (OUTPATIENT)
Dept: CARDIOLOGY | Facility: CLINIC | Age: 88
End: 2023-02-10
Payer: MEDICARE

## 2023-02-10 NOTE — TELEPHONE ENCOUNTER
----- Message from Merry Archer sent at 2/10/2023 11:03 AM CST -----  Regarding: Return call  .Type:  Patient Returning Call    Who Called: Self     Who Left Message for Patient: Unknown     Does the patient know what this is regarding?: no     Would the patient rather a call back or a response via [facility]? NO     Best Call Back Number: [phone]      Additional Information:

## 2023-02-13 ENCOUNTER — HOSPITAL ENCOUNTER (EMERGENCY)
Facility: HOSPITAL | Age: 88
Discharge: HOME | End: 2023-02-13
Attending: EMERGENCY MEDICINE
Payer: MEDICARE

## 2023-02-13 VITALS
OXYGEN SATURATION: 96 % | DIASTOLIC BLOOD PRESSURE: 80 MMHG | HEIGHT: 68 IN | WEIGHT: 183 LBS | BODY MASS INDEX: 27.74 KG/M2 | RESPIRATION RATE: 16 BRPM | HEART RATE: 77 BPM | SYSTOLIC BLOOD PRESSURE: 166 MMHG | TEMPERATURE: 98 F

## 2023-02-13 DIAGNOSIS — R20.2 PARESTHESIAS: Primary | ICD-10-CM

## 2023-02-13 LAB
ALBUMIN SERPL BCP-MCNC: 3.4 G/DL (ref 3.5–5.2)
ALP SERPL-CCNC: 69 U/L (ref 55–135)
ALT SERPL W/O P-5'-P-CCNC: 11 U/L (ref 10–44)
ANION GAP SERPL CALC-SCNC: 8 MMOL/L (ref 8–16)
AST SERPL-CCNC: 46 U/L (ref 10–40)
BASOPHILS # BLD AUTO: 0.04 K/UL (ref 0–0.2)
BASOPHILS NFR BLD: 0.6 % (ref 0–1.9)
BILIRUB SERPL-MCNC: 0.3 MG/DL (ref 0.1–1)
BILIRUB UR QL STRIP: NEGATIVE
BUN SERPL-MCNC: 15 MG/DL (ref 8–23)
CALCIUM SERPL-MCNC: 8.4 MG/DL (ref 8.7–10.5)
CHLORIDE SERPL-SCNC: 105 MMOL/L (ref 95–110)
CLARITY UR: CLEAR
CO2 SERPL-SCNC: 21 MMOL/L (ref 23–29)
COLOR UR: COLORLESS
CREAT SERPL-MCNC: 0.9 MG/DL (ref 0.5–1.4)
DIFFERENTIAL METHOD: ABNORMAL
EOSINOPHIL # BLD AUTO: 0.3 K/UL (ref 0–0.5)
EOSINOPHIL NFR BLD: 4.5 % (ref 0–8)
ERYTHROCYTE [DISTWIDTH] IN BLOOD BY AUTOMATED COUNT: 16.7 % (ref 11.5–14.5)
EST. GFR  (NO RACE VARIABLE): >60 ML/MIN/1.73 M^2
GLUCOSE SERPL-MCNC: 89 MG/DL (ref 70–110)
GLUCOSE UR QL STRIP: NEGATIVE
HCT VFR BLD AUTO: 36.1 % (ref 40–54)
HGB BLD-MCNC: 11.9 G/DL (ref 14–18)
HGB UR QL STRIP: NEGATIVE
IMM GRANULOCYTES # BLD AUTO: 0.02 K/UL (ref 0–0.04)
IMM GRANULOCYTES NFR BLD AUTO: 0.3 % (ref 0–0.5)
KETONES UR QL STRIP: NEGATIVE
LEUKOCYTE ESTERASE UR QL STRIP: NEGATIVE
LYMPHOCYTES # BLD AUTO: 1.1 K/UL (ref 1–4.8)
LYMPHOCYTES NFR BLD: 17.2 % (ref 18–48)
MCH RBC QN AUTO: 29.7 PG (ref 27–31)
MCHC RBC AUTO-ENTMCNC: 33 G/DL (ref 32–36)
MCV RBC AUTO: 90 FL (ref 82–98)
MONOCYTES # BLD AUTO: 0.6 K/UL (ref 0.3–1)
MONOCYTES NFR BLD: 9.3 % (ref 4–15)
NEUTROPHILS # BLD AUTO: 4.4 K/UL (ref 1.8–7.7)
NEUTROPHILS NFR BLD: 68.1 % (ref 38–73)
NITRITE UR QL STRIP: NEGATIVE
NRBC BLD-RTO: 0 /100 WBC
PH UR STRIP: 7 [PH] (ref 5–8)
PLATELET # BLD AUTO: 271 K/UL (ref 150–450)
PMV BLD AUTO: 9.5 FL (ref 9.2–12.9)
POTASSIUM SERPL-SCNC: ABNORMAL MMOL/L (ref 3.5–5.1)
PROT SERPL-MCNC: 8.1 G/DL (ref 6–8.4)
PROT UR QL STRIP: ABNORMAL
RBC # BLD AUTO: 4.01 M/UL (ref 4.6–6.2)
SODIUM SERPL-SCNC: 134 MMOL/L (ref 136–145)
SP GR UR STRIP: 1.01 (ref 1–1.03)
TROPONIN I SERPL DL<=0.01 NG/ML-MCNC: <0.006 NG/ML (ref 0–0.03)
URN SPEC COLLECT METH UR: ABNORMAL
UROBILINOGEN UR STRIP-ACNC: NEGATIVE EU/DL
WBC # BLD AUTO: 6.46 K/UL (ref 3.9–12.7)

## 2023-02-13 PROCEDURE — 93005 ELECTROCARDIOGRAM TRACING: CPT

## 2023-02-13 PROCEDURE — 85025 COMPLETE CBC W/AUTO DIFF WBC: CPT | Performed by: EMERGENCY MEDICINE

## 2023-02-13 PROCEDURE — 84484 ASSAY OF TROPONIN QUANT: CPT | Performed by: EMERGENCY MEDICINE

## 2023-02-13 PROCEDURE — 99284 EMERGENCY DEPT VISIT MOD MDM: CPT

## 2023-02-13 PROCEDURE — 93010 ELECTROCARDIOGRAM REPORT: CPT | Mod: ,,, | Performed by: INTERNAL MEDICINE

## 2023-02-13 PROCEDURE — 93010 EKG 12-LEAD: ICD-10-PCS | Mod: ,,, | Performed by: INTERNAL MEDICINE

## 2023-02-13 PROCEDURE — 81003 URINALYSIS AUTO W/O SCOPE: CPT | Performed by: EMERGENCY MEDICINE

## 2023-02-13 PROCEDURE — 63600175 PHARM REV CODE 636 W HCPCS: Performed by: EMERGENCY MEDICINE

## 2023-02-13 PROCEDURE — 80053 COMPREHEN METABOLIC PANEL: CPT | Performed by: EMERGENCY MEDICINE

## 2023-02-13 RX ADMIN — SODIUM CHLORIDE, POTASSIUM CHLORIDE, SODIUM LACTATE AND CALCIUM CHLORIDE 1000 ML: 600; 310; 30; 20 INJECTION, SOLUTION INTRAVENOUS at 05:02

## 2023-02-13 NOTE — ED PROVIDER NOTES
"Encounter Date: 2/13/2023    SCRIBE #1 NOTE: I, ABIMBOLA KRUEGER, am scribing for, and in the presence of,  José Luis Pan MD. I have scribed the following portions of the note - Other sections scribed: HPI, ROS, PE.     History     Chief Complaint   Patient presents with    Numbness     Patient arrives via EMS for bilateral leg numbness that began tonight about 30 minutes ago. Recent Holter monitor test. Denies chest pain, weakness, dizziness. EMS performed stroke scale - negative.     87-year-old male, with a PMHx of CAD, DM, High cholesterol, HTN, N-STEMI, and Prostate cancer, presents to the ED via EMS with a chief complaint of bilateral leg numbness onset thirty minutes ago. Per EMS personnel, patient arrived from home after contacting EMS for bilateral leg numbness. They state that the patient had a recent Holter monitor test done two days ago.    Patient states that he called 911 secondary to not "feeling right". He further states that both of his legs feel numb. He reports checking his blood pressure at home and states that it was "191 over some". He further reports that he has trouble ambulating at baseline secondary to his knees. No other exacerbating or alleviating factors. Denies any arm issues, back pain, abdominal pain, CP, headaches, dizziness, or other associated symptoms.     The history is provided by the patient and the EMS personnel. No  was used.   Review of patient's allergies indicates:  No Known Allergies  Past Medical History:   Diagnosis Date    Coronary artery disease     Diabetes mellitus     High cholesterol     Hypertension     NSTEMI (non-ST elevated myocardial infarction) 07/25/2021    Prostate cancer      Past Surgical History:   Procedure Laterality Date    APPLICATION OF FULL-THICKNESS SKIN GRAFT (FTSG) TO UPPER EXTREMITY Right 11/15/2018    Procedure: APPLICATION, GRAFT, SKIN, FULL-THICKNESS, TO UPPER EXTREMITY VS STSG WITH FROZEN SECTIONS;  Surgeon: Alan CORDOVA" MD Ness;  Location: [facility] OR;  Service: Plastics;  Laterality: Right;    EXCISION OF SQUAMOUS CELL CARCINOMA Right 11/15/2018    Procedure: EXCISION, CARCINOMA, SQUAMOUS CELL @ RIGHT HAND;  Surgeon: Alan Arzola MD;  Location: [facility] OR;  Service: Plastics;  Laterality: Right;  RN PREOP 2018--NEED H/P    HEMIARTHROPLASTY OF HIP Right 2022    Procedure: HEMIARTHROPLASTY, HIP;  Surgeon: Edis Villanueva MD;  Location: [facility] OR;  Service: Orthopedics;  Laterality: Right;    JOINT REPLACEMENT      LEFT HEART CATHETERIZATION Left 2019    Procedure: Left heart cath;  Surgeon: Rashad Nieto MD;  Location: [facility] CATH LAB;  Service: Cardiology;  Laterality: Left;    LEFT HEART CATHETERIZATION Left 2021    Procedure: Left heart cath, radial;  Surgeon: Rashad Nieto MD;  Location: [facility] CATH LAB;  Service: Cardiology;  Laterality: Left;    TONSILLECTOMY      TOTAL KNEE ARTHROPLASTY       Family History   Problem Relation Age of Onset    Heart disease Mother     Stroke Father      Social History     Tobacco Use    Smoking status: Former     Packs/day: 0.00     Types: Cigarettes     Quit date: 2016     Years since quittin.5    Smokeless tobacco: Never    Tobacco comments:     2016   Substance Use Topics    Alcohol use: No    Drug use: No     Review of Systems   Constitutional:  Negative for chills and fever.   HENT:  Negative for congestion.    Respiratory:  Negative for cough and shortness of breath.    Cardiovascular:  Negative for chest pain.   Gastrointestinal:  Negative for abdominal pain, diarrhea and nausea.   Genitourinary:  Negative for dysuria.   Musculoskeletal:  Negative for back pain and myalgias.   Skin:  Negative for rash.   Neurological:  Positive for numbness. Negative for dizziness and headaches.     Physical Exam     Initial Vitals   BP Pulse Resp Temp SpO2   23 0303 23 0303 23 0303 23 0307 23 0303   (!) 140/60 70 16 98.1 °F (36.7 °C) 100 %       MAP       --                Physical Exam    Nursing note and vitals reviewed.  Constitutional: He appears well-developed. He is not diaphoretic. No distress.   HENT:   Head: Normocephalic.   Eyes: EOM are normal. Pupils are equal, round, and reactive to light. Right eye exhibits no nystagmus. Left eye exhibits no nystagmus.   Pupils are 3 mm.   Cardiovascular:  Normal rate and regular rhythm.           No murmur heard.  Pulses:       Dorsalis pedis pulses are 2+ on the right side and 2+ on the left side.   Pulmonary/Chest: Effort normal and breath sounds normal. He has no wheezes.   Abdominal: Abdomen is soft. He exhibits no distension. There is no abdominal tenderness.   Musculoskeletal:         General: Normal range of motion.     Neurological: He is alert and oriented to person, place, and time. He has normal strength. No cranial nerve deficit or sensory deficit.   No facial asymmetry. Finger-to-nose intact. No Pronator drift. Sensations are equal to upper and lower extremities.   Flexion and extension intact bilateral lower extremities.  Dorsiflexion and plantar flexion intact.   Skin: Skin is warm.       ED Course   Procedures  Labs Reviewed   CBC W/ AUTO DIFFERENTIAL - Abnormal; Notable for the following components:       Result Value    RBC 4.01 (*)     Hemoglobin 11.9 (*)     Hematocrit 36.1 (*)     RDW 16.7 (*)     Lymph % 17.2 (*)     All other components within normal limits   COMPREHENSIVE METABOLIC PANEL - Abnormal; Notable for the following components:    Sodium 134 (*)     CO2 21 (*)     Calcium 8.4 (*)     Albumin 3.4 (*)     AST 46 (*)     All other components within normal limits   URINALYSIS, REFLEX TO URINE CULTURE - Abnormal; Notable for the following components:    Color, UA Colorless (*)     Protein, UA Trace (*)     All other components within normal limits    Narrative:     Specimen Source->Urine   TROPONIN I          Imaging Results    None          Medications   lactated ringers  bolus 1,000 mL (1,000 mLs Intravenous New Bag 2/13/23 0502)     Medical Decision Making:   History:   Old Medical Records: I decided to obtain old medical records.  Initial Assessment:     87-year-old male presenting with reported paresthesias of the bilateral lower extremities.  Patient reported sudden onset of symptoms.  On initial exam patient had no neuro deficit.  Patient was able to move all extremities with good strength.  No facial asymmetry.  No dysarthria.  Patient thinking clearly.  No sensation deficit in the lower extremities.  Pulses intact bilateral lower extremities.  No urinary retention.  Patient was given IV fluids and monitored.  Patient reported symptoms completely resolved.  Gait intact.  No electrolyte abnormalities.  Discussed following up with primary care provider for further evaluation.  Differential Diagnosis:   Electrolyte abnormality, lower back radiculopathy  Independently Interpreted Test(s):   I have ordered and independently interpreted EKG Reading(s) - see prior notes  Clinical Tests:   Lab Tests: Ordered and Reviewed  Medical Tests: Ordered and Reviewed  ED Management:    Problems considered includes paresthesias (Signs & symptoms, differentials)  All labs reviewed and considered in the patient's differential diagnosis. Discussion of labs includes no electrolyte abnormalities noted.  Glucose within normal limits..      Plan was discussed with the patient.  This includes follow up with primary care for reported paresthesias, lab results.    All questions or concerns have been addressed.          Scribe Attestation:   Scribe #1: I performed the above scribed service and the documentation accurately describes the services I performed. I attest to the accuracy of the note.      ED Course as of 02/13/23 0616   Mon Feb 13, 2023   0426 EKG 12-lead  Time 3:43 a.m.     Rate 54, sinus, regular rhythm, left axis deviation.    .  No ST elevation or depression.  No T-wave  inversion no Q-waves present     Normal sinus rhythm with left axis deviation. [JM]   0519 Glucose: 89 [JM]   0600 The patient was able to stand and walk to the restroom unassisted.  Patient denies any symptoms.  States symptoms have completely resolved.  I discussed his lab results with the patient.  Recommendation is to follow up with primary care for re-evaluation. [JM]      ED Course User Index  [JM] José Luis Pan MD                 Scribe attestation: I, José Luis Pan, personally performed the services described in this documentation. All medical record entries made by the scribe were at my direction and in my presence.  I have reviewed the chart and agree that the record reflects my personal performance and is accurate and complete.   Clinical Impression:   Final diagnoses:  [R20.2] Paresthesias (Primary)        ED Disposition Condition    Discharge Stable          ED Prescriptions    None       Follow-up Information       Follow up With Specialties Details Why Contact Info    Lissy Gavin MD Internal Medicine Schedule an appointment as soon as possible for a visit in 2 days Primary care [address] [facility] [address]  [phone]      [facility] - Emergency Dept Emergency Medicine  If symptoms worsen [address] [facility] [address] [phone]             José Luis Pan MD  02/13/23 0616       José Luis Pan MD  02/13/23 0617

## 2023-02-13 NOTE — DISCHARGE INSTRUCTIONS
Follow up with the primary care provider for re-evaluation and management of symptoms within the next 2-3 days.  Seek medical care if symptoms are worsening or concerns.

## 2023-02-13 NOTE — ED TRIAGE NOTES
Juan Martínez Jr., a 87 y.o. male presents to the ED w/ complaint of bilateral leg numbness x 30 mins pta.  Denies cp, sob, abd pain, ha, dizziness, weakness or any other symptoms.      Triage note:  Chief Complaint   Patient presents with    Numbness     Patient arrives via EMS for bilateral leg numbness that began tonight about 30 minutes ago. Recent Holter monitor test. Denies chest pain, weakness, dizziness. EMS performed stroke scale - negative.     Review of patient's allergies indicates:  No Known Allergies  Past Medical History:   Diagnosis Date    Coronary artery disease     Diabetes mellitus     High cholesterol     Hypertension     NSTEMI (non-ST elevated myocardial infarction) 07/25/2021    Prostate cancer

## 2023-02-13 NOTE — ED NOTES
Pt is aware urine specimen is needed, urinal at bedside, call light in reach.  Will cont the plan of care.

## 2023-03-08 ENCOUNTER — HOSPITAL ENCOUNTER (OUTPATIENT)
Dept: CARDIOLOGY | Facility: HOSPITAL | Age: 88
Discharge: HOME | End: 2023-03-08
Attending: INTERNAL MEDICINE
Payer: MEDICARE

## 2023-03-08 DIAGNOSIS — R60.9 EDEMA, UNSPECIFIED TYPE: ICD-10-CM

## 2023-03-08 LAB
LEFT GREAT SAPHENOUS DISTAL THIGH DIA: 0.5 CM
LEFT GREAT SAPHENOUS JUNCTION DIA: 0.7 CM
LEFT GREAT SAPHENOUS KNEE DIA: 0.5 CM
LEFT GREAT SAPHENOUS MIDDLE THIGH DIA: 0.5 CM
LEFT GREAT SAPHENOUS PROXIMAL CALF DIA: 0.4 CM
LEFT SMALL SAPHENOUS KNEE DIA: 0.2 CM
LEFT SMALL SAPHENOUS SPJ DIA: 0.2 CM
RIGHT GREAT SAPHENOUS DISTAL THIGH DIA: 0.5 CM
RIGHT GREAT SAPHENOUS JUNCTION DIA: 0.8 CM
RIGHT GREAT SAPHENOUS KNEE DIA: 0.5 CM
RIGHT GREAT SAPHENOUS MIDDLE THIGH DIA: 0.5 CM
RIGHT GREAT SAPHENOUS PROXIMAL CALF DIA: 0.5 CM
RIGHT SMALL SAPHENOUS KNEE DIA: 0.2 CM
RIGHT SMALL SAPHENOUS SPJ DIA: 0.1 CM

## 2023-03-08 PROCEDURE — 93970 EXTREMITY STUDY: CPT | Mod: 26,,, | Performed by: INTERNAL MEDICINE

## 2023-03-08 PROCEDURE — 93970 CV US LOWER VENOUS INSUFFICIENCY BILATERAL (CUPID ONLY): ICD-10-PCS | Mod: 26,,, | Performed by: INTERNAL MEDICINE

## 2023-03-08 PROCEDURE — 93970 EXTREMITY STUDY: CPT | Mod: TC

## 2023-03-10 ENCOUNTER — OFFICE VISIT (OUTPATIENT)
Dept: CARDIOLOGY | Facility: CLINIC | Age: 88
End: 2023-03-10
Payer: MEDICARE

## 2023-03-10 VITALS
OXYGEN SATURATION: 96 % | HEIGHT: 68 IN | WEIGHT: 177.94 LBS | BODY MASS INDEX: 26.97 KG/M2 | RESPIRATION RATE: 18 BRPM | DIASTOLIC BLOOD PRESSURE: 65 MMHG | SYSTOLIC BLOOD PRESSURE: 138 MMHG | HEART RATE: 60 BPM

## 2023-03-10 DIAGNOSIS — I10 ESSENTIAL HYPERTENSION, BENIGN: Primary | ICD-10-CM

## 2023-03-10 DIAGNOSIS — E11.621 DIABETIC ULCER OF TOE OF LEFT FOOT ASSOCIATED WITH TYPE 2 DIABETES MELLITUS, WITH FAT LAYER EXPOSED: ICD-10-CM

## 2023-03-10 DIAGNOSIS — C44.92 SCC (SQUAMOUS CELL CARCINOMA): ICD-10-CM

## 2023-03-10 DIAGNOSIS — R00.2 PALPITATIONS: ICD-10-CM

## 2023-03-10 DIAGNOSIS — E78.2 MIXED HYPERLIPIDEMIA: ICD-10-CM

## 2023-03-10 DIAGNOSIS — I77.810 AORTIC ROOT DILATATION: ICD-10-CM

## 2023-03-10 DIAGNOSIS — I25.10 CORONARY ARTERY DISEASE INVOLVING NATIVE CORONARY ARTERY OF NATIVE HEART WITHOUT ANGINA PECTORIS: ICD-10-CM

## 2023-03-10 DIAGNOSIS — L97.522 DIABETIC ULCER OF TOE OF LEFT FOOT ASSOCIATED WITH TYPE 2 DIABETES MELLITUS, WITH FAT LAYER EXPOSED: ICD-10-CM

## 2023-03-10 DIAGNOSIS — I95.1 ORTHOSTATIC HYPOTENSION: ICD-10-CM

## 2023-03-10 DIAGNOSIS — E11.9 TYPE 2 DIABETES MELLITUS WITHOUT COMPLICATION, WITHOUT LONG-TERM CURRENT USE OF INSULIN: ICD-10-CM

## 2023-03-10 PROCEDURE — 99214 OFFICE O/P EST MOD 30 MIN: CPT | Mod: S$GLB,,, | Performed by: INTERNAL MEDICINE

## 2023-03-10 PROCEDURE — 1126F PR PAIN SEVERITY QUANTIFIED, NO PAIN PRESENT: ICD-10-PCS | Mod: CPTII,S$GLB,, | Performed by: INTERNAL MEDICINE

## 2023-03-10 PROCEDURE — 3075F PR MOST RECENT SYSTOLIC BLOOD PRESS GE 130-139MM HG: ICD-10-PCS | Mod: CPTII,S$GLB,, | Performed by: INTERNAL MEDICINE

## 2023-03-10 PROCEDURE — 3288F FALL RISK ASSESSMENT DOCD: CPT | Mod: CPTII,S$GLB,, | Performed by: INTERNAL MEDICINE

## 2023-03-10 PROCEDURE — 99999 PR PBB SHADOW E&M-EST. PATIENT-LVL IV: ICD-10-PCS | Mod: PBBFAC,,, | Performed by: INTERNAL MEDICINE

## 2023-03-10 PROCEDURE — 3075F SYST BP GE 130 - 139MM HG: CPT | Mod: CPTII,S$GLB,, | Performed by: INTERNAL MEDICINE

## 2023-03-10 PROCEDURE — 1160F PR REVIEW ALL MEDS BY PRESCRIBER/CLIN PHARMACIST DOCUMENTED: ICD-10-PCS | Mod: CPTII,S$GLB,, | Performed by: INTERNAL MEDICINE

## 2023-03-10 PROCEDURE — 1159F MED LIST DOCD IN RCRD: CPT | Mod: CPTII,S$GLB,, | Performed by: INTERNAL MEDICINE

## 2023-03-10 PROCEDURE — 1159F PR MEDICATION LIST DOCUMENTED IN MEDICAL RECORD: ICD-10-PCS | Mod: CPTII,S$GLB,, | Performed by: INTERNAL MEDICINE

## 2023-03-10 PROCEDURE — 3078F PR MOST RECENT DIASTOLIC BLOOD PRESSURE < 80 MM HG: ICD-10-PCS | Mod: CPTII,S$GLB,, | Performed by: INTERNAL MEDICINE

## 2023-03-10 PROCEDURE — 99999 PR PBB SHADOW E&M-EST. PATIENT-LVL IV: CPT | Mod: PBBFAC,,, | Performed by: INTERNAL MEDICINE

## 2023-03-10 PROCEDURE — 1101F PT FALLS ASSESS-DOCD LE1/YR: CPT | Mod: CPTII,S$GLB,, | Performed by: INTERNAL MEDICINE

## 2023-03-10 PROCEDURE — 1126F AMNT PAIN NOTED NONE PRSNT: CPT | Mod: CPTII,S$GLB,, | Performed by: INTERNAL MEDICINE

## 2023-03-10 PROCEDURE — 1160F RVW MEDS BY RX/DR IN RCRD: CPT | Mod: CPTII,S$GLB,, | Performed by: INTERNAL MEDICINE

## 2023-03-10 PROCEDURE — 3078F DIAST BP <80 MM HG: CPT | Mod: CPTII,S$GLB,, | Performed by: INTERNAL MEDICINE

## 2023-03-10 PROCEDURE — 3288F PR FALLS RISK ASSESSMENT DOCUMENTED: ICD-10-PCS | Mod: CPTII,S$GLB,, | Performed by: INTERNAL MEDICINE

## 2023-03-10 PROCEDURE — 1101F PR PT FALLS ASSESS DOC 0-1 FALLS W/OUT INJ PAST YR: ICD-10-PCS | Mod: CPTII,S$GLB,, | Performed by: INTERNAL MEDICINE

## 2023-03-10 PROCEDURE — 99214 PR OFFICE/OUTPT VISIT, EST, LEVL IV, 30-39 MIN: ICD-10-PCS | Mod: S$GLB,,, | Performed by: INTERNAL MEDICINE

## 2023-03-10 NOTE — PROGRESS NOTES
CARDIOVASCULAR CONSULTATION    REASON FOR CONSULT:   Juan Martínez Jr. is a 87 y.o. male who presents for follow-up recent hospitalization for a non-STEMI..      HISTORY OF PRESENT ILLNESS:     Notes from August 2019:   Patient is here for follow-up today.  Was recently admitted for hypertensive urgency.  Did not have any chest pains during the hypertensive urgency.  Troponins were found to be mildly elevated.  Was evaluated by Cardiology and Norvasc was restarted.  His blood pressure has been well controlled since then.  Denies any chest pains at rest on exertion, orthopnea, PND, swelling of feet.      Notes from July 2019  Patient is here for follow-up today.  Denies any chest pains at rest on exertion, orthopnea, PND.  Denies any claudication symptoms.  Denies any dyspnea at rest on exertion    Note from clinic visit in May 2019:  Patient is 83-year-old man pleasant man with a past medical history significant for diabetes, dyslipidemia, hypertension, coronary artery disease who recently presented to the hospital with a non-STEMI.  Coronary angiogram performed at that time revealed nonischemic coronary artery disease of the LAD and RCA as well as 80% stenosis in his diagonal 1.  Which is being managed medically.  A week after that he developed some right-sided chest pain, different from his anginal pain which he had presented with his non-STEMI.  He states that right-sided chest pain resolved on its own in less than 20 min, but since this is all new for him he decided to come to the hospital to make sure everything was okay.  He was admitted and serial troponins did not reveal any elevation and he was discharged home.  He has not had any further episodes of chest pains.  Denies orthopnea, PND chest pains at rest or on exertion.  Denies typical claudication pain, has knee pains and atypical leg pain.      Notes from October 2019:  Patient here for follow-up.  States that had a brief episode of right-sided  chest pain which lasted a few seconds to few minutes and then went away.  This was different than the anginal pain he had in the past.  Denies any orthopnea, PND, swelling of feet.  States that he tried telling is  that this he felt was related to his lungs, and an checks x-ray done yesterday.  Denies any recent fevers or chills.    Notes from December 2019:  Patient here for follow-up.  Denies any chest pains at rest on exertion, orthopnea, PND, swelling of feet.  Blood pressure mildly elevated at clinic.  At home states blood pressure stays around 140-145 mm systolic.  I will increase his Toprol-XL to 75 mg daily.    Notes from January 2020:  Patient here because states that yesterday he felt some chest pain.  It was right-sided.  States did not feel like it was from his heart but just wanted to make sure.  He took nitroglycerines and had no response to nitroglycerin.  States that did not feel like his earlier anginal pains.    Notes from May 2020:  Patient here for follow-up did not have any further episodes of chest pain.  Is looking for clearance for EGD.  Denies any orthopnea, PND, swelling of feet.    Notes from September 2020:  Patient here for follow-up.  Denies any anginal sounding chest pains, orthopnea, PND.  States he had an episode last Friday where he took a deep breath and felt a sharp pain which lasted 1 sec and then went away.  Denies any anginal sounding chest pains on exertion.  Denies any orthopnea, PND, swelling of feet.  EKG done in the clinic today was personally reviewed and demonstrated normal sinus rhythm, left axis deviation.  Abnormal EKG.    Notes from December 2020:  Patient here follow-up.  Denies any chest pains rest exertion PND.  Doing fine.  No cardiac issues.    Notes from February 2021:  Patient here follow-up.  Denies any chest pains at rest on exertion, orthopnea, PND.  EKG done in the clinic today was personally reviewed and demonstrated normal sinus rhythm with left  anterior fascicular block.  No other cardiac symptomatology.    Notes from June 2021:  Patient here for follow-up.  Doing fine.  Denies any chest pains at rest on exertion, orthopnea PND.    Notes from July 21:  Patient here for after recent hospitalization.  Had come in with non-STEMI.  Culprit lesion was distal RCA.  Underwent PCI distal RCA.  Doing fine.  No chest pains.  No complications from cardiac catheterization.    October 21:  Patient here for follow-up.  Denies any chest pains at rest on exertion, orthopnea, PND.  Doing fine.    February 2022 patient here for follow-up.  Doing fine.  No anginal sounding chest pains, orthopnea, PND.    Notes from July 2022:  Patient here for follow-up.  Denies any chest at rest on exertion, orthopnea, PND, swelling of feet \    Notes from October 2022: Patient here for follow-up.  Denies any chest pains at rest on exertion, orthopnea, PND.  Had hip replacement recently.  Bilateral pedal edema since then P    Notes from January 23: Patient here for follow-up.  States lately has been experiencing night sweats and wakes up sweaty in the morning.  Also sometimes feels his heart is beating very fast.  Occurs about once a week.  Has had multiple ER visits for evaluation of this.  Denies any angina.      Notes from March 2023: Patient here for follow-up.  Doing fine.  Significant drop in cholesterol.  Blood pressure well controlled    Baseline rhythm was sinus bradycardia with normal intervals and an initial heart rate of 59 bpm.  There were no reported symptoms.  There were occasional PACs and very rare PVCs.  There was no evidence of high-degree AV block, nor were there prolonged pauses.  There were no atrial or ventricular arrhythmias.          PAST MEDICAL HISTORY:     Past Medical History:   Diagnosis Date    Coronary artery disease     Diabetes mellitus     High cholesterol     Hypertension     NSTEMI (non-ST elevated myocardial infarction) 07/25/2021    Prostate cancer         PAST SURGICAL HISTORY:     Past Surgical History:   Procedure Laterality Date    APPLICATION OF FULL-THICKNESS SKIN GRAFT (FTSG) TO UPPER EXTREMITY Right 11/15/2018    Procedure: APPLICATION, GRAFT, SKIN, FULL-THICKNESS, TO UPPER EXTREMITY VS STSG WITH FROZEN SECTIONS;  Surgeon: Alan Arzola MD;  Location: [facility] OR;  Service: Plastics;  Laterality: Right;    EXCISION OF SQUAMOUS CELL CARCINOMA Right 11/15/2018    Procedure: EXCISION, CARCINOMA, SQUAMOUS CELL @ RIGHT HAND;  Surgeon: Alan Arzola MD;  Location: [facility] OR;  Service: Plastics;  Laterality: Right;  RN PREOP 11/13/2018--NEED H/P    HEMIARTHROPLASTY OF HIP Right 8/14/2022    Procedure: HEMIARTHROPLASTY, HIP;  Surgeon: Edis Villanueva MD;  Location: [facility] OR;  Service: Orthopedics;  Laterality: Right;    JOINT REPLACEMENT      LEFT HEART CATHETERIZATION Left 5/6/2019    Procedure: Left heart cath;  Surgeon: Rashad Nieto MD;  Location: [facility] CATH LAB;  Service: Cardiology;  Laterality: Left;    LEFT HEART CATHETERIZATION Left 7/26/2021    Procedure: Left heart cath, radial;  Surgeon: Rashad Nieto MD;  Location: [facility] CATH LAB;  Service: Cardiology;  Laterality: Left;    TONSILLECTOMY      TOTAL KNEE ARTHROPLASTY         ALLERGIES AND MEDICATION:   Review of patient's allergies indicates:  No Known Allergies     Medication List            Accurate as of March 10, 2023 10:35 AM. If you have any questions, ask your nurse or doctor.                CONTINUE taking these medications      albuterol 90 mcg/actuation inhaler  Commonly known as: PROVENTIL/VENTOLIN HFA     aluminum-magnesium hydroxide-simethicone 200-200-20 mg/5 mL Susp  Commonly known as: MAALOX ADVANCED  Take 30 mLs by mouth 4 (four) times daily before meals and nightly. for 7 days     amLODIPine 10 MG tablet  Commonly known as: NORVASC  Take 1 tablet (10 mg total) by mouth once daily.     aspirin 81 MG EC tablet  Commonly known as: ECOTRIN  Take 1 tablet (81 mg total) by mouth once  daily.     atorvastatin 40 MG tablet  Commonly known as: LIPITOR  Take 1 tablet (40 mg total) by mouth once daily.     blood-glucose meter Misc     clopidogreL 75 mg tablet  Commonly known as: PLAVIX  Take 1 tablet (75 mg total) by mouth once daily.     EScitalopram oxalate 10 MG tablet  Commonly known as: LEXAPRO     furosemide 20 MG tablet  Commonly known as: LASIX  Take 1 tablet (20 mg total) by mouth as needed (use as needed for fluid buildup).     hydroCHLOROthiazide 12.5 mg capsule  Commonly known as: MICROZIDE  Take 2 capsules (25 mg total) by mouth once daily.     isosorbide mononitrate 60 MG 24 hr tablet  Commonly known as: IMDUR  Take 1 tablet (60 mg total) by mouth once daily.     LANCETS & BLOOD GLUCOSE STRIPS MISC     metFORMIN 1000 MG tablet  Commonly known as: GLUCOPHAGE  Take 1 tablet (1,000 mg total) by mouth 2 (two) times daily with meals.     metoprolol succinate 50 MG 24 hr tablet  Commonly known as: TOPROL-XL  Take 1 tablet (50 mg total) by mouth once daily.     nitroGLYCERIN 0.4 MG SL tablet  Commonly known as: NITROSTAT  Place 1 tablet (0.4 mg total) under the tongue every 5 (five) minutes as needed for Chest pain.     simethicone 125 MG chewable tablet  Commonly known as: MYLICON  Take 1 tablet (125 mg total) by mouth every 6 (six) hours as needed for Flatulence.     thiamine 250 MG tablet              SOCIAL HISTORY:     Social History     Socioeconomic History    Marital status:    Tobacco Use    Smoking status: Former     Packs/day: 0.00     Types: Cigarettes     Quit date: 2016     Years since quittin.5    Smokeless tobacco: Never    Tobacco comments:     2016   Substance and Sexual Activity    Alcohol use: No    Drug use: No    Sexual activity: Not Currently       FAMILY HISTORY:     Family History   Problem Relation Age of Onset    Heart disease Mother     Stroke Father        REVIEW OF SYSTEMS:   Review of Systems   Constitutional: Negative.    HENT: Negative.    "  Eyes: Negative.    Respiratory: Negative.     Gastrointestinal: Negative.    Genitourinary: Negative.    Skin: Negative.    Neurological: Negative.    Endo/Heme/Allergies: Negative.      A 10 point review of systems was performed and all the pertinent positives have been mentioned. Rest of review of systems was negative.        PHYSICAL EXAM:     Vitals:    03/10/23 1027   BP: 138/65   Pulse: 60   Resp: 18    Body mass index is 27.05 kg/m².  Weight: 80.7 kg (177 lb 14.6 oz)   Height: 5' 8" (172.7 cm)     Physical Exam  Vitals and nursing note reviewed.   Constitutional:       Appearance: Normal appearance. He is well-developed.   HENT:      Head: Normocephalic and atraumatic.      Right Ear: Hearing normal.      Left Ear: Hearing normal.      Nose: Nose normal.   Eyes:      General: Lids are normal.      Conjunctiva/sclera: Conjunctivae normal.      Pupils: Pupils are equal, round, and reactive to light.   Cardiovascular:      Rate and Rhythm: Normal rate and regular rhythm.      Pulses: Normal pulses.      Heart sounds: Normal heart sounds.   Pulmonary:      Effort: Pulmonary effort is normal.      Breath sounds: Normal breath sounds.   Abdominal:      Palpations: Abdomen is soft.      Tenderness: There is no abdominal tenderness.   Musculoskeletal:         General: No deformity.      Cervical back: Normal range of motion and neck supple.      Right lower leg: Edema present.      Left lower leg: Edema present.   Skin:     General: Skin is warm and dry.   Neurological:      Mental Status: He is alert and oriented to person, place, and time.   Psychiatric:         Speech: Speech normal.         DATA:     Laboratory:  CBC:  Recent Labs   Lab 12/18/22  0519 01/05/23  0400 02/13/23  0350   WBC 6.76 6.17 6.46   Hemoglobin 11.6 L 11.3 L 11.9 L   Hematocrit 34.4 L 33.6 L 36.1 L   Platelets 289 256 271         CHEMISTRIES:  Recent Labs   Lab 07/25/21  0503 07/26/21  0348 07/27/21  0550 07/30/21  0116 11/11/21  0609 " 01/11/22  2335 08/13/22  1118 12/18/22  0519 01/05/23  0400 02/13/23  0350   Glucose 114 H 126 H   < > 119 H 179 H 138 H   < > 155 H 99 89   Sodium 138 137   < > 135 L 138 138   < > 137 140 134 L   Potassium 4.1 3.4 L   < > 3.2 L 3.7 3.4 L   < > 3.4 L 3.4 L SEE COMMENT   BUN 13 12   < > 15 14 18   < > 17 14 15   Creatinine 1.0 0.9   < > 1.0 1.0 1.2   < > 1.1 0.9 0.9   eGFR if African American >60 >60   < > >60 >60 >60  --   --   --   --    eGFR if non African American >60 >60   < > >60 >60 55 A  --   --   --   --    Calcium 8.8 9.2   < > 9.5 9.6 9.0   < > 9.0 8.9 8.4 L   Magnesium 1.8 1.9  --   --  2.0  --   --   --   --   --     < > = values in this interval not displayed.         CARDIAC BIOMARKERS:  Recent Labs   Lab 01/05/23  0400 01/05/23  0632 02/13/23  0350   Troponin I 0.012 0.008 <0.006         COAGS:  Recent Labs   Lab 11/11/21  0609 08/13/22  1118 08/13/22  1540   INR 1.0 1.0 1.0         LIPIDS/LFTS:  Recent Labs   Lab 12/18/22  0519 01/05/23  0400 02/13/23  0350   AST 13 15 46 H   ALT 10 12 11         Hemoglobin A1C   Date Value Ref Range Status   08/17/2022 6.4 (H) 4.0 - 5.6 % Final     Comment:     ADA Screening Guidelines:  5.7-6.4%  Consistent with prediabetes  >or=6.5%  Consistent with diabetes    High levels of fetal hemoglobin interfere with the HbA1C  assay. Heterozygous hemoglobin variants (HbS, HgC, etc)do  not significantly interfere with this assay.   However, presence of multiple variants may affect accuracy.     05/07/2019 7.0 (H) 4.0 - 5.6 % Final     Comment:     ADA Screening Guidelines:  5.7-6.4%  Consistent with prediabetes  >or=6.5%  Consistent with diabetes  High levels of fetal hemoglobin interfere with the HbA1C  assay. Heterozygous hemoglobin variants (HbS, HgC, etc)do  not significantly interfere with this assay.   However, presence of multiple variants may affect accuracy.     06/19/2018 7.3 (H) 4.0 - 5.6 % Final     Comment:     ADA Screening Guidelines:  5.7-6.4%  Consistent  with prediabetes  >or=6.5%  Consistent with diabetes  High levels of fetal hemoglobin interfere with the HbA1C  assay. Heterozygous hemoglobin variants (HbS, HgC, etc)do  not significantly interfere with this assay.   However, presence of multiple variants may affect accuracy.       Hemoglobin A1c   Date Value Ref Range Status   12/02/2021 6.7 (H) <5.7 % of total Hgb Final     Comment:     For someone without known diabetes, a hemoglobin A1c  value of 6.5% or greater indicates that they may have   diabetes and this should be confirmed with a follow-up   test.    For someone with known diabetes, a value <7% indicates   that their diabetes is well controlled and a value   greater than or equal to 7% indicates suboptimal   control. A1c targets should be individualized based on   duration of diabetes, age, comorbid conditions, and   other considerations.    Currently, no consensus exists regarding use of  hemoglobin A1c for diagnosis of diabetes for children.       07/23/2021 6.8 (H) <5.7 % of total Hgb Final     Comment:     For someone without known diabetes, a hemoglobin A1c  value of 6.5% or greater indicates that they may have   diabetes and this should be confirmed with a follow-up   test.    For someone with known diabetes, a value <7% indicates   that their diabetes is well controlled and a value   greater than or equal to 7% indicates suboptimal   control. A1c targets should be individualized based on   duration of diabetes, age, comorbid conditions, and   other considerations.    Currently, no consensus exists regarding use of  hemoglobin A1c for diagnosis of diabetes for children.       03/16/2021 6.4 (H) <5.7 % of total Hgb Final     Comment:     For someone without known diabetes, a hemoglobin   A1c value between 5.7% and 6.4% is consistent with  prediabetes and should be confirmed with a   follow-up test.    For someone with known diabetes, a value <7%  indicates that their diabetes is well controlled.  A1c  targets should be individualized based on duration of  diabetes, age, comorbid conditions, and other  considerations.    This assay result is consistent with an increased risk  of diabetes.    Currently, no consensus exists regarding use of  hemoglobin A1c for diagnosis of diabetes for children.       TSH        The ASCVD Risk score (Guanako CHENG, et al., 2019) failed to calculate for the following reasons:    The 2019 ASCVD risk score is only valid for ages 40 to 79    The patient has a prior MI or stroke diagnosis           Cardiovascular Testing:    EKG: (personally reviewed tracing)  May 2019:  Normal sinus rhythm, left axis deviation.  Abnormal EKG.    2D echocardiogram May 2019:  Normal left ventricular systolic function. The estimated ejection fraction is 65%  Normal LV diastolic function.  Mild left atrial enlargement.  Normal central venous pressure (3 mm Hg).  The estimated PA systolic pressure is 10 mm Hg       Coronary angiogram 6th May 2019:    No acute thrombotic lesion identified.  Coronary artery disease as described below  LVEDP: 12 mmHg    Carotid ultrasound May 28, 2019:    There is 20-39% right Internal Carotid Stenosis.  There is 20-39% left Internal Carotid Stenosis.     ABIs May 28, 2019:    Noncompressible LOW bilaterally.  Mildly decrease TBI bilaterally.  Normal PVR waveforms bilaterally.       Coronary Anatomy:  LM:  No significant stenosis  LAD:  Mid LAD 60-70% stenosis.  Distal LAD 50% stenosis.  IFR 0.92 (nonischemic).  Diagonal 1 is a tortuous vessel with mid 80% stenosis.  Will medically manage this diagonal stenosis  LCx :  Mild nonobstructive CAD  RCA:  50% distal RCA/proximal PDA lesion.  IFR 1 (nonischemic)     Impression / Plan  Coronary artery disease as described above.  Continue medical management with aggressive risk factor modification.  Continue aspirin 81 mg daily. Change simvastatin to atorvastatin 80 mg daily.        ASSESSMENT AND PLAN     Patient Active Problem List    Diagnosis    Essential hypertension, benign    Type 2 diabetes mellitus    Hyperlipidemia    Body mass index 28.0-28.9, adult    History of prostate cancer    Orthostatic hypotension    Diabetic ulcer of toe of left foot associated with type 2 diabetes mellitus, with fat layer exposed    SCC (squamous cell carcinoma)    Coronary artery disease involving native coronary artery of native heart without angina pectoris    Unstable angina    Palpitations    Elevated troponin    NSTEMI (non-ST elevated myocardial infarction)    Hip fracture    Aortic root dilatation    Acute blood loss anemia    Diaphoresis       1.  Patient with coronary artery disease, now angina free.  Status post PCI distal RCA in July of 2021. Continue aspirin 81 mg daily indefinitely and Plavix 75 mg daily uninterrupted for at least 1 year from date of PCI.  Diagonal 1 disease being managed medically.  Continues to be angina free    2.  Hypertension:  Well controlled on current therapy.      3.  Cardiovascular screening with rest and stress ABIs and carotid ultrasound performed.  ABIs demonstrated noncompressible ABIs bilaterally, mildly decreased ABIs bilaterally with normal PVR waveforms bilaterally.  I got a peripheral ultrasound for further evaluation of his abnormal TBI.   ultrasound did not show any flow restriction in the right or the left lower extremity.    4.  Dyslipidemia:  Continue medical management with atorvastatin.    5. Bilateral pedal edema after hip surgery.  Lasix as needed.  Check venous ultrasound to rule out DVT.    6.  May hold Plavix as needed for procedures.    7. Palpitations and night sweats.  Further evaluation of night sweats by primary care physician.  Event monitor did not show any significant arrhythmia.      Thank you very much for involving me in the care of your patient.  Please do not hesitate to contact me if there are any questions.      Rashad Nieto MD, FACC, [facility]  Interventional Cardiologist, [facility].     Follow-up in 3 months      This note was dictated with the help of speech recognition software.  There might be un-intended errors and/or substitutions.

## 2023-06-12 ENCOUNTER — OFFICE VISIT (OUTPATIENT)
Dept: CARDIOLOGY | Facility: CLINIC | Age: 88
End: 2023-06-12
Payer: MEDICARE

## 2023-06-12 VITALS
DIASTOLIC BLOOD PRESSURE: 66 MMHG | HEIGHT: 68 IN | WEIGHT: 185.88 LBS | HEART RATE: 60 BPM | RESPIRATION RATE: 15 BRPM | BODY MASS INDEX: 28.17 KG/M2 | SYSTOLIC BLOOD PRESSURE: 126 MMHG | OXYGEN SATURATION: 96 %

## 2023-06-12 DIAGNOSIS — R00.2 PALPITATIONS: ICD-10-CM

## 2023-06-12 DIAGNOSIS — I10 ESSENTIAL HYPERTENSION, BENIGN: Primary | ICD-10-CM

## 2023-06-12 DIAGNOSIS — E11.9 TYPE 2 DIABETES MELLITUS WITHOUT COMPLICATION, WITHOUT LONG-TERM CURRENT USE OF INSULIN: ICD-10-CM

## 2023-06-12 DIAGNOSIS — E78.2 MIXED HYPERLIPIDEMIA: ICD-10-CM

## 2023-06-12 DIAGNOSIS — C44.92 SCC (SQUAMOUS CELL CARCINOMA): ICD-10-CM

## 2023-06-12 PROCEDURE — 1159F MED LIST DOCD IN RCRD: CPT | Mod: CPTII,S$GLB,, | Performed by: INTERNAL MEDICINE

## 2023-06-12 PROCEDURE — 1160F PR REVIEW ALL MEDS BY PRESCRIBER/CLIN PHARMACIST DOCUMENTED: ICD-10-PCS | Mod: CPTII,S$GLB,, | Performed by: INTERNAL MEDICINE

## 2023-06-12 PROCEDURE — 1159F PR MEDICATION LIST DOCUMENTED IN MEDICAL RECORD: ICD-10-PCS | Mod: CPTII,S$GLB,, | Performed by: INTERNAL MEDICINE

## 2023-06-12 PROCEDURE — 1160F RVW MEDS BY RX/DR IN RCRD: CPT | Mod: CPTII,S$GLB,, | Performed by: INTERNAL MEDICINE

## 2023-06-12 PROCEDURE — 3074F PR MOST RECENT SYSTOLIC BLOOD PRESSURE < 130 MM HG: ICD-10-PCS | Mod: CPTII,S$GLB,, | Performed by: INTERNAL MEDICINE

## 2023-06-12 PROCEDURE — 3288F PR FALLS RISK ASSESSMENT DOCUMENTED: ICD-10-PCS | Mod: CPTII,S$GLB,, | Performed by: INTERNAL MEDICINE

## 2023-06-12 PROCEDURE — 99214 OFFICE O/P EST MOD 30 MIN: CPT | Mod: S$GLB,,, | Performed by: INTERNAL MEDICINE

## 2023-06-12 PROCEDURE — 1126F AMNT PAIN NOTED NONE PRSNT: CPT | Mod: CPTII,S$GLB,, | Performed by: INTERNAL MEDICINE

## 2023-06-12 PROCEDURE — 93000 EKG 12-LEAD: ICD-10-PCS | Mod: S$GLB,,, | Performed by: INTERNAL MEDICINE

## 2023-06-12 PROCEDURE — 1101F PR PT FALLS ASSESS DOC 0-1 FALLS W/OUT INJ PAST YR: ICD-10-PCS | Mod: CPTII,S$GLB,, | Performed by: INTERNAL MEDICINE

## 2023-06-12 PROCEDURE — 3078F DIAST BP <80 MM HG: CPT | Mod: CPTII,S$GLB,, | Performed by: INTERNAL MEDICINE

## 2023-06-12 PROCEDURE — 99214 PR OFFICE/OUTPT VISIT, EST, LEVL IV, 30-39 MIN: ICD-10-PCS | Mod: S$GLB,,, | Performed by: INTERNAL MEDICINE

## 2023-06-12 PROCEDURE — 1126F PR PAIN SEVERITY QUANTIFIED, NO PAIN PRESENT: ICD-10-PCS | Mod: CPTII,S$GLB,, | Performed by: INTERNAL MEDICINE

## 2023-06-12 PROCEDURE — 3074F SYST BP LT 130 MM HG: CPT | Mod: CPTII,S$GLB,, | Performed by: INTERNAL MEDICINE

## 2023-06-12 PROCEDURE — 3078F PR MOST RECENT DIASTOLIC BLOOD PRESSURE < 80 MM HG: ICD-10-PCS | Mod: CPTII,S$GLB,, | Performed by: INTERNAL MEDICINE

## 2023-06-12 PROCEDURE — 3288F FALL RISK ASSESSMENT DOCD: CPT | Mod: CPTII,S$GLB,, | Performed by: INTERNAL MEDICINE

## 2023-06-12 PROCEDURE — 93000 ELECTROCARDIOGRAM COMPLETE: CPT | Mod: S$GLB,,, | Performed by: INTERNAL MEDICINE

## 2023-06-12 PROCEDURE — 99999 PR PBB SHADOW E&M-EST. PATIENT-LVL V: ICD-10-PCS | Mod: PBBFAC,,, | Performed by: INTERNAL MEDICINE

## 2023-06-12 PROCEDURE — 1101F PT FALLS ASSESS-DOCD LE1/YR: CPT | Mod: CPTII,S$GLB,, | Performed by: INTERNAL MEDICINE

## 2023-06-12 PROCEDURE — 99999 PR PBB SHADOW E&M-EST. PATIENT-LVL V: CPT | Mod: PBBFAC,,, | Performed by: INTERNAL MEDICINE

## 2023-06-12 RX ORDER — CLOPIDOGREL BISULFATE 75 MG/1
75 TABLET ORAL DAILY
Qty: 90 TABLET | Refills: 3 | Status: SHIPPED | OUTPATIENT
Start: 2023-06-12 | End: 2024-06-11

## 2023-06-12 NOTE — PROGRESS NOTES
CARDIOVASCULAR CONSULTATION    REASON FOR CONSULT:   Juan Martínez Jr. is a 87 y.o. male who presents for follow-up recent hospitalization for a non-STEMI..      HISTORY OF PRESENT ILLNESS:     Notes from August 2019:   Patient is here for follow-up today.  Was recently admitted for hypertensive urgency.  Did not have any chest pains during the hypertensive urgency.  Troponins were found to be mildly elevated.  Was evaluated by Cardiology and Norvasc was restarted.  His blood pressure has been well controlled since then.  Denies any chest pains at rest on exertion, orthopnea, PND, swelling of feet.      Notes from July 2019  Patient is here for follow-up today.  Denies any chest pains at rest on exertion, orthopnea, PND.  Denies any claudication symptoms.  Denies any dyspnea at rest on exertion    Note from clinic visit in May 2019:  Patient is 83-year-old man pleasant man with a past medical history significant for diabetes, dyslipidemia, hypertension, coronary artery disease who recently presented to the hospital with a non-STEMI.  Coronary angiogram performed at that time revealed nonischemic coronary artery disease of the LAD and RCA as well as 80% stenosis in his diagonal 1.  Which is being managed medically.  A week after that he developed some right-sided chest pain, different from his anginal pain which he had presented with his non-STEMI.  He states that right-sided chest pain resolved on its own in less than 20 min, but since this is all new for him he decided to come to the hospital to make sure everything was okay.  He was admitted and serial troponins did not reveal any elevation and he was discharged home.  He has not had any further episodes of chest pains.  Denies orthopnea, PND chest pains at rest or on exertion.  Denies typical claudication pain, has knee pains and atypical leg pain.      Notes from October 2019:  Patient here for follow-up.  States that had a brief episode of right-sided  chest pain which lasted a few seconds to few minutes and then went away.  This was different than the anginal pain he had in the past.  Denies any orthopnea, PND, swelling of feet.  States that he tried telling is  that this he felt was related to his lungs, and an checks x-ray done yesterday.  Denies any recent fevers or chills.    Notes from December 2019:  Patient here for follow-up.  Denies any chest pains at rest on exertion, orthopnea, PND, swelling of feet.  Blood pressure mildly elevated at clinic.  At home states blood pressure stays around 140-145 mm systolic.  I will increase his Toprol-XL to 75 mg daily.    Notes from January 2020:  Patient here because states that yesterday he felt some chest pain.  It was right-sided.  States did not feel like it was from his heart but just wanted to make sure.  He took nitroglycerines and had no response to nitroglycerin.  States that did not feel like his earlier anginal pains.    Notes from May 2020:  Patient here for follow-up did not have any further episodes of chest pain.  Is looking for clearance for EGD.  Denies any orthopnea, PND, swelling of feet.    Notes from September 2020:  Patient here for follow-up.  Denies any anginal sounding chest pains, orthopnea, PND.  States he had an episode last Friday where he took a deep breath and felt a sharp pain which lasted 1 sec and then went away.  Denies any anginal sounding chest pains on exertion.  Denies any orthopnea, PND, swelling of feet.  EKG done in the clinic today was personally reviewed and demonstrated normal sinus rhythm, left axis deviation.  Abnormal EKG.    Notes from December 2020:  Patient here follow-up.  Denies any chest pains rest exertion PND.  Doing fine.  No cardiac issues.    Notes from February 2021:  Patient here follow-up.  Denies any chest pains at rest on exertion, orthopnea, PND.  EKG done in the clinic today was personally reviewed and demonstrated normal sinus rhythm with left  anterior fascicular block.  No other cardiac symptomatology.    Notes from June 2021:  Patient here for follow-up.  Doing fine.  Denies any chest pains at rest on exertion, orthopnea PND.    Notes from July 21:  Patient here for after recent hospitalization.  Had come in with non-STEMI.  Culprit lesion was distal RCA.  Underwent PCI distal RCA.  Doing fine.  No chest pains.  No complications from cardiac catheterization.    October 21:  Patient here for follow-up.  Denies any chest pains at rest on exertion, orthopnea, PND.  Doing fine.    February 2022 patient here for follow-up.  Doing fine.  No anginal sounding chest pains, orthopnea, PND.    Notes from July 2022:  Patient here for follow-up.  Denies any chest at rest on exertion, orthopnea, PND, swelling of feet \    Notes from October 2022: Patient here for follow-up.  Denies any chest pains at rest on exertion, orthopnea, PND.  Had hip replacement recently.  Bilateral pedal edema since then P    Notes from January 23: Patient here for follow-up.  States lately has been experiencing night sweats and wakes up sweaty in the morning.  Also sometimes feels his heart is beating very fast.  Occurs about once a week.  Has had multiple ER visits for evaluation of this.  Denies any angina.      Notes from March 2023: Patient here for follow-up.  Doing fine.  Significant drop in cholesterol.  Blood pressure well controlled    Baseline rhythm was sinus bradycardia with normal intervals and an initial heart rate of 59 bpm.  There were no reported symptoms.  There were occasional PACs and very rare PVCs.  There was no evidence of high-degree AV block, nor were there prolonged pauses.  There were no atrial or ventricular arrhythmias.        Notes from June 23: Patient here for follow-up.  Doing fine.  Denies any chest pains at rest on exertion, orthopnea, PND, swelling of feet      PAST MEDICAL HISTORY:     Past Medical History:   Diagnosis Date    Coronary artery disease      Diabetes mellitus     High cholesterol     Hypertension     NSTEMI (non-ST elevated myocardial infarction) 07/25/2021    Prostate cancer        PAST SURGICAL HISTORY:     Past Surgical History:   Procedure Laterality Date    APPLICATION OF FULL-THICKNESS SKIN GRAFT (FTSG) TO UPPER EXTREMITY Right 11/15/2018    Procedure: APPLICATION, GRAFT, SKIN, FULL-THICKNESS, TO UPPER EXTREMITY VS STSG WITH FROZEN SECTIONS;  Surgeon: Alan Arzola MD;  Location: [facility] OR;  Service: Plastics;  Laterality: Right;    EXCISION OF SQUAMOUS CELL CARCINOMA Right 11/15/2018    Procedure: EXCISION, CARCINOMA, SQUAMOUS CELL @ RIGHT HAND;  Surgeon: Alan Arzola MD;  Location: [facility] OR;  Service: Plastics;  Laterality: Right;  RN PREOP 11/13/2018--NEED H/P    HEMIARTHROPLASTY OF HIP Right 8/14/2022    Procedure: HEMIARTHROPLASTY, HIP;  Surgeon: Edis Villanueva MD;  Location: [facility] OR;  Service: Orthopedics;  Laterality: Right;    JOINT REPLACEMENT      LEFT HEART CATHETERIZATION Left 5/6/2019    Procedure: Left heart cath;  Surgeon: Rashad Nieto MD;  Location: [facility] CATH LAB;  Service: Cardiology;  Laterality: Left;    LEFT HEART CATHETERIZATION Left 7/26/2021    Procedure: Left heart cath, radial;  Surgeon: Rashad Nieto MD;  Location: [facility] CATH LAB;  Service: Cardiology;  Laterality: Left;    TONSILLECTOMY      TOTAL KNEE ARTHROPLASTY         ALLERGIES AND MEDICATION:   Review of patient's allergies indicates:  No Known Allergies     Medication List            Accurate as of June 12, 2023 10:22 AM. If you have any questions, ask your nurse or doctor.                CONTINUE taking these medications      albuterol 90 mcg/actuation inhaler  Commonly known as: PROVENTIL/VENTOLIN HFA     aluminum-magnesium hydroxide-simethicone 200-200-20 mg/5 mL Susp  Commonly known as: MAALOX ADVANCED  Take 30 mLs by mouth 4 (four) times daily before meals and nightly. for 7 days     amLODIPine 10 MG tablet  Commonly known as: NORVASC  Take 1  tablet (10 mg total) by mouth once daily.     aspirin 81 MG EC tablet  Commonly known as: ECOTRIN  Take 1 tablet (81 mg total) by mouth once daily.     atorvastatin 40 MG tablet  Commonly known as: LIPITOR  Take 1 tablet (40 mg total) by mouth once daily.     blood-glucose meter Misc     clopidogreL 75 mg tablet  Commonly known as: PLAVIX  Take 1 tablet (75 mg total) by mouth once daily.     EScitalopram oxalate 10 MG tablet  Commonly known as: LEXAPRO     furosemide 20 MG tablet  Commonly known as: LASIX  Take 1 tablet (20 mg total) by mouth as needed (use as needed for fluid buildup).     hydroCHLOROthiazide 12.5 mg capsule  Commonly known as: MICROZIDE  Take 2 capsules (25 mg total) by mouth once daily.     isosorbide mononitrate 60 MG 24 hr tablet  Commonly known as: IMDUR  Take 1 tablet (60 mg total) by mouth once daily.     LANCETS & BLOOD GLUCOSE STRIPS MISC     metFORMIN 1000 MG tablet  Commonly known as: GLUCOPHAGE  Take 1 tablet (1,000 mg total) by mouth 2 (two) times daily with meals.     metoprolol succinate 50 MG 24 hr tablet  Commonly known as: TOPROL-XL  Take 1 tablet (50 mg total) by mouth once daily.     nitroGLYCERIN 0.4 MG SL tablet  Commonly known as: NITROSTAT  Place 1 tablet (0.4 mg total) under the tongue every 5 (five) minutes as needed for Chest pain.     simethicone 125 MG chewable tablet  Commonly known as: MYLICON  Take 1 tablet (125 mg total) by mouth every 6 (six) hours as needed for Flatulence.     thiamine 250 MG tablet              SOCIAL HISTORY:     Social History     Socioeconomic History    Marital status:    Tobacco Use    Smoking status: Former     Packs/day: 0.00     Types: Cigarettes     Quit date: 2016     Years since quittin.8    Smokeless tobacco: Never    Tobacco comments:     2016   Substance and Sexual Activity    Alcohol use: No    Drug use: No    Sexual activity: Not Currently       FAMILY HISTORY:     Family History   Problem Relation Age of  "Onset    Heart disease Mother     Stroke Father        REVIEW OF SYSTEMS:   Review of Systems   Constitutional: Negative.    HENT: Negative.     Eyes: Negative.    Respiratory: Negative.     Gastrointestinal: Negative.    Genitourinary: Negative.    Skin: Negative.    Neurological: Negative.    Endo/Heme/Allergies: Negative.      A 10 point review of systems was performed and all the pertinent positives have been mentioned. Rest of review of systems was negative.        PHYSICAL EXAM:     Vitals:    06/12/23 1014   BP: 126/66   Pulse: 60   Resp: 15    Body mass index is 28.26 kg/m².  Weight: 84.3 kg (185 lb 13.6 oz)   Height: 5' 8" (172.7 cm)     Physical Exam  Vitals and nursing note reviewed.   Constitutional:       Appearance: Normal appearance. He is well-developed.   HENT:      Head: Normocephalic and atraumatic.      Right Ear: Hearing normal.      Left Ear: Hearing normal.      Nose: Nose normal.   Eyes:      General: Lids are normal.      Conjunctiva/sclera: Conjunctivae normal.      Pupils: Pupils are equal, round, and reactive to light.   Cardiovascular:      Rate and Rhythm: Normal rate and regular rhythm.      Pulses: Normal pulses.      Heart sounds: Normal heart sounds.   Pulmonary:      Effort: Pulmonary effort is normal.      Breath sounds: Normal breath sounds.   Abdominal:      Palpations: Abdomen is soft.      Tenderness: There is no abdominal tenderness.   Musculoskeletal:         General: No deformity.      Cervical back: Normal range of motion and neck supple.      Right lower leg: Edema present.      Left lower leg: Edema present.   Skin:     General: Skin is warm and dry.   Neurological:      Mental Status: He is alert and oriented to person, place, and time.   Psychiatric:         Speech: Speech normal.         DATA:     Laboratory:  CBC:  Recent Labs   Lab 12/18/22  0519 01/05/23  0400 02/13/23  0350   WBC 6.76 6.17 6.46   Hemoglobin 11.6 L 11.3 L 11.9 L   Hematocrit 34.4 L 33.6 L 36.1 L "   Platelets 289 256 271         CHEMISTRIES:  Recent Labs   Lab 07/25/21  0503 07/26/21  0348 07/27/21  0550 07/30/21  0116 11/11/21  0609 01/11/22  2335 08/13/22  1118 12/18/22  0519 01/05/23  0400 02/13/23  0350   Glucose 114 H 126 H   < > 119 H 179 H 138 H   < > 155 H 99 89   Sodium 138 137   < > 135 L 138 138   < > 137 140 134 L   Potassium 4.1 3.4 L   < > 3.2 L 3.7 3.4 L   < > 3.4 L 3.4 L SEE COMMENT   BUN 13 12   < > 15 14 18   < > 17 14 15   Creatinine 1.0 0.9   < > 1.0 1.0 1.2   < > 1.1 0.9 0.9   eGFR if African American >60 >60   < > >60 >60 >60  --   --   --   --    eGFR if non African American >60 >60   < > >60 >60 55 A  --   --   --   --    Calcium 8.8 9.2   < > 9.5 9.6 9.0   < > 9.0 8.9 8.4 L   Magnesium 1.8 1.9  --   --  2.0  --   --   --   --   --     < > = values in this interval not displayed.         CARDIAC BIOMARKERS:  Recent Labs   Lab 01/05/23  0400 01/05/23  0632 02/13/23  0350   Troponin I 0.012 0.008 <0.006         COAGS:  Recent Labs   Lab 11/11/21  0609 08/13/22  1118 08/13/22  1540   INR 1.0 1.0 1.0         LIPIDS/LFTS:  Recent Labs   Lab 12/18/22  0519 01/05/23  0400 02/13/23  0350   AST 13 15 46 H   ALT 10 12 11         Hemoglobin A1C   Date Value Ref Range Status   08/17/2022 6.4 (H) 4.0 - 5.6 % Final     Comment:     ADA Screening Guidelines:  5.7-6.4%  Consistent with prediabetes  >or=6.5%  Consistent with diabetes    High levels of fetal hemoglobin interfere with the HbA1C  assay. Heterozygous hemoglobin variants (HbS, HgC, etc)do  not significantly interfere with this assay.   However, presence of multiple variants may affect accuracy.     05/07/2019 7.0 (H) 4.0 - 5.6 % Final     Comment:     ADA Screening Guidelines:  5.7-6.4%  Consistent with prediabetes  >or=6.5%  Consistent with diabetes  High levels of fetal hemoglobin interfere with the HbA1C  assay. Heterozygous hemoglobin variants (HbS, HgC, etc)do  not significantly interfere with this assay.   However, presence of  multiple variants may affect accuracy.     06/19/2018 7.3 (H) 4.0 - 5.6 % Final     Comment:     ADA Screening Guidelines:  5.7-6.4%  Consistent with prediabetes  >or=6.5%  Consistent with diabetes  High levels of fetal hemoglobin interfere with the HbA1C  assay. Heterozygous hemoglobin variants (HbS, HgC, etc)do  not significantly interfere with this assay.   However, presence of multiple variants may affect accuracy.       Hemoglobin A1c   Date Value Ref Range Status   12/02/2021 6.7 (H) <5.7 % of total Hgb Final     Comment:     For someone without known diabetes, a hemoglobin A1c  value of 6.5% or greater indicates that they may have   diabetes and this should be confirmed with a follow-up   test.    For someone with known diabetes, a value <7% indicates   that their diabetes is well controlled and a value   greater than or equal to 7% indicates suboptimal   control. A1c targets should be individualized based on   duration of diabetes, age, comorbid conditions, and   other considerations.    Currently, no consensus exists regarding use of  hemoglobin A1c for diagnosis of diabetes for children.       07/23/2021 6.8 (H) <5.7 % of total Hgb Final     Comment:     For someone without known diabetes, a hemoglobin A1c  value of 6.5% or greater indicates that they may have   diabetes and this should be confirmed with a follow-up   test.    For someone with known diabetes, a value <7% indicates   that their diabetes is well controlled and a value   greater than or equal to 7% indicates suboptimal   control. A1c targets should be individualized based on   duration of diabetes, age, comorbid conditions, and   other considerations.    Currently, no consensus exists regarding use of  hemoglobin A1c for diagnosis of diabetes for children.       03/16/2021 6.4 (H) <5.7 % of total Hgb Final     Comment:     For someone without known diabetes, a hemoglobin   A1c value between 5.7% and 6.4% is consistent with  prediabetes and  should be confirmed with a   follow-up test.    For someone with known diabetes, a value <7%  indicates that their diabetes is well controlled. A1c  targets should be individualized based on duration of  diabetes, age, comorbid conditions, and other  considerations.    This assay result is consistent with an increased risk  of diabetes.    Currently, no consensus exists regarding use of  hemoglobin A1c for diagnosis of diabetes for children.       TSH        The ASCVD Risk score (Guanako CHENG, et al., 2019) failed to calculate for the following reasons:    The 2019 ASCVD risk score is only valid for ages 40 to 79    The patient has a prior MI or stroke diagnosis           Cardiovascular Testing:    EKG: (personally reviewed tracing)  May 2019:  Normal sinus rhythm, left axis deviation.  Abnormal EKG.    2D echocardiogram May 2019:  Normal left ventricular systolic function. The estimated ejection fraction is 65%  Normal LV diastolic function.  Mild left atrial enlargement.  Normal central venous pressure (3 mm Hg).  The estimated PA systolic pressure is 10 mm Hg       Coronary angiogram 6th May 2019:    No acute thrombotic lesion identified.  Coronary artery disease as described below  LVEDP: 12 mmHg    Carotid ultrasound May 28, 2019:    There is 20-39% right Internal Carotid Stenosis.  There is 20-39% left Internal Carotid Stenosis.     ABIs May 28, 2019:    Noncompressible LOW bilaterally.  Mildly decrease TBI bilaterally.  Normal PVR waveforms bilaterally.       Coronary Anatomy:  LM:  No significant stenosis  LAD:  Mid LAD 60-70% stenosis.  Distal LAD 50% stenosis.  IFR 0.92 (nonischemic).  Diagonal 1 is a tortuous vessel with mid 80% stenosis.  Will medically manage this diagonal stenosis  LCx :  Mild nonobstructive CAD  RCA:  50% distal RCA/proximal PDA lesion.  IFR 1 (nonischemic)     Impression / Plan  Coronary artery disease as described above.  Continue medical management with aggressive risk factor  modification.  Continue aspirin 81 mg daily. Change simvastatin to atorvastatin 80 mg daily.        ASSESSMENT AND PLAN     Patient Active Problem List   Diagnosis    Essential hypertension, benign    Type 2 diabetes mellitus    Hyperlipidemia    Body mass index 28.0-28.9, adult    History of prostate cancer    Orthostatic hypotension    Diabetic ulcer of toe of left foot associated with type 2 diabetes mellitus, with fat layer exposed    SCC (squamous cell carcinoma)    Coronary artery disease involving native coronary artery of native heart without angina pectoris    Unstable angina    Palpitations    Elevated troponin    NSTEMI (non-ST elevated myocardial infarction)    Hip fracture    Aortic root dilatation    Acute blood loss anemia    Diaphoresis       1.  Patient with coronary artery disease, now angina free.  Status post PCI distal RCA in July of 2021. Continue aspirin 81 mg daily indefinitely and Plavix 75 mg daily uninterrupted for at least 1 year from date of PCI.  Diagonal 1 disease being managed medically.  Continues to be angina free    2.  Hypertension:  Well controlled on current therapy.      3.  Cardiovascular screening with rest and stress ABIs and carotid ultrasound performed.  ABIs demonstrated noncompressible ABIs bilaterally, mildly decreased ABIs bilaterally with normal PVR waveforms bilaterally.  I got a peripheral ultrasound for further evaluation of his abnormal TBI.   ultrasound did not show any flow restriction in the right or the left lower extremity.    4.  Dyslipidemia:  Continue medical management with atorvastatin.    5. Bilateral pedal edema after hip surgery.  Lasix as needed.  Check venous ultrasound to rule out DVT.    6.  May hold Plavix as needed for procedures.    7. Palpitations and night sweats.  Further evaluation of night sweats by primary care physician.  Event monitor did not show any significant arrhythmia.      Thank you very much for involving me in the care of your  patient.  Please do not hesitate to contact me if there are any questions.      Rashad Nieto MD, FAC, [facility]  Interventional Cardiologist, [facility].     Follow-up in 4  months      This note was dictated with the help of speech recognition software.  There might be un-intended errors and/or substitutions.

## 2023-07-26 ENCOUNTER — OFFICE VISIT (OUTPATIENT)
Dept: UROLOGY | Facility: CLINIC | Age: 88
End: 2023-07-26
Payer: MEDICARE

## 2023-07-26 ENCOUNTER — LAB VISIT (OUTPATIENT)
Dept: LAB | Facility: HOSPITAL | Age: 88
End: 2023-07-26
Attending: UROLOGY
Payer: MEDICARE

## 2023-07-26 VITALS — BODY MASS INDEX: 29.58 KG/M2 | WEIGHT: 194.56 LBS

## 2023-07-26 DIAGNOSIS — N20.0 KIDNEY STONES: ICD-10-CM

## 2023-07-26 DIAGNOSIS — C61 PROSTATE CANCER: Primary | ICD-10-CM

## 2023-07-26 DIAGNOSIS — R35.1 NOCTURIA: ICD-10-CM

## 2023-07-26 DIAGNOSIS — C61 PROSTATE CANCER: ICD-10-CM

## 2023-07-26 PROCEDURE — 1126F AMNT PAIN NOTED NONE PRSNT: CPT | Mod: CPTII,S$GLB,, | Performed by: UROLOGY

## 2023-07-26 PROCEDURE — 99999 PR PBB SHADOW E&M-EST. PATIENT-LVL III: CPT | Mod: PBBFAC,,, | Performed by: UROLOGY

## 2023-07-26 PROCEDURE — 1101F PT FALLS ASSESS-DOCD LE1/YR: CPT | Mod: CPTII,S$GLB,, | Performed by: UROLOGY

## 2023-07-26 PROCEDURE — 1159F MED LIST DOCD IN RCRD: CPT | Mod: CPTII,S$GLB,, | Performed by: UROLOGY

## 2023-07-26 PROCEDURE — 1160F RVW MEDS BY RX/DR IN RCRD: CPT | Mod: CPTII,S$GLB,, | Performed by: UROLOGY

## 2023-07-26 PROCEDURE — 99999 PR PBB SHADOW E&M-EST. PATIENT-LVL III: ICD-10-PCS | Mod: PBBFAC,,, | Performed by: UROLOGY

## 2023-07-26 PROCEDURE — 3288F PR FALLS RISK ASSESSMENT DOCUMENTED: ICD-10-PCS | Mod: CPTII,S$GLB,, | Performed by: UROLOGY

## 2023-07-26 PROCEDURE — 3288F FALL RISK ASSESSMENT DOCD: CPT | Mod: CPTII,S$GLB,, | Performed by: UROLOGY

## 2023-07-26 PROCEDURE — 1159F PR MEDICATION LIST DOCUMENTED IN MEDICAL RECORD: ICD-10-PCS | Mod: CPTII,S$GLB,, | Performed by: UROLOGY

## 2023-07-26 PROCEDURE — 99213 PR OFFICE/OUTPT VISIT, EST, LEVL III, 20-29 MIN: ICD-10-PCS | Mod: S$GLB,,, | Performed by: UROLOGY

## 2023-07-26 PROCEDURE — 1101F PR PT FALLS ASSESS DOC 0-1 FALLS W/OUT INJ PAST YR: ICD-10-PCS | Mod: CPTII,S$GLB,, | Performed by: UROLOGY

## 2023-07-26 PROCEDURE — 84153 ASSAY OF PSA TOTAL: CPT | Performed by: UROLOGY

## 2023-07-26 PROCEDURE — 99213 OFFICE O/P EST LOW 20 MIN: CPT | Mod: S$GLB,,, | Performed by: UROLOGY

## 2023-07-26 PROCEDURE — 36415 COLL VENOUS BLD VENIPUNCTURE: CPT | Performed by: UROLOGY

## 2023-07-26 PROCEDURE — 1126F PR PAIN SEVERITY QUANTIFIED, NO PAIN PRESENT: ICD-10-PCS | Mod: CPTII,S$GLB,, | Performed by: UROLOGY

## 2023-07-26 PROCEDURE — 1160F PR REVIEW ALL MEDS BY PRESCRIBER/CLIN PHARMACIST DOCUMENTED: ICD-10-PCS | Mod: CPTII,S$GLB,, | Performed by: UROLOGY

## 2023-07-26 NOTE — PROGRESS NOTES
Subjective:       Patient ID: Juan Martínez Jr. is a 87 y.o. male The patient's last visit with me was on 7/28/2020.     Chief Complaint:   Chief Complaint   Patient presents with    Follow-up       History of Present Illness  Prostate Cancer  He underwent brachytherapy in 2003 by Dr. Brown.  He voids with a good stream.  He has nocturia x 1.  He denies hematuria, dysuria, pain, weight loss, or UTI.  He has ED but does not want treatment.    Kidney Stone  He went to [facility] since his last visit with kidney stones.  They have been removed.  He is back here with an ultrasound.    ACTIVE MEDICAL ISSUES:  Patient Active Problem List   Diagnosis    Essential hypertension, benign    Type 2 diabetes mellitus    Hyperlipidemia    Body mass index 28.0-28.9, adult    History of prostate cancer    Orthostatic hypotension    Diabetic ulcer of toe of left foot associated with type 2 diabetes mellitus, with fat layer exposed    SCC (squamous cell carcinoma)    Coronary artery disease involving native coronary artery of native heart without angina pectoris    Unstable angina    Palpitations    Elevated troponin    NSTEMI (non-ST elevated myocardial infarction)    Hip fracture    Aortic root dilatation    Acute blood loss anemia    Diaphoresis       ALLERGIES AND MEDICATIONS: updated and reviewed.  Review of patient's allergies indicates:  No Known Allergies  Current Outpatient Medications   Medication Sig    amLODIPine (NORVASC) 10 MG tablet Take 1 tablet (10 mg total) by mouth once daily.    aspirin (ECOTRIN) 81 MG EC tablet Take 1 tablet (81 mg total) by mouth once daily.    atorvastatin (LIPITOR) 40 MG tablet Take 1 tablet (40 mg total) by mouth once daily.    blood-glucose meter Misc by Misc.(Non-Drug; Combo Route) route    clopidogreL (PLAVIX) 75 mg tablet Take 1 tablet (75 mg total) by mouth once daily.    furosemide (LASIX) 20 MG tablet Take 1 tablet (20 mg total) by mouth as needed (use as needed for fluid buildup).     hydroCHLOROthiazide (MICROZIDE) 12.5 mg capsule Take 2 capsules (25 mg total) by mouth once daily.    isosorbide mononitrate (IMDUR) 60 MG 24 hr tablet Take 1 tablet (60 mg total) by mouth once daily.    LANCETS & BLOOD GLUCOSE STRIPS MISC by Misc.(Non-Drug; Combo Route) route Check blood sugars  Twice a day.    metformin (GLUCOPHAGE) 1000 MG tablet Take 1 tablet (1,000 mg total) by mouth 2 (two) times daily with meals.    metoprolol succinate (TOPROL-XL) 50 MG 24 hr tablet Take 1 tablet (50 mg total) by mouth once daily.    simethicone (MYLICON) 125 MG chewable tablet Take 1 tablet (125 mg total) by mouth every 6 (six) hours as needed for Flatulence.    thiamine 250 MG tablet Take 250 mg by mouth once daily.    albuterol (PROVENTIL/VENTOLIN HFA) 90 mcg/actuation inhaler Inhale 2 puffs into the lungs.    aluminum-magnesium hydroxide-simethicone (MAALOX ADVANCED) 200-200-20 mg/5 mL Susp Take 30 mLs by mouth 4 (four) times daily before meals and nightly. for 7 days    escitalopram oxalate (LEXAPRO) 10 MG tablet Take 10 mg by mouth.    nitroGLYCERIN (NITROSTAT) 0.4 MG SL tablet Place 1 tablet (0.4 mg total) under the tongue every 5 (five) minutes as needed for Chest pain.     No current facility-administered medications for this visit.       Review of Systems   Constitutional:  Negative for activity change, fatigue, fever and unexpected weight change.   HENT:  Negative for congestion.    Eyes:  Negative for redness.   Respiratory:  Negative for chest tightness and shortness of breath.    Cardiovascular:  Negative for chest pain and leg swelling.   Gastrointestinal:  Negative for abdominal pain, constipation, diarrhea, nausea and vomiting.   Genitourinary:  Negative for dysuria, flank pain, frequency, hematuria, penile pain, penile swelling, scrotal swelling, testicular pain and urgency.   Musculoskeletal:  Negative for arthralgias and back pain.   Neurological:  Negative for dizziness and light-headedness.    Psychiatric/Behavioral:  Negative for behavioral problems and confusion. The patient is not nervous/anxious.    All other systems reviewed and are negative.    Objective:      Vitals:    07/26/23 1359   Weight: 88.3 kg (194 lb 8.9 oz)       Physical Exam  Vitals and nursing note reviewed.   Constitutional:       Appearance: He is well-developed.   HENT:      Head: Normocephalic.   Eyes:      Conjunctiva/sclera: Conjunctivae normal.   Neck:      Thyroid: No thyromegaly.      Trachea: No tracheal deviation.   Cardiovascular:      Rate and Rhythm: Normal rate.      Heart sounds: Normal heart sounds.   Pulmonary:      Effort: Pulmonary effort is normal. No respiratory distress.      Breath sounds: Normal breath sounds. No wheezing.   Abdominal:      General: Bowel sounds are normal.      Palpations: Abdomen is soft.      Tenderness: There is no abdominal tenderness. There is no rebound.      Hernia: No hernia is present.   Musculoskeletal:         General: No tenderness. Normal range of motion.      Cervical back: Normal range of motion and neck supple.   Lymphadenopathy:      Cervical: No cervical adenopathy.   Skin:     General: Skin is warm and dry.      Findings: No erythema or rash.   Neurological:      Mental Status: He is alert and oriented to person, place, and time.   Psychiatric:         Behavior: Behavior normal.         Thought Content: Thought content normal.         Judgment: Judgment normal.       Urine dipstick shows not done.  Micro exam: not done.     CT Abdomen Pelvis With Contrast  Order: [id number]  Status: Final result     Visible to patient: No (inaccessible in Patient Portal)     Next appt: None     0 Result Notes  Details    Reading Physician Reading Date Result Priority   Kelsi Mccollum MD  [phone] 12/12/2022 STAT     Narrative & Impression  EXAMINATION:  CT ABDOMEN PELVIS WITH CONTRAST     CLINICAL HISTORY:  Bowel obstruction suspected;     TECHNIQUE:  Low dose axial images,  sagittal and coronal reformations were obtained from the lung bases to the pubic symphysis following the IV administration of 85 mL of Omnipaque 350 .  Oral contrast was not given. Axial and coronal images reformatted.     COMPARISON:  None     FINDINGS:  Lung bases are clear.     No pericardial effusion, coronary artery calcification.     No liver lesions.  The gallbladder is present, no radiopaque stones seen within.     The stomach, bilateral adrenal glands, and spleen appear normal.     There is a hypoattenuating lesion within the body of the pancreas measuring 0.9 cm (02:50) in retrospect unchanged from CT 01/12/2022 most suggestive of a small benign lipoma.     The right kidney enhances normally, no hydronephrosis. Three subcentimeter hypoattenuating lesions of the right kidney, too small to characterize possibly small cysts.  Small stones forming at the lower pole of the right kidney, the largest 3 mm.  The right ureter appears normal.     The left kidney enhances normally.  No hydronephrosis.  1.5 cm cyst noted at the midpole.  Two smaller hypoattenuating lesions noted at the lower pole of the left kidney, too small to characterize, favored to represent benign cysts.  Tiny punctate calculi noted at the lower pole of the left kidney.  The left ureter appears normal.     The bladder appears normal.  Prostatic seeds noted.     Mild stool retention, no inflammatory changes of the bowel seen, no bowel dilation.  Few scattered colonic diverticula.  The appendix is normal.     The abdominal aorta tapers normally, there is moderate circumferential atherosclerotic plaque of the abdominal aorta and its branches.     No ascites, no free air.     The inguinal regions appear normal.     Postoperative changes of the right hip joint. No worrisome osseous lesion seen.     Impression:     No evidence of bowel obstruction.     Bilateral kidney, nonobstructive forming calculi.     Bilateral small hypoattenuating lesions of the  kidneys, too small to be characterized suggestive of small cysts.     Atherosclerotic plaque of the abdominal aorta and its branches.     Prostatic seed implants.     Postoperative changes of the right hip joint.        Electronically signed by: Kelsi Mccollum MD  Date:                                            12/12/2022  Time:                                           11:34         Assessment:       1. Prostate cancer    2. Kidney stones    3. Nocturia            Plan:       1. Prostate cancer  PSA now  PSA 1 year  - Prostate Specific Antigen, Diagnostic; Future  - Prostate Specific Antigen, Diagnostic; Future    2. Kidney stones  monitor  - US Retroperitoneal Complete; Future    3. Nocturia  Limit evening fluids  - US Retroperitoneal Complete; Future             Follow up in about 1 year (around 7/26/2024) for Follow up Established, Review PSA, Review X-ray.

## 2023-07-27 LAB — COMPLEXED PSA SERPL-MCNC: 0.02 NG/ML (ref 0–4)

## 2023-08-17 ENCOUNTER — LAB VISIT (OUTPATIENT)
Dept: LAB | Facility: HOSPITAL | Age: 88
End: 2023-08-17
Attending: INTERNAL MEDICINE
Payer: MEDICARE

## 2023-08-17 DIAGNOSIS — C61 MALIGNANT NEOPLASM OF PROSTATE: Primary | ICD-10-CM

## 2023-08-17 LAB
BASOPHILS # BLD AUTO: 0.04 K/UL (ref 0–0.2)
BASOPHILS NFR BLD: 0.6 % (ref 0–1.9)
DIFFERENTIAL METHOD: ABNORMAL
EOSINOPHIL # BLD AUTO: 0.2 K/UL (ref 0–0.5)
EOSINOPHIL NFR BLD: 2.9 % (ref 0–8)
ERYTHROCYTE [DISTWIDTH] IN BLOOD BY AUTOMATED COUNT: 15.5 % (ref 11.5–14.5)
HCT VFR BLD AUTO: 34.4 % (ref 40–54)
HGB BLD-MCNC: 11.2 G/DL (ref 14–18)
IMM GRANULOCYTES # BLD AUTO: 0.02 K/UL (ref 0–0.04)
IMM GRANULOCYTES NFR BLD AUTO: 0.3 % (ref 0–0.5)
LYMPHOCYTES # BLD AUTO: 1.2 K/UL (ref 1–4.8)
LYMPHOCYTES NFR BLD: 16.8 % (ref 18–48)
MCH RBC QN AUTO: 30.6 PG (ref 27–31)
MCHC RBC AUTO-ENTMCNC: 32.6 G/DL (ref 32–36)
MCV RBC AUTO: 94 FL (ref 82–98)
MONOCYTES # BLD AUTO: 0.6 K/UL (ref 0.3–1)
MONOCYTES NFR BLD: 8 % (ref 4–15)
NEUTROPHILS # BLD AUTO: 5.1 K/UL (ref 1.8–7.7)
NEUTROPHILS NFR BLD: 71.4 % (ref 38–73)
NRBC BLD-RTO: 0 /100 WBC
PLATELET # BLD AUTO: 253 K/UL (ref 150–450)
PMV BLD AUTO: 9.9 FL (ref 9.2–12.9)
RBC # BLD AUTO: 3.66 M/UL (ref 4.6–6.2)
WBC # BLD AUTO: 7.14 K/UL (ref 3.9–12.7)

## 2023-08-17 PROCEDURE — 36415 COLL VENOUS BLD VENIPUNCTURE: CPT | Performed by: INTERNAL MEDICINE

## 2023-08-17 PROCEDURE — 85025 COMPLETE CBC W/AUTO DIFF WBC: CPT | Performed by: INTERNAL MEDICINE

## 2023-08-31 ENCOUNTER — LAB VISIT (OUTPATIENT)
Dept: LAB | Facility: HOSPITAL | Age: 88
End: 2023-08-31
Attending: NURSE PRACTITIONER
Payer: MEDICARE

## 2023-08-31 DIAGNOSIS — R19.5 DARK STOOLS: Primary | ICD-10-CM

## 2023-08-31 LAB
ERYTHROCYTE [DISTWIDTH] IN BLOOD BY AUTOMATED COUNT: 15.6 % (ref 11.5–14.5)
HCT VFR BLD AUTO: 35.7 % (ref 40–54)
HGB BLD-MCNC: 11.5 G/DL (ref 14–18)
MCH RBC QN AUTO: 30.2 PG (ref 27–31)
MCHC RBC AUTO-ENTMCNC: 32.2 G/DL (ref 32–36)
MCV RBC AUTO: 94 FL (ref 82–98)
PLATELET # BLD AUTO: 240 K/UL (ref 150–450)
PMV BLD AUTO: 9.8 FL (ref 9.2–12.9)
RBC # BLD AUTO: 3.81 M/UL (ref 4.6–6.2)
WBC # BLD AUTO: 6.77 K/UL (ref 3.9–12.7)

## 2023-08-31 PROCEDURE — 36415 COLL VENOUS BLD VENIPUNCTURE: CPT | Performed by: NURSE PRACTITIONER

## 2023-08-31 PROCEDURE — 85027 COMPLETE CBC AUTOMATED: CPT | Performed by: NURSE PRACTITIONER

## 2023-09-20 ENCOUNTER — LAB VISIT (OUTPATIENT)
Dept: LAB | Facility: HOSPITAL | Age: 88
End: 2023-09-20
Attending: INTERNAL MEDICINE
Payer: MEDICARE

## 2023-09-20 DIAGNOSIS — R19.5 ABNORMAL FECES: Primary | ICD-10-CM

## 2023-09-20 LAB
BASOPHILS # BLD AUTO: 0.07 K/UL (ref 0–0.2)
BASOPHILS NFR BLD: 0.9 % (ref 0–1.9)
DIFFERENTIAL METHOD: ABNORMAL
EOSINOPHIL # BLD AUTO: 0.3 K/UL (ref 0–0.5)
EOSINOPHIL NFR BLD: 3.2 % (ref 0–8)
ERYTHROCYTE [DISTWIDTH] IN BLOOD BY AUTOMATED COUNT: 15.1 % (ref 11.5–14.5)
HCT VFR BLD AUTO: 33.7 % (ref 40–54)
HGB BLD-MCNC: 11.1 G/DL (ref 14–18)
IMM GRANULOCYTES # BLD AUTO: 0.04 K/UL (ref 0–0.04)
IMM GRANULOCYTES NFR BLD AUTO: 0.5 % (ref 0–0.5)
LYMPHOCYTES # BLD AUTO: 1.7 K/UL (ref 1–4.8)
LYMPHOCYTES NFR BLD: 21.6 % (ref 18–48)
MCH RBC QN AUTO: 30.2 PG (ref 27–31)
MCHC RBC AUTO-ENTMCNC: 32.9 G/DL (ref 32–36)
MCV RBC AUTO: 92 FL (ref 82–98)
MONOCYTES # BLD AUTO: 0.7 K/UL (ref 0.3–1)
MONOCYTES NFR BLD: 8.8 % (ref 4–15)
NEUTROPHILS # BLD AUTO: 5.1 K/UL (ref 1.8–7.7)
NEUTROPHILS NFR BLD: 65 % (ref 38–73)
NRBC BLD-RTO: 0 /100 WBC
PLATELET # BLD AUTO: 267 K/UL (ref 150–450)
PMV BLD AUTO: 9.9 FL (ref 9.2–12.9)
RBC # BLD AUTO: 3.67 M/UL (ref 4.6–6.2)
WBC # BLD AUTO: 7.84 K/UL (ref 3.9–12.7)

## 2023-09-20 PROCEDURE — 36415 COLL VENOUS BLD VENIPUNCTURE: CPT | Performed by: INTERNAL MEDICINE

## 2023-09-20 PROCEDURE — 85025 COMPLETE CBC W/AUTO DIFF WBC: CPT | Performed by: INTERNAL MEDICINE

## 2023-10-29 ENCOUNTER — HOSPITAL ENCOUNTER (EMERGENCY)
Facility: HOSPITAL | Age: 88
Discharge: HOME | End: 2023-10-29
Attending: EMERGENCY MEDICINE
Payer: MEDICARE

## 2023-10-29 VITALS
HEART RATE: 57 BPM | OXYGEN SATURATION: 94 % | RESPIRATION RATE: 18 BRPM | SYSTOLIC BLOOD PRESSURE: 141 MMHG | TEMPERATURE: 98 F | BODY MASS INDEX: 28.19 KG/M2 | HEIGHT: 68 IN | WEIGHT: 186 LBS | DIASTOLIC BLOOD PRESSURE: 72 MMHG

## 2023-10-29 DIAGNOSIS — M25.519 SHOULDER PAIN: ICD-10-CM

## 2023-10-29 DIAGNOSIS — S42.291A HUMERAL HEAD FRACTURE, RIGHT, CLOSED, INITIAL ENCOUNTER: Primary | ICD-10-CM

## 2023-10-29 DIAGNOSIS — T07.XXXA MULTIPLE ABRASIONS: ICD-10-CM

## 2023-10-29 PROCEDURE — 99284 EMERGENCY DEPT VISIT MOD MDM: CPT | Mod: 25

## 2023-10-29 PROCEDURE — 96374 THER/PROPH/DIAG INJ IV PUSH: CPT

## 2023-10-29 PROCEDURE — 63600175 PHARM REV CODE 636 W HCPCS: Performed by: EMERGENCY MEDICINE

## 2023-10-29 PROCEDURE — 96375 TX/PRO/DX INJ NEW DRUG ADDON: CPT

## 2023-10-29 PROCEDURE — 25000003 PHARM REV CODE 250: Performed by: EMERGENCY MEDICINE

## 2023-10-29 PROCEDURE — G0008 ADMIN INFLUENZA VIRUS VAC: HCPCS | Mod: PBBFAC

## 2023-10-29 RX ORDER — FAMOTIDINE 10 MG/ML
20 INJECTION INTRAVENOUS
Status: COMPLETED | OUTPATIENT
Start: 2023-10-29 | End: 2023-10-29

## 2023-10-29 RX ORDER — METOCLOPRAMIDE HYDROCHLORIDE 5 MG/ML
5 INJECTION INTRAMUSCULAR; INTRAVENOUS
Status: COMPLETED | OUTPATIENT
Start: 2023-10-29 | End: 2023-10-29

## 2023-10-29 RX ORDER — MORPHINE SULFATE 4 MG/ML
4 INJECTION, SOLUTION INTRAMUSCULAR; INTRAVENOUS
Status: COMPLETED | OUTPATIENT
Start: 2023-10-29 | End: 2023-10-29

## 2023-10-29 RX ORDER — OXYCODONE AND ACETAMINOPHEN 5; 325 MG/1; MG/1
1 TABLET ORAL
Status: COMPLETED | OUTPATIENT
Start: 2023-10-29 | End: 2023-10-29

## 2023-10-29 RX ORDER — OXYCODONE HYDROCHLORIDE 5 MG/1
5 TABLET ORAL EVERY 4 HOURS PRN
Qty: 18 TABLET | Refills: 0 | Status: SHIPPED | OUTPATIENT
Start: 2023-10-29

## 2023-10-29 RX ORDER — SODIUM CITRATE AND CITRIC ACID MONOHYDRATE 334; 500 MG/5ML; MG/5ML
30 SOLUTION ORAL ONCE
Status: COMPLETED | OUTPATIENT
Start: 2023-10-29 | End: 2023-10-29

## 2023-10-29 RX ORDER — OXYCODONE HYDROCHLORIDE 5 MG/1
5 TABLET ORAL EVERY 4 HOURS PRN
Qty: 18 TABLET | Refills: 0 | Status: SHIPPED | OUTPATIENT
Start: 2023-10-29 | End: 2023-10-29 | Stop reason: SDUPTHER

## 2023-10-29 RX ADMIN — MORPHINE SULFATE 4 MG: 4 INJECTION, SOLUTION INTRAMUSCULAR; INTRAVENOUS at 12:10

## 2023-10-29 RX ADMIN — OXYCODONE AND ACETAMINOPHEN 1 TABLET: 5; 325 TABLET ORAL at 03:10

## 2023-10-29 RX ADMIN — FAMOTIDINE 20 MG: 10 INJECTION INTRAVENOUS at 12:10

## 2023-10-29 RX ADMIN — BACITRACIN ZINC, NEOMYCIN, POLYMYXIN B 1 EACH: 400; 3.5; 5 OINTMENT TOPICAL at 03:10

## 2023-10-29 RX ADMIN — SODIUM CITRATE AND CITRIC ACID MONOHYDRATE 30 ML: 500; 334 SOLUTION ORAL at 12:10

## 2023-10-29 RX ADMIN — METOCLOPRAMIDE 5 MG: 5 INJECTION, SOLUTION INTRAMUSCULAR; INTRAVENOUS at 12:10

## 2023-10-29 NOTE — DISCHARGE INSTRUCTIONS
OTC tylenol up to 1,000 mg every 6 hours as needed for pain. Oxycodone 5 mg Oral every 4 to 8 hours as needed for breakthrough pain.

## 2023-10-29 NOTE — ED PROVIDER NOTES
Encounter Date: 10/29/2023    SCRIBE #1 NOTE: I, Torongoc Solo, am scribing for, and in the presence of,  Maninder Oseguera MD. I have scribed the following portions of the note - Other sections scribed: HPI, ROS, PE, MDM.       History     Chief Complaint   Patient presents with    Fall     Ems called to 89yo male that tripped in his yard at home and fell onto a car. No LOC.deformity noted to his right shoulder, possibly dislocated. Hit his face on the car and had some bleeding in his mouth. Takes plavix. Denied any pain unless he moves his arm.     Juan Martínez Jr. is a 88 y.o. male, with a PMHx of NSTEMI, HTN, HLD, CAD, DM type II, who presents to the ED for evaluation after fall that occurred PTA. Patient reports he was at a junkyard tripped and fell into a car, hitting, his lip, left shoulder, and right leg. Patient reports right shoulder pain and stiffness and right knee pain. Patient is not able to bear weight on his right knee. No other exacerbating or alleviating factors. Denies abdominal pain, hip pain, pelvic pain, or other associated symptoms. PSHx of hip replacement and knee replacement (right). Patient last ate 2 hours ago. Patient is on anticoagulants (Plavix).     The history is provided by the patient. No  was used.     Review of patient's allergies indicates:  No Known Allergies  Past Medical History:   Diagnosis Date    Coronary artery disease     Diabetes mellitus     High cholesterol     Hypertension     NSTEMI (non-ST elevated myocardial infarction) 07/25/2021    Prostate cancer      Past Surgical History:   Procedure Laterality Date    APPLICATION OF FULL-THICKNESS SKIN GRAFT (FTSG) TO UPPER EXTREMITY Right 11/15/2018    Procedure: APPLICATION, GRAFT, SKIN, FULL-THICKNESS, TO UPPER EXTREMITY VS STSG WITH FROZEN SECTIONS;  Surgeon: Alan Arzola MD;  Location: [facility] OR;  Service: Plastics;  Laterality: Right;    EXCISION OF SQUAMOUS CELL CARCINOMA Right  11/15/2018    Procedure: EXCISION, CARCINOMA, SQUAMOUS CELL @ RIGHT HAND;  Surgeon: Alan Arzola MD;  Location: [facility] OR;  Service: Plastics;  Laterality: Right;  RN PREOP 2018--NEED H/P    HEMIARTHROPLASTY OF HIP Right 2022    Procedure: HEMIARTHROPLASTY, HIP;  Surgeon: Edis Villanueva MD;  Location: [facility] OR;  Service: Orthopedics;  Laterality: Right;    JOINT REPLACEMENT      LEFT HEART CATHETERIZATION Left 2019    Procedure: Left heart cath;  Surgeon: Rashad Nieto MD;  Location: [facility] CATH LAB;  Service: Cardiology;  Laterality: Left;    LEFT HEART CATHETERIZATION Left 2021    Procedure: Left heart cath, radial;  Surgeon: Rashad Nieto MD;  Location: [facility] CATH LAB;  Service: Cardiology;  Laterality: Left;    TONSILLECTOMY      TOTAL KNEE ARTHROPLASTY Right      Family History   Problem Relation Age of Onset    Heart disease Mother     Stroke Father      Social History     Tobacco Use    Smoking status: Former     Current packs/day: 0.00     Types: Cigarettes     Quit date: 2016     Years since quittin.2    Smokeless tobacco: Never    Tobacco comments:     2016   Substance Use Topics    Alcohol use: No    Drug use: No     Review of Systems   Constitutional:  Negative for chills and fever.   HENT:  Negative for congestion, rhinorrhea and sore throat.    Eyes:  Negative for visual disturbance.   Respiratory:  Negative for cough and shortness of breath.    Cardiovascular:  Negative for chest pain.   Gastrointestinal:  Negative for abdominal pain, diarrhea, nausea and vomiting.   Genitourinary:  Negative for dysuria, frequency and hematuria.   Musculoskeletal:  Positive for arthralgias and myalgias. Negative for back pain.   Skin:  Positive for wound. Negative for rash.   Neurological:  Negative for dizziness, weakness and headaches.       Physical Exam     Initial Vitals [10/29/23 1145]   BP Pulse Resp Temp SpO2   129/62 (!) 56 18 98.3 °F (36.8 °C) (!) 94 %      MAP        --         Physical Exam    Nursing note and vitals reviewed.  Constitutional: He appears well-developed and well-nourished.   HENT:   Head: Normocephalic.   Mouth/Throat: He has dentures.   Upper lip contusion. No other obvious deformities of the mouth. No other head or facial trauma.   Eyes: EOM are normal. Pupils are equal, round, and reactive to light.   Neck: Neck supple. No thyromegaly present. No JVD present.   No midline neck tenderness with full ROM.    Normal range of motion.  Cardiovascular:  Normal rate and regular rhythm.     Exam reveals no gallop and no friction rub.       No murmur heard.  Pulmonary/Chest: Breath sounds normal. No respiratory distress.   Abdominal: Abdomen is soft. Bowel sounds are normal. There is no abdominal tenderness.   Musculoskeletal:         General: No tenderness or edema. Normal range of motion.      Cervical back: Normal range of motion and neck supple.      Comments: Right shoulder deformity with normal pulse and  strength to right arm. Full ROM of hips, knees, ankles.     Neurological: He is alert and oriented to person, place, and time. He has normal strength. GCS score is 15. GCS eye subscore is 4. GCS verbal subscore is 5. GCS motor subscore is 6.   Skin: Skin is warm. Capillary refill takes less than 2 seconds.   Skin tear to right calf.   Psychiatric: He has a normal mood and affect. His behavior is normal. Thought content normal.         ED Course   Procedures  Labs Reviewed - No data to display       Imaging Results              X-Ray Shoulder Complete 2 View Right (Final result)  Result time 10/29/23 13:35:29      Final result by Fercho Boyle MD (10/29/23 13:35:29)                   Impression:      1. Impacted subcapital fracture of the right proximal humerus, fracture planes extend to involve the superolateral aspect of the right humeral head.  No dislocation.      Electronically signed by: Fercho Boyle MD  Date:    10/29/2023  Time:    13:35                Narrative:    EXAMINATION:  XR SHOULDER COMPLETE 2 OR MORE VIEWS RIGHT    CLINICAL HISTORY:  Pain in unspecified shoulder    TECHNIQUE:  Two or three views of the right shoulder were performed.    COMPARISON:  None    FINDINGS:  Three views right shoulder.    The right humeral head maintains appropriate relationship with the glenoid noting glenohumeral degenerative change.  There is degenerative change about the superolateral aspect of the right humeral head.  There is fracture at the level of the surgical neck with impaction.  The acromioclavicular joint is intact.  No acute displaced right rib fracture.  The right lung zones are grossly clear.  There is osteopenia.                                       Medications   Tdap (BOOSTRIX) vaccine injection 0.5 mL (0.5 mLs Intramuscular Not Given 10/29/23 1247)   neomycin-bacitracnZn-polymyxnB packet (has no administration in time range)   morphine injection 4 mg (4 mg Intravenous Given 10/29/23 1217)   metoclopramide HCl injection 5 mg (5 mg Intravenous Given 10/29/23 1218)   famotidine (PF) injection 20 mg (20 mg Intravenous Given 10/29/23 1219)   sodium citrate-citric acid 500-334 mg/5 ml solution 30 mL (30 mLs Oral Given 10/29/23 1226)     Medical Decision Making  This is an emergent evaluation of a 88 y.o. male who presents for evaluation after fall. The patient was seen and examined. The history and physical exam was obtained. The nursing notes and vital signs were reviewed. Secondary to symptoms and examination findings, I ordered xray shoulder. Will treat with Tdap, famotidine, metoclopramide, morphine, sodium citrate solution.      Amount and/or Complexity of Data Reviewed  Independent Historian: EMS  Radiology: ordered.    Risk  OTC drugs.  Prescription drug management.    Communicated with Orthopedics who recommends sling and follow up in clinic.         Scribe Attestation:   Scribe #1: I performed the above scribed service and the documentation  accurately describes the services I performed. I attest to the accuracy of the note.                        Clinical Impression:   Final diagnoses:  [M25.519] Shoulder pain  [S42.291A] Humeral head fracture, right, closed, initial encounter (Primary)  [T07.XXXA] Multiple abrasions        ED Disposition Condition    Discharge Stable          ED Prescriptions       Medication Sig Dispense Start Date End Date Auth. Provider    oxyCODONE (ROXICODONE) 5 MG immediate release tablet Take 1 tablet (5 mg total) by mouth every 4 (four) hours as needed (breakthrough pain.). 18 tablet 10/29/2023 -- Maninder Oseguera MD          Follow-up Information       Follow up With Specialties Details Why Contact Info    Lissy Gavin MD Internal Medicine Call in 1 day for close follow up. [address] [place] [id number]  [phone]      Edis Villanueva MD Orthopedic Surgery  this is the orthopedic that perfromed one of your prior surgeries. [address] [facility]  [place] [id number]  [phone]      Felicita Blake MD Orthopedic Surgery  this is an [facility]. [address]  [place] [id number]  [phone]      [facility] Emergency Dept Emergency Medicine  As needed [address] [facility] [address] [phone]            I, Maninder Oseguera, personally performed the services described in this documentation. All medical record entries made by the scribe were at my direction and in my presence. I have reviewed the chart and agree that the record reflects my personal performance and is accurate and complete.      Maninder Oseguera MD  10/29/23 8276

## 2023-10-29 NOTE — ED TRIAGE NOTES
Juan JOHNSON Carlosmariya , a 88 y.o. male presents to the ED via EMS for evaluation s/p fall that occurred PTA. Patient reports he was at a junkyard tripped and fell into a car, hitting, his lip, right shoulder, and right leg. Patient reports right shoulder pain and stiffness and right knee pain. Patient is not able to bear weight on his right knee. No other exacerbating or alleviating factors. Denies chest pain, SOB, abdominal pain, hip pain, pelvic pain, or other associated symptoms. PSHx of hip replacement and knee replacement (right). Patient last ate 2 hours ago. Patient is on anticoagulants (Plavix). Tetanus UTD. Pt is AAOX4.     Triage note:  Chief Complaint   Patient presents with    Fall     Ems called to 89yo male that tripped in his yard at home and fell onto a car. No LOC.deformity noted to his right shoulder, possibly dislocated. Hit his face on the car and had some bleeding in his mouth. Takes plavix. Denied any pain unless he moves his arm.     Review of patient's allergies indicates:  No Known Allergies  Past Medical History:   Diagnosis Date    Coronary artery disease     Diabetes mellitus     High cholesterol     Hypertension     NSTEMI (non-ST elevated myocardial infarction) 07/25/2021    Prostate cancer

## 2023-10-30 RX ORDER — FUROSEMIDE 20 MG/1
20 TABLET ORAL
Qty: 90 TABLET | Refills: 3 | OUTPATIENT
Start: 2023-10-30 | End: 2024-01-08 | Stop reason: SDUPTHER

## 2023-10-30 NOTE — TELEPHONE ENCOUNTER
Patient requesting refill of furosemide 20mg tablets. Please advise, last seen 6/12/2023, follow up 12/18. christopher

## 2023-12-01 ENCOUNTER — TELEPHONE (OUTPATIENT)
Dept: CARDIOLOGY | Facility: CLINIC | Age: 88
End: 2023-12-01
Payer: MEDICARE

## 2023-12-01 NOTE — TELEPHONE ENCOUNTER
Pt called to see when his medication refills would be ready. Let him know that Dr. Nieto is still working on them. Pt asked for a call back when I find out when they'll be ready. Told him I'd sned a message to the provider.

## 2023-12-04 RX ORDER — ISOSORBIDE MONONITRATE 60 MG/1
60 TABLET, EXTENDED RELEASE ORAL DAILY
Qty: 90 TABLET | Refills: 3 | Status: SHIPPED | OUTPATIENT
Start: 2023-12-04

## 2023-12-04 RX ORDER — METOPROLOL SUCCINATE 50 MG/1
50 TABLET, EXTENDED RELEASE ORAL DAILY
Qty: 90 TABLET | Refills: 3 | Status: SHIPPED | OUTPATIENT
Start: 2023-12-04

## 2023-12-04 RX ORDER — ISOSORBIDE MONONITRATE 60 MG/1
60 TABLET, EXTENDED RELEASE ORAL DAILY
Qty: 90 TABLET | Refills: 3 | OUTPATIENT
Start: 2023-12-04 | End: 2023-12-04 | Stop reason: SDUPTHER

## 2023-12-04 RX ORDER — ATORVASTATIN CALCIUM 40 MG/1
40 TABLET, FILM COATED ORAL DAILY
Qty: 90 TABLET | Refills: 3 | OUTPATIENT
Start: 2023-12-04 | End: 2023-12-04 | Stop reason: SDUPTHER

## 2023-12-04 RX ORDER — ATORVASTATIN CALCIUM 40 MG/1
40 TABLET, FILM COATED ORAL DAILY
Qty: 90 TABLET | Refills: 3 | Status: SHIPPED | OUTPATIENT
Start: 2023-12-04 | End: 2024-12-03

## 2023-12-04 RX ORDER — METOPROLOL SUCCINATE 50 MG/1
50 TABLET, EXTENDED RELEASE ORAL DAILY
Qty: 90 TABLET | Refills: 3 | OUTPATIENT
Start: 2023-12-04 | End: 2023-12-04 | Stop reason: SDUPTHER

## 2023-12-04 RX ORDER — AMLODIPINE BESYLATE 10 MG/1
10 TABLET ORAL DAILY
Qty: 90 TABLET | Refills: 3 | Status: SHIPPED | OUTPATIENT
Start: 2023-12-04

## 2023-12-04 RX ORDER — AMLODIPINE BESYLATE 10 MG/1
10 TABLET ORAL DAILY
Qty: 90 TABLET | Refills: 3 | OUTPATIENT
Start: 2023-12-04 | End: 2023-12-04 | Stop reason: SDUPTHER

## 2023-12-04 NOTE — TELEPHONE ENCOUNTER
Scheduling Department called to let me know that the pt requested his medications from today's visit be sent electronically to [facility] pharmacy. Please advise. Thanks.

## 2023-12-18 ENCOUNTER — OFFICE VISIT (OUTPATIENT)
Dept: CARDIOLOGY | Facility: CLINIC | Age: 88
End: 2023-12-18
Payer: MEDICARE

## 2023-12-18 VITALS
SYSTOLIC BLOOD PRESSURE: 142 MMHG | RESPIRATION RATE: 18 BRPM | DIASTOLIC BLOOD PRESSURE: 60 MMHG | WEIGHT: 184.75 LBS | OXYGEN SATURATION: 95 % | HEIGHT: 68 IN | BODY MASS INDEX: 28 KG/M2 | HEART RATE: 74 BPM

## 2023-12-18 DIAGNOSIS — E78.2 MIXED HYPERLIPIDEMIA: ICD-10-CM

## 2023-12-18 DIAGNOSIS — I25.10 CORONARY ARTERY DISEASE INVOLVING NATIVE CORONARY ARTERY OF NATIVE HEART WITHOUT ANGINA PECTORIS: ICD-10-CM

## 2023-12-18 DIAGNOSIS — I10 ESSENTIAL HYPERTENSION, BENIGN: Primary | ICD-10-CM

## 2023-12-18 DIAGNOSIS — E11.9 TYPE 2 DIABETES MELLITUS WITHOUT COMPLICATION, WITHOUT LONG-TERM CURRENT USE OF INSULIN: ICD-10-CM

## 2023-12-18 DIAGNOSIS — I77.810 AORTIC ROOT DILATATION: ICD-10-CM

## 2023-12-18 DIAGNOSIS — I21.4 NSTEMI (NON-ST ELEVATED MYOCARDIAL INFARCTION): ICD-10-CM

## 2023-12-18 PROCEDURE — 99999 PR PBB SHADOW E&M-EST. PATIENT-LVL IV: CPT | Mod: PBBFAC,,, | Performed by: INTERNAL MEDICINE

## 2023-12-18 PROCEDURE — 99214 PR OFFICE/OUTPT VISIT, EST, LEVL IV, 30-39 MIN: ICD-10-PCS | Mod: S$GLB,,, | Performed by: INTERNAL MEDICINE

## 2023-12-18 PROCEDURE — 93000 EKG 12-LEAD: ICD-10-PCS | Mod: S$GLB,,, | Performed by: INTERNAL MEDICINE

## 2023-12-18 PROCEDURE — 3288F FALL RISK ASSESSMENT DOCD: CPT | Mod: CPTII,S$GLB,, | Performed by: INTERNAL MEDICINE

## 2023-12-18 PROCEDURE — 99999 PR PBB SHADOW E&M-EST. PATIENT-LVL IV: ICD-10-PCS | Mod: PBBFAC,,, | Performed by: INTERNAL MEDICINE

## 2023-12-18 PROCEDURE — 1101F PT FALLS ASSESS-DOCD LE1/YR: CPT | Mod: CPTII,S$GLB,, | Performed by: INTERNAL MEDICINE

## 2023-12-18 PROCEDURE — 3288F PR FALLS RISK ASSESSMENT DOCUMENTED: ICD-10-PCS | Mod: CPTII,S$GLB,, | Performed by: INTERNAL MEDICINE

## 2023-12-18 PROCEDURE — 1101F PR PT FALLS ASSESS DOC 0-1 FALLS W/OUT INJ PAST YR: ICD-10-PCS | Mod: CPTII,S$GLB,, | Performed by: INTERNAL MEDICINE

## 2023-12-18 PROCEDURE — 1159F PR MEDICATION LIST DOCUMENTED IN MEDICAL RECORD: ICD-10-PCS | Mod: CPTII,S$GLB,, | Performed by: INTERNAL MEDICINE

## 2023-12-18 PROCEDURE — 1126F PR PAIN SEVERITY QUANTIFIED, NO PAIN PRESENT: ICD-10-PCS | Mod: CPTII,S$GLB,, | Performed by: INTERNAL MEDICINE

## 2023-12-18 PROCEDURE — 1159F MED LIST DOCD IN RCRD: CPT | Mod: CPTII,S$GLB,, | Performed by: INTERNAL MEDICINE

## 2023-12-18 PROCEDURE — 99214 OFFICE O/P EST MOD 30 MIN: CPT | Mod: S$GLB,,, | Performed by: INTERNAL MEDICINE

## 2023-12-18 PROCEDURE — 93000 ELECTROCARDIOGRAM COMPLETE: CPT | Mod: S$GLB,,, | Performed by: INTERNAL MEDICINE

## 2023-12-18 PROCEDURE — 1126F AMNT PAIN NOTED NONE PRSNT: CPT | Mod: CPTII,S$GLB,, | Performed by: INTERNAL MEDICINE

## 2023-12-18 NOTE — PROGRESS NOTES
CARDIOVASCULAR CONSULTATION    REASON FOR CONSULT:   Juan Martínez Jr. is a 88 y.o. male who presents for follow-up recent hospitalization for a non-STEMI..      HISTORY OF PRESENT ILLNESS:     Notes from August 2019:   Patient is here for follow-up today.  Was recently admitted for hypertensive urgency.  Did not have any chest pains during the hypertensive urgency.  Troponins were found to be mildly elevated.  Was evaluated by Cardiology and Norvasc was restarted.  His blood pressure has been well controlled since then.  Denies any chest pains at rest on exertion, orthopnea, PND, swelling of feet.      Notes from July 2019  Patient is here for follow-up today.  Denies any chest pains at rest on exertion, orthopnea, PND.  Denies any claudication symptoms.  Denies any dyspnea at rest on exertion    Note from clinic visit in May 2019:  Patient is 83-year-old man pleasant man with a past medical history significant for diabetes, dyslipidemia, hypertension, coronary artery disease who recently presented to the hospital with a non-STEMI.  Coronary angiogram performed at that time revealed nonischemic coronary artery disease of the LAD and RCA as well as 80% stenosis in his diagonal 1.  Which is being managed medically.  A week after that he developed some right-sided chest pain, different from his anginal pain which he had presented with his non-STEMI.  He states that right-sided chest pain resolved on its own in less than 20 min, but since this is all new for him he decided to come to the hospital to make sure everything was okay.  He was admitted and serial troponins did not reveal any elevation and he was discharged home.  He has not had any further episodes of chest pains.  Denies orthopnea, PND chest pains at rest or on exertion.  Denies typical claudication pain, has knee pains and atypical leg pain.      Notes from October 2019:  Patient here for follow-up.  States that had a brief episode of right-sided  chest pain which lasted a few seconds to few minutes and then went away.  This was different than the anginal pain he had in the past.  Denies any orthopnea, PND, swelling of feet.  States that he tried telling is  that this he felt was related to his lungs, and an checks x-ray done yesterday.  Denies any recent fevers or chills.    Notes from December 2019:  Patient here for follow-up.  Denies any chest pains at rest on exertion, orthopnea, PND, swelling of feet.  Blood pressure mildly elevated at clinic.  At home states blood pressure stays around 140-145 mm systolic.  I will increase his Toprol-XL to 75 mg daily.    Notes from January 2020:  Patient here because states that yesterday he felt some chest pain.  It was right-sided.  States did not feel like it was from his heart but just wanted to make sure.  He took nitroglycerines and had no response to nitroglycerin.  States that did not feel like his earlier anginal pains.    Notes from May 2020:  Patient here for follow-up did not have any further episodes of chest pain.  Is looking for clearance for EGD.  Denies any orthopnea, PND, swelling of feet.    Notes from September 2020:  Patient here for follow-up.  Denies any anginal sounding chest pains, orthopnea, PND.  States he had an episode last Friday where he took a deep breath and felt a sharp pain which lasted 1 sec and then went away.  Denies any anginal sounding chest pains on exertion.  Denies any orthopnea, PND, swelling of feet.  EKG done in the clinic today was personally reviewed and demonstrated normal sinus rhythm, left axis deviation.  Abnormal EKG.    Notes from December 2020:  Patient here follow-up.  Denies any chest pains rest exertion PND.  Doing fine.  No cardiac issues.    Notes from February 2021:  Patient here follow-up.  Denies any chest pains at rest on exertion, orthopnea, PND.  EKG done in the clinic today was personally reviewed and demonstrated normal sinus rhythm with left  anterior fascicular block.  No other cardiac symptomatology.    Notes from June 2021:  Patient here for follow-up.  Doing fine.  Denies any chest pains at rest on exertion, orthopnea PND.    Notes from July 21:  Patient here for after recent hospitalization.  Had come in with non-STEMI.  Culprit lesion was distal RCA.  Underwent PCI distal RCA.  Doing fine.  No chest pains.  No complications from cardiac catheterization.    October 21:  Patient here for follow-up.  Denies any chest pains at rest on exertion, orthopnea, PND.  Doing fine.    February 2022 patient here for follow-up.  Doing fine.  No anginal sounding chest pains, orthopnea, PND.    Notes from July 2022:  Patient here for follow-up.  Denies any chest at rest on exertion, orthopnea, PND, swelling of feet \    Notes from October 2022: Patient here for follow-up.  Denies any chest pains at rest on exertion, orthopnea, PND.  Had hip replacement recently.  Bilateral pedal edema since then P    Notes from January 23: Patient here for follow-up.  States lately has been experiencing night sweats and wakes up sweaty in the morning.  Also sometimes feels his heart is beating very fast.  Occurs about once a week.  Has had multiple ER visits for evaluation of this.  Denies any angina.      Notes from March 2023: Patient here for follow-up.  Doing fine.  Significant drop in cholesterol.  Blood pressure well controlled    Baseline rhythm was sinus bradycardia with normal intervals and an initial heart rate of 59 bpm.  There were no reported symptoms.  There were occasional PACs and very rare PVCs.  There was no evidence of high-degree AV block, nor were there prolonged pauses.  There were no atrial or ventricular arrhythmias.        Notes from June 23: Patient here for follow-up.  Doing fine.  Denies any chest pains at rest on exertion, orthopnea, PND, swelling of feet    Notes from December 23:  Patient here for follow-up.  Denies chest pains at rest on exertion,  orthopnea, PND.      PAST MEDICAL HISTORY:     Past Medical History:   Diagnosis Date    Coronary artery disease     Diabetes mellitus     High cholesterol     Hypertension     NSTEMI (non-ST elevated myocardial infarction) 07/25/2021    Prostate cancer        PAST SURGICAL HISTORY:     Past Surgical History:   Procedure Laterality Date    APPLICATION OF FULL-THICKNESS SKIN GRAFT (FTSG) TO UPPER EXTREMITY Right 11/15/2018    Procedure: APPLICATION, GRAFT, SKIN, FULL-THICKNESS, TO UPPER EXTREMITY VS STSG WITH FROZEN SECTIONS;  Surgeon: Alan Arzola MD;  Location: [facility] OR;  Service: Plastics;  Laterality: Right;    EXCISION OF SQUAMOUS CELL CARCINOMA Right 11/15/2018    Procedure: EXCISION, CARCINOMA, SQUAMOUS CELL @ RIGHT HAND;  Surgeon: Alan Arzola MD;  Location: [facility] OR;  Service: Plastics;  Laterality: Right;  RN PREOP 11/13/2018--NEED H/P    HEMIARTHROPLASTY OF HIP Right 08/14/2022    Procedure: HEMIARTHROPLASTY, HIP;  Surgeon: Edis Villanueva MD;  Location: [facility] OR;  Service: Orthopedics;  Laterality: Right;    JOINT REPLACEMENT      LEFT HEART CATHETERIZATION Left 05/06/2019    Procedure: Left heart cath;  Surgeon: Rashad Nieto MD;  Location: [facility] CATH LAB;  Service: Cardiology;  Laterality: Left;    LEFT HEART CATHETERIZATION Left 07/26/2021    Procedure: Left heart cath, radial;  Surgeon: Rashad Nieto MD;  Location: [facility] CATH LAB;  Service: Cardiology;  Laterality: Left;    TONSILLECTOMY      TOTAL KNEE ARTHROPLASTY Right        ALLERGIES AND MEDICATION:   Review of patient's allergies indicates:  No Known Allergies     Medication List            Accurate as of December 18, 2023  3:17 PM. If you have any questions, ask your nurse or doctor.                CONTINUE taking these medications      albuterol 90 mcg/actuation inhaler  Commonly known as: PROVENTIL/VENTOLIN HFA     aluminum-magnesium hydroxide-simethicone 200-200-20 mg/5 mL Susp  Commonly known as: MAALOX ADVANCED  Take 30 mLs by  mouth 4 (four) times daily before meals and nightly. for 7 days     amLODIPine 10 MG tablet  Commonly known as: NORVASC  Take 1 tablet (10 mg total) by mouth once daily.     aspirin 81 MG EC tablet  Commonly known as: ECOTRIN  Take 1 tablet (81 mg total) by mouth once daily.     atorvastatin 40 MG tablet  Commonly known as: LIPITOR  Take 1 tablet (40 mg total) by mouth once daily.     blood-glucose meter Misc     clopidogreL 75 mg tablet  Commonly known as: PLAVIX  Take 1 tablet (75 mg total) by mouth once daily.     EScitalopram oxalate 10 MG tablet  Commonly known as: LEXAPRO     furosemide 20 MG tablet  Commonly known as: LASIX  Take 1 tablet (20 mg total) by mouth as needed (use as needed for fluid buildup).     hydroCHLOROthiazide 12.5 mg capsule  Commonly known as: MICROZIDE  Take 2 capsules (25 mg total) by mouth once daily.     isosorbide mononitrate 60 MG 24 hr tablet  Commonly known as: IMDUR  Take 1 tablet (60 mg total) by mouth once daily.     LANCETS & BLOOD GLUCOSE STRIPS MISC     metFORMIN 1000 MG tablet  Commonly known as: GLUCOPHAGE  Take 1 tablet (1,000 mg total) by mouth 2 (two) times daily with meals.     metoprolol succinate 50 MG 24 hr tablet  Commonly known as: TOPROL-XL  Take 1 tablet (50 mg total) by mouth once daily.     nitroGLYCERIN 0.4 MG SL tablet  Commonly known as: NITROSTAT  Place 1 tablet (0.4 mg total) under the tongue every 5 (five) minutes as needed for Chest pain.     oxyCODONE 5 MG immediate release tablet  Commonly known as: ROXICODONE  Take 1 tablet (5 mg total) by mouth every 4 (four) hours as needed (breakthrough pain.).     simethicone 125 MG chewable tablet  Commonly known as: MYLICON  Take 1 tablet (125 mg total) by mouth every 6 (six) hours as needed for Flatulence.     thiamine 250 MG tablet              SOCIAL HISTORY:     Social History     Socioeconomic History    Marital status:    Tobacco Use    Smoking status: Former     Current packs/day: 0.00     Types:  "Cigarettes     Quit date: 2016     Years since quittin.3    Smokeless tobacco: Never    Tobacco comments:     2016   Substance and Sexual Activity    Alcohol use: No    Drug use: No    Sexual activity: Not Currently       FAMILY HISTORY:     Family History   Problem Relation Age of Onset    Heart disease Mother     Stroke Father        REVIEW OF SYSTEMS:   Review of Systems   Constitutional: Negative.    HENT: Negative.     Eyes: Negative.    Respiratory: Negative.     Cardiovascular:  Positive for leg swelling.   Gastrointestinal: Negative.    Genitourinary: Negative.    Skin: Negative.    Neurological: Negative.    Endo/Heme/Allergies: Negative.        A 10 point review of systems was performed and all the pertinent positives have been mentioned. Rest of review of systems was negative.        PHYSICAL EXAM:     Vitals:    23 1442   BP: (!) 142/60   Pulse: 74   Resp: 18    Body mass index is 28.09 kg/m².  Weight: 83.8 kg (184 lb 11.9 oz)   Height: 5' 8" (172.7 cm)     Physical Exam  Vitals and nursing note reviewed.   Constitutional:       Appearance: Normal appearance. He is well-developed.   HENT:      Head: Normocephalic and atraumatic.      Right Ear: Hearing normal.      Left Ear: Hearing normal.      Nose: Nose normal.   Eyes:      General: Lids are normal.      Conjunctiva/sclera: Conjunctivae normal.      Pupils: Pupils are equal, round, and reactive to light.   Cardiovascular:      Rate and Rhythm: Normal rate and regular rhythm.      Pulses: Normal pulses.      Heart sounds: Normal heart sounds.   Pulmonary:      Effort: Pulmonary effort is normal.      Breath sounds: Normal breath sounds.   Abdominal:      Palpations: Abdomen is soft.      Tenderness: There is no abdominal tenderness.   Musculoskeletal:         General: No deformity.      Cervical back: Normal range of motion and neck supple.      Right lower leg: Edema present.      Left lower leg: Edema present.   Skin:     General: " Skin is warm and dry.   Neurological:      Mental Status: He is alert and oriented to person, place, and time.   Psychiatric:         Speech: Speech normal.         DATA:     Laboratory:  CBC:  Recent Labs   Lab 08/17/23  1155 08/31/23  0716 09/20/23  1250   WBC 7.14 6.77 7.84   Hemoglobin 11.2 L 11.5 L 11.1 L   Hematocrit 34.4 L 35.7 L 33.7 L   Platelets 253 240 267         CHEMISTRIES:  Recent Labs   Lab 07/25/21  0503 07/26/21  0348 07/27/21  0550 07/30/21  0116 11/11/21  0609 01/11/22  2335 08/13/22  1118 12/18/22  0519 01/05/23  0400 02/13/23  0350   Glucose 114 H 126 H   < > 119 H 179 H 138 H   < > 155 H 99 89   Sodium 138 137   < > 135 L 138 138   < > 137 140 134 L   Potassium 4.1 3.4 L   < > 3.2 L 3.7 3.4 L   < > 3.4 L 3.4 L SEE COMMENT   BUN 13 12   < > 15 14 18   < > 17 14 15   Creatinine 1.0 0.9   < > 1.0 1.0 1.2   < > 1.1 0.9 0.9   eGFR if African American >60 >60   < > >60 >60 >60  --   --   --   --    eGFR if non African American >60 >60   < > >60 >60 55 A  --   --   --   --    Calcium 8.8 9.2   < > 9.5 9.6 9.0   < > 9.0 8.9 8.4 L   Magnesium 1.8 1.9  --   --  2.0  --   --   --   --   --     < > = values in this interval not displayed.         CARDIAC BIOMARKERS:  Recent Labs   Lab 01/05/23  0400 01/05/23  0632 02/13/23  0350   Troponin I 0.012 0.008 <0.006         COAGS:  Recent Labs   Lab 11/11/21  0609 08/13/22  1118 08/13/22  1540   INR 1.0 1.0 1.0         LIPIDS/LFTS:  Recent Labs   Lab 12/18/22  0519 01/05/23  0400 02/13/23  0350   AST 13 15 46 H   ALT 10 12 11         Hemoglobin A1C   Date Value Ref Range Status   08/17/2022 6.4 (H) 4.0 - 5.6 % Final     Comment:     ADA Screening Guidelines:  5.7-6.4%  Consistent with prediabetes  >or=6.5%  Consistent with diabetes    High levels of fetal hemoglobin interfere with the HbA1C  assay. Heterozygous hemoglobin variants (HbS, HgC, etc)do  not significantly interfere with this assay.   However, presence of multiple variants may affect accuracy.      05/07/2019 7.0 (H) 4.0 - 5.6 % Final     Comment:     ADA Screening Guidelines:  5.7-6.4%  Consistent with prediabetes  >or=6.5%  Consistent with diabetes  High levels of fetal hemoglobin interfere with the HbA1C  assay. Heterozygous hemoglobin variants (HbS, HgC, etc)do  not significantly interfere with this assay.   However, presence of multiple variants may affect accuracy.     06/19/2018 7.3 (H) 4.0 - 5.6 % Final     Comment:     ADA Screening Guidelines:  5.7-6.4%  Consistent with prediabetes  >or=6.5%  Consistent with diabetes  High levels of fetal hemoglobin interfere with the HbA1C  assay. Heterozygous hemoglobin variants (HbS, HgC, etc)do  not significantly interfere with this assay.   However, presence of multiple variants may affect accuracy.       Hemoglobin A1c   Date Value Ref Range Status   12/02/2021 6.7 (H) <5.7 % of total Hgb Final     Comment:     For someone without known diabetes, a hemoglobin A1c  value of 6.5% or greater indicates that they may have   diabetes and this should be confirmed with a follow-up   test.    For someone with known diabetes, a value <7% indicates   that their diabetes is well controlled and a value   greater than or equal to 7% indicates suboptimal   control. A1c targets should be individualized based on   duration of diabetes, age, comorbid conditions, and   other considerations.    Currently, no consensus exists regarding use of  hemoglobin A1c for diagnosis of diabetes for children.       07/23/2021 6.8 (H) <5.7 % of total Hgb Final     Comment:     For someone without known diabetes, a hemoglobin A1c  value of 6.5% or greater indicates that they may have   diabetes and this should be confirmed with a follow-up   test.    For someone with known diabetes, a value <7% indicates   that their diabetes is well controlled and a value   greater than or equal to 7% indicates suboptimal   control. A1c targets should be individualized based on   duration of diabetes, age,  comorbid conditions, and   other considerations.    Currently, no consensus exists regarding use of  hemoglobin A1c for diagnosis of diabetes for children.       03/16/2021 6.4 (H) <5.7 % of total Hgb Final     Comment:     For someone without known diabetes, a hemoglobin   A1c value between 5.7% and 6.4% is consistent with  prediabetes and should be confirmed with a   follow-up test.    For someone with known diabetes, a value <7%  indicates that their diabetes is well controlled. A1c  targets should be individualized based on duration of  diabetes, age, comorbid conditions, and other  considerations.    This assay result is consistent with an increased risk  of diabetes.    Currently, no consensus exists regarding use of  hemoglobin A1c for diagnosis of diabetes for children.       TSH        The ASCVD Risk score (Guanako CHENG, et al., 2019) failed to calculate for the following reasons:    The 2019 ASCVD risk score is only valid for ages 40 to 79    The patient has a prior MI or stroke diagnosis           Cardiovascular Testing:    EKG: (personally reviewed tracing)  May 2019:  Normal sinus rhythm, left axis deviation.  Abnormal EKG.    2D echocardiogram May 2019:  Normal left ventricular systolic function. The estimated ejection fraction is 65%  Normal LV diastolic function.  Mild left atrial enlargement.  Normal central venous pressure (3 mm Hg).  The estimated PA systolic pressure is 10 mm Hg       Coronary angiogram 6th May 2019:    No acute thrombotic lesion identified.  Coronary artery disease as described below  LVEDP: 12 mmHg    Carotid ultrasound May 28, 2019:    There is 20-39% right Internal Carotid Stenosis.  There is 20-39% left Internal Carotid Stenosis.     ABIs May 28, 2019:    Noncompressible LOW bilaterally.  Mildly decrease TBI bilaterally.  Normal PVR waveforms bilaterally.       Coronary Anatomy:  LM:  No significant stenosis  LAD:  Mid LAD 60-70% stenosis.  Distal LAD 50% stenosis.  IFR 0.92  (nonischemic).  Diagonal 1 is a tortuous vessel with mid 80% stenosis.  Will medically manage this diagonal stenosis  LCx :  Mild nonobstructive CAD  RCA:  50% distal RCA/proximal PDA lesion.  IFR 1 (nonischemic)     Impression / Plan  Coronary artery disease as described above.  Continue medical management with aggressive risk factor modification.  Continue aspirin 81 mg daily. Change simvastatin to atorvastatin 80 mg daily.        ASSESSMENT AND PLAN     Patient Active Problem List   Diagnosis    Essential hypertension, benign    Type 2 diabetes mellitus    Hyperlipidemia    Body mass index 28.0-28.9, adult    History of prostate cancer    Orthostatic hypotension    Diabetic ulcer of toe of left foot associated with type 2 diabetes mellitus, with fat layer exposed    SCC (squamous cell carcinoma)    Coronary artery disease involving native coronary artery of native heart without angina pectoris    Unstable angina    Palpitations    Elevated troponin    NSTEMI (non-ST elevated myocardial infarction)    Hip fracture    Aortic root dilatation    Acute blood loss anemia    Diaphoresis       1.  Patient with coronary artery disease, now angina free.  Status post PCI distal RCA in July of 2021. Continue aspirin 81 mg daily indefinitely and Plavix 75 mg daily uninterrupted for at least 1 year from date of PCI.  Diagonal 1 disease being managed medically.  Continues to be angina free    2.  Hypertension:  Well controlled on current therapy.      3.  Cardiovascular screening with rest and stress ABIs and carotid ultrasound performed.  ABIs demonstrated noncompressible ABIs bilaterally, mildly decreased ABIs bilaterally with normal PVR waveforms bilaterally.  I got a peripheral ultrasound for further evaluation of his abnormal TBI.   ultrasound did not show any flow restriction in the right or the left lower extremity.    4.  Dyslipidemia:  Continue medical management with atorvastatin.    5. Bilateral pedal edema after hip  surgery.  Lasix as needed.  Check venous ultrasound to rule out DVT.    6.  May hold Plavix as needed for procedures.    7. Palpitations and night sweats.  Further evaluation of night sweats by primary care physician.  Event monitor did not show any significant arrhythmia.      Fu in 3 m    Thank you very much for involving me in the care of your patient.  Please do not hesitate to contact me if there are any questions.      Rashad Nieto MD, FACC, [facility]  Interventional Cardiologist, [facility].       This note was dictated with the help of speech recognition software.  There might be un-intended errors and/or substitutions.

## 2024-01-08 NOTE — TELEPHONE ENCOUNTER
----- Message from Carmita Buchanan sent at 1/8/2024  1:06 PM CST -----  .Type: RX Refill Request    Who Called: Samanta - sister    Have you contacted your pharmacy: Yes     Refill or New Rx: Refill     RX Name and Strength:furosemide (LASIX) 20 MG tablet      [id number] - [place] AT [address]  [place] [address]  Phone: [phone] Fax: [phone]        Local or Mail Order:Local     Ordering Provider: Dr. Nieto    Would the patient rather a call back or a response via [facility]? Call Back     Best Call Back Number:[phone]    Additional Information:    Ask that someone give her a call when the medication has been filled

## 2024-01-09 RX ORDER — FUROSEMIDE 20 MG/1
20 TABLET ORAL
Qty: 90 TABLET | Refills: 3 | OUTPATIENT
Start: 2024-01-09 | End: 2024-01-10 | Stop reason: SDUPTHER

## 2024-01-10 RX ORDER — FUROSEMIDE 20 MG/1
20 TABLET ORAL
Qty: 90 TABLET | Refills: 3 | Status: CANCELLED | OUTPATIENT
Start: 2024-01-10

## 2024-01-10 RX ORDER — FUROSEMIDE 20 MG/1
20 TABLET ORAL
Qty: 90 TABLET | Refills: 3 | OUTPATIENT
Start: 2024-01-10

## 2024-01-10 NOTE — TELEPHONE ENCOUNTER
----- Message from Jordan Brown sent at 1/10/2024 10:37 AM CST -----   Name of Who is Calling:     What is the request in detail:  request call back in reference to medication  furosemide (LASIX) 20 MG tablet is not at pharmacy     Please contact to further discuss and advise  ( medication has print next to it not e-prescribed   / caller is waiting call back  to be notified medication has been sent to pharmacy     Can the clinic reply by [facility]:     What Number to Call Back if not in [facility]:  [phone] /  isaac / sister       [id number] - [place], [place] - [address] [facility] [address] AT [facility] OF [facility] [address]

## 2024-01-10 NOTE — TELEPHONE ENCOUNTER
----- Message from Merry Archer sent at 1/10/2024 10:25 AM CST -----  Regarding: Refill request  .Type: RX Refill Request    Who Called:SisterShameka England    Have you contacted your pharmacy:yes     Refill or New Rx: Refill    RX Name and Strength:furosemide (LASIX) 20 MG tablet    Preferred Pharmacy with phone number:.  [facility] #[id number] - [address] AT [address]  [facility] [address]  Phone: [phone] Fax: [phone]        Local or Mail Order:local     Ordering Provider:KAL Nieto     Would the patient rather a call back or a response via [facility]? Call     Best Call Back Number: [phone]-      Additional Information:

## 2024-01-10 NOTE — TELEPHONE ENCOUNTER
Spoke with patient sister, advised of error that medication was printed and informed I will resend to provider. Please refill medication and sent to pharmacy. Desiree

## 2024-01-11 ENCOUNTER — TELEPHONE (OUTPATIENT)
Dept: CARDIOLOGY | Facility: CLINIC | Age: 89
End: 2024-01-11
Payer: MEDICARE

## 2024-01-11 NOTE — TELEPHONE ENCOUNTER
Prescription was printed again. Okay to call in prescription to pharmacy to ensure it goes through?

## 2024-01-11 NOTE — TELEPHONE ENCOUNTER
Spoke with pharmacist at [facility], per Dr. Emilia mcintosh to call in furosemide 20mg  Per last printed script:  Dispense 90 with 3 refills.   Take 1 tablet 20 mg as needed for fluid buildup. Patient informed prescription sent to pharmacy.

## 2024-03-18 ENCOUNTER — OFFICE VISIT (OUTPATIENT)
Dept: CARDIOLOGY | Facility: CLINIC | Age: 89
End: 2024-03-18
Payer: MEDICARE

## 2024-03-18 VITALS
HEART RATE: 64 BPM | RESPIRATION RATE: 18 BRPM | SYSTOLIC BLOOD PRESSURE: 132 MMHG | DIASTOLIC BLOOD PRESSURE: 62 MMHG | BODY MASS INDEX: 26.83 KG/M2 | OXYGEN SATURATION: 95 % | WEIGHT: 177 LBS | HEIGHT: 68 IN

## 2024-03-18 DIAGNOSIS — I21.4 NSTEMI (NON-ST ELEVATED MYOCARDIAL INFARCTION): ICD-10-CM

## 2024-03-18 DIAGNOSIS — R79.89 ELEVATED TROPONIN: ICD-10-CM

## 2024-03-18 DIAGNOSIS — E11.9 TYPE 2 DIABETES MELLITUS WITHOUT COMPLICATION, WITHOUT LONG-TERM CURRENT USE OF INSULIN: ICD-10-CM

## 2024-03-18 DIAGNOSIS — I25.10 CORONARY ARTERY DISEASE INVOLVING NATIVE CORONARY ARTERY OF NATIVE HEART WITHOUT ANGINA PECTORIS: ICD-10-CM

## 2024-03-18 DIAGNOSIS — E78.2 MIXED HYPERLIPIDEMIA: Primary | ICD-10-CM

## 2024-03-18 DIAGNOSIS — I25.10 CORONARY ARTERY DISEASE, UNSPECIFIED VESSEL OR LESION TYPE, UNSPECIFIED WHETHER ANGINA PRESENT, UNSPECIFIED WHETHER NATIVE OR TRANSPLANTED HEART: ICD-10-CM

## 2024-03-18 DIAGNOSIS — I77.810 AORTIC ROOT DILATATION: ICD-10-CM

## 2024-03-18 DIAGNOSIS — L97.522 DIABETIC ULCER OF TOE OF LEFT FOOT ASSOCIATED WITH TYPE 2 DIABETES MELLITUS, WITH FAT LAYER EXPOSED: ICD-10-CM

## 2024-03-18 DIAGNOSIS — I20.0 UNSTABLE ANGINA: ICD-10-CM

## 2024-03-18 DIAGNOSIS — I10 ESSENTIAL HYPERTENSION, BENIGN: ICD-10-CM

## 2024-03-18 DIAGNOSIS — I95.1 ORTHOSTATIC HYPOTENSION: ICD-10-CM

## 2024-03-18 DIAGNOSIS — E11.621 DIABETIC ULCER OF TOE OF LEFT FOOT ASSOCIATED WITH TYPE 2 DIABETES MELLITUS, WITH FAT LAYER EXPOSED: ICD-10-CM

## 2024-03-18 DIAGNOSIS — R00.2 PALPITATIONS: ICD-10-CM

## 2024-03-18 PROCEDURE — 99999 PR PBB SHADOW E&M-EST. PATIENT-LVL V: CPT | Mod: PBBFAC,,, | Performed by: INTERNAL MEDICINE

## 2024-03-18 PROCEDURE — 99214 OFFICE O/P EST MOD 30 MIN: CPT | Mod: S$GLB,,, | Performed by: INTERNAL MEDICINE

## 2024-03-18 NOTE — PROGRESS NOTES
CARDIOVASCULAR CONSULTATION    REASON FOR CONSULT:   Juan Martínez Jr. is a 88 y.o. male who presents for follow-up recent hospitalization for a non-STEMI..      HISTORY OF PRESENT ILLNESS:     Notes from August 2019:   Patient is here for follow-up today.  Was recently admitted for hypertensive urgency.  Did not have any chest pains during the hypertensive urgency.  Troponins were found to be mildly elevated.  Was evaluated by Cardiology and Norvasc was restarted.  His blood pressure has been well controlled since then.  Denies any chest pains at rest on exertion, orthopnea, PND, swelling of feet.      Notes from July 2019  Patient is here for follow-up today.  Denies any chest pains at rest on exertion, orthopnea, PND.  Denies any claudication symptoms.  Denies any dyspnea at rest on exertion    Note from clinic visit in May 2019:  Patient is 83-year-old man pleasant man with a past medical history significant for diabetes, dyslipidemia, hypertension, coronary artery disease who recently presented to the hospital with a non-STEMI.  Coronary angiogram performed at that time revealed nonischemic coronary artery disease of the LAD and RCA as well as 80% stenosis in his diagonal 1.  Which is being managed medically.  A week after that he developed some right-sided chest pain, different from his anginal pain which he had presented with his non-STEMI.  He states that right-sided chest pain resolved on its own in less than 20 min, but since this is all new for him he decided to come to the hospital to make sure everything was okay.  He was admitted and serial troponins did not reveal any elevation and he was discharged home.  He has not had any further episodes of chest pains.  Denies orthopnea, PND chest pains at rest or on exertion.  Denies typical claudication pain, has knee pains and atypical leg pain.      Notes from October 2019:  Patient here for follow-up.  States that had a brief episode of right-sided  chest pain which lasted a few seconds to few minutes and then went away.  This was different than the anginal pain he had in the past.  Denies any orthopnea, PND, swelling of feet.  States that he tried telling is  that this he felt was related to his lungs, and an checks x-ray done yesterday.  Denies any recent fevers or chills.    Notes from December 2019:  Patient here for follow-up.  Denies any chest pains at rest on exertion, orthopnea, PND, swelling of feet.  Blood pressure mildly elevated at clinic.  At home states blood pressure stays around 140-145 mm systolic.  I will increase his Toprol-XL to 75 mg daily.    Notes from January 2020:  Patient here because states that yesterday he felt some chest pain.  It was right-sided.  States did not feel like it was from his heart but just wanted to make sure.  He took nitroglycerines and had no response to nitroglycerin.  States that did not feel like his earlier anginal pains.    Notes from May 2020:  Patient here for follow-up did not have any further episodes of chest pain.  Is looking for clearance for EGD.  Denies any orthopnea, PND, swelling of feet.    Notes from September 2020:  Patient here for follow-up.  Denies any anginal sounding chest pains, orthopnea, PND.  States he had an episode last Friday where he took a deep breath and felt a sharp pain which lasted 1 sec and then went away.  Denies any anginal sounding chest pains on exertion.  Denies any orthopnea, PND, swelling of feet.  EKG done in the clinic today was personally reviewed and demonstrated normal sinus rhythm, left axis deviation.  Abnormal EKG.    Notes from December 2020:  Patient here follow-up.  Denies any chest pains rest exertion PND.  Doing fine.  No cardiac issues.    Notes from February 2021:  Patient here follow-up.  Denies any chest pains at rest on exertion, orthopnea, PND.  EKG done in the clinic today was personally reviewed and demonstrated normal sinus rhythm with left  anterior fascicular block.  No other cardiac symptomatology.    Notes from June 2021:  Patient here for follow-up.  Doing fine.  Denies any chest pains at rest on exertion, orthopnea PND.    Notes from July 21:  Patient here for after recent hospitalization.  Had come in with non-STEMI.  Culprit lesion was distal RCA.  Underwent PCI distal RCA.  Doing fine.  No chest pains.  No complications from cardiac catheterization.    October 21:  Patient here for follow-up.  Denies any chest pains at rest on exertion, orthopnea, PND.  Doing fine.    February 2022 patient here for follow-up.  Doing fine.  No anginal sounding chest pains, orthopnea, PND.    Notes from July 2022:  Patient here for follow-up.  Denies any chest at rest on exertion, orthopnea, PND, swelling of feet \    Notes from October 2022: Patient here for follow-up.  Denies any chest pains at rest on exertion, orthopnea, PND.  Had hip replacement recently.  Bilateral pedal edema since then P    Notes from January 23: Patient here for follow-up.  States lately has been experiencing night sweats and wakes up sweaty in the morning.  Also sometimes feels his heart is beating very fast.  Occurs about once a week.  Has had multiple ER visits for evaluation of this.  Denies any angina.      Notes from March 2023: Patient here for follow-up.  Doing fine.  Significant drop in cholesterol.  Blood pressure well controlled    Baseline rhythm was sinus bradycardia with normal intervals and an initial heart rate of 59 bpm.  There were no reported symptoms.  There were occasional PACs and very rare PVCs.  There was no evidence of high-degree AV block, nor were there prolonged pauses.  There were no atrial or ventricular arrhythmias.        Notes from June 23: Patient here for follow-up.  Doing fine.  Denies any chest pains at rest on exertion, orthopnea, PND, swelling of feet    Notes from December 23:  Patient here for follow-up.  Denies chest pains at rest on exertion,  orthopnea, PND.    Notes from March 2024: Patient here for follow-up.  Doing fine.  Denies any chest pains at rest on exertion, orthopnea, PND.    PAST MEDICAL HISTORY:     Past Medical History:   Diagnosis Date    Coronary artery disease     Diabetes mellitus     High cholesterol     Hypertension     NSTEMI (non-ST elevated myocardial infarction) 07/25/2021    Prostate cancer        PAST SURGICAL HISTORY:     Past Surgical History:   Procedure Laterality Date    APPLICATION OF FULL-THICKNESS SKIN GRAFT (FTSG) TO UPPER EXTREMITY Right 11/15/2018    Procedure: APPLICATION, GRAFT, SKIN, FULL-THICKNESS, TO UPPER EXTREMITY VS STSG WITH FROZEN SECTIONS;  Surgeon: Alan Arzola MD;  Location: [facility];  Service: Plastics;  Laterality: Right;    EXCISION OF SQUAMOUS CELL CARCINOMA Right 11/15/2018    Procedure: EXCISION, CARCINOMA, SQUAMOUS CELL @ RIGHT HAND;  Surgeon: Alan Arzola MD;  Location: [facility];  Service: Plastics;  Laterality: Right;  RN PREOP 11/13/2018--NEED H/P    HEMIARTHROPLASTY OF HIP Right 08/14/2022    Procedure: HEMIARTHROPLASTY, HIP;  Surgeon: Edis Villanueva MD;  Location: [facility] OR;  Service: Orthopedics;  Laterality: Right;    JOINT REPLACEMENT      LEFT HEART CATHETERIZATION Left 05/06/2019    Procedure: Left heart cath;  Surgeon: Rashad Nieto MD;  Location: [facility] CATH LAB;  Service: Cardiology;  Laterality: Left;    LEFT HEART CATHETERIZATION Left 07/26/2021    Procedure: Left heart cath, radial;  Surgeon: Rashad Nieto MD;  Location: [facility] CATH LAB;  Service: Cardiology;  Laterality: Left;    TONSILLECTOMY      TOTAL KNEE ARTHROPLASTY Right        ALLERGIES AND MEDICATION:   Review of patient's allergies indicates:  No Known Allergies     Medication List            Accurate as of March 18, 2024  9:31 AM. If you have any questions, ask your nurse or doctor.                CONTINUE taking these medications      albuterol 90 mcg/actuation inhaler  Commonly known as: PROVENTIL/VENTOLIN  HFA     aluminum-magnesium hydroxide-simethicone 200-200-20 mg/5 mL Susp  Commonly known as: MAALOX ADVANCED  Take 30 mLs by mouth 4 (four) times daily before meals and nightly. for 7 days     amLODIPine 10 MG tablet  Commonly known as: NORVASC  Take 1 tablet (10 mg total) by mouth once daily.     aspirin 81 MG EC tablet  Commonly known as: ECOTRIN  Take 1 tablet (81 mg total) by mouth once daily.     atorvastatin 40 MG tablet  Commonly known as: LIPITOR  Take 1 tablet (40 mg total) by mouth once daily.     blood-glucose meter Misc     clopidogreL 75 mg tablet  Commonly known as: PLAVIX  Take 1 tablet (75 mg total) by mouth once daily.     EScitalopram oxalate 10 MG tablet  Commonly known as: LEXAPRO     furosemide 20 MG tablet  Commonly known as: LASIX  Take 1 tablet (20 mg total) by mouth as needed (use as needed for fluid buildup).     hydroCHLOROthiazide 12.5 mg capsule  Commonly known as: MICROZIDE  Take 2 capsules (25 mg total) by mouth once daily.     isosorbide mononitrate 60 MG 24 hr tablet  Commonly known as: IMDUR  Take 1 tablet (60 mg total) by mouth once daily.     LANCETS & BLOOD GLUCOSE STRIPS MISC     metFORMIN 1000 MG tablet  Commonly known as: GLUCOPHAGE  Take 1 tablet (1,000 mg total) by mouth 2 (two) times daily with meals.     metoprolol succinate 50 MG 24 hr tablet  Commonly known as: TOPROL-XL  Take 1 tablet (50 mg total) by mouth once daily.     nitroGLYCERIN 0.4 MG SL tablet  Commonly known as: NITROSTAT  Place 1 tablet (0.4 mg total) under the tongue every 5 (five) minutes as needed for Chest pain.     oxyCODONE 5 MG immediate release tablet  Commonly known as: ROXICODONE  Take 1 tablet (5 mg total) by mouth every 4 (four) hours as needed (breakthrough pain.).     simethicone 125 MG chewable tablet  Commonly known as: MYLICON  Take 1 tablet (125 mg total) by mouth every 6 (six) hours as needed for Flatulence.     thiamine 250 MG tablet              SOCIAL HISTORY:     Social History  "    Socioeconomic History    Marital status:    Tobacco Use    Smoking status: Former     Current packs/day: 0.00     Types: Cigarettes     Quit date: 2016     Years since quittin.6    Smokeless tobacco: Never    Tobacco comments:     2016   Substance and Sexual Activity    Alcohol use: No    Drug use: No    Sexual activity: Not Currently       FAMILY HISTORY:     Family History   Problem Relation Age of Onset    Heart disease Mother     Stroke Father        REVIEW OF SYSTEMS:   Review of Systems   Constitutional: Negative.    HENT: Negative.     Eyes: Negative.    Respiratory: Negative.     Cardiovascular:  Positive for leg swelling.   Gastrointestinal: Negative.    Genitourinary: Negative.    Skin: Negative.    Neurological: Negative.    Endo/Heme/Allergies: Negative.        A 10 point review of systems was performed and all the pertinent positives have been mentioned. Rest of review of systems was negative.        PHYSICAL EXAM:     Vitals:    24 0853   BP: 132/62   Pulse: 64   Resp: 18    Body mass index is 26.92 kg/m².  Weight: 80.3 kg (177 lb 0.5 oz)   Height: 5' 8" (172.7 cm)     Physical Exam  Vitals and nursing note reviewed.   Constitutional:       Appearance: Normal appearance. He is well-developed.   HENT:      Head: Normocephalic and atraumatic.      Right Ear: Hearing normal.      Left Ear: Hearing normal.      Nose: Nose normal.   Eyes:      General: Lids are normal.      Conjunctiva/sclera: Conjunctivae normal.      Pupils: Pupils are equal, round, and reactive to light.   Cardiovascular:      Rate and Rhythm: Normal rate and regular rhythm.      Pulses: Normal pulses.      Heart sounds: Normal heart sounds.   Pulmonary:      Effort: Pulmonary effort is normal.      Breath sounds: Normal breath sounds.   Abdominal:      Palpations: Abdomen is soft.      Tenderness: There is no abdominal tenderness.   Musculoskeletal:         General: No deformity.      Cervical back: " Normal range of motion and neck supple.      Right lower leg: Edema present.      Left lower leg: Edema present.   Skin:     General: Skin is warm and dry.   Neurological:      Mental Status: He is alert and oriented to person, place, and time.   Psychiatric:         Speech: Speech normal.           DATA:     Laboratory:  CBC:  Recent Labs   Lab 08/17/23  1155 08/31/23  0716 09/20/23  1250   WBC 7.14 6.77 7.84   Hemoglobin 11.2 L 11.5 L 11.1 L   Hematocrit 34.4 L 35.7 L 33.7 L   Platelets 253 240 267       CHEMISTRIES:  Recent Labs   Lab 07/25/21  0503 07/26/21  0348 07/27/21  0550 07/30/21  0116 11/11/21  0609 01/11/22  2335 08/13/22  1118 12/18/22  0519 01/05/23  0400 02/13/23  0350   Glucose 114 H 126 H   < > 119 H 179 H 138 H   < > 155 H 99 89   Sodium 138 137   < > 135 L 138 138   < > 137 140 134 L   Potassium 4.1 3.4 L   < > 3.2 L 3.7 3.4 L   < > 3.4 L 3.4 L SEE COMMENT   BUN 13 12   < > 15 14 18   < > 17 14 15   Creatinine 1.0 0.9   < > 1.0 1.0 1.2   < > 1.1 0.9 0.9   eGFR if African American >60 >60   < > >60 >60 >60  --   --   --   --    eGFR if non African American >60 >60   < > >60 >60 55 A  --   --   --   --    Calcium 8.8 9.2   < > 9.5 9.6 9.0   < > 9.0 8.9 8.4 L   Magnesium 1.8 1.9  --   --  2.0  --   --   --   --   --     < > = values in this interval not displayed.       CARDIAC BIOMARKERS:  Recent Labs   Lab 01/05/23  0400 01/05/23  0632 02/13/23  0350   Troponin I 0.012 0.008 <0.006       COAGS:  Recent Labs   Lab 11/11/21  0609 08/13/22  1118 08/13/22  1540   INR 1.0 1.0 1.0       LIPIDS/LFTS:  Recent Labs   Lab 12/18/22  0519 01/05/23  0400 02/13/23  0350   AST 13 15 46 H   ALT 10 12 11       Hemoglobin A1C   Date Value Ref Range Status   08/17/2022 6.4 (H) 4.0 - 5.6 % Final     Comment:     ADA Screening Guidelines:  5.7-6.4%  Consistent with prediabetes  >or=6.5%  Consistent with diabetes    High levels of fetal hemoglobin interfere with the HbA1C  assay. Heterozygous hemoglobin variants (HbS,  HgC, etc)do  not significantly interfere with this assay.   However, presence of multiple variants may affect accuracy.     05/07/2019 7.0 (H) 4.0 - 5.6 % Final     Comment:     ADA Screening Guidelines:  5.7-6.4%  Consistent with prediabetes  >or=6.5%  Consistent with diabetes  High levels of fetal hemoglobin interfere with the HbA1C  assay. Heterozygous hemoglobin variants (HbS, HgC, etc)do  not significantly interfere with this assay.   However, presence of multiple variants may affect accuracy.     06/19/2018 7.3 (H) 4.0 - 5.6 % Final     Comment:     ADA Screening Guidelines:  5.7-6.4%  Consistent with prediabetes  >or=6.5%  Consistent with diabetes  High levels of fetal hemoglobin interfere with the HbA1C  assay. Heterozygous hemoglobin variants (HbS, HgC, etc)do  not significantly interfere with this assay.   However, presence of multiple variants may affect accuracy.       Hemoglobin A1c   Date Value Ref Range Status   12/02/2021 6.7 (H) <5.7 % of total Hgb Final     Comment:     For someone without known diabetes, a hemoglobin A1c  value of 6.5% or greater indicates that they may have   diabetes and this should be confirmed with a follow-up   test.    For someone with known diabetes, a value <7% indicates   that their diabetes is well controlled and a value   greater than or equal to 7% indicates suboptimal   control. A1c targets should be individualized based on   duration of diabetes, age, comorbid conditions, and   other considerations.    Currently, no consensus exists regarding use of  hemoglobin A1c for diagnosis of diabetes for children.       07/23/2021 6.8 (H) <5.7 % of total Hgb Final     Comment:     For someone without known diabetes, a hemoglobin A1c  value of 6.5% or greater indicates that they may have   diabetes and this should be confirmed with a follow-up   test.    For someone with known diabetes, a value <7% indicates   that their diabetes is well controlled and a value   greater than or  equal to 7% indicates suboptimal   control. A1c targets should be individualized based on   duration of diabetes, age, comorbid conditions, and   other considerations.    Currently, no consensus exists regarding use of  hemoglobin A1c for diagnosis of diabetes for children.       03/16/2021 6.4 (H) <5.7 % of total Hgb Final     Comment:     For someone without known diabetes, a hemoglobin   A1c value between 5.7% and 6.4% is consistent with  prediabetes and should be confirmed with a   follow-up test.    For someone with known diabetes, a value <7%  indicates that their diabetes is well controlled. A1c  targets should be individualized based on duration of  diabetes, age, comorbid conditions, and other  considerations.    This assay result is consistent with an increased risk  of diabetes.    Currently, no consensus exists regarding use of  hemoglobin A1c for diagnosis of diabetes for children.       TSH        The ASCVD Risk score (Guanako CHENG, et al., 2019) failed to calculate for the following reasons:    The 2019 ASCVD risk score is only valid for ages 40 to 79    The patient has a prior MI or stroke diagnosis           Cardiovascular Testing:    EKG: (personally reviewed tracing)  May 2019:  Normal sinus rhythm, left axis deviation.  Abnormal EKG.    2D echocardiogram May 2019:  Normal left ventricular systolic function. The estimated ejection fraction is 65%  Normal LV diastolic function.  Mild left atrial enlargement.  Normal central venous pressure (3 mm Hg).  The estimated PA systolic pressure is 10 mm Hg       Coronary angiogram 6th May 2019:    No acute thrombotic lesion identified.  Coronary artery disease as described below  LVEDP: 12 mmHg    Carotid ultrasound May 28, 2019:    There is 20-39% right Internal Carotid Stenosis.  There is 20-39% left Internal Carotid Stenosis.     ABIs May 28, 2019:    Noncompressible LOW bilaterally.  Mildly decrease TBI bilaterally.  Normal PVR waveforms bilaterally.        Coronary Anatomy:  LM:  No significant stenosis  LAD:  Mid LAD 60-70% stenosis.  Distal LAD 50% stenosis.  IFR 0.92 (nonischemic).  Diagonal 1 is a tortuous vessel with mid 80% stenosis.  Will medically manage this diagonal stenosis  LCx :  Mild nonobstructive CAD  RCA:  50% distal RCA/proximal PDA lesion.  IFR 1 (nonischemic)     Impression / Plan  Coronary artery disease as described above.  Continue medical management with aggressive risk factor modification.  Continue aspirin 81 mg daily. Change simvastatin to atorvastatin 80 mg daily.        ASSESSMENT AND PLAN     Patient Active Problem List   Diagnosis    Essential hypertension, benign    Type 2 diabetes mellitus    Hyperlipidemia    Body mass index 28.0-28.9, adult    History of prostate cancer    Orthostatic hypotension    Diabetic ulcer of toe of left foot associated with type 2 diabetes mellitus, with fat layer exposed    SCC (squamous cell carcinoma)    Coronary artery disease involving native coronary artery of native heart without angina pectoris    Unstable angina    Palpitations    Elevated troponin    NSTEMI (non-ST elevated myocardial infarction)    Hip fracture    Aortic root dilatation    Acute blood loss anemia    Diaphoresis       1.  Patient with coronary artery disease, now angina free.  Status post PCI distal RCA in July of 2021. Continue aspirin 81 mg daily indefinitely and Plavix 75 mg daily uninterrupted for at least 1 year from date of PCI.  Diagonal 1 disease being managed medically.  Continues to be angina free    2.  Hypertension:  Well controlled on current therapy.      3.  Cardiovascular screening with rest and stress ABIs and carotid ultrasound performed.  ABIs demonstrated noncompressible ABIs bilaterally, mildly decreased ABIs bilaterally with normal PVR waveforms bilaterally.  I got a peripheral ultrasound for further evaluation of his abnormal TBI.   ultrasound did not show any flow restriction in the right or the left lower  extremity.    4.  Dyslipidemia:  Continue medical management with atorvastatin.    5. Bilateral pedal edema after hip surgery.  Lasix as needed.  Check venous ultrasound to rule out DVT.    6.  May hold Plavix as needed for procedures.    7. Palpitations and night sweats.  Further evaluation of night sweats by primary care physician.  Event monitor did not show any significant arrhythmia.      Fu in 3 m    Thank you very much for involving me in the care of your patient.  Please do not hesitate to contact me if there are any questions.      Rashad Nieto MD, FACC, [facility]  Interventional Cardiologist, [facility].       This note was dictated with the help of speech recognition software.  There might be un-intended errors and/or substitutions.

## 2024-03-20 ENCOUNTER — LAB VISIT (OUTPATIENT)
Dept: LAB | Facility: HOSPITAL | Age: 89
End: 2024-03-20
Attending: INTERNAL MEDICINE
Payer: MEDICARE

## 2024-03-20 DIAGNOSIS — D50.9 IRON DEFICIENCY ANEMIA, UNSPECIFIED: ICD-10-CM

## 2024-03-20 DIAGNOSIS — K21.9 ESOPHAGEAL REFLUX: Primary | ICD-10-CM

## 2024-03-20 DIAGNOSIS — K21.9 ESOPHAGEAL REFLUX: ICD-10-CM

## 2024-03-20 DIAGNOSIS — K59.00 COLONIC CONSTIPATION: ICD-10-CM

## 2024-03-20 LAB
BASOPHILS # BLD AUTO: 0.06 K/UL (ref 0–0.2)
BASOPHILS NFR BLD: 1.2 % (ref 0–1.9)
DIFFERENTIAL METHOD BLD: ABNORMAL
EOSINOPHIL # BLD AUTO: 0.1 K/UL (ref 0–0.5)
EOSINOPHIL NFR BLD: 1.4 % (ref 0–8)
ERYTHROCYTE [DISTWIDTH] IN BLOOD BY AUTOMATED COUNT: 14.6 % (ref 11.5–14.5)
FERRITIN SERPL-MCNC: 111 NG/ML (ref 20–300)
HCT VFR BLD AUTO: 35.4 % (ref 40–54)
HGB BLD-MCNC: 11.6 G/DL (ref 14–18)
IMM GRANULOCYTES # BLD AUTO: 0.03 K/UL (ref 0–0.04)
IMM GRANULOCYTES NFR BLD AUTO: 0.6 % (ref 0–0.5)
LYMPHOCYTES # BLD AUTO: 1 K/UL (ref 1–4.8)
LYMPHOCYTES NFR BLD: 18.6 % (ref 18–48)
MCH RBC QN AUTO: 30.5 PG (ref 27–31)
MCHC RBC AUTO-ENTMCNC: 32.8 G/DL (ref 32–36)
MCV RBC AUTO: 93 FL (ref 82–98)
MONOCYTES # BLD AUTO: 0.7 K/UL (ref 0.3–1)
MONOCYTES NFR BLD: 14.5 % (ref 4–15)
NEUTROPHILS # BLD AUTO: 3.3 K/UL (ref 1.8–7.7)
NEUTROPHILS NFR BLD: 63.7 % (ref 38–73)
NRBC BLD-RTO: 0 /100 WBC
PLATELET # BLD AUTO: 282 K/UL (ref 150–450)
PMV BLD AUTO: 9.5 FL (ref 9.2–12.9)
RBC # BLD AUTO: 3.8 M/UL (ref 4.6–6.2)
WBC # BLD AUTO: 5.11 K/UL (ref 3.9–12.7)

## 2024-03-20 PROCEDURE — 82728 ASSAY OF FERRITIN: CPT | Performed by: INTERNAL MEDICINE

## 2024-03-20 PROCEDURE — 36415 COLL VENOUS BLD VENIPUNCTURE: CPT | Performed by: INTERNAL MEDICINE

## 2024-03-20 PROCEDURE — 85025 COMPLETE CBC W/AUTO DIFF WBC: CPT | Performed by: INTERNAL MEDICINE

## 2024-05-10 RX ORDER — CLOPIDOGREL BISULFATE 75 MG/1
75 TABLET ORAL DAILY
Qty: 90 TABLET | Refills: 3 | Status: SHIPPED | OUTPATIENT
Start: 2024-05-10 | End: 2025-05-10

## 2024-06-19 ENCOUNTER — OFFICE VISIT (OUTPATIENT)
Dept: CARDIOLOGY | Facility: CLINIC | Age: 89
End: 2024-06-19
Payer: MEDICARE

## 2024-06-19 VITALS
OXYGEN SATURATION: 96 % | WEIGHT: 180.75 LBS | DIASTOLIC BLOOD PRESSURE: 64 MMHG | RESPIRATION RATE: 18 BRPM | HEART RATE: 94 BPM | SYSTOLIC BLOOD PRESSURE: 126 MMHG | BODY MASS INDEX: 27.39 KG/M2 | HEIGHT: 68 IN

## 2024-06-19 DIAGNOSIS — E78.2 MIXED HYPERLIPIDEMIA: ICD-10-CM

## 2024-06-19 DIAGNOSIS — I77.810 AORTIC ROOT DILATATION: ICD-10-CM

## 2024-06-19 DIAGNOSIS — I25.10 CORONARY ARTERY DISEASE INVOLVING NATIVE CORONARY ARTERY OF NATIVE HEART WITHOUT ANGINA PECTORIS: ICD-10-CM

## 2024-06-19 DIAGNOSIS — E11.9 TYPE 2 DIABETES MELLITUS WITHOUT COMPLICATION, WITHOUT LONG-TERM CURRENT USE OF INSULIN: ICD-10-CM

## 2024-06-19 DIAGNOSIS — R79.89 ELEVATED TROPONIN: ICD-10-CM

## 2024-06-19 DIAGNOSIS — R00.2 PALPITATIONS: ICD-10-CM

## 2024-06-19 DIAGNOSIS — I20.0 UNSTABLE ANGINA: ICD-10-CM

## 2024-06-19 DIAGNOSIS — I10 ESSENTIAL HYPERTENSION, BENIGN: Primary | ICD-10-CM

## 2024-06-19 DIAGNOSIS — I25.10 CORONARY ARTERY DISEASE, UNSPECIFIED VESSEL OR LESION TYPE, UNSPECIFIED WHETHER ANGINA PRESENT, UNSPECIFIED WHETHER NATIVE OR TRANSPLANTED HEART: ICD-10-CM

## 2024-06-19 DIAGNOSIS — C44.92 SCC (SQUAMOUS CELL CARCINOMA): ICD-10-CM

## 2024-06-19 LAB
OHS QRS DURATION: 112 MS
OHS QTC CALCULATION: 412 MS

## 2024-06-19 PROCEDURE — 99999 PR PBB SHADOW E&M-EST. PATIENT-LVL IV: CPT | Mod: PBBFAC,,, | Performed by: INTERNAL MEDICINE

## 2024-06-19 PROCEDURE — 93000 ELECTROCARDIOGRAM COMPLETE: CPT | Mod: S$GLB,,, | Performed by: INTERNAL MEDICINE

## 2024-06-19 PROCEDURE — 1126F AMNT PAIN NOTED NONE PRSNT: CPT | Mod: CPTII,S$GLB,, | Performed by: INTERNAL MEDICINE

## 2024-06-19 PROCEDURE — 3288F FALL RISK ASSESSMENT DOCD: CPT | Mod: CPTII,S$GLB,, | Performed by: INTERNAL MEDICINE

## 2024-06-19 PROCEDURE — 1159F MED LIST DOCD IN RCRD: CPT | Mod: CPTII,S$GLB,, | Performed by: INTERNAL MEDICINE

## 2024-06-19 PROCEDURE — 99214 OFFICE O/P EST MOD 30 MIN: CPT | Mod: S$GLB,,, | Performed by: INTERNAL MEDICINE

## 2024-06-19 PROCEDURE — 1101F PT FALLS ASSESS-DOCD LE1/YR: CPT | Mod: CPTII,S$GLB,, | Performed by: INTERNAL MEDICINE

## 2024-06-19 NOTE — PROGRESS NOTES
CARDIOVASCULAR CONSULTATION    REASON FOR CONSULT:   Juan Martínez Jr. is a 88 y.o. male who presents for follow-up recent hospitalization for a non-STEMI..      HISTORY OF PRESENT ILLNESS:     Notes from August 2019:   Patient is here for follow-up today.  Was recently admitted for hypertensive urgency.  Did not have any chest pains during the hypertensive urgency.  Troponins were found to be mildly elevated.  Was evaluated by Cardiology and Norvasc was restarted.  His blood pressure has been well controlled since then.  Denies any chest pains at rest on exertion, orthopnea, PND, swelling of feet.      Notes from July 2019  Patient is here for follow-up today.  Denies any chest pains at rest on exertion, orthopnea, PND.  Denies any claudication symptoms.  Denies any dyspnea at rest on exertion    Note from clinic visit in May 2019:  Patient is 83-year-old man pleasant man with a past medical history significant for diabetes, dyslipidemia, hypertension, coronary artery disease who recently presented to the hospital with a non-STEMI.  Coronary angiogram performed at that time revealed nonischemic coronary artery disease of the LAD and RCA as well as 80% stenosis in his diagonal 1.  Which is being managed medically.  A week after that he developed some right-sided chest pain, different from his anginal pain which he had presented with his non-STEMI.  He states that right-sided chest pain resolved on its own in less than 20 min, but since this is all new for him he decided to come to the hospital to make sure everything was okay.  He was admitted and serial troponins did not reveal any elevation and he was discharged home.  He has not had any further episodes of chest pains.  Denies orthopnea, PND chest pains at rest or on exertion.  Denies typical claudication pain, has knee pains and atypical leg pain.      Notes from October 2019:  Patient here for follow-up.  States that had a brief episode of right-sided  chest pain which lasted a few seconds to few minutes and then went away.  This was different than the anginal pain he had in the past.  Denies any orthopnea, PND, swelling of feet.  States that he tried telling is  that this he felt was related to his lungs, and an checks x-ray done yesterday.  Denies any recent fevers or chills.    Notes from December 2019:  Patient here for follow-up.  Denies any chest pains at rest on exertion, orthopnea, PND, swelling of feet.  Blood pressure mildly elevated at clinic.  At home states blood pressure stays around 140-145 mm systolic.  I will increase his Toprol-XL to 75 mg daily.    Notes from January 2020:  Patient here because states that yesterday he felt some chest pain.  It was right-sided.  States did not feel like it was from his heart but just wanted to make sure.  He took nitroglycerines and had no response to nitroglycerin.  States that did not feel like his earlier anginal pains.    Notes from May 2020:  Patient here for follow-up did not have any further episodes of chest pain.  Is looking for clearance for EGD.  Denies any orthopnea, PND, swelling of feet.    Notes from September 2020:  Patient here for follow-up.  Denies any anginal sounding chest pains, orthopnea, PND.  States he had an episode last Friday where he took a deep breath and felt a sharp pain which lasted 1 sec and then went away.  Denies any anginal sounding chest pains on exertion.  Denies any orthopnea, PND, swelling of feet.  EKG done in the clinic today was personally reviewed and demonstrated normal sinus rhythm, left axis deviation.  Abnormal EKG.    Notes from December 2020:  Patient here follow-up.  Denies any chest pains rest exertion PND.  Doing fine.  No cardiac issues.    Notes from February 2021:  Patient here follow-up.  Denies any chest pains at rest on exertion, orthopnea, PND.  EKG done in the clinic today was personally reviewed and demonstrated normal sinus rhythm with left  anterior fascicular block.  No other cardiac symptomatology.    Notes from June 2021:  Patient here for follow-up.  Doing fine.  Denies any chest pains at rest on exertion, orthopnea PND.    Notes from July 21:  Patient here for after recent hospitalization.  Had come in with non-STEMI.  Culprit lesion was distal RCA.  Underwent PCI distal RCA.  Doing fine.  No chest pains.  No complications from cardiac catheterization.    October 21:  Patient here for follow-up.  Denies any chest pains at rest on exertion, orthopnea, PND.  Doing fine.    February 2022 patient here for follow-up.  Doing fine.  No anginal sounding chest pains, orthopnea, PND.    Notes from July 2022:  Patient here for follow-up.  Denies any chest at rest on exertion, orthopnea, PND, swelling of feet \    Notes from October 2022: Patient here for follow-up.  Denies any chest pains at rest on exertion, orthopnea, PND.  Had hip replacement recently.  Bilateral pedal edema since then P    Notes from January 23: Patient here for follow-up.  States lately has been experiencing night sweats and wakes up sweaty in the morning.  Also sometimes feels his heart is beating very fast.  Occurs about once a week.  Has had multiple ER visits for evaluation of this.  Denies any angina.      Notes from March 2023: Patient here for follow-up.  Doing fine.  Significant drop in cholesterol.  Blood pressure well controlled    Baseline rhythm was sinus bradycardia with normal intervals and an initial heart rate of 59 bpm.  There were no reported symptoms.  There were occasional PACs and very rare PVCs.  There was no evidence of high-degree AV block, nor were there prolonged pauses.  There were no atrial or ventricular arrhythmias.        Notes from June 23: Patient here for follow-up.  Doing fine.  Denies any chest pains at rest on exertion, orthopnea, PND, swelling of feet    Notes from December 23:  Patient here for follow-up.  Denies chest pains at rest on exertion,  orthopnea, PND.    Notes from March 2024: Patient here for follow-up.  Doing fine.  Denies any chest pains at rest on exertion, orthopnea, PND.    Notes from June 24: Patient here for follow-up.  Doing fine.  Denies chest pains at rest on exertion, orthopnea, PND    PAST MEDICAL HISTORY:     Past Medical History:   Diagnosis Date    Coronary artery disease     Diabetes mellitus     High cholesterol     Hypertension     NSTEMI (non-ST elevated myocardial infarction) 07/25/2021    Prostate cancer        PAST SURGICAL HISTORY:     Past Surgical History:   Procedure Laterality Date    APPLICATION OF FULL-THICKNESS SKIN GRAFT (FTSG) TO UPPER EXTREMITY Right 11/15/2018    Procedure: APPLICATION, GRAFT, SKIN, FULL-THICKNESS, TO UPPER EXTREMITY VS STSG WITH FROZEN SECTIONS;  Surgeon: Alan Arzola MD;  Location: [facility] OR;  Service: Plastics;  Laterality: Right;    EXCISION OF SQUAMOUS CELL CARCINOMA Right 11/15/2018    Procedure: EXCISION, CARCINOMA, SQUAMOUS CELL @ RIGHT HAND;  Surgeon: Alan Arzola MD;  Location: [facility] OR;  Service: Plastics;  Laterality: Right;  RN PREOP 11/13/2018--NEED H/P    HEMIARTHROPLASTY OF HIP Right 08/14/2022    Procedure: HEMIARTHROPLASTY, HIP;  Surgeon: Edis Villanueva MD;  Location: [facility] OR;  Service: Orthopedics;  Laterality: Right;    JOINT REPLACEMENT      LEFT HEART CATHETERIZATION Left 05/06/2019    Procedure: Left heart cath;  Surgeon: Rashad Nieto MD;  Location: [facility] CATH LAB;  Service: Cardiology;  Laterality: Left;    LEFT HEART CATHETERIZATION Left 07/26/2021    Procedure: Left heart cath, radial;  Surgeon: Rashad Nieto MD;  Location: [facility] CATH LAB;  Service: Cardiology;  Laterality: Left;    TONSILLECTOMY      TOTAL KNEE ARTHROPLASTY Right        ALLERGIES AND MEDICATION:   Review of patient's allergies indicates:  No Known Allergies     Medication List            Accurate as of June 19, 2024  2:27 PM. If you have any questions, ask your nurse or doctor.                 CONTINUE taking these medications      albuterol 90 mcg/actuation inhaler  Commonly known as: PROVENTIL/VENTOLIN HFA     aluminum-magnesium hydroxide-simethicone 200-200-20 mg/5 mL Susp  Commonly known as: MAALOX ADVANCED  Take 30 mLs by mouth 4 (four) times daily before meals and nightly. for 7 days     amLODIPine 10 MG tablet  Commonly known as: NORVASC  Take 1 tablet (10 mg total) by mouth once daily.     aspirin 81 MG EC tablet  Commonly known as: ECOTRIN  Take 1 tablet (81 mg total) by mouth once daily.     atorvastatin 40 MG tablet  Commonly known as: LIPITOR  Take 1 tablet (40 mg total) by mouth once daily.     blood-glucose meter Misc     clopidogreL 75 mg tablet  Commonly known as: PLAVIX  Take 1 tablet (75 mg total) by mouth once daily.     EScitalopram oxalate 10 MG tablet  Commonly known as: LEXAPRO     furosemide 20 MG tablet  Commonly known as: LASIX  Take 1 tablet (20 mg total) by mouth as needed (use as needed for fluid buildup).     hydroCHLOROthiazide 12.5 mg capsule  Commonly known as: MICROZIDE  Take 2 capsules (25 mg total) by mouth once daily.     isosorbide mononitrate 60 MG 24 hr tablet  Commonly known as: IMDUR  Take 1 tablet (60 mg total) by mouth once daily.     LANCETS & BLOOD GLUCOSE STRIPS MISC     metFORMIN 1000 MG tablet  Commonly known as: GLUCOPHAGE  Take 1 tablet (1,000 mg total) by mouth 2 (two) times daily with meals.     metoprolol succinate 50 MG 24 hr tablet  Commonly known as: TOPROL-XL  Take 1 tablet (50 mg total) by mouth once daily.     nitroGLYCERIN 0.4 MG SL tablet  Commonly known as: NITROSTAT  Place 1 tablet (0.4 mg total) under the tongue every 5 (five) minutes as needed for Chest pain.     oxyCODONE 5 MG immediate release tablet  Commonly known as: ROXICODONE  Take 1 tablet (5 mg total) by mouth every 4 (four) hours as needed (breakthrough pain.).     simethicone 125 MG chewable tablet  Commonly known as: MYLICON  Take 1 tablet (125 mg total) by mouth every 6  "(six) hours as needed for Flatulence.     thiamine 250 MG tablet              SOCIAL HISTORY:     Social History     Socioeconomic History    Marital status:    Tobacco Use    Smoking status: Former     Current packs/day: 0.00     Types: Cigarettes     Quit date: 2016     Years since quittin.8    Smokeless tobacco: Never    Tobacco comments:     2016   Substance and Sexual Activity    Alcohol use: No    Drug use: No    Sexual activity: Not Currently       FAMILY HISTORY:     Family History   Problem Relation Name Age of Onset    Heart disease Mother      Stroke Father         REVIEW OF SYSTEMS:   Review of Systems   Constitutional: Negative.    HENT: Negative.     Eyes: Negative.    Respiratory: Negative.     Cardiovascular:  Positive for leg swelling.   Gastrointestinal: Negative.    Genitourinary: Negative.    Skin: Negative.    Neurological: Negative.    Endo/Heme/Allergies: Negative.        A 10 point review of systems was performed and all the pertinent positives have been mentioned. Rest of review of systems was negative.        PHYSICAL EXAM:     Vitals:    24 1328   BP: 126/64   Pulse: 94   Resp: 18    Body mass index is 27.49 kg/m².  Weight: 82 kg (180 lb 12.4 oz)   Height: 5' 8" (172.7 cm)     Physical Exam  Vitals and nursing note reviewed.   Constitutional:       Appearance: Normal appearance. He is well-developed.   HENT:      Head: Normocephalic and atraumatic.      Right Ear: Hearing normal.      Left Ear: Hearing normal.      Nose: Nose normal.   Eyes:      General: Lids are normal.      Conjunctiva/sclera: Conjunctivae normal.      Pupils: Pupils are equal, round, and reactive to light.   Cardiovascular:      Rate and Rhythm: Normal rate and regular rhythm.      Pulses: Normal pulses.      Heart sounds: Normal heart sounds.   Pulmonary:      Effort: Pulmonary effort is normal.      Breath sounds: Normal breath sounds.   Abdominal:      Palpations: Abdomen is soft.      " Tenderness: There is no abdominal tenderness.   Musculoskeletal:         General: No deformity.      Cervical back: Normal range of motion and neck supple.      Right lower leg: Edema present.      Left lower leg: Edema present.   Skin:     General: Skin is warm and dry.   Neurological:      Mental Status: He is alert and oriented to person, place, and time.   Psychiatric:         Speech: Speech normal.           DATA:     Laboratory:  CBC:  Recent Labs   Lab 08/31/23  0716 09/20/23  1250 03/20/24  0935   WBC 6.77 7.84 5.11   Hemoglobin 11.5 L 11.1 L 11.6 L   Hematocrit 35.7 L 33.7 L 35.4 L   Platelets 240 267 282       CHEMISTRIES:  Recent Labs   Lab 07/25/21  0503 07/26/21  0348 07/27/21  0550 07/30/21  0116 11/11/21  0609 01/11/22  2335 08/13/22  1118 12/18/22  0519 01/05/23  0400 02/13/23  0350   Glucose 114 H 126 H   < > 119 H 179 H 138 H   < > 155 H 99 89   Sodium 138 137   < > 135 L 138 138   < > 137 140 134 L   Potassium 4.1 3.4 L   < > 3.2 L 3.7 3.4 L   < > 3.4 L 3.4 L SEE COMMENT   BUN 13 12   < > 15 14 18   < > 17 14 15   Creatinine 1.0 0.9   < > 1.0 1.0 1.2   < > 1.1 0.9 0.9   eGFR if African American >60 >60   < > >60 >60 >60  --   --   --   --    eGFR if non African American >60 >60   < > >60 >60 55 A  --   --   --   --    Calcium 8.8 9.2   < > 9.5 9.6 9.0   < > 9.0 8.9 8.4 L   Magnesium 1.8 1.9  --   --  2.0  --   --   --   --   --     < > = values in this interval not displayed.       CARDIAC BIOMARKERS:  Recent Labs   Lab 01/05/23  0400 01/05/23  0632 02/13/23  0350   Troponin I 0.012 0.008 <0.006       COAGS:  Recent Labs   Lab 11/11/21  0609 08/13/22  1118 08/13/22  1540   INR 1.0 1.0 1.0       LIPIDS/LFTS:  Recent Labs   Lab 12/18/22  0519 01/05/23  0400 02/13/23  0350   AST 13 15 46 H   ALT 10 12 11       Hemoglobin A1C   Date Value Ref Range Status   08/17/2022 6.4 (H) 4.0 - 5.6 % Final     Comment:     ADA Screening Guidelines:  5.7-6.4%  Consistent with prediabetes  >or=6.5%  Consistent with  diabetes    High levels of fetal hemoglobin interfere with the HbA1C  assay. Heterozygous hemoglobin variants (HbS, HgC, etc)do  not significantly interfere with this assay.   However, presence of multiple variants may affect accuracy.     05/07/2019 7.0 (H) 4.0 - 5.6 % Final     Comment:     ADA Screening Guidelines:  5.7-6.4%  Consistent with prediabetes  >or=6.5%  Consistent with diabetes  High levels of fetal hemoglobin interfere with the HbA1C  assay. Heterozygous hemoglobin variants (HbS, HgC, etc)do  not significantly interfere with this assay.   However, presence of multiple variants may affect accuracy.     06/19/2018 7.3 (H) 4.0 - 5.6 % Final     Comment:     ADA Screening Guidelines:  5.7-6.4%  Consistent with prediabetes  >or=6.5%  Consistent with diabetes  High levels of fetal hemoglobin interfere with the HbA1C  assay. Heterozygous hemoglobin variants (HbS, HgC, etc)do  not significantly interfere with this assay.   However, presence of multiple variants may affect accuracy.       Hemoglobin A1c   Date Value Ref Range Status   12/02/2021 6.7 (H) <5.7 % of total Hgb Final     Comment:     For someone without known diabetes, a hemoglobin A1c  value of 6.5% or greater indicates that they may have   diabetes and this should be confirmed with a follow-up   test.    For someone with known diabetes, a value <7% indicates   that their diabetes is well controlled and a value   greater than or equal to 7% indicates suboptimal   control. A1c targets should be individualized based on   duration of diabetes, age, comorbid conditions, and   other considerations.    Currently, no consensus exists regarding use of  hemoglobin A1c for diagnosis of diabetes for children.       07/23/2021 6.8 (H) <5.7 % of total Hgb Final     Comment:     For someone without known diabetes, a hemoglobin A1c  value of 6.5% or greater indicates that they may have   diabetes and this should be confirmed with a follow-up   test.    For  someone with known diabetes, a value <7% indicates   that their diabetes is well controlled and a value   greater than or equal to 7% indicates suboptimal   control. A1c targets should be individualized based on   duration of diabetes, age, comorbid conditions, and   other considerations.    Currently, no consensus exists regarding use of  hemoglobin A1c for diagnosis of diabetes for children.       03/16/2021 6.4 (H) <5.7 % of total Hgb Final     Comment:     For someone without known diabetes, a hemoglobin   A1c value between 5.7% and 6.4% is consistent with  prediabetes and should be confirmed with a   follow-up test.    For someone with known diabetes, a value <7%  indicates that their diabetes is well controlled. A1c  targets should be individualized based on duration of  diabetes, age, comorbid conditions, and other  considerations.    This assay result is consistent with an increased risk  of diabetes.    Currently, no consensus exists regarding use of  hemoglobin A1c for diagnosis of diabetes for children.       TSH        The ASCVD Risk score (Guanako CHENG, et al., 2019) failed to calculate for the following reasons:    The 2019 ASCVD risk score is only valid for ages 40 to 79    The patient has a prior MI or stroke diagnosis           Cardiovascular Testing:    EKG: (personally reviewed tracing)  May 2019:  Normal sinus rhythm, left axis deviation.  Abnormal EKG.    2D echocardiogram May 2019:  Normal left ventricular systolic function. The estimated ejection fraction is 65%  Normal LV diastolic function.  Mild left atrial enlargement.  Normal central venous pressure (3 mm Hg).  The estimated PA systolic pressure is 10 mm Hg       Coronary angiogram 6th May 2019:    No acute thrombotic lesion identified.  Coronary artery disease as described below  LVEDP: 12 mmHg    Carotid ultrasound May 28, 2019:    There is 20-39% right Internal Carotid Stenosis.  There is 20-39% left Internal Carotid Stenosis.     ABIs  May 28, 2019:    Noncompressible LOW bilaterally.  Mildly decrease TBI bilaterally.  Normal PVR waveforms bilaterally.       Coronary Anatomy:  LM:  No significant stenosis  LAD:  Mid LAD 60-70% stenosis.  Distal LAD 50% stenosis.  IFR 0.92 (nonischemic).  Diagonal 1 is a tortuous vessel with mid 80% stenosis.  Will medically manage this diagonal stenosis  LCx :  Mild nonobstructive CAD  RCA:  50% distal RCA/proximal PDA lesion.  IFR 1 (nonischemic)     Impression / Plan  Coronary artery disease as described above.  Continue medical management with aggressive risk factor modification.  Continue aspirin 81 mg daily. Change simvastatin to atorvastatin 80 mg daily.        ASSESSMENT AND PLAN     Patient Active Problem List   Diagnosis    Essential hypertension, benign    Type 2 diabetes mellitus    Hyperlipidemia    Body mass index 28.0-28.9, adult    History of prostate cancer    Orthostatic hypotension    Diabetic ulcer of toe of left foot associated with type 2 diabetes mellitus, with fat layer exposed    SCC (squamous cell carcinoma)    Coronary artery disease involving native coronary artery of native heart without angina pectoris    Unstable angina    Palpitations    Elevated troponin    NSTEMI (non-ST elevated myocardial infarction)    Hip fracture    Aortic root dilatation    Acute blood loss anemia    Diaphoresis       1.  Patient with coronary artery disease, now angina free.  Status post PCI distal RCA in July of 2021. Continue aspirin 81 mg daily indefinitely and Plavix 75 mg daily uninterrupted for at least 1 year from date of PCI.  Diagonal 1 disease being managed medically.  Continues to be angina free    2.  Hypertension:  Well controlled on current therapy.      3.  Cardiovascular screening with rest and stress ABIs and carotid ultrasound performed.  ABIs demonstrated noncompressible ABIs bilaterally, mildly decreased ABIs bilaterally with normal PVR waveforms bilaterally.  I got a peripheral  ultrasound for further evaluation of his abnormal TBI.   ultrasound did not show any flow restriction in the right or the left lower extremity.    4.  Dyslipidemia:  Continue medical management with atorvastatin.    Lab Results   Component Value Date    LDLCALC 73.0 05/06/2019         5. Bilateral pedal edema after hip surgery.  Lasix as needed.  Check venous ultrasound to rule out DVT.    6.  May hold Plavix as needed for procedures.    7. Palpitations and night sweats.  Further evaluation of night sweats by primary care physician.  Event monitor did not show any significant arrhythmia.      Fu in 4 m    Thank you very much for involving me in the care of your patient.  Please do not hesitate to contact me if there are any questions.      Rashad Nieto MD, FACC, [facility]  Interventional Cardiologist, [facility].       This note was dictated with the help of speech recognition software.  There might be un-intended errors and/or substitutions.

## 2024-07-10 NOTE — TELEPHONE ENCOUNTER
----- Message from Sabino Aguirre MA sent at 7/10/2024 12:35 PM CDT -----  Type: Patient Call Back    Who called: Sister    What is the request in detail: is asking for a call regarding his prescription being changed to reflect how he takes it..     Can the clinic reply by [facility]?NO    Would the patient rather a call back or a response via [facility]? yes, call     Best call back number: [phone]

## 2024-07-10 NOTE — TELEPHONE ENCOUNTER
Patient needs a new rx for lasix. His sister states that he takes it 2 tablets twice a day. He is running out. christopher

## 2024-07-12 ENCOUNTER — TELEPHONE (OUTPATIENT)
Dept: CARDIOLOGY | Facility: CLINIC | Age: 89
End: 2024-07-12
Payer: MEDICARE

## 2024-07-12 NOTE — TELEPHONE ENCOUNTER
----- Message from Elaine Box sent at 7/12/2024 10:08 AM CDT -----  Regarding: Sister Samanta [phone] or [phone]   Type: RX Refill Request    Who Called: Sister     Have you contacted your pharmacy: yes ( pt states Dr changed the dosage but never sent a new script in )     Refill    RX Name and Strength:furosemide (LASIX) 20 MG tablet    Preferred Pharmacy with phone number: .      [facility] #[id number] - [address] AT [address]  [facility] [address]  Phone: [phone] Fax: [phone]        Local or Mail Order: local     Would the patient rather a call back or a response via [facility]? Call back     Best Call Back Number:.[phone] or [phone]      Additional Information:  Pt states this is the 3rd call  for this issue, Nurse was supposed to call back and hasn't heard anything     Thank you.

## 2024-07-13 RX ORDER — FUROSEMIDE 20 MG/1
40 TABLET ORAL 2 TIMES DAILY
Qty: 90 TABLET | Refills: 3 | Status: SHIPPED | OUTPATIENT
Start: 2024-07-13

## 2024-07-25 NOTE — TELEPHONE ENCOUNTER
----- Message from Belkis Lawler sent at 7/25/2024 10:20 AM CDT -----  Regarding: self  Type: RX Refill Request     Who Called:     Have you contacted your pharmacy:     Refill     RX Name and Strength:furosemide (LASIX) 20 MG tablet     Preferred Pharmacy with phone number:      [facility] #[id number] - [place] AT [address]  [place] [address]  Phone: [phone] Fax: [phone]         Local or Mail Order:local     Would the patient rather a call back or a response via [facility]?call     Best Call Back Number: [phone]       Additional Information:pt is stating he only received a partial fill, he takes it twice a day     Thank you.

## 2024-07-30 RX ORDER — FUROSEMIDE 20 MG/1
40 TABLET ORAL 2 TIMES DAILY
Qty: 90 TABLET | Refills: 3 | Status: SHIPPED | OUTPATIENT
Start: 2024-07-30

## 2024-08-01 ENCOUNTER — TELEPHONE (OUTPATIENT)
Dept: CARDIOLOGY | Facility: CLINIC | Age: 89
End: 2024-08-01
Payer: MEDICARE

## 2024-08-01 NOTE — TELEPHONE ENCOUNTER
Spoke with patient and let him know that Dr. Nieto sent a new prescription with the requested changes and to please contact his pharmacy for further assistance. Patient voiced understanding.

## 2024-08-01 NOTE — TELEPHONE ENCOUNTER
----- Message from Mariposa Ayala sent at 8/1/2024  1:30 PM CDT -----  Regarding: SELF  Type: Patient Call Back     Who called:SELF     What is the request in detail:pt called to find out if the pharmacy was contacted regarding furosemide (LASIX) 20 MG tablet. He states the pharmacy will not refill medication unless the instructions state he takes 2 tablets a day, one in the morning and one in evening     Can the clinic reply by [facility]?-No     Would the patient rather a call back or a response via [facility]?-CALL back     Best call back number: [phone]     Additional Information:   [id number] [place] [address] [place] [address] [place] [address]  Phone: [phone] Fax: [phone]

## 2024-08-05 RX ORDER — METOPROLOL SUCCINATE 50 MG/1
50 TABLET, EXTENDED RELEASE ORAL DAILY
Qty: 90 TABLET | Refills: 3 | Status: CANCELLED | OUTPATIENT
Start: 2024-08-05

## 2024-08-05 RX ORDER — CLOPIDOGREL BISULFATE 75 MG/1
75 TABLET ORAL DAILY
Qty: 90 TABLET | Refills: 3 | Status: CANCELLED | OUTPATIENT
Start: 2024-08-05 | End: 2025-08-05

## 2024-08-05 RX ORDER — ISOSORBIDE MONONITRATE 60 MG/1
60 TABLET, EXTENDED RELEASE ORAL DAILY
Qty: 90 TABLET | Refills: 3 | Status: CANCELLED | OUTPATIENT
Start: 2024-08-05

## 2024-08-05 RX ORDER — FUROSEMIDE 20 MG/1
40 TABLET ORAL 2 TIMES DAILY
Qty: 90 TABLET | Refills: 3 | Status: SHIPPED | OUTPATIENT
Start: 2024-08-05

## 2024-08-05 RX ORDER — ATORVASTATIN CALCIUM 40 MG/1
40 TABLET, FILM COATED ORAL DAILY
Qty: 90 TABLET | Refills: 3 | Status: CANCELLED | OUTPATIENT
Start: 2024-08-05 | End: 2025-08-05

## 2024-08-09 ENCOUNTER — PATIENT MESSAGE (OUTPATIENT)
Dept: CARDIOLOGY | Facility: CLINIC | Age: 89
End: 2024-08-09
Payer: MEDICARE

## 2024-08-09 RX ORDER — ISOSORBIDE MONONITRATE 60 MG/1
60 TABLET, EXTENDED RELEASE ORAL DAILY
Qty: 90 TABLET | Refills: 3 | Status: SHIPPED | OUTPATIENT
Start: 2024-08-09

## 2024-08-09 RX ORDER — ATORVASTATIN CALCIUM 40 MG/1
40 TABLET, FILM COATED ORAL DAILY
Qty: 90 TABLET | Refills: 3 | Status: SHIPPED | OUTPATIENT
Start: 2024-08-09 | End: 2025-08-09

## 2024-08-09 RX ORDER — METOPROLOL SUCCINATE 50 MG/1
50 TABLET, EXTENDED RELEASE ORAL DAILY
Qty: 90 TABLET | Refills: 3 | Status: SHIPPED | OUTPATIENT
Start: 2024-08-09

## 2024-08-09 RX ORDER — CLOPIDOGREL BISULFATE 75 MG/1
75 TABLET ORAL DAILY
Qty: 90 TABLET | Refills: 3 | Status: SHIPPED | OUTPATIENT
Start: 2024-08-09 | End: 2025-08-09

## 2024-08-09 NOTE — TELEPHONE ENCOUNTER
----- Message from Belkis Lawler sent at 8/9/2024  8:09 AM CDT -----  Regarding: self    Type: Patient Call Back     Who called:self     What is the request in detail:pt is calling back in regards to his furosemide (LASIX) 20 MG tablet, pt would like to speak to the office about the medication refill     Can the clinic reply by [facility]? No     Would the patient rather a call back or a response via [facility]? Call back     Best call back number: [phone]       Additional Information:call attempt to the office was made     Thank you.

## 2024-08-12 RX ORDER — ATORVASTATIN CALCIUM 40 MG/1
40 TABLET, FILM COATED ORAL DAILY
Qty: 90 TABLET | Refills: 3 | Status: SHIPPED | OUTPATIENT
Start: 2024-08-12 | End: 2024-08-12 | Stop reason: SDUPTHER

## 2024-08-12 RX ORDER — CLOPIDOGREL BISULFATE 75 MG/1
75 TABLET ORAL DAILY
Qty: 90 TABLET | Refills: 3 | OUTPATIENT
Start: 2024-08-12 | End: 2025-08-12

## 2024-08-12 RX ORDER — ISOSORBIDE MONONITRATE 60 MG/1
60 TABLET, EXTENDED RELEASE ORAL DAILY
Qty: 90 TABLET | Refills: 3 | Status: SHIPPED | OUTPATIENT
Start: 2024-08-12 | End: 2024-08-12 | Stop reason: SDUPTHER

## 2024-08-12 RX ORDER — CLOPIDOGREL BISULFATE 75 MG/1
75 TABLET ORAL DAILY
Qty: 90 TABLET | Refills: 3 | Status: SHIPPED | OUTPATIENT
Start: 2024-08-12 | End: 2024-08-12 | Stop reason: SDUPTHER

## 2024-08-12 RX ORDER — METOPROLOL SUCCINATE 50 MG/1
50 TABLET, EXTENDED RELEASE ORAL DAILY
Qty: 90 TABLET | Refills: 3 | Status: SHIPPED | OUTPATIENT
Start: 2024-08-12 | End: 2024-08-12 | Stop reason: SDUPTHER

## 2024-08-12 RX ORDER — FUROSEMIDE 20 MG/1
40 TABLET ORAL 2 TIMES DAILY
Qty: 90 TABLET | Refills: 3 | Status: SHIPPED | OUTPATIENT
Start: 2024-08-12 | End: 2024-08-12 | Stop reason: SDUPTHER

## 2024-08-12 RX ORDER — FUROSEMIDE 20 MG/1
40 TABLET ORAL 2 TIMES DAILY
Qty: 90 TABLET | Refills: 3 | OUTPATIENT
Start: 2024-08-12

## 2024-08-12 RX ORDER — ISOSORBIDE MONONITRATE 60 MG/1
60 TABLET, EXTENDED RELEASE ORAL DAILY
Qty: 90 TABLET | Refills: 3 | OUTPATIENT
Start: 2024-08-12

## 2024-08-12 RX ORDER — METOPROLOL SUCCINATE 50 MG/1
50 TABLET, EXTENDED RELEASE ORAL DAILY
Qty: 90 TABLET | Refills: 3 | OUTPATIENT
Start: 2024-08-12

## 2024-08-14 RX ORDER — CLOPIDOGREL BISULFATE 75 MG/1
75 TABLET ORAL DAILY
Qty: 90 TABLET | Refills: 3 | Status: SHIPPED | OUTPATIENT
Start: 2024-08-14 | End: 2025-08-14

## 2024-08-14 RX ORDER — ATORVASTATIN CALCIUM 40 MG/1
40 TABLET, FILM COATED ORAL DAILY
Qty: 90 TABLET | Refills: 3 | Status: SHIPPED | OUTPATIENT
Start: 2024-08-14 | End: 2025-08-14

## 2024-08-14 RX ORDER — ISOSORBIDE MONONITRATE 60 MG/1
60 TABLET, EXTENDED RELEASE ORAL DAILY
Qty: 90 TABLET | Refills: 3 | Status: SHIPPED | OUTPATIENT
Start: 2024-08-14

## 2024-08-14 RX ORDER — FUROSEMIDE 20 MG/1
40 TABLET ORAL 2 TIMES DAILY
Qty: 90 TABLET | Refills: 3 | Status: SHIPPED | OUTPATIENT
Start: 2024-08-14

## 2024-08-14 RX ORDER — METOPROLOL SUCCINATE 50 MG/1
50 TABLET, EXTENDED RELEASE ORAL DAILY
Qty: 90 TABLET | Refills: 3 | Status: SHIPPED | OUTPATIENT
Start: 2024-08-14

## 2024-09-01 ENCOUNTER — HOSPITAL ENCOUNTER (EMERGENCY)
Facility: HOSPITAL | Age: 89
Discharge: HOME | End: 2024-09-01
Attending: STUDENT IN AN ORGANIZED HEALTH CARE EDUCATION/TRAINING PROGRAM
Payer: MEDICARE

## 2024-09-01 VITALS
HEIGHT: 68 IN | RESPIRATION RATE: 18 BRPM | OXYGEN SATURATION: 96 % | HEART RATE: 64 BPM | WEIGHT: 180.81 LBS | SYSTOLIC BLOOD PRESSURE: 126 MMHG | DIASTOLIC BLOOD PRESSURE: 60 MMHG | TEMPERATURE: 98 F | BODY MASS INDEX: 27.4 KG/M2

## 2024-09-01 DIAGNOSIS — S52.501A CLOSED FRACTURE OF DISTAL END OF RIGHT RADIUS, UNSPECIFIED FRACTURE MORPHOLOGY, INITIAL ENCOUNTER: Primary | ICD-10-CM
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
## 2024-09-01 DIAGNOSIS — M25.559 HIP PAIN: ICD-10-CM

## 2024-09-01 DIAGNOSIS — M25.539 WRIST PAIN: ICD-10-CM

## 2024-09-01 PROCEDURE — 90715 TDAP VACCINE 7 YRS/> IM: CPT | Performed by: STUDENT IN AN ORGANIZED HEALTH CARE EDUCATION/TRAINING PROGRAM

## 2024-09-01 PROCEDURE — 99285 EMERGENCY DEPT VISIT HI MDM: CPT | Mod: 25

## 2024-09-01 PROCEDURE — 90471 IMMUNIZATION ADMIN: CPT | Performed by: STUDENT IN AN ORGANIZED HEALTH CARE EDUCATION/TRAINING PROGRAM

## 2024-09-01 PROCEDURE — 63600175 PHARM REV CODE 636 W HCPCS: Performed by: STUDENT IN AN ORGANIZED HEALTH CARE EDUCATION/TRAINING PROGRAM

## 2024-09-01 PROCEDURE — 25000003 PHARM REV CODE 250: Performed by: STUDENT IN AN ORGANIZED HEALTH CARE EDUCATION/TRAINING PROGRAM

## 2024-09-01 PROCEDURE — 29125 APPL SHORT ARM SPLINT STATIC: CPT | Mod: RT

## 2024-09-01 RX ORDER — BACITRACIN 500 [USP'U]/G
OINTMENT TOPICAL
Status: COMPLETED | OUTPATIENT
Start: 2024-09-01 | End: 2024-09-01

## 2024-09-01 RX ADMIN — BACITRACIN: 500 OINTMENT TOPICAL at 10:09

## 2024-09-01 RX ADMIN — TETANUS TOXOID, REDUCED DIPHTHERIA TOXOID AND ACELLULAR PERTUSSIS VACCINE, ADSORBED 0.5 ML: 5; 2.5; 8; 8; 2.5 SUSPENSION INTRAMUSCULAR at 10:09

## 2024-09-01 NOTE — ED PROVIDER NOTES
Encounter Date: 9/1/2024    SCRIBE #1 NOTE: I, Idalmis Moon, am scribing for, and in the presence of,  Merry Jerez DO. I have scribed the following portions of the note - Other sections scribed: HPI, ROS, PE.       History     Chief Complaint   Patient presents with    Fall     88 yo male to EMS triage for Fall. Pt on blood thinners, denies head injury or LOC. Pt has skin tear to left forearm. VSS, NAD, AAOx4     Juan Martínez Jr. is a 89 y.o. male, with PMHx of DM, CAD, HLD, HTN, who presents to the ED for evaluation of s/p fall PTA. Pt states that he lost his balance and fell backwards, landing on his bottom. Pt reports associated right arm soreness after using it break his fall and L elbow abrasion after hitting a chair during the fall. He notes that he recently had hip replacement surgery. No other exacerbating or alleviating factors. Patient denies LOC, head trauma, hip pain, chest pain, back pain, SOB, N/V/D, weakness, or other associated symptoms. Pt admits to using blood thinners and states Tdap status is unknown.     The history is provided by the patient. No  was used.     Review of patient's allergies indicates:  No Known Allergies  Past Medical History:   Diagnosis Date    Coronary artery disease     Diabetes mellitus     High cholesterol     Hypertension     NSTEMI (non-ST elevated myocardial infarction) 07/25/2021    Prostate cancer      Past Surgical History:   Procedure Laterality Date    APPLICATION OF FULL-THICKNESS SKIN GRAFT (FTSG) TO UPPER EXTREMITY Right 11/15/2018    Procedure: APPLICATION, GRAFT, SKIN, FULL-THICKNESS, TO UPPER EXTREMITY VS STSG WITH FROZEN SECTIONS;  Surgeon: Alan Arzola MD;  Location: [facility] OR;  Service: Plastics;  Laterality: Right;    EXCISION OF SQUAMOUS CELL CARCINOMA Right 11/15/2018    Procedure: EXCISION, CARCINOMA, SQUAMOUS CELL @ RIGHT HAND;  Surgeon: Alan Arzola MD;  Location: [facility] OR;  Service: Plastics;  Laterality:  Right;  RN PREOP 2018--NEED H/P    HEMIARTHROPLASTY OF HIP Right 2022    Procedure: HEMIARTHROPLASTY, HIP;  Surgeon: Edis Villanueva MD;  Location: [facility] OR;  Service: Orthopedics;  Laterality: Right;    JOINT REPLACEMENT      LEFT HEART CATHETERIZATION Left 2019    Procedure: Left heart cath;  Surgeon: Rashad Nieto MD;  Location: [facility] CATH LAB;  Service: Cardiology;  Laterality: Left;    LEFT HEART CATHETERIZATION Left 2021    Procedure: Left heart cath, radial;  Surgeon: Rashad Nieto MD;  Location: [facility] CATH LAB;  Service: Cardiology;  Laterality: Left;    TONSILLECTOMY      TOTAL KNEE ARTHROPLASTY Right      Family History   Problem Relation Name Age of Onset    Heart disease Mother      Stroke Father       Social History     Tobacco Use    Smoking status: Former     Current packs/day: 0.00     Types: Cigarettes     Quit date: 2016     Years since quittin.0    Smokeless tobacco: Never    Tobacco comments:     2016   Substance Use Topics    Alcohol use: No    Drug use: No     Review of Systems   Constitutional:  Negative for chills and fever.   HENT:  Negative for congestion and sore throat.    Eyes:  Negative for visual disturbance.   Respiratory:  Negative for cough and shortness of breath.    Cardiovascular:  Negative for chest pain.   Gastrointestinal:  Negative for abdominal pain, diarrhea, nausea and vomiting.   Genitourinary:  Negative for dysuria.   Musculoskeletal:  Positive for arthralgias (R arm). Negative for back pain, neck pain and neck stiffness.   Skin:  Positive for wound (abrasion to L elbow). Negative for rash.   Neurological:  Negative for seizures, weakness, light-headedness, numbness and headaches.        - head trauma, -LOC     Psychiatric/Behavioral:  Negative for confusion.        Physical Exam     Initial Vitals [24 0951]   BP Pulse Resp Temp SpO2   132/60 62 16 98.1 °F (36.7 °C) 95 %      MAP       --         Physical Exam    Nursing  note and vitals reviewed.  Constitutional: He appears well-developed and well-nourished. He is not diaphoretic.   HENT:   Head: Normocephalic and atraumatic.   Right Ear: External ear normal.   Left Ear: External ear normal.   Nose: Nose normal.   Mouth/Throat: Oropharynx is clear and moist.   Eyes: Conjunctivae and EOM are normal. Pupils are equal, round, and reactive to light. No scleral icterus.   Neck: Neck supple. No JVD present.   Normal range of motion.  Cardiovascular:  Normal rate, regular rhythm, normal heart sounds and intact distal pulses.           Pulses:       Radial pulses are 2+ on the right side and 2+ on the left side.        Dorsalis pedis pulses are 2+ on the right side and 2+ on the left side.        Posterior tibial pulses are 2+ on the right side and 2+ on the left side.   Pulmonary/Chest: Breath sounds normal. No stridor. No respiratory distress.   Abdominal: Abdomen is soft. He exhibits no distension. There is no abdominal tenderness. There is no rebound and no guarding.   Musculoskeletal:         General: Tenderness present. No edema.      Right upper arm: Normal.      Left upper arm: Normal.      Right elbow: Normal.      Left elbow: Normal.      Right forearm: Normal.      Left forearm: Normal.      Right wrist: Swelling and tenderness present. No bony tenderness, snuff box tenderness or crepitus. Normal range of motion. Normal pulse.      Left wrist: Normal.      Right hand: Normal.      Left hand: Normal.      Cervical back: Normal, normal range of motion and neck supple.      Thoracic back: Normal.      Lumbar back: Normal.      Right hip: Normal.      Right upper leg: Normal.      Left upper leg: Normal.      Right knee: Normal.      Left knee: Normal.      Right lower leg: Normal.      Left lower leg: Normal.      Right ankle: Normal.      Left ankle: Normal.      Right foot: Normal.      Left foot: Normal.     Neurological: He is alert and oriented to person, place, and time. He  has normal strength.   Moves all extremities, follows all commands, no focal neurologic deficits.     Skin: Skin is warm and dry. No rash noted. No erythema.   Large skin tear to the posterior aspect of the left upper extremity. See imaging below.    Psychiatric: He has a normal mood and affect.         ED Course   Critical Care    Date/Time: 9/1/2024 12:41 PM    Performed by: Merry Jerez DO  Authorized by: Merry Jerez DO  Direct patient critical care time: 20 minutes  Additional history critical care time: 10 minutes  Ordering / reviewing critical care time: 10 minutes  Documentation critical care time: 10 minutes  Consulting other physicians critical care time: 10 minutes  Consult with family critical care time: 10 minutes  Total critical care time (exclusive of procedural time) : 70 minutes  Critical care time was exclusive of separately billable procedures and treating other patients and teaching time.  Critical care was necessary to treat or prevent imminent or life-threatening deterioration of the following conditions: trauma.  Critical care was time spent personally by me on the following activities: development of treatment plan with patient or surrogate, discussions with consultants, examination of patient, evaluation of patient's response to treatment, obtaining history from patient or surrogate, ordering and performing treatments and interventions, ordering and review of radiographic studies, re-evaluation of patient's condition and review of old charts.        Labs Reviewed - No data to display       Imaging Results              X-Ray Lumbar Spine Ap And Lateral (Final result)  Result time 09/01/24 11:45:32      Final result by Cihnmay Riley MD (09/01/24 11:45:32)                   Impression:      As above.      Electronically signed by: Chinmay Riley MD  Date:    09/01/2024  Time:    11:45               Narrative:    EXAMINATION:  XR LUMBAR SPINE AP AND LATERAL    CLINICAL  HISTORY:  back pain;    TECHNIQUE:  AP, lateral and spot images were performed of the lumbar spine.    FINDINGS:  The lumbar spinal alignment is satisfactorily preserved.  There is loss of disc space height with degenerative endplate change and facet hypertrophy throughout the lumbar spine.  There is no fracture, dislocation, or bony erosion.  There is extensive arterial calcification.                                       X-Ray Hips Bilateral 2 View Incl AP Pelvis (Final result)  Result time 09/01/24 11:46:14      Final result by Chinmay Riley MD (09/01/24 11:46:14)                   Impression:      As above.      Electronically signed by: Chinmay Riley MD  Date:    09/01/2024  Time:    11:46               Narrative:    EXAMINATION:  XR HIPS BILATERAL 2 VIEW INCL AP PELVIS    CLINICAL HISTORY:  Pain in unspecified hip    TECHNIQUE:  AP view of the pelvis and frogleg lateral views of both hips were performed.    FINDINGS:  There is a right hip prosthesis present with no evidence of hardware failure.  The left hip joint space is satisfactorily preserved.  There is arterial calcification.                                       X-Ray Wrist Complete Right (Final result)  Result time 09/01/24 11:47:35      Final result by Chinmay Riley MD (09/01/24 11:47:35)                   Impression:      As above.      Electronically signed by: Chinmay Riley MD  Date:    09/01/2024  Time:    11:47               Narrative:    EXAMINATION:  XR WRIST COMPLETE 3 VIEWS RIGHT    CLINICAL HISTORY:  Pain in unspecified wrist    TECHNIQUE:  PA, lateral, and oblique views of the right wrist were performed.    FINDINGS:  There is a displaced comminuted fracture of the distal radius which extends into the radiocarpal joint.  There is extensive arterial calcification.                                       X-Ray Humerus 2 View Left (Final result)  Result time 09/01/24 11:48:01      Final result by Chinmay Riley MD (09/01/24 11:48:01)                    Impression:      As above.      Electronically signed by: Chinmay Riley MD  Date:    09/01/2024  Time:    11:48               Narrative:    EXAMINATION:  XR HUMERUS 2 VIEW LEFT    CLINICAL HISTORY:  Unspecified fall, initial encounter    TECHNIQUE:  Humerus two views left    FINDINGS:  There is no acute fracture, dislocation, or bony erosion.                                       Medications   Tdap (BOOSTRIX) vaccine injection 0.5 mL (0.5 mLs Intramuscular Given 9/1/24 1047)   bacitracin ointment ( Topical (Top) Given 9/1/24 1045)     Medical Decision Making   MDM  This is an emergent evaluation of a 89 y.o. male, with PMHx of DM, CAD, HLD, HTN, who presents to the ED for evaluation of s/p fall PTA. Initial vitals in the ED [09/01/24 0951]  BP: 132/60  Pulse: 62  Resp: 16  Temp: 98.1 °F (36.7 °C)  SpO2: 95 % .     Physical exam noted above. DDx includes but is not limited to fracture, dislocation, contusion, abrasion. Also considered but clinically less likely to be septic joint, compartment syndrome, stroke, intracranial hemorrhage, subdural hematoma, epidural hematoma. Will obtain labs and imaging including xrays of bilateral hips, lumbar spine, left humerus, and right wrist. Will also provide Tdap update, bacitracin and wound care. Offered pain medication but patient deferred at this time. Will continue to monitor and frequently reassess pending results of labs, treatments and final disposition.    Patient is aware of plan and is amenable.     Merry Jerez D.O  EMERGENCY MEDICINE  12:31 PM 09/01/2024    UPDATE  Xray of the right wrist shows a displaced comminuted fracture of the distal radius which extends into the radiocarpal joint. Remainder of imaging unremarkable. Patient consulted to orthopedics and case discussed with Dr. Villanueva, who recommended splint placement and outpatient orthopedic follow-up in his office on 9/3. Patient offered pain medication but deferred. Splint placed. Will  discharge with Orthopedic follow-up and ED return precautions. Patient and his sister at bedside are aware and agreeable to the plan.     Merry Jerez D.O  EMERGENCY MEDICINE   12:33 PM 09/01/2024       This chart was completed using dictation software, as a result there may be some transcription errors     Amount and/or Complexity of Data Reviewed  Radiology: ordered and independent interpretation performed. Decision-making details documented in ED Course.    Risk  OTC drugs.  Prescription drug management.            Scribe Attestation:   Scribe #1: I performed the above scribed service and the documentation accurately describes the services I performed. I attest to the accuracy of the note.                               Clinical Impression:  Final diagnoses:  [M25.559] Hip pain  [M25.539] Wrist pain  [W19.XXXA] Fall  [S52.501A] Closed fracture of distal end of right radius, unspecified fracture morphology, initial encounter (Primary)          ED Disposition Condition    Discharge Stable          DISCLAIMER: This note was prepared with CRAZE voice recognition transcription software. Garbled syntax, mangled pronouns, and other bizarre constructions may be attributed to that software system.     I, Merry Jerez DO, personally performed the services described in this documentation. All medical record entries made by the scribe were at my direction and in my presence. I have reviewed the chart and agree that the record reflects my personal performance and is accurate and complete.  ED Prescriptions    None       Follow-up Information       Follow up With Specialties Details Why Contact Info    Lissy Gavin MD Internal Medicine Schedule an appointment as soon as possible for a visit   [address] [place] [address]  [phone]      [facility] Emergency Dept Emergency Medicine Go to  If symptoms worsen [address] [facility] [place]  [address]  [phone]    Edis Villanueva MD Orthopedic Surgery Go on 9/3/2024 Emergency Room Follow-up, Please go to Dr. Villanueva's office at 1 pm on 9/3/2024 [id number] [facility]  [address]  [facility] [address]  [phone]               Merry Jerez,   09/11/24 2002

## 2024-09-01 NOTE — DISCHARGE INSTRUCTIONS
Thank you for coming to our Emergency Department today. It is important to remember that some problems or medical conditions are difficult to diagnose and may not be found during your Emergency Department visit.     Be sure to follow up with your primary care doctor and review all labs/imaging/tests that were performed during your ER visit with them. Some labs/tests may be outside of the normal range and require non-emergent follow-up and further investigation to help diagnose/exclude/prevent complications or other potentially serious medical conditions that were not addressed during your ER visit.    If you do not have a primary care doctor, you may contact the one listed on your discharge paperwork or you may also call the [facility] Appointment Desk at [phone] to schedule an appointment and establish care with one. It is important to your health that you have a primary care doctor.    Please take all medications as directed. All medications may potentially have side-effects and it is impossible to predict which medications may give you side-effects or what side-effects (if any) they will give you.. If you feel that you are having a negative effect or side-effect of any medication you should immediately stop taking them and seek medical attention. If you feel that you are having a life-threatening reaction call 911.    Return to the ER with any questions/concerns, new/concerning symptoms, worsening or failure to improve.     Do not drive, swim, climb to height, take a bath, operate heavy machinery, drink alcohol or take potentially sedating medications, sign any legal documents or make any important decisions for 24 hours if you have received any pain medications, sedatives or mood altering drugs during your ER visit or within 24 hours of taking them if they have been prescribed to you.     You can find additional resources for Dentists, hearing aids, durable medical equipment, low cost pharmacies and  other resources at https://[facility].org    BELOW THIS LINE ONLY APPLIES IF YOU HAVE A COVID TEST PENDING OR IF YOU HAVE BEEN DIAGNOSED WITH COVID:  Please access [facility] to review the results of your test. Until the results of your COVID test return, you should isolate yourself so as not to potentially spread illness to others.   If your COVID test returns positive, you should isolate yourself so as not to spread illness to others. After five full days, if you are feeling better and you have not had fever for 24 hours, you can return to your typical daily activities, but you must wear a mask around others for an additional 5 days.   If your COVID test returns negative and you are either unvaccinated or more than six months out from your two-dose vaccine and are not yet boosted, you should still quarantine for 5 full days followed by strict mask use for an additional 5 full days.   If your COVID test returns negative and you have received your 2-dose initial vaccine as well as a booster, you should continue strict mask use for 10 full days after the exposure.  For all those exposed, best practice includes a test at day 5 after the exposure. This can be a home test or a test through one of the many testing centers throughout our community.   Masking is always advised to limit the spread of COVID. Cdc.gov is an excellent site to obtain the latest up to date recommendations regarding COVID and COVID testing.     CDC Testing and Quarantine Guidelines for patients with exposure to a known-positive COVID-19 person:  A close exposure is defined as anyone who has had an exposure (masked or unmasked) to a known COVID -19 positive person within 6 feet of someone for a cumulative total of 15 minutes or more over a 24-hour period.   Vaccinated and/or if you recently had a positive covid test within 90 days do NOT need to quarantine after contact with someone who had COVID-19 unless you develop symptoms.   Fully vaccinated  people who have not had a positive test within 90 days, should get tested 3-5 days after their exposure, even if they don't have symptoms and wear a mask indoors in public for 14 days following exposure or until their test result is negative.      Unvaccinated and/or NOT had a positive test within 90 days and meet close exposure  You are required by CDC guidelines to quarantine for at least 5 days from time of exposure followed by 5 days of strict masking. It is recommended, but not required to test after 5 days, unless you develop symptoms, in which case you should test at that time.  If you get tested after 5 days and your test is positive, your 5 day period of isolation starts the day of the positive test.    If your exposure does not meet the above definition, you can return to your normal daily activities to include social distancing, wearing a mask and frequent handwashing.      Here is a link to guidance from the CDC:  https://www.cdc.gov/media/releases/2021/s1227-isolation-quarantine-guidance.html      [place] Dept Of Health Testing Sites:  https://ldh.la.gov/page/3934      [facility] website with testing locations and guidance:  [email or web address]

## 2024-09-26 DIAGNOSIS — G60.9 IDIOPATHIC PERIPHERAL NEUROPATHY: ICD-10-CM

## 2024-09-26 DIAGNOSIS — M62.81 MUSCLE WEAKNESS (GENERALIZED): ICD-10-CM

## 2024-09-26 DIAGNOSIS — R25.1 DYSPHONIA OF ORGANIC TREMOR: ICD-10-CM

## 2024-09-26 DIAGNOSIS — R29.6 REPEATED FALLS: Primary | ICD-10-CM

## 2024-09-26 DIAGNOSIS — R49.0 DYSPHONIA OF ORGANIC TREMOR: ICD-10-CM

## 2024-10-11 ENCOUNTER — HOSPITAL ENCOUNTER (OUTPATIENT)
Dept: RADIOLOGY | Facility: HOSPITAL | Age: 89
Discharge: HOME | End: 2024-10-11
Attending: NEUROLOGICAL SURGERY
Payer: MEDICARE

## 2024-10-11 DIAGNOSIS — R29.6 REPEATED FALLS: ICD-10-CM

## 2024-10-11 DIAGNOSIS — R49.0 DYSPHONIA OF ORGANIC TREMOR: ICD-10-CM

## 2024-10-11 DIAGNOSIS — M62.81 MUSCLE WEAKNESS (GENERALIZED): ICD-10-CM

## 2024-10-11 DIAGNOSIS — R25.1 DYSPHONIA OF ORGANIC TREMOR: ICD-10-CM

## 2024-10-11 PROCEDURE — 70551 MRI BRAIN STEM W/O DYE: CPT | Mod: 26,,, | Performed by: RADIOLOGY

## 2024-10-11 PROCEDURE — 72148 MRI LUMBAR SPINE W/O DYE: CPT | Mod: TC

## 2024-10-11 PROCEDURE — 72148 MRI LUMBAR SPINE W/O DYE: CPT | Mod: 26,,, | Performed by: RADIOLOGY

## 2024-10-11 PROCEDURE — 70551 MRI BRAIN STEM W/O DYE: CPT | Mod: TC

## 2024-10-11 PROCEDURE — 72141 MRI NECK SPINE W/O DYE: CPT | Mod: 26,,, | Performed by: RADIOLOGY

## 2024-10-11 PROCEDURE — 72141 MRI NECK SPINE W/O DYE: CPT | Mod: TC

## 2024-10-23 ENCOUNTER — LAB VISIT (OUTPATIENT)
Dept: LAB | Facility: HOSPITAL | Age: 89
End: 2024-10-23
Attending: NEUROLOGICAL SURGERY
Payer: MEDICARE

## 2024-10-23 DIAGNOSIS — R25.1 DYSPHONIA OF ORGANIC TREMOR: ICD-10-CM

## 2024-10-23 DIAGNOSIS — R49.0 DYSPHONIA OF ORGANIC TREMOR: ICD-10-CM

## 2024-10-23 DIAGNOSIS — M62.81 MUSCLE WEAKNESS (GENERALIZED): ICD-10-CM

## 2024-10-23 DIAGNOSIS — R29.6 REPEATED FALLS: ICD-10-CM

## 2024-10-23 DIAGNOSIS — G60.9 IDIOPATHIC PERIPHERAL NEUROPATHY: ICD-10-CM

## 2024-10-23 LAB
ESTIMATED AVG GLUCOSE: 111 MG/DL (ref 68–131)
FOLATE SERPL-MCNC: 11.3 NG/ML (ref 4–24)
HBA1C MFR BLD: 5.5 % (ref 4–5.6)
TSH SERPL DL<=0.005 MIU/L-ACNC: 2.09 UIU/ML (ref 0.4–4)
VIT B12 SERPL-MCNC: 285 PG/ML (ref 210–950)

## 2024-10-23 PROCEDURE — 82746 ASSAY OF FOLIC ACID SERUM: CPT | Performed by: NEUROLOGICAL SURGERY

## 2024-10-23 PROCEDURE — 84425 ASSAY OF VITAMIN B-1: CPT | Performed by: NEUROLOGICAL SURGERY

## 2024-10-23 PROCEDURE — 83036 HEMOGLOBIN GLYCOSYLATED A1C: CPT | Mod: GA | Performed by: NEUROLOGICAL SURGERY

## 2024-10-23 PROCEDURE — 36415 COLL VENOUS BLD VENIPUNCTURE: CPT | Performed by: NEUROLOGICAL SURGERY

## 2024-10-23 PROCEDURE — 84165 PROTEIN E-PHORESIS SERUM: CPT | Mod: 26,,, | Performed by: PATHOLOGY

## 2024-10-23 PROCEDURE — 84207 ASSAY OF VITAMIN B-6: CPT | Performed by: NEUROLOGICAL SURGERY

## 2024-10-23 PROCEDURE — 82607 VITAMIN B-12: CPT | Performed by: NEUROLOGICAL SURGERY

## 2024-10-23 PROCEDURE — 86334 IMMUNOFIX E-PHORESIS SERUM: CPT | Performed by: NEUROLOGICAL SURGERY

## 2024-10-23 PROCEDURE — 86334 IMMUNOFIX E-PHORESIS SERUM: CPT | Mod: 26,,, | Performed by: PATHOLOGY

## 2024-10-23 PROCEDURE — 84165 PROTEIN E-PHORESIS SERUM: CPT | Performed by: NEUROLOGICAL SURGERY

## 2024-10-23 PROCEDURE — 84443 ASSAY THYROID STIM HORMONE: CPT | Performed by: NEUROLOGICAL SURGERY

## 2024-10-24 LAB
ALBUMIN SERPL ELPH-MCNC: 4.07 G/DL (ref 3.35–5.55)
ALPHA1 GLOB SERPL ELPH-MCNC: 0.31 G/DL (ref 0.17–0.41)
ALPHA2 GLOB SERPL ELPH-MCNC: 0.75 G/DL (ref 0.43–0.99)
B-GLOBULIN SERPL ELPH-MCNC: 0.67 G/DL (ref 0.5–1.1)
GAMMA GLOB SERPL ELPH-MCNC: 0.79 G/DL (ref 0.67–1.58)
INTERPRETATION SERPL IFE-IMP: NORMAL
PROT SERPL-MCNC: 6.6 G/DL (ref 6–8.4)

## 2024-10-25 LAB
PATHOLOGIST INTERPRETATION IFE: NORMAL
PATHOLOGIST INTERPRETATION SPE: NORMAL

## 2024-10-29 LAB
PYRIDOXAL SERPL-MCNC: 7 UG/L (ref 5–50)
VIT B1 BLD-MCNC: 104 UG/L (ref 38–122)

## 2024-11-21 ENCOUNTER — OFFICE VISIT (OUTPATIENT)
Dept: CARDIOLOGY | Facility: CLINIC | Age: 89
End: 2024-11-21
Payer: MEDICARE

## 2024-11-21 VITALS
OXYGEN SATURATION: 99 % | HEIGHT: 68 IN | BODY MASS INDEX: 29.12 KG/M2 | WEIGHT: 192.13 LBS | HEART RATE: 96 BPM | SYSTOLIC BLOOD PRESSURE: 118 MMHG | DIASTOLIC BLOOD PRESSURE: 46 MMHG | RESPIRATION RATE: 18 BRPM

## 2024-11-21 DIAGNOSIS — I25.10 CORONARY ARTERY DISEASE INVOLVING NATIVE CORONARY ARTERY OF NATIVE HEART WITHOUT ANGINA PECTORIS: ICD-10-CM

## 2024-11-21 DIAGNOSIS — E78.5 HYPERLIPIDEMIA, UNSPECIFIED HYPERLIPIDEMIA TYPE: Primary | ICD-10-CM

## 2024-11-21 DIAGNOSIS — I10 ESSENTIAL HYPERTENSION, BENIGN: ICD-10-CM

## 2024-11-21 DIAGNOSIS — R60.0 EDEMA LEG: ICD-10-CM

## 2024-11-21 DIAGNOSIS — I25.10 CORONARY ARTERY DISEASE, UNSPECIFIED VESSEL OR LESION TYPE, UNSPECIFIED WHETHER ANGINA PRESENT, UNSPECIFIED WHETHER NATIVE OR TRANSPLANTED HEART: ICD-10-CM

## 2024-11-21 PROCEDURE — 1159F MED LIST DOCD IN RCRD: CPT | Mod: CPTII,S$GLB,, | Performed by: INTERNAL MEDICINE

## 2024-11-21 PROCEDURE — 99999 PR PBB SHADOW E&M-EST. PATIENT-LVL IV: CPT | Mod: PBBFAC,,, | Performed by: INTERNAL MEDICINE

## 2024-11-21 PROCEDURE — 1100F PTFALLS ASSESS-DOCD GE2>/YR: CPT | Mod: CPTII,S$GLB,, | Performed by: INTERNAL MEDICINE

## 2024-11-21 PROCEDURE — 1126F AMNT PAIN NOTED NONE PRSNT: CPT | Mod: CPTII,S$GLB,, | Performed by: INTERNAL MEDICINE

## 2024-11-21 PROCEDURE — 3288F FALL RISK ASSESSMENT DOCD: CPT | Mod: CPTII,S$GLB,, | Performed by: INTERNAL MEDICINE

## 2024-11-21 PROCEDURE — G2211 COMPLEX E/M VISIT ADD ON: HCPCS | Mod: S$GLB,,, | Performed by: INTERNAL MEDICINE

## 2024-11-21 PROCEDURE — 99214 OFFICE O/P EST MOD 30 MIN: CPT | Mod: S$GLB,,, | Performed by: INTERNAL MEDICINE

## 2024-11-21 NOTE — PROGRESS NOTES
CARDIOVASCULAR CONSULTATION    REASON FOR CONSULT:   Juan Martínez Jr. is a 89 y.o. male who presents for follow-up recent hospitalization for a non-STEMI..      HISTORY OF PRESENT ILLNESS:     Notes from August 2019:   Patient is here for follow-up today.  Was recently admitted for hypertensive urgency.  Did not have any chest pains during the hypertensive urgency.  Troponins were found to be mildly elevated.  Was evaluated by Cardiology and Norvasc was restarted.  His blood pressure has been well controlled since then.  Denies any chest pains at rest on exertion, orthopnea, PND, swelling of feet.      Notes from July 2019  Patient is here for follow-up today.  Denies any chest pains at rest on exertion, orthopnea, PND.  Denies any claudication symptoms.  Denies any dyspnea at rest on exertion    Note from clinic visit in May 2019:  Patient is 83-year-old man pleasant man with a past medical history significant for diabetes, dyslipidemia, hypertension, coronary artery disease who recently presented to the hospital with a non-STEMI.  Coronary angiogram performed at that time revealed nonischemic coronary artery disease of the LAD and RCA as well as 80% stenosis in his diagonal 1.  Which is being managed medically.  A week after that he developed some right-sided chest pain, different from his anginal pain which he had presented with his non-STEMI.  He states that right-sided chest pain resolved on its own in less than 20 min, but since this is all new for him he decided to come to the hospital to make sure everything was okay.  He was admitted and serial troponins did not reveal any elevation and he was discharged home.  He has not had any further episodes of chest pains.  Denies orthopnea, PND chest pains at rest or on exertion.  Denies typical claudication pain, has knee pains and atypical leg pain.      Notes from October 2019:  Patient here for follow-up.  States that had a brief episode of right-sided  chest pain which lasted a few seconds to few minutes and then went away.  This was different than the anginal pain he had in the past.  Denies any orthopnea, PND, swelling of feet.  States that he tried telling is  that this he felt was related to his lungs, and an checks x-ray done yesterday.  Denies any recent fevers or chills.    Notes from December 2019:  Patient here for follow-up.  Denies any chest pains at rest on exertion, orthopnea, PND, swelling of feet.  Blood pressure mildly elevated at clinic.  At home states blood pressure stays around 140-145 mm systolic.  I will increase his Toprol-XL to 75 mg daily.    Notes from January 2020:  Patient here because states that yesterday he felt some chest pain.  It was right-sided.  States did not feel like it was from his heart but just wanted to make sure.  He took nitroglycerines and had no response to nitroglycerin.  States that did not feel like his earlier anginal pains.    Notes from May 2020:  Patient here for follow-up did not have any further episodes of chest pain.  Is looking for clearance for EGD.  Denies any orthopnea, PND, swelling of feet.    Notes from September 2020:  Patient here for follow-up.  Denies any anginal sounding chest pains, orthopnea, PND.  States he had an episode last Friday where he took a deep breath and felt a sharp pain which lasted 1 sec and then went away.  Denies any anginal sounding chest pains on exertion.  Denies any orthopnea, PND, swelling of feet.  EKG done in the clinic today was personally reviewed and demonstrated normal sinus rhythm, left axis deviation.  Abnormal EKG.    Notes from December 2020:  Patient here follow-up.  Denies any chest pains rest exertion PND.  Doing fine.  No cardiac issues.    Notes from February 2021:  Patient here follow-up.  Denies any chest pains at rest on exertion, orthopnea, PND.  EKG done in the clinic today was personally reviewed and demonstrated normal sinus rhythm with left  anterior fascicular block.  No other cardiac symptomatology.    Notes from June 2021:  Patient here for follow-up.  Doing fine.  Denies any chest pains at rest on exertion, orthopnea PND.    Notes from July 21:  Patient here for after recent hospitalization.  Had come in with non-STEMI.  Culprit lesion was distal RCA.  Underwent PCI distal RCA.  Doing fine.  No chest pains.  No complications from cardiac catheterization.    October 21:  Patient here for follow-up.  Denies any chest pains at rest on exertion, orthopnea, PND.  Doing fine.    February 2022 patient here for follow-up.  Doing fine.  No anginal sounding chest pains, orthopnea, PND.    Notes from July 2022:  Patient here for follow-up.  Denies any chest at rest on exertion, orthopnea, PND, swelling of feet \    Notes from October 2022: Patient here for follow-up.  Denies any chest pains at rest on exertion, orthopnea, PND.  Had hip replacement recently.  Bilateral pedal edema since then P    Notes from January 23: Patient here for follow-up.  States lately has been experiencing night sweats and wakes up sweaty in the morning.  Also sometimes feels his heart is beating very fast.  Occurs about once a week.  Has had multiple ER visits for evaluation of this.  Denies any angina.      Notes from March 2023: Patient here for follow-up.  Doing fine.  Significant drop in cholesterol.  Blood pressure well controlled    Baseline rhythm was sinus bradycardia with normal intervals and an initial heart rate of 59 bpm.  There were no reported symptoms.  There were occasional PACs and very rare PVCs.  There was no evidence of high-degree AV block, nor were there prolonged pauses.  There were no atrial or ventricular arrhythmias.        Notes from June 23: Patient here for follow-up.  Doing fine.  Denies any chest pains at rest on exertion, orthopnea, PND, swelling of feet    Notes from December 23:  Patient here for follow-up.  Denies chest pains at rest on exertion,  orthopnea, PND.    Notes from March 2024: Patient here for follow-up.  Doing fine.  Denies any chest pains at rest on exertion, orthopnea, PND.    Notes from June 24: Patient here for follow-up.  Doing fine.  Denies chest pains at rest on exertion, orthopnea, PND    November 24    History of Present Illness    CHIEF COMPLAINT:  Juan presents today with swollen ankles.    SWOLLEN ANKLES:  He reports ongoing ankle swelling, noticeable in the morning, which has been present for an extended period. He takes a fluid pill to manage the swelling, which he finds helpful.    CARDIOVASCULAR HISTORY:  He has a history of a heart attack that occurred many years ago. He was previously unaware of a mild stroke until it was discovered on an MRI.    MEDICATIONS:  He is currently taking a diuretic ('fluid pill') for ankle swelling management.         PAST MEDICAL HISTORY:     Past Medical History:   Diagnosis Date    Coronary artery disease     Diabetes mellitus     High cholesterol     Hypertension     NSTEMI (non-ST elevated myocardial infarction) 07/25/2021    Prostate cancer        PAST SURGICAL HISTORY:     Past Surgical History:   Procedure Laterality Date    APPLICATION OF FULL-THICKNESS SKIN GRAFT (FTSG) TO UPPER EXTREMITY Right 11/15/2018    Procedure: APPLICATION, GRAFT, SKIN, FULL-THICKNESS, TO UPPER EXTREMITY VS STSG WITH FROZEN SECTIONS;  Surgeon: Alan Arzola MD;  Location: [facility] OR;  Service: Plastics;  Laterality: Right;    EXCISION OF SQUAMOUS CELL CARCINOMA Right 11/15/2018    Procedure: EXCISION, CARCINOMA, SQUAMOUS CELL @ RIGHT HAND;  Surgeon: Alan Arzola MD;  Location: [facility] OR;  Service: Plastics;  Laterality: Right;  RN PREOP 11/13/2018--NEED H/P    HEMIARTHROPLASTY OF HIP Right 08/14/2022    Procedure: HEMIARTHROPLASTY, HIP;  Surgeon: Edis Villanueva MD;  Location: [facility] OR;  Service: Orthopedics;  Laterality: Right;    JOINT REPLACEMENT      LEFT HEART CATHETERIZATION Left 05/06/2019     Procedure: Left heart cath;  Surgeon: Rashad Nieto MD;  Location: [facility] CATH LAB;  Service: Cardiology;  Laterality: Left;    LEFT HEART CATHETERIZATION Left 07/26/2021    Procedure: Left heart cath, radial;  Surgeon: Rashad Nieto MD;  Location: [facility] CATH LAB;  Service: Cardiology;  Laterality: Left;    TONSILLECTOMY      TOTAL KNEE ARTHROPLASTY Right        ALLERGIES AND MEDICATION:   Review of patient's allergies indicates:  No Known Allergies     Medication List            Accurate as of November 21, 2024  9:17 AM. If you have any questions, ask your nurse or doctor.                CONTINUE taking these medications      albuterol 90 mcg/actuation inhaler  Commonly known as: PROVENTIL/VENTOLIN HFA     aluminum-magnesium hydroxide-simethicone 200-200-20 mg/5 mL Susp  Commonly known as: MAALOX ADVANCED  Take 30 mLs by mouth 4 (four) times daily before meals and nightly. for 7 days     amLODIPine 10 MG tablet  Commonly known as: NORVASC  Take 1 tablet (10 mg total) by mouth once daily.     aspirin 81 MG EC tablet  Commonly known as: ECOTRIN  Take 1 tablet (81 mg total) by mouth once daily.     atorvastatin 40 MG tablet  Commonly known as: LIPITOR  Take 1 tablet (40 mg total) by mouth once daily.     blood-glucose meter Misc     clopidogreL 75 mg tablet  Commonly known as: PLAVIX  Take 1 tablet (75 mg total) by mouth once daily.     EScitalopram oxalate 10 MG tablet  Commonly known as: LEXAPRO     furosemide 20 MG tablet  Commonly known as: LASIX  Take 2 tablets (40 mg total) by mouth 2 (two) times a day.     hydroCHLOROthiazide 12.5 mg capsule  Commonly known as: MICROZIDE  Take 2 capsules (25 mg total) by mouth once daily.     isosorbide mononitrate 60 MG 24 hr tablet  Commonly known as: IMDUR  Take 1 tablet (60 mg total) by mouth once daily.     LANCETS & BLOOD GLUCOSE STRIPS MISC     metFORMIN 1000 MG tablet  Commonly known as: GLUCOPHAGE  Take 1 tablet (1,000 mg total) by mouth 2 (two) times daily with  "meals.     metoprolol succinate 50 MG 24 hr tablet  Commonly known as: TOPROL-XL  Take 1 tablet (50 mg total) by mouth once daily.     nitroGLYCERIN 0.4 MG SL tablet  Commonly known as: NITROSTAT  Place 1 tablet (0.4 mg total) under the tongue every 5 (five) minutes as needed for Chest pain.     oxyCODONE 5 MG immediate release tablet  Commonly known as: ROXICODONE  Take 1 tablet (5 mg total) by mouth every 4 (four) hours as needed (breakthrough pain.).     simethicone 125 MG chewable tablet  Commonly known as: MYLICON  Take 1 tablet (125 mg total) by mouth every 6 (six) hours as needed for Flatulence.     thiamine 250 MG tablet              SOCIAL HISTORY:     Social History     Socioeconomic History    Marital status:    Tobacco Use    Smoking status: Former     Current packs/day: 0.00     Types: Cigarettes     Quit date: 2016     Years since quittin.2    Smokeless tobacco: Never    Tobacco comments:     2016   Substance and Sexual Activity    Alcohol use: No    Drug use: No    Sexual activity: Not Currently       FAMILY HISTORY:     Family History   Problem Relation Name Age of Onset    Heart disease Mother      Stroke Father         REVIEW OF SYSTEMS:   Review of Systems   Constitutional: Negative.    HENT: Negative.     Eyes: Negative.    Respiratory: Negative.     Cardiovascular:  Positive for leg swelling.   Gastrointestinal: Negative.    Genitourinary: Negative.    Skin: Negative.    Neurological: Negative.    Endo/Heme/Allergies: Negative.        A 10 point review of systems was performed and all the pertinent positives have been mentioned. Rest of review of systems was negative.        PHYSICAL EXAM:     Vitals:    24 0841   BP: (!) 118/46   Pulse: 96   Resp: 18    Body mass index is 29.21 kg/m².  Weight: 87.1 kg (192 lb 2.1 oz)   Height: 5' 8" (172.7 cm)     Physical Exam  Vitals and nursing note reviewed.   Constitutional:       Appearance: Normal appearance. He is " well-developed.   HENT:      Head: Normocephalic and atraumatic.      Right Ear: Hearing normal.      Left Ear: Hearing normal.      Nose: Nose normal.   Eyes:      General: Lids are normal.      Conjunctiva/sclera: Conjunctivae normal.      Pupils: Pupils are equal, round, and reactive to light.   Cardiovascular:      Rate and Rhythm: Normal rate and regular rhythm.      Pulses: Normal pulses.      Heart sounds: Normal heart sounds.   Pulmonary:      Effort: Pulmonary effort is normal.      Breath sounds: Normal breath sounds.   Abdominal:      Palpations: Abdomen is soft.      Tenderness: There is no abdominal tenderness.   Musculoskeletal:         General: No deformity.      Cervical back: Normal range of motion and neck supple.      Right lower leg: Edema present.      Left lower leg: Edema present.   Skin:     General: Skin is warm and dry.   Neurological:      Mental Status: He is alert and oriented to person, place, and time.   Psychiatric:         Speech: Speech normal.           DATA:     Laboratory:  CBC:  Recent Labs   Lab 08/31/23  0716 09/20/23  1250 03/20/24  0935   WBC 6.77 7.84 5.11   Hemoglobin 11.5 L 11.1 L 11.6 L   Hematocrit 35.7 L 33.7 L 35.4 L   Platelets 240 267 282       CHEMISTRIES:  Recent Labs   Lab 01/11/22  2335 08/12/22  0722 03/11/24  0724 08/07/24  0714 11/15/24  0729   Glucose 138 H   < > 144 H 113 H 102 H   Sodium 138   < > 140 135 133 L   Potassium 3.4 L   < > 4.0 3.7 4.6   BUN 18   < >  --   --   --    Blood Urea Nitrogen  --    < > 13 14 15   Creatinine 1.2   < > 1.04 1.06 0.91   eGFR if  >60  --   --   --   --    eGFR if non  55 A  --   --   --   --    Calcium 9.0   < > 9.5 9.5 8.7    < > = values in this interval not displayed.       CARDIAC BIOMARKERS:  Recent Labs   Lab 01/05/23  0400 01/05/23  0632 02/13/23  0350   Troponin I 0.012 0.008 <0.006       COAGS:  Recent Labs   Lab 08/13/22  1118 08/13/22  1540   INR 1.0 1.0        LIPIDS/LFTS:  Recent Labs   Lab 03/11/24  0724 08/07/24  0714 11/15/24  0729   AST 13 17 16   ALT 9 11 23       Hemoglobin A1C   Date Value Ref Range Status   10/23/2024 5.5 4.0 - 5.6 % Final     Comment:     ADA Screening Guidelines:  5.7-6.4%  Consistent with prediabetes  >or=6.5%  Consistent with diabetes    High levels of fetal hemoglobin interfere with the HbA1C  assay. Heterozygous hemoglobin variants (HbS, HgC, etc)do  not significantly interfere with this assay.   However, presence of multiple variants may affect accuracy.     08/17/2022 6.4 (H) 4.0 - 5.6 % Final     Comment:     ADA Screening Guidelines:  5.7-6.4%  Consistent with prediabetes  >or=6.5%  Consistent with diabetes    High levels of fetal hemoglobin interfere with the HbA1C  assay. Heterozygous hemoglobin variants (HbS, HgC, etc)do  not significantly interfere with this assay.   However, presence of multiple variants may affect accuracy.     05/07/2019 7.0 (H) 4.0 - 5.6 % Final     Comment:     ADA Screening Guidelines:  5.7-6.4%  Consistent with prediabetes  >or=6.5%  Consistent with diabetes  High levels of fetal hemoglobin interfere with the HbA1C  assay. Heterozygous hemoglobin variants (HbS, HgC, etc)do  not significantly interfere with this assay.   However, presence of multiple variants may affect accuracy.       Hemoglobin A1c   Date Value Ref Range Status   12/02/2021 6.7 (H) <5.7 % of total Hgb Final     Comment:     For someone without known diabetes, a hemoglobin A1c  value of 6.5% or greater indicates that they may have   diabetes and this should be confirmed with a follow-up   test.    For someone with known diabetes, a value <7% indicates   that their diabetes is well controlled and a value   greater than or equal to 7% indicates suboptimal   control. A1c targets should be individualized based on   duration of diabetes, age, comorbid conditions, and   other considerations.    Currently, no consensus exists regarding use  of  hemoglobin A1c for diagnosis of diabetes for children.       07/23/2021 6.8 (H) <5.7 % of total Hgb Final     Comment:     For someone without known diabetes, a hemoglobin A1c  value of 6.5% or greater indicates that they may have   diabetes and this should be confirmed with a follow-up   test.    For someone with known diabetes, a value <7% indicates   that their diabetes is well controlled and a value   greater than or equal to 7% indicates suboptimal   control. A1c targets should be individualized based on   duration of diabetes, age, comorbid conditions, and   other considerations.    Currently, no consensus exists regarding use of  hemoglobin A1c for diagnosis of diabetes for children.       03/16/2021 6.4 (H) <5.7 % of total Hgb Final     Comment:     For someone without known diabetes, a hemoglobin   A1c value between 5.7% and 6.4% is consistent with  prediabetes and should be confirmed with a   follow-up test.    For someone with known diabetes, a value <7%  indicates that their diabetes is well controlled. A1c  targets should be individualized based on duration of  diabetes, age, comorbid conditions, and other  considerations.    This assay result is consistent with an increased risk  of diabetes.    Currently, no consensus exists regarding use of  hemoglobin A1c for diagnosis of diabetes for children.       TSH  Recent Labs   Lab 10/23/24  1230   TSH 2.088       The ASCVD Risk score (Guanako CHENG, et al., 2019) failed to calculate for the following reasons:    The 2019 ASCVD risk score is only valid for ages 40 to 79    Risk score cannot be calculated because patient has a medical history suggesting prior/existing ASCVD           Cardiovascular Testing:    EKG: (personally reviewed tracing)  May 2019:  Normal sinus rhythm, left axis deviation.  Abnormal EKG.    2D echocardiogram May 2019:  Normal left ventricular systolic function. The estimated ejection fraction is 65%  Normal LV diastolic  function.  Mild left atrial enlargement.  Normal central venous pressure (3 mm Hg).  The estimated PA systolic pressure is 10 mm Hg       Coronary angiogram 6th May 2019:    No acute thrombotic lesion identified.  Coronary artery disease as described below  LVEDP: 12 mmHg    Carotid ultrasound May 28, 2019:    There is 20-39% right Internal Carotid Stenosis.  There is 20-39% left Internal Carotid Stenosis.     ABIs May 28, 2019:    Noncompressible LOW bilaterally.  Mildly decrease TBI bilaterally.  Normal PVR waveforms bilaterally.       Coronary Anatomy:  LM:  No significant stenosis  LAD:  Mid LAD 60-70% stenosis.  Distal LAD 50% stenosis.  IFR 0.92 (nonischemic).  Diagonal 1 is a tortuous vessel with mid 80% stenosis.  Will medically manage this diagonal stenosis  LCx :  Mild nonobstructive CAD  RCA:  50% distal RCA/proximal PDA lesion.  IFR 1 (nonischemic)     Impression / Plan  Coronary artery disease as described above.  Continue medical management with aggressive risk factor modification.  Continue aspirin 81 mg daily. Change simvastatin to atorvastatin 80 mg daily.        ASSESSMENT AND PLAN     Patient Active Problem List   Diagnosis    Essential hypertension, benign    Type 2 diabetes mellitus    Hyperlipidemia    Body mass index 28.0-28.9, adult    History of prostate cancer    Orthostatic hypotension    Diabetic ulcer of toe of left foot associated with type 2 diabetes mellitus, with fat layer exposed    SCC (squamous cell carcinoma)    Coronary artery disease involving native coronary artery of native heart without angina pectoris    Unstable angina    Palpitations    Elevated troponin    NSTEMI (non-ST elevated myocardial infarction)    Hip fracture    Aortic root dilatation    Acute blood loss anemia    Diaphoresis       1.  Patient with coronary artery disease, now angina free.  Status post PCI distal RCA in July of 2021. Continue aspirin 81 mg daily indefinitely and Plavix 75 mg daily uninterrupted  for at least 1 year from date of PCI.  Diagonal 1 disease being managed medically.  Continues to be angina free    2.  Hypertension:  Well controlled on current therapy.      3.  Cardiovascular screening with rest and stress ABIs and carotid ultrasound performed.  ABIs demonstrated noncompressible ABIs bilaterally, mildly decreased ABIs bilaterally with normal PVR waveforms bilaterally.  I got a peripheral ultrasound for further evaluation of his abnormal TBI.   ultrasound did not show any flow restriction in the right or the left lower extremity.    4.  Dyslipidemia:  Continue medical management with atorvastatin.      5. Bilateral pedal edema  Lasix as needed.  Compression stockings.    6.  May hold Plavix as needed for procedures.        7. Palpitations and night sweats.  Further evaluation of night sweats by primary care physician.  Event monitor did not show any significant arrhythmia.      8. History of TIA:  Aggressive risk factor modification    Fu in 4 m    Thank you very much for involving me in the care of your patient.  Please do not hesitate to contact me if there are any questions.      Rashad Nieto MD, FACC, [facility]  Interventional Cardiologist, [facility].       Visit today included increased complexity associated with the care of the episodic problem hypertension, dyslipidemia, coronary artery disease, history of TIA addressed and managing the longitudinal care of the patient due to the serious and/or complex managed problem(s)   Patient Active Problem List   Diagnosis    Essential hypertension, benign    Type 2 diabetes mellitus    Hyperlipidemia    Body mass index 28.0-28.9, adult    History of prostate cancer    Orthostatic hypotension    Diabetic ulcer of toe of left foot associated with type 2 diabetes mellitus, with fat layer exposed    SCC (squamous cell carcinoma)    Coronary artery disease involving native coronary artery of native heart without angina pectoris    Unstable angina     Palpitations    Elevated troponin    NSTEMI (non-ST elevated myocardial infarction)    Hip fracture    Aortic root dilatation    Acute blood loss anemia    Diaphoresis     .      This note was dictated with the help of speech recognition software.  There might be un-intended errors and/or substitutions.

## 2025-01-27 ENCOUNTER — HOSPITAL ENCOUNTER (OUTPATIENT)
Dept: RADIOLOGY | Facility: HOSPITAL | Age: 89
Discharge: HOME | End: 2025-01-27
Attending: NEUROLOGICAL SURGERY
Payer: MEDICARE

## 2025-01-27 ENCOUNTER — TELEPHONE (OUTPATIENT)
Dept: UROLOGY | Facility: CLINIC | Age: 89
End: 2025-01-27
Payer: MEDICARE

## 2025-01-27 DIAGNOSIS — M48.062 SPINAL STENOSIS, LUMBAR REGION, WITH NEUROGENIC CLAUDICATION: ICD-10-CM

## 2025-01-27 DIAGNOSIS — M48.062 SPINAL STENOSIS, LUMBAR REGION, WITH NEUROGENIC CLAUDICATION: Primary | ICD-10-CM

## 2025-01-27 PROCEDURE — 72114 X-RAY EXAM L-S SPINE BENDING: CPT | Mod: TC,FY

## 2025-01-27 PROCEDURE — 72114 X-RAY EXAM L-S SPINE BENDING: CPT | Mod: 26,,, | Performed by: RADIOLOGY

## 2025-01-27 NOTE — TELEPHONE ENCOUNTER
----- Message from Carmita sent at 1/27/2025 11:15 AM CST -----  .Type: Patient Call Back    Who called: Self     What is the request in detail: Canceled appointment due to transportation, Stated his right testicle is swollen. Ask that the nurse give him a call     Can the clinic reply by [facility]? No     Would the patient rather a call back or a response via [facility]? Call Back     Best call back number: .[phone] (home)       Additional Information:

## 2025-02-03 NOTE — ADDENDUM NOTE
Addended by: PINO PATINO on: 4/6/2020 08:54 AM     Modules accepted: Orders    
[facility]

## 2025-02-04 RX ORDER — CLOPIDOGREL BISULFATE 75 MG/1
75 TABLET ORAL DAILY
Qty: 90 TABLET | Refills: 3 | Status: SHIPPED | OUTPATIENT
Start: 2025-02-04 | End: 2026-02-04

## 2025-02-04 RX ORDER — ATORVASTATIN CALCIUM 40 MG/1
40 TABLET, FILM COATED ORAL DAILY
Qty: 90 TABLET | Refills: 3 | Status: SHIPPED | OUTPATIENT
Start: 2025-02-04 | End: 2026-02-04

## 2025-02-04 RX ORDER — METOPROLOL SUCCINATE 50 MG/1
50 TABLET, EXTENDED RELEASE ORAL DAILY
Qty: 90 TABLET | Refills: 3 | Status: SHIPPED | OUTPATIENT
Start: 2025-02-04

## 2025-02-04 RX ORDER — ISOSORBIDE MONONITRATE 60 MG/1
60 TABLET, EXTENDED RELEASE ORAL DAILY
Qty: 90 TABLET | Refills: 3 | Status: SHIPPED | OUTPATIENT
Start: 2025-02-04

## 2025-02-04 NOTE — TELEPHONE ENCOUNTER
----- Message from [employer] sent at 2/4/2025  9:54 AM CST -----  Who Called:sister -isaac       Refill or New Rx:refill      RX Name and Strength:atorvastatin (LIPITOR) 40 MG tablet          isosorbide mononitrate (IMDUR) 60 MG 24 hr tablet        clopidogreL (PLAVIX) 75 mg tablet          metoprolol succinate (TOPROL-XL) 50 MG 24 hr tablet        Is this a 30 day or 90 day RX:      Preferred Pharmacy with phone number:1 - [id number] - [place], [place] - [address]      Would the patient rather a call back or a response via [facility]?call       Best Call Back Number:.[phone]    Additional Information:  would like a phone call when complete please

## 2025-02-13 ENCOUNTER — HOSPITAL ENCOUNTER (OUTPATIENT)
Dept: RADIOLOGY | Facility: HOSPITAL | Age: 89
Discharge: HOME | End: 2025-02-13
Attending: INTERNAL MEDICINE
Payer: MEDICARE

## 2025-02-13 ENCOUNTER — OFFICE VISIT (OUTPATIENT)
Dept: UROLOGY | Facility: CLINIC | Age: 89
End: 2025-02-13
Payer: MEDICARE

## 2025-02-13 DIAGNOSIS — K59.00 COLONIC CONSTIPATION: ICD-10-CM

## 2025-02-13 DIAGNOSIS — R35.1 NOCTURIA: ICD-10-CM

## 2025-02-13 DIAGNOSIS — C61 PROSTATE CANCER: ICD-10-CM

## 2025-02-13 DIAGNOSIS — N43.3 HYDROCELE IN ADULT: Primary | ICD-10-CM

## 2025-02-13 DIAGNOSIS — K59.00 COLONIC CONSTIPATION: Primary | ICD-10-CM

## 2025-02-13 PROCEDURE — 3288F FALL RISK ASSESSMENT DOCD: CPT | Mod: CPTII,S$GLB,, | Performed by: UROLOGY

## 2025-02-13 PROCEDURE — 1159F MED LIST DOCD IN RCRD: CPT | Mod: CPTII,S$GLB,, | Performed by: UROLOGY

## 2025-02-13 PROCEDURE — 74018 RADEX ABDOMEN 1 VIEW: CPT | Mod: TC,FY

## 2025-02-13 PROCEDURE — 1101F PT FALLS ASSESS-DOCD LE1/YR: CPT | Mod: CPTII,S$GLB,, | Performed by: UROLOGY

## 2025-02-13 PROCEDURE — 1126F AMNT PAIN NOTED NONE PRSNT: CPT | Mod: CPTII,S$GLB,, | Performed by: UROLOGY

## 2025-02-13 PROCEDURE — 99214 OFFICE O/P EST MOD 30 MIN: CPT | Mod: S$GLB,,, | Performed by: UROLOGY

## 2025-02-13 PROCEDURE — 74018 RADEX ABDOMEN 1 VIEW: CPT | Mod: 26,,, | Performed by: STUDENT IN AN ORGANIZED HEALTH CARE EDUCATION/TRAINING PROGRAM

## 2025-02-13 PROCEDURE — 99999 PR PBB SHADOW E&M-EST. PATIENT-LVL III: CPT | Mod: PBBFAC,,, | Performed by: UROLOGY

## 2025-02-13 PROCEDURE — 1160F RVW MEDS BY RX/DR IN RCRD: CPT | Mod: CPTII,S$GLB,, | Performed by: UROLOGY

## 2025-02-13 NOTE — PROGRESS NOTES
Subjective:       Patient ID: Juan Martínez Jr. is a 89 y.o. male The patient's last visit with me was on 7/26/2023.     Chief Complaint:   No chief complaint on file.      History of Present Illness  Prostate Cancer  He underwent brachytherapy in 2003 by Dr. Brown.  He voids with a good stream.  He has nocturia x 1.  He denies hematuria, dysuria, pain, weight loss, or UTI.  He has ED but does not want treatment.      Kidney Stone  He went to [facility] since his last visit with kidney stones.  They have been removed.  He is back here with an ultrasound.    Right testicular swelling  He has noted right testicular swelling for about 2-3 weeks.  He denies redness or pain.      ACTIVE MEDICAL ISSUES:  Patient Active Problem List   Diagnosis    Essential hypertension, benign    Type 2 diabetes mellitus    Hyperlipidemia    Body mass index 28.0-28.9, adult    History of prostate cancer    Orthostatic hypotension    Diabetic ulcer of toe of left foot associated with type 2 diabetes mellitus, with fat layer exposed    SCC (squamous cell carcinoma)    Coronary artery disease involving native coronary artery of native heart without angina pectoris    Unstable angina    Palpitations    Elevated troponin    NSTEMI (non-ST elevated myocardial infarction)    Hip fracture    Aortic root dilatation    Acute blood loss anemia    Diaphoresis       ALLERGIES AND MEDICATIONS: updated and reviewed.  Review of patient's allergies indicates:  No Known Allergies  Current Outpatient Medications   Medication Sig    amLODIPine (NORVASC) 10 MG tablet Take 1 tablet (10 mg total) by mouth once daily.    aspirin (ECOTRIN) 81 MG EC tablet Take 1 tablet (81 mg total) by mouth once daily.    atorvastatin (LIPITOR) 40 MG tablet Take 1 tablet (40 mg total) by mouth once daily.    blood-glucose meter Misc by Misc.(Non-Drug; Combo Route) route    clopidogreL (PLAVIX) 75 mg tablet Take 1 tablet (75 mg total) by mouth once daily.    furosemide (LASIX) 20  MG tablet Take 2 tablets (40 mg total) by mouth 2 (two) times a day.    hydroCHLOROthiazide (MICROZIDE) 12.5 mg capsule Take 2 capsules (25 mg total) by mouth once daily.    isosorbide mononitrate (IMDUR) 60 MG 24 hr tablet Take 1 tablet (60 mg total) by mouth once daily.    LANCETS & BLOOD GLUCOSE STRIPS MISC by Misc.(Non-Drug; Combo Route) route Check blood sugars  Twice a day.    metformin (GLUCOPHAGE) 1000 MG tablet Take 1 tablet (1,000 mg total) by mouth 2 (two) times daily with meals.    metoprolol succinate (TOPROL-XL) 50 MG 24 hr tablet Take 1 tablet (50 mg total) by mouth once daily.    thiamine 250 MG tablet Take 250 mg by mouth once daily.    albuterol (PROVENTIL/VENTOLIN HFA) 90 mcg/actuation inhaler Inhale 2 puffs into the lungs.    aluminum-magnesium hydroxide-simethicone (MAALOX ADVANCED) 200-200-20 mg/5 mL Susp Take 30 mLs by mouth 4 (four) times daily before meals and nightly. for 7 days    escitalopram oxalate (LEXAPRO) 10 MG tablet Take 10 mg by mouth.    nitroGLYCERIN (NITROSTAT) 0.4 MG SL tablet Place 1 tablet (0.4 mg total) under the tongue every 5 (five) minutes as needed for Chest pain.    oxyCODONE (ROXICODONE) 5 MG immediate release tablet Take 1 tablet (5 mg total) by mouth every 4 (four) hours as needed (breakthrough pain.).    simethicone (MYLICON) 125 MG chewable tablet Take 1 tablet (125 mg total) by mouth every 6 (six) hours as needed for Flatulence.     No current facility-administered medications for this visit.       Review of Systems   Constitutional:  Negative for activity change, fatigue, fever and unexpected weight change.   HENT:  Negative for congestion.    Eyes:  Negative for redness.   Respiratory:  Negative for chest tightness and shortness of breath.    Cardiovascular:  Negative for chest pain and leg swelling.   Gastrointestinal:  Negative for abdominal pain, constipation, diarrhea, nausea and vomiting.   Genitourinary:  Negative for dysuria, flank pain, frequency,  hematuria, penile pain, penile swelling, scrotal swelling, testicular pain and urgency.   Musculoskeletal:  Negative for arthralgias and back pain.   Neurological:  Negative for dizziness and light-headedness.   Psychiatric/Behavioral:  Negative for behavioral problems and confusion. The patient is not nervous/anxious.    All other systems reviewed and are negative.      Objective:      There were no vitals filed for this visit.      Physical Exam  Vitals and nursing note reviewed.   Constitutional:       Appearance: He is well-developed.   HENT:      Head: Normocephalic.   Eyes:      Conjunctiva/sclera: Conjunctivae normal.   Neck:      Thyroid: No thyromegaly.      Trachea: No tracheal deviation.   Cardiovascular:      Rate and Rhythm: Normal rate.      Heart sounds: Normal heart sounds.   Pulmonary:      Effort: Pulmonary effort is normal. No respiratory distress.      Breath sounds: Normal breath sounds. No wheezing.   Abdominal:      General: Bowel sounds are normal.      Palpations: Abdomen is soft.      Tenderness: There is no abdominal tenderness. There is no rebound.      Hernia: No hernia is present.   Genitourinary:     Penis: Normal and uncircumcised.       Testes:         Right: Swelling present. Mass or tenderness not present.         Left: Mass, tenderness or swelling not present.      Comments: No erythema or tenderness  Musculoskeletal:         General: No tenderness. Normal range of motion.      Cervical back: Normal range of motion and neck supple.   Lymphadenopathy:      Cervical: No cervical adenopathy.   Skin:     General: Skin is warm and dry.      Findings: No erythema or rash.   Neurological:      Mental Status: He is alert and oriented to person, place, and time.   Psychiatric:         Behavior: Behavior normal.         Thought Content: Thought content normal.         Judgment: Judgment normal.         Urine dipstick shows not done.  Micro exam: not done.     CT Abdomen Pelvis With  Contrast  Order: [id number]  Status: Final result     Visible to patient: No (inaccessible in Patient Portal)     Next appt: None     0 Result Notes  Details    Reading Physician Reading Date Result Priority   Kelsi Mccollum MD  [phone] 12/12/2022 STAT     Narrative & Impression  EXAMINATION:  CT ABDOMEN PELVIS WITH CONTRAST     CLINICAL HISTORY:  Bowel obstruction suspected;     TECHNIQUE:  Low dose axial images, sagittal and coronal reformations were obtained from the lung bases to the pubic symphysis following the IV administration of 85 mL of Omnipaque 350 .  Oral contrast was not given. Axial and coronal images reformatted.     COMPARISON:  None     FINDINGS:  Lung bases are clear.     No pericardial effusion, coronary artery calcification.     No liver lesions.  The gallbladder is present, no radiopaque stones seen within.     The stomach, bilateral adrenal glands, and spleen appear normal.     There is a hypoattenuating lesion within the body of the pancreas measuring 0.9 cm (02:50) in retrospect unchanged from CT 01/12/2022 most suggestive of a small benign lipoma.     The right kidney enhances normally, no hydronephrosis. Three subcentimeter hypoattenuating lesions of the right kidney, too small to characterize possibly small cysts.  Small stones forming at the lower pole of the right kidney, the largest 3 mm.  The right ureter appears normal.     The left kidney enhances normally.  No hydronephrosis.  1.5 cm cyst noted at the midpole.  Two smaller hypoattenuating lesions noted at the lower pole of the left kidney, too small to characterize, favored to represent benign cysts.  Tiny punctate calculi noted at the lower pole of the left kidney.  The left ureter appears normal.     The bladder appears normal.  Prostatic seeds noted.     Mild stool retention, no inflammatory changes of the bowel seen, no bowel dilation.  Few scattered colonic diverticula.  The appendix is normal.     The abdominal  aorta tapers normally, there is moderate circumferential atherosclerotic plaque of the abdominal aorta and its branches.     No ascites, no free air.     The inguinal regions appear normal.     Postoperative changes of the right hip joint. No worrisome osseous lesion seen.     Impression:     No evidence of bowel obstruction.     Bilateral kidney, nonobstructive forming calculi.     Bilateral small hypoattenuating lesions of the kidneys, too small to be characterized suggestive of small cysts.     Atherosclerotic plaque of the abdominal aorta and its branches.     Prostatic seed implants.     Postoperative changes of the right hip joint.        Electronically signed by: Kelsi Mccollum MD  Date:                                            12/12/2022  Time:                                           11:34         Assessment:       1. Hydrocele in adult    2. Prostate cancer    3. Nocturia              Plan:       1. Hydrocele in adult (Primary)  Consider repair if he desires  - US Scrotum And Testicles; Future    2. Prostate cancer  monitor  - Prostate Specific Antigen, Diagnostic; Future    3. Nocturia  Limit evening fluids           Follow up in about 6 weeks (around 3/27/2025) for Follow up Established, Review X-ray, Review PSA.

## 2025-03-20 ENCOUNTER — HOSPITAL ENCOUNTER (OUTPATIENT)
Dept: RADIOLOGY | Facility: HOSPITAL | Age: 89
Discharge: HOME | End: 2025-03-20
Attending: UROLOGY
Payer: MEDICARE

## 2025-03-20 DIAGNOSIS — N43.3 HYDROCELE IN ADULT: ICD-10-CM

## 2025-03-20 PROCEDURE — 76870 US EXAM SCROTUM: CPT | Mod: 26,,, | Performed by: RADIOLOGY

## 2025-03-20 PROCEDURE — 76870 US EXAM SCROTUM: CPT | Mod: TC

## 2025-03-21 ENCOUNTER — RESULTS FOLLOW-UP (OUTPATIENT)
Dept: UROLOGY | Facility: HOSPITAL | Age: 89
End: 2025-03-21

## 2025-03-27 ENCOUNTER — OFFICE VISIT (OUTPATIENT)
Dept: UROLOGY | Facility: CLINIC | Age: 89
End: 2025-03-27
Payer: MEDICARE

## 2025-03-27 DIAGNOSIS — N43.41: Primary | ICD-10-CM

## 2025-03-27 DIAGNOSIS — C61 PROSTATE CANCER: ICD-10-CM

## 2025-03-27 DIAGNOSIS — R35.1 NOCTURIA: ICD-10-CM

## 2025-03-27 PROCEDURE — 1160F RVW MEDS BY RX/DR IN RCRD: CPT | Mod: CPTII,S$GLB,, | Performed by: UROLOGY

## 2025-03-27 PROCEDURE — 99999 PR PBB SHADOW E&M-EST. PATIENT-LVL III: CPT | Mod: PBBFAC,,, | Performed by: UROLOGY

## 2025-03-27 PROCEDURE — 1126F AMNT PAIN NOTED NONE PRSNT: CPT | Mod: CPTII,S$GLB,, | Performed by: UROLOGY

## 2025-03-27 PROCEDURE — 1101F PT FALLS ASSESS-DOCD LE1/YR: CPT | Mod: CPTII,S$GLB,, | Performed by: UROLOGY

## 2025-03-27 PROCEDURE — 3288F FALL RISK ASSESSMENT DOCD: CPT | Mod: CPTII,S$GLB,, | Performed by: UROLOGY

## 2025-03-27 PROCEDURE — 1159F MED LIST DOCD IN RCRD: CPT | Mod: CPTII,S$GLB,, | Performed by: UROLOGY

## 2025-03-27 PROCEDURE — 99213 OFFICE O/P EST LOW 20 MIN: CPT | Mod: S$GLB,,, | Performed by: UROLOGY

## 2025-03-27 NOTE — PROGRESS NOTES
Subjective:       Patient ID: Juan Martínez Jr. is a 89 y.o. male The patient's last visit with me was on 2/13/2025.     Chief Complaint:   No chief complaint on file.      History of Present Illness  Prostate Cancer  He underwent brachytherapy in 2003 by Dr. Brown.  He voids with a good stream.  He has nocturia x 1.  He denies hematuria, dysuria, pain, weight loss, or UTI.  He has ED but does not want treatment.      Kidney Stone  He went to [facility] since his last visit with kidney stones.  They have been removed.  He is back here with an ultrasound.    Right testicular swelling  He has noted right testicular swelling for about 2-3 weeks.  He denies redness or pain.      03/27/2025  He is back with an ultrasound.  He is not really bothered by the swelling.    ACTIVE MEDICAL ISSUES:  Patient Active Problem List   Diagnosis    Essential hypertension, benign    Type 2 diabetes mellitus    Hyperlipidemia    Body mass index 28.0-28.9, adult    History of prostate cancer    Orthostatic hypotension    Diabetic ulcer of toe of left foot associated with type 2 diabetes mellitus, with fat layer exposed    SCC (squamous cell carcinoma)    Coronary artery disease involving native coronary artery of native heart without angina pectoris    Unstable angina    Palpitations    Elevated troponin    NSTEMI (non-ST elevated myocardial infarction)    Hip fracture    Aortic root dilatation    Acute blood loss anemia    Diaphoresis       ALLERGIES AND MEDICATIONS: updated and reviewed.  Review of patient's allergies indicates:  No Known Allergies  Current Outpatient Medications   Medication Sig    amLODIPine (NORVASC) 10 MG tablet Take 1 tablet (10 mg total) by mouth once daily.    aspirin (ECOTRIN) 81 MG EC tablet Take 1 tablet (81 mg total) by mouth once daily.    atorvastatin (LIPITOR) 40 MG tablet Take 1 tablet (40 mg total) by mouth once daily.    blood-glucose meter Misc by Misc.(Non-Drug; Combo Route) route    clopidogreL  (PLAVIX) 75 mg tablet Take 1 tablet (75 mg total) by mouth once daily.    furosemide (LASIX) 20 MG tablet Take 2 tablets (40 mg total) by mouth 2 (two) times a day.    hydroCHLOROthiazide (MICROZIDE) 12.5 mg capsule Take 2 capsules (25 mg total) by mouth once daily.    isosorbide mononitrate (IMDUR) 60 MG 24 hr tablet Take 1 tablet (60 mg total) by mouth once daily.    LANCETS & BLOOD GLUCOSE STRIPS MISC by Misc.(Non-Drug; Combo Route) route Check blood sugars  Twice a day.    metformin (GLUCOPHAGE) 1000 MG tablet Take 1 tablet (1,000 mg total) by mouth 2 (two) times daily with meals.    metoprolol succinate (TOPROL-XL) 50 MG 24 hr tablet Take 1 tablet (50 mg total) by mouth once daily.    thiamine 250 MG tablet Take 250 mg by mouth once daily.    albuterol (PROVENTIL/VENTOLIN HFA) 90 mcg/actuation inhaler Inhale 2 puffs into the lungs.    aluminum-magnesium hydroxide-simethicone (MAALOX ADVANCED) 200-200-20 mg/5 mL Susp Take 30 mLs by mouth 4 (four) times daily before meals and nightly. for 7 days    escitalopram oxalate (LEXAPRO) 10 MG tablet Take 10 mg by mouth.    nitroGLYCERIN (NITROSTAT) 0.4 MG SL tablet Place 1 tablet (0.4 mg total) under the tongue every 5 (five) minutes as needed for Chest pain.    oxyCODONE (ROXICODONE) 5 MG immediate release tablet Take 1 tablet (5 mg total) by mouth every 4 (four) hours as needed (breakthrough pain.).    simethicone (MYLICON) 125 MG chewable tablet Take 1 tablet (125 mg total) by mouth every 6 (six) hours as needed for Flatulence.     No current facility-administered medications for this visit.       Review of Systems   Constitutional:  Negative for activity change, fatigue, fever and unexpected weight change.   HENT:  Negative for congestion.    Eyes:  Negative for redness.   Respiratory:  Negative for chest tightness and shortness of breath.    Cardiovascular:  Negative for chest pain and leg swelling.   Gastrointestinal:  Negative for abdominal pain, constipation,  diarrhea, nausea and vomiting.   Genitourinary:  Negative for dysuria, flank pain, frequency, hematuria, penile pain, penile swelling, scrotal swelling, testicular pain and urgency.   Musculoskeletal:  Negative for arthralgias and back pain.   Neurological:  Negative for dizziness and light-headedness.   Psychiatric/Behavioral:  Negative for behavioral problems and confusion. The patient is not nervous/anxious.    All other systems reviewed and are negative.      Objective:      There were no vitals filed for this visit.      Physical Exam  Vitals and nursing note reviewed.   Constitutional:       Appearance: He is well-developed.   HENT:      Head: Normocephalic.   Eyes:      Conjunctiva/sclera: Conjunctivae normal.   Neck:      Thyroid: No thyromegaly.      Trachea: No tracheal deviation.   Cardiovascular:      Rate and Rhythm: Normal rate.      Heart sounds: Normal heart sounds.   Pulmonary:      Effort: Pulmonary effort is normal. No respiratory distress.      Breath sounds: Normal breath sounds. No wheezing.   Abdominal:      General: Bowel sounds are normal.      Palpations: Abdomen is soft.      Tenderness: There is no abdominal tenderness. There is no rebound.      Hernia: No hernia is present.   Musculoskeletal:         General: No tenderness. Normal range of motion.      Cervical back: Normal range of motion and neck supple.   Lymphadenopathy:      Cervical: No cervical adenopathy.   Skin:     General: Skin is warm and dry.      Findings: No erythema or rash.   Neurological:      Mental Status: He is alert and oriented to person, place, and time.   Psychiatric:         Behavior: Behavior normal.         Thought Content: Thought content normal.         Judgment: Judgment normal.         Urine dipstick shows not done.  Micro exam: not done.               Component  Ref Range & Units (hover) 7 d ago 1 yr ago 4 yr ago 6 yr ago 7 yr ago 8 yr ago 9 yr ago   PSA Diagnostic <0.01 0.02 CM <0.01 CM 0.01 CM 0.02 CM  0.02 CM 0.02 CM   Comment: The testing method is a chemiluminescent microparticle immunoassay  manufactured by [employer] and performed on the [employer]  or  [employer] system. Values obtained with different assay manufacturers  for  methods may be different and cannot be used interchangeably.  PSA Expected levels:  Hormonal Therapy: <0.05 ng/ml  Prostatectomy: <0.01 ng/ml  Radiation Therapy: <1.00 ng/ml   Resulting Agency [facility]              Narrative  Performed by: [facility]  Send normal result to authorizing provider's In Basket if  patient is active on MyChart:->Yes   Specimen Collected: 03/20/25 12:06 CDT       US Scrotum And Testicles  Order: [id number]   Status: Final result       Next appt: Today at 01:30 PM in Urology (Pablo Brenner MD)       Dx: Hydrocele in adult    Test Result Released: Yes (seen)    0 Result Notes  Details    Reading Physician Reading Date Result Priority   Jose Ricardo MD  [phone]  3/20/2025 Routine     Narrative & Impression  EXAMINATION:  US SCROTUM AND TESTICLES     CLINICAL HISTORY:  Hydrocele, unspecified     TECHNIQUE:  Sonography of the scrotum and testes.     COMPARISON:  None.     FINDINGS:  Right Testicle:     *Size: 4.4 x 2.7 x 3.1 cm  *Appearance: Normal.  *Flow: Normal arterial and venous flow  *Epididymis: A few epididymal cyst measuring up to 4 mm.  *Spermatocele: Large sized spermatocele measuring 4.6 x 1.7 x 1.6 cm.  *Varicocele: Present.  .     Left Testicle:     *Size: 4.4 x 2.4 x 3.0 cm  *Appearance: Normal.  *Flow: Normal arterial and venous flow  *Epididymis: Normal.  *Hydrocele: None.  *Varicocele: None.  .     Other findings: None.     Impression:     No acute sonographic abnormality.     Large right-sided spermatocele.     Right-sided varicocele.        Electronically signed by:Jose Ricardo MD  Date:                                            03/20/2025  Time:                                            15:22        Exam Ended: 03/20/25 14:12 CDT       Assessment:       1. Single spermatocele of right epididymis    2. Prostate cancer    3. Nocturia                Plan:       1. Single spermatocele of right epididymis (Primary)  He does not want repair at this time      2. Prostate cancer  monitor  - Prostate Specific Antigen, Diagnostic; Future    3. Nocturia  stable           Follow up in about 1 year (around 3/27/2026) for Follow up Established, Review PSA.

## 2025-06-13 ENCOUNTER — HOSPITAL ENCOUNTER (EMERGENCY)
Facility: HOSPITAL | Age: 89
Discharge: HOME | End: 2025-06-13
Attending: EMERGENCY MEDICINE
Payer: MEDICARE

## 2025-06-13 VITALS
HEART RATE: 53 BPM | DIASTOLIC BLOOD PRESSURE: 65 MMHG | BODY MASS INDEX: 29.1 KG/M2 | OXYGEN SATURATION: 99 % | TEMPERATURE: 98 F | RESPIRATION RATE: 17 BRPM | HEIGHT: 68 IN | WEIGHT: 192 LBS | SYSTOLIC BLOOD PRESSURE: 136 MMHG

## 2025-06-13 DIAGNOSIS — R10.9 FLANK PAIN: ICD-10-CM

## 2025-06-13 DIAGNOSIS — M47.816 OSTEOARTHRITIS OF LUMBAR SPINE, UNSPECIFIED SPINAL OSTEOARTHRITIS COMPLICATION STATUS: Primary | ICD-10-CM

## 2025-06-13 LAB
ABSOLUTE EOSINOPHIL (OHS): 0.43 K/UL
ABSOLUTE MONOCYTE (OHS): 0.63 K/UL (ref 0.3–1)
ABSOLUTE NEUTROPHIL COUNT (OHS): 3.92 K/UL (ref 1.8–7.7)
ALBUMIN SERPL BCP-MCNC: 3.8 G/DL (ref 3.5–5.2)
ALP SERPL-CCNC: 69 UNIT/L (ref 40–150)
ALT SERPL W/O P-5'-P-CCNC: 21 UNIT/L (ref 10–44)
ANION GAP (OHS): 14 MMOL/L (ref 8–16)
AST SERPL-CCNC: 20 UNIT/L (ref 11–45)
BASOPHILS # BLD AUTO: 0.06 K/UL
BASOPHILS NFR BLD AUTO: 1 %
BILIRUB SERPL-MCNC: 0.6 MG/DL (ref 0.1–1)
BILIRUB UR QL STRIP.AUTO: NEGATIVE
BUN SERPL-MCNC: 21 MG/DL (ref 8–23)
CALCIUM SERPL-MCNC: 9.2 MG/DL (ref 8.7–10.5)
CHLORIDE SERPL-SCNC: 102 MMOL/L (ref 95–110)
CLARITY UR: CLEAR
CO2 SERPL-SCNC: 23 MMOL/L (ref 23–29)
COLOR UR AUTO: YELLOW
CREAT SERPL-MCNC: 1.1 MG/DL (ref 0.5–1.4)
ERYTHROCYTE [DISTWIDTH] IN BLOOD BY AUTOMATED COUNT: 15.1 % (ref 11.5–14.5)
GFR SERPLBLD CREATININE-BSD FMLA CKD-EPI: >60 ML/MIN/1.73/M2
GLUCOSE SERPL-MCNC: 123 MG/DL (ref 70–110)
GLUCOSE UR QL STRIP: NEGATIVE
HCT VFR BLD AUTO: 33.8 % (ref 40–54)
HGB BLD-MCNC: 11.2 GM/DL (ref 14–18)
HGB UR QL STRIP: NEGATIVE
HOLD SPECIMEN: NORMAL
IMM GRANULOCYTES # BLD AUTO: 0.03 K/UL (ref 0–0.04)
IMM GRANULOCYTES NFR BLD AUTO: 0.5 % (ref 0–0.5)
KETONES UR QL STRIP: NEGATIVE
LEUKOCYTE ESTERASE UR QL STRIP: NEGATIVE
LYMPHOCYTES # BLD AUTO: 1.14 K/UL (ref 1–4.8)
MCH RBC QN AUTO: 33.5 PG (ref 27–31)
MCHC RBC AUTO-ENTMCNC: 33.1 G/DL (ref 32–36)
MCV RBC AUTO: 101 FL (ref 82–98)
NITRITE UR QL STRIP: NEGATIVE
NUCLEATED RBC (/100WBC) (OHS): 0 /100 WBC
OHS QRS DURATION: 114 MS
OHS QTC CALCULATION: 402 MS
PH UR STRIP: 8 [PH]
PLATELET # BLD AUTO: 231 K/UL (ref 150–450)
PMV BLD AUTO: 10 FL (ref 9.2–12.9)
POTASSIUM SERPL-SCNC: 4 MMOL/L (ref 3.5–5.1)
PROT SERPL-MCNC: 7.1 GM/DL (ref 6–8.4)
PROT UR QL STRIP: NEGATIVE
RBC # BLD AUTO: 3.34 M/UL (ref 4.6–6.2)
RELATIVE EOSINOPHIL (OHS): 6.9 %
RELATIVE LYMPHOCYTE (OHS): 18.4 % (ref 18–48)
RELATIVE MONOCYTE (OHS): 10.1 % (ref 4–15)
RELATIVE NEUTROPHIL (OHS): 63.1 % (ref 38–73)
SODIUM SERPL-SCNC: 139 MMOL/L (ref 136–145)
SP GR UR STRIP: 1.01
UROBILINOGEN UR STRIP-ACNC: NEGATIVE EU/DL
WBC # BLD AUTO: 6.21 K/UL (ref 3.9–12.7)

## 2025-06-13 PROCEDURE — 85025 COMPLETE CBC W/AUTO DIFF WBC: CPT

## 2025-06-13 PROCEDURE — 81003 URINALYSIS AUTO W/O SCOPE: CPT

## 2025-06-13 PROCEDURE — 25000003 PHARM REV CODE 250

## 2025-06-13 PROCEDURE — 99285 EMERGENCY DEPT VISIT HI MDM: CPT | Mod: 25

## 2025-06-13 PROCEDURE — 80053 COMPREHEN METABOLIC PANEL: CPT

## 2025-06-13 PROCEDURE — 93005 ELECTROCARDIOGRAM TRACING: CPT

## 2025-06-13 PROCEDURE — 93010 ELECTROCARDIOGRAM REPORT: CPT | Mod: ,,, | Performed by: INTERNAL MEDICINE

## 2025-06-13 RX ORDER — ACETAMINOPHEN 500 MG
500 TABLET ORAL EVERY 6 HOURS PRN
Qty: 30 TABLET | Refills: 0 | Status: SHIPPED | OUTPATIENT
Start: 2025-06-13

## 2025-06-13 RX ORDER — LIDOCAINE 50 MG/G
2 PATCH TOPICAL ONCE
Status: DISCONTINUED | OUTPATIENT
Start: 2025-06-13 | End: 2025-06-13 | Stop reason: HOSPADM

## 2025-06-13 RX ORDER — LIDOCAINE 50 MG/G
1 PATCH TOPICAL DAILY
Qty: 15 PATCH | Refills: 0 | Status: SHIPPED | OUTPATIENT
Start: 2025-06-13

## 2025-06-13 RX ADMIN — LIDOCAINE 2 PATCH: 50 PATCH TOPICAL at 09:06

## 2025-06-13 NOTE — DISCHARGE INSTRUCTIONS
Thank you for coming to our Emergency Department today. It is important to remember that some problems or medical conditions are difficult to diagnose and may not be found or addressed during your Emergency Department visit.  These conditions often start with non-specific symptoms and can only be diagnosed on follow up visits with your primary care physician or specialist when the symptoms continue or change. Please remember that all medical conditions can change, and we cannot predict how you will be feeling tomorrow or the next day. Return to the ER with any questions/concerns, new/concerning symptoms including fever, chest pain, shortness of breath, loss of consciousness, dizziness, weakness, worsening symptoms, failure to improve, or any other concerns. Also, please follow up with your Primary Care Physician and/or Pediatrician in the next 1-2 days to review your ED visit in entirety and for re-evaluation.   Be sure to follow up with your primary care doctor and review all labs/imaging/tests that were performed during your ER visit with them. It is very common for us to identify non-emergent incidental findings which must be followed up with your primary care physician.  Some labs/imaging/tests may be outside of the normal range, and require non-emergent follow-up and/or further investigation/treatment/procedures/testing to help diagnose/exclude/prevent complications or other potentially serious medical conditions. Some abnormalities may not have been discussed or addressed during your ER visit. Some lab results may not return during your ER visit but can be accessible by downloading the free [facility] moriah or by visiting [email or web address] . It is important for you to review all labs/imaging/tests which are outside of the normal range with your physician.  An ER visit does not replace a primary care visit, and many screening tests or follow-up tests cannot be ordered by an ER doctor or performed by  the ER. Some tests may even require pre-approval.  If you do not have a primary care doctor, you may contact the one listed on your discharge paperwork or you may also call the [facility] Appointment Desk at [phone] , or [address] [place] [address] at  [phone] to schedule an appointment, or establish care with a primary care doctor or even a specialist and to obtain information about local resources. It is important to your health that you have a primary care doctor.  Please take all medications as directed. We have done our best to select a medication for you that will treat your condition however, all medications may potentially have side-effects and it is impossible to predict which medications may give you side-effects or what those side-effects (if any) those medications may give you.  If you feel that you are having a negative effect or side-effect of any medication you should stop taking those medications immediately and seek medical attention. If you feel that you are having a life-threatening reaction call 911.  Do not drive, swim, climb to height, take a bath, operate heavy machinery, drink alcohol or take potentially sedating medications, sign any legal documents or make any important decisions for 24 hours if you have received any pain medications, sedatives or mood altering drugs during your ER visit or within 24 hours of taking them if they have been prescribed to you.   You can find additional resources for Dentists, hearing aids, durable medical equipment, low cost pharmacies and other resources at [email or web address]  Patient agrees with this plan. Discussed with her strict return precautions, they verbalized understanding. Patient is stable for discharge.   § Please take all medication as prescribed.

## 2025-06-13 NOTE — ED PROVIDER NOTES
Encounter Date: 6/13/2025       History     Chief Complaint   Patient presents with    Back Pain     LOWER back since 0200, worse to LEFT flank. Hx kidney stones, unsure if this feels similar. Denies injury/trauma. Worse with bending. Denies urinary symptoms. No tx used.      Juan Martínez Jr. is a 89 y.o. male, with a PMHx of CAD, DM, HTN, CAD, who presents to the ED with onset of left lower back and flank pain around 2:30 AM. Patient reports pain is worse with bending and transitioning from lying to sitting, and he was initially unable to stand due to the severity. He notes swelling over the left flank. Patient reports he has a history of kidney stones but is unsure if this feels similar. Patient reports he had a history of right hip replacement and difficulty walking, previously treated by Dr. Howell. Patient is compliant with Lasix 20 mg twice daily. Denies prior history of back pain. No other exacerbating or alleviating factors. Patient denies chest pain, SOB, nausea, vomiting, diarrhea, constipation, or other associated symptoms. NKDA              The history is provided by the patient and medical records. No  was used.     Review of patient's allergies indicates:  No Known Allergies  Past Medical History:   Diagnosis Date    Coronary artery disease     Diabetes mellitus     High cholesterol     Hypertension     NSTEMI (non-ST elevated myocardial infarction) 07/25/2021    Prostate cancer      Past Surgical History:   Procedure Laterality Date    APPLICATION OF FULL-THICKNESS SKIN GRAFT (FTSG) TO UPPER EXTREMITY Right 11/15/2018    Procedure: APPLICATION, GRAFT, SKIN, FULL-THICKNESS, TO UPPER EXTREMITY VS STSG WITH FROZEN SECTIONS;  Surgeon: Alan Arzola MD;  Location: [facility] OR;  Service: Plastics;  Laterality: Right;    EXCISION OF SQUAMOUS CELL CARCINOMA Right 11/15/2018    Procedure: EXCISION, CARCINOMA, SQUAMOUS CELL @ RIGHT HAND;  Surgeon: Alan Arzola MD;  Location: [facility]  OR;  Service: Plastics;  Laterality: Right;  RN PREOP 11/13/2018--NEED H/P    HEMIARTHROPLASTY OF HIP Right 08/14/2022    Procedure: HEMIARTHROPLASTY, HIP;  Surgeon: Edis Villanueva MD;  Location: [facility] OR;  Service: Orthopedics;  Laterality: Right;    JOINT REPLACEMENT      LEFT HEART CATHETERIZATION Left 05/06/2019    Procedure: Left heart cath;  Surgeon: Rashad Nieto MD;  Location: [facility] CATH LAB;  Service: Cardiology;  Laterality: Left;    LEFT HEART CATHETERIZATION Left 07/26/2021    Procedure: Left heart cath, radial;  Surgeon: Rashad Nieto MD;  Location: [facility] CATH LAB;  Service: Cardiology;  Laterality: Left;    TONSILLECTOMY      TOTAL KNEE ARTHROPLASTY Right      Family History   Problem Relation Name Age of Onset    Heart disease Mother      Stroke Father       Social History[1]  Review of Systems   Constitutional:  Negative for chills, diaphoresis, fatigue and fever.   HENT:  Negative for congestion, ear discharge, ear pain, rhinorrhea, sore throat and trouble swallowing.    Eyes:  Negative for photophobia, redness and visual disturbance.   Respiratory:  Negative for cough and shortness of breath.    Cardiovascular:  Negative for chest pain, palpitations and leg swelling.   Gastrointestinal:  Negative for abdominal pain, diarrhea, nausea and vomiting.   Genitourinary:  Negative for difficulty urinating, dysuria, flank pain, frequency and hematuria.   Musculoskeletal:  Positive for back pain. Negative for arthralgias, myalgias, neck pain and neck stiffness.   Skin:  Negative for pallor and rash.   Neurological:  Negative for dizziness, weakness, light-headedness, numbness and headaches.   Hematological:  Does not bruise/bleed easily.       Physical Exam     Initial Vitals [06/13/25 0810]   BP Pulse Resp Temp SpO2   (!) 148/67 64 17 97.7 °F (36.5 °C) 95 %      MAP       --         Physical Exam    Nursing note and vitals reviewed.  Constitutional: He appears well-developed and well-nourished. He is  not diaphoretic. He does not appear ill. No distress.   HENT:   Head: Normocephalic and atraumatic.   Right Ear: External ear normal.   Left Ear: External ear normal.   Nose: Nose normal. Mouth/Throat: Uvula is midline and oropharynx is clear and moist.   Eyes: Conjunctivae and EOM are normal. Pupils are equal, round, and reactive to light. Right eye exhibits no discharge. Left eye exhibits no discharge. No scleral icterus.   Neck: Trachea normal. Neck supple.   Normal range of motion.   Full passive range of motion without pain.     Cardiovascular:  Normal rate, regular rhythm, normal heart sounds, intact distal pulses and normal pulses.     Exam reveals no distant heart sounds and no friction rub.       Pulmonary/Chest: Effort normal and breath sounds normal. No respiratory distress.   Abdominal: Abdomen is soft. Bowel sounds are normal. He exhibits no shifting dullness, no distension, no fluid wave, no pulsatile midline mass and no mass. There is no abdominal tenderness.   No right CVA tenderness.  No left CVA tenderness. There is no rebound and no guarding.   Musculoskeletal:         General: Normal range of motion.      Right shoulder: Normal.      Left shoulder: Normal.      Right elbow: Normal.      Left elbow: Normal.      Right wrist: Normal.      Left wrist: Normal.      Right hand: Normal.      Left hand: Normal.      Cervical back: Normal, full passive range of motion without pain, normal range of motion and neck supple.      Thoracic back: Normal.      Lumbar back: Swelling (Mild swelling to the left lumbar region.) and tenderness (left lower lumbar region) present. No edema, deformity, signs of trauma, lacerations, spasms or bony tenderness. Normal range of motion. Negative right straight leg raise test and negative left straight leg raise test. No scoliosis.      Right hip: Normal.      Left hip: Normal.      Right knee: Normal.      Left knee: Normal.      Right lower leg: No deformity, lacerations,  tenderness or bony tenderness. Edema (Trace) present.      Left lower leg: No deformity, lacerations, tenderness or bony tenderness. Edema (Trace) present.      Right ankle: Normal.      Left ankle: Normal.      Right foot: Normal.      Left foot: Normal.      Comments: Full range of motion of both upper and lower extremities bilaterally 5/5 strength 2+ distal pulses sensation intact.  That has no midline tenderness of the cervical, thoracic, lumbar spine.  That has muscular tenderness of the left lower lumbar musculature and paraspinal region.     Neurological: He is alert and oriented to person, place, and time. He has normal strength. No cranial nerve deficit or sensory deficit. Coordination and gait normal.   Skin: Skin is warm and dry. Capillary refill takes less than 2 seconds. No bruising, no ecchymosis and no rash noted. No erythema.   Psychiatric: He has a normal mood and affect. His speech is normal and behavior is normal. Thought content normal.         ED Course   Procedures  Labs Reviewed   COMPREHENSIVE METABOLIC PANEL - Abnormal       Result Value    Sodium 139      Potassium 4.0      Chloride 102      CO2 23      Glucose 123 (*)     BUN 21      Creatinine 1.1      Calcium 9.2      Protein Total 7.1      Albumin 3.8      Bilirubin Total 0.6      ALP 69      AST 20      ALT 21      Anion Gap 14      eGFR >60     CBC WITH DIFFERENTIAL - Abnormal    WBC 6.21      RBC 3.34 (*)     HGB 11.2 (*)     HCT 33.8 (*)      (*)     MCH 33.5 (*)     MCHC 33.1      RDW 15.1 (*)     Platelet Count 231      MPV 10.0      Nucleated RBC 0      Neut % 63.1      Lymph % 18.4      Mono % 10.1      Eos % 6.9      Basophil % 1.0      Imm Grans % 0.5      Neut # 3.92      Lymph # 1.14      Mono # 0.63      Eos # 0.43      Baso # 0.06      Imm Grans # 0.03     URINALYSIS, REFLEX TO URINE CULTURE - Normal    Color, UA Yellow      Appearance, UA Clear      pH, UA 8.0      Spec Grav UA 1.010      Protein, UA Negative       Glucose, UA Negative      Ketones, UA Negative      Bilirubin, UA Negative      Blood, UA Negative      Nitrites, UA Negative      Urobilinogen, UA Negative      Leukocyte Esterase, UA Negative     CBC W/ AUTO DIFFERENTIAL    Narrative:     The following orders were created for panel order CBC auto differential.  Procedure                               Abnormality         Status                     ---------                               -----------         ------                     CBC with Differential[[id number]]       Abnormal            Final result                 Please view results for these tests on the individual orders.   [facility] URINE HOLD          Imaging Results              CT Renal Stone Study ABD Pelvis WO (Final result)  Result time 06/13/25 09:41:33      Final result by Marc Duran III, MD (06/13/25 09:41:33)                   Impression:      Left lower pole renal calculi without obstruction.    Lung base granulomas.    Coronary calcifications.    Left renal cyst.    Right hip and prostate radiation seeds.    Mild diverticulosis.    No acute process seen.      Electronically signed by: Marc Duran MD  Date:    06/13/2025  Time:    09:41               Narrative:    EXAMINATION:  CT RENAL STONE STUDY ABD PELVIS WO    CLINICAL HISTORY:  Flank pain, kidney stone suspected;    FINDINGS:  Comparison is 12/12/2022.    There are lung base granulomas.  There are coronary calcifications.  The liver, gallbladder, biliary tree, spleen, stomach, duodenum, adrenal glands, and pancreas are unremarkable.  There are 3 tiny left inferior pole renal calculi.  There is a left renal cyst.  No hydronephrosis is seen.  No para-aortic, retroperitoneal, or mesenteric adenopathy is seen.  There is a right hip prosthesis.  There are prostate radiation seeds.  No obstruction, ileus, or perforation seen.  There is mild diverticulosis.  Appendix is unremarkable.  No obstruction, ileus, perforation, ascites, or  abscess seen.  The appendix region is normal.  Right lower quadrant left lower quadrant unremarkable.  Bones demonstrate DJD.                                       X-Ray Lumbar Spine Ap And Lateral (Final result)  Result time 06/13/25 09:07:09      Final result by Marc Duran III, MD (06/13/25 09:07:09)                   Impression:      No acute process seen.      Electronically signed by: Marc Duran MD  Date:    06/13/2025  Time:    09:07               Narrative:    EXAMINATION:  XR LUMBAR SPINE AP AND LATERAL    CLINICAL HISTORY:  low back pain;    FINDINGS:  Lumbar spine two views:    There is a right hip prosthesis.  There is grade 1 anterolisthesis of L4 on L5.  There is mild retrolisthesis of L1 on L2, L2 on L3, and L3 on L4.  There is mild-moderate DJD.  No fracture dislocation bone destruction seen.  No trauma seen.  No acute process seen.                                       Medications   LIDOcaine 5 % patch 2 patch (2 patches Transdermal Patch Applied 6/13/25 0915)     Medical Decision Making  Juan Martínez Jr. is a 89 y.o. male, with a PMHx of CAD, DM, HTN, CAD, who presents to the ED with onset of left lower back and flank pain around 2:30 AM. Patient reports pain is worse with bending and transitioning from lying to sitting, and he was initially unable to stand due to the severity. He notes swelling over the left flank. Patient reports he has a history of kidney stones but is unsure if this feels similar. Patient reports he had a history of right hip replacement and difficulty walking, previously treated by Dr. Howell. Patient is compliant with Lasix 20 mg twice daily. Denies prior history of back pain. No other exacerbating or alleviating factors. Patient denies chest pain, SOB, nausea, vomiting, diarrhea, constipation, or other associated symptoms. NKDA    Patient's vitals reviewed.  They are afebrile, no respiratory distress, nontoxic-appearing in the ED.  Patient's chart and medical  history reviewed.  Differential diagnosis is considered as the following.  - MSK strain: considered with back pain and muscular tenderness  - Pyelonephritis: unlikely with no CVA tenderness or fever or urinary complaints   - Spinal Fx: unlikely with normal neurovascular exam, no step-off or deformity on exam, no recent injury, no spinal TTP.  - kidney stone: considered with PMH of kidney stones and left flank pain.   - Discitis/Spinal abscess/Osteomyelitis: unlikely with no risk factors including current immunosuppression, hemodialysis, current or recent injection drug use, current or recent invasive epidural/spinal procedure, current or recent endocarditis or bacteremia. no spinal tenderness, no fever. no neuro deficits.  - Cauda Equina: unlikely with no saddle anesthesia, urinary or bowel retention or incontinence.  - stenosis/herniation: considered with back pain, however negative straight leg test, no radicular symptoms.  Patient without CP or SOB, normal ECG, unlikely cardiac etiology. Left lower back pain worse with  palpation on exam and with movement and not at rest consistent with MSK etiology supported by chronic DJD of the lumbar spine of imaging. Advised supportive care with ice/heat along with stretching and tylenol for back pain.   At this time I'll discharge home to follow up with primary care physician in the next 1-2 days for further evaluation.  If the pain continues the pt will need to see back and spine for further evaluation.  The patient is comfortable with this plan and comfortable going home at this time. After taking into careful account the historical factors and physical exam findings of the patient's presentation today, in conjunction with the empirical and objective data obtained on ED workup, no acute emergent medical condition has been identified. The patient appears to be low risk for an emergent medical condition and I feel it is safe and appropriate at this time for the patient to  be discharged to follow-up as detailed in their discharge instructions for reevaluation and possible continued outpatient workup and management. I have discussed the specifics of the workup with the patient and the patient has verbalized understanding of the details of the workup, the diagnosis, the treatment plan, and the need for outpatient follow-up.  Although the patient has no emergent etiology today this does not preclude the development of an emergent condition so in addition, I have advised the patient that they can return to the ED and/or activate EMS at any time with worsening of their symptoms, change of their symptoms, or with any other medical complaint.  The patient remained comfortable and stable during their visit in the ED.  Discharge and follow-up instructions discussed with the patient who expressed understanding and willingness to comply with my recommendations. I discussed with the patient/family the diagnosis, treatment plan, indications for return to the emergency department, and for expected follow-up. Please follow up with your primary doctor in 1-2 days and return to the ED in any new, worsening, or continued symptoms. The patient/family verbalized an understanding. The patient/family is asked if there are any questions or concerns. We discuss the case, until all issues are addressed to the patient/family's satisfaction. Patient/family understands and is agreeable to the plan.    AMANDA SERRANO PA-C.    DISCLAIMER: This note was prepared with [email or web address] voice recognition transcription software. Garbled syntax, mangled pronouns, and other bizarre constructions may be attributed to that software system.      Amount and/or Complexity of Data Reviewed  Labs: ordered. Decision-making details documented in ED Course.  Radiology: ordered. Decision-making details documented in ED Course.    Risk  OTC drugs.  Prescription drug management.            Scribe Attestation:   Scribe #1: I performed the  above scribed service and the documentation accurately describes the services I performed. I attest to the accuracy of the note.        ED Course as of 06/13/25 1035   Fri Jun 13, 2025   0922 X-Ray Lumbar Spine Ap And Lateral  There is a right hip prosthesis.  There is grade 1 anterolisthesis of L4 on L5.  There is mild retrolisthesis of L1 on L2, L2 on L3, and L3 on L4.  There is mild-moderate DJD.  No fracture dislocation bone destruction seen.  No trauma seen.  No acute process seen. [AF]   0951 CT Renal Stone Study ABD Pelvis WO  Left lower pole renal calculi without obstruction.     Lung base granulomas.     Coronary calcifications.     Left renal cyst.     Right hip and prostate radiation seeds.     Mild diverticulosis.     No acute process seen.   [AF]   1010 Sinus bradycardia at a rate of 57.  CT interval 186.  .  No ST elevation. [AF]      ED Course User Index  [AF] Guanako Funk PA-C I, Alain David Flexer, PA-C, personally performed the services described in this documentation. All medical record entries made by the scribe were at my direction and in my presence. I have reviewed the chart and agree that the record reflects my personal performance and is accurate and complete.      DISCLAIMER: This note was prepared with [employer] voice recognition transcription software. Garbled syntax, mangled pronouns, and other bizarre constructions may be attributed to that software system.      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 Provider    LIDOcaine (LIDODERM) 5 % Place 1 patch onto the skin once daily. Apply patch for 12 hours and then leave off for 12 hours 15 patch 6/13/2025 -- Guanako Funk PA-C    acetaminophen  (TYLENOL) 500 MG tablet Take 1 tablet (500 mg total) by mouth every 6 (six) hours as needed for Pain. 30 tablet 2025 -- Guanako Funk PA-C          Follow-up Information       Follow up With Specialties Details Why Contact Info    Lissy Gavin MD Internal Medicine Schedule an appointment as soon as possible for a visit in 1 day for follow up [address]  [facility] [address]  [phone]      [facility] - Emergency Dept Emergency Medicine Go to  If you have new or worsening symptoms, or if you have any concerns at all. [address] [facility] [place] [address] [phone]    Your primary care physician  Schedule an appointment as soon as possible for a visit in 1 day for follow up regarding today's visit and for re-evaluation. Please follow up in 1-2 days.                    [1]   Social History  Tobacco Use    Smoking status: Former     Current packs/day: 0.00     Types: Cigarettes     Quit date: 2016     Years since quittin.8    Smokeless tobacco: Never    Tobacco comments:     2016   Substance Use Topics    Alcohol use: No    Drug use: No        Guanako Funk PA-C  25 1036

## 2025-07-11 ENCOUNTER — OFFICE VISIT (OUTPATIENT)
Dept: CARDIOLOGY | Facility: CLINIC | Age: 89
End: 2025-07-11
Payer: MEDICARE

## 2025-07-11 VITALS
BODY MASS INDEX: 27.83 KG/M2 | DIASTOLIC BLOOD PRESSURE: 54 MMHG | HEIGHT: 68 IN | SYSTOLIC BLOOD PRESSURE: 112 MMHG | OXYGEN SATURATION: 96 % | HEART RATE: 75 BPM | WEIGHT: 183.63 LBS

## 2025-07-11 DIAGNOSIS — E78.2 MIXED HYPERLIPIDEMIA: ICD-10-CM

## 2025-07-11 DIAGNOSIS — I10 ESSENTIAL HYPERTENSION, BENIGN: Primary | ICD-10-CM

## 2025-07-11 DIAGNOSIS — I25.10 CORONARY ARTERY DISEASE INVOLVING NATIVE CORONARY ARTERY OF NATIVE HEART WITHOUT ANGINA PECTORIS: ICD-10-CM

## 2025-07-11 PROCEDURE — 99999 PR PBB SHADOW E&M-EST. PATIENT-LVL IV: CPT | Mod: PBBFAC,,, | Performed by: INTERNAL MEDICINE

## 2025-07-11 NOTE — PROGRESS NOTES
CARDIOVASCULAR CONSULTATION    REASON FOR CONSULT:   Juan Martínez Jr. is a 89 y.o. male who presents for follow-up recent hospitalization for a non-STEMI..      HISTORY OF PRESENT ILLNESS:     Notes from August 2019:   Patient is here for follow-up today.  Was recently admitted for hypertensive urgency.  Did not have any chest pains during the hypertensive urgency.  Troponins were found to be mildly elevated.  Was evaluated by Cardiology and Norvasc was restarted.  His blood pressure has been well controlled since then.  Denies any chest pains at rest on exertion, orthopnea, PND, swelling of feet.      Notes from July 2019  Patient is here for follow-up today.  Denies any chest pains at rest on exertion, orthopnea, PND.  Denies any claudication symptoms.  Denies any dyspnea at rest on exertion    Note from clinic visit in May 2019:  Patient is 83-year-old man pleasant man with a past medical history significant for diabetes, dyslipidemia, hypertension, coronary artery disease who recently presented to the hospital with a non-STEMI.  Coronary angiogram performed at that time revealed nonischemic coronary artery disease of the LAD and RCA as well as 80% stenosis in his diagonal 1.  Which is being managed medically.  A week after that he developed some right-sided chest pain, different from his anginal pain which he had presented with his non-STEMI.  He states that right-sided chest pain resolved on its own in less than 20 min, but since this is all new for him he decided to come to the hospital to make sure everything was okay.  He was admitted and serial troponins did not reveal any elevation and he was discharged home.  He has not had any further episodes of chest pains.  Denies orthopnea, PND chest pains at rest or on exertion.  Denies typical claudication pain, has knee pains and atypical leg pain.      Notes from October 2019:  Patient here for follow-up.  States that had a brief episode of right-sided  chest pain which lasted a few seconds to few minutes and then went away.  This was different than the anginal pain he had in the past.  Denies any orthopnea, PND, swelling of feet.  States that he tried telling is  that this he felt was related to his lungs, and an checks x-ray done yesterday.  Denies any recent fevers or chills.    Notes from December 2019:  Patient here for follow-up.  Denies any chest pains at rest on exertion, orthopnea, PND, swelling of feet.  Blood pressure mildly elevated at clinic.  At home states blood pressure stays around 140-145 mm systolic.  I will increase his Toprol-XL to 75 mg daily.    Notes from January 2020:  Patient here because states that yesterday he felt some chest pain.  It was right-sided.  States did not feel like it was from his heart but just wanted to make sure.  He took nitroglycerines and had no response to nitroglycerin.  States that did not feel like his earlier anginal pains.    Notes from May 2020:  Patient here for follow-up did not have any further episodes of chest pain.  Is looking for clearance for EGD.  Denies any orthopnea, PND, swelling of feet.    Notes from September 2020:  Patient here for follow-up.  Denies any anginal sounding chest pains, orthopnea, PND.  States he had an episode last Friday where he took a deep breath and felt a sharp pain which lasted 1 sec and then went away.  Denies any anginal sounding chest pains on exertion.  Denies any orthopnea, PND, swelling of feet.  EKG done in the clinic today was personally reviewed and demonstrated normal sinus rhythm, left axis deviation.  Abnormal EKG.    Notes from December 2020:  Patient here follow-up.  Denies any chest pains rest exertion PND.  Doing fine.  No cardiac issues.    Notes from February 2021:  Patient here follow-up.  Denies any chest pains at rest on exertion, orthopnea, PND.  EKG done in the clinic today was personally reviewed and demonstrated normal sinus rhythm with left  anterior fascicular block.  No other cardiac symptomatology.    Notes from June 2021:  Patient here for follow-up.  Doing fine.  Denies any chest pains at rest on exertion, orthopnea PND.    Notes from July 21:  Patient here for after recent hospitalization.  Had come in with non-STEMI.  Culprit lesion was distal RCA.  Underwent PCI distal RCA.  Doing fine.  No chest pains.  No complications from cardiac catheterization.    October 21:  Patient here for follow-up.  Denies any chest pains at rest on exertion, orthopnea, PND.  Doing fine.    February 2022 patient here for follow-up.  Doing fine.  No anginal sounding chest pains, orthopnea, PND.    Notes from July 2022:  Patient here for follow-up.  Denies any chest at rest on exertion, orthopnea, PND, swelling of feet \    Notes from October 2022: Patient here for follow-up.  Denies any chest pains at rest on exertion, orthopnea, PND.  Had hip replacement recently.  Bilateral pedal edema since then P    Notes from January 23: Patient here for follow-up.  States lately has been experiencing night sweats and wakes up sweaty in the morning.  Also sometimes feels his heart is beating very fast.  Occurs about once a week.  Has had multiple ER visits for evaluation of this.  Denies any angina.      Notes from March 2023: Patient here for follow-up.  Doing fine.  Significant drop in cholesterol.  Blood pressure well controlled    Baseline rhythm was sinus bradycardia with normal intervals and an initial heart rate of 59 bpm.  There were no reported symptoms.  There were occasional PACs and very rare PVCs.  There was no evidence of high-degree AV block, nor were there prolonged pauses.  There were no atrial or ventricular arrhythmias.        Notes from June 23: Patient here for follow-up.  Doing fine.  Denies any chest pains at rest on exertion, orthopnea, PND, swelling of feet    Notes from December 23:  Patient here for follow-up.  Denies chest pains at rest on exertion,  "orthopnea, PND.    Notes from March 2024: Patient here for follow-up.  Doing fine.  Denies any chest pains at rest on exertion, orthopnea, PND.    Notes from June 24: Patient here for follow-up.  Doing fine.  Denies chest pains at rest on exertion, orthopnea, PND    November 24    History of Present Illness    CHIEF COMPLAINT:  Juan presents today with swollen ankles.    SWOLLEN ANKLES:  He reports ongoing ankle swelling, noticeable in the morning, which has been present for an extended period. He takes a fluid pill to manage the swelling, which he finds helpful.    CARDIOVASCULAR HISTORY:  He has a history of a heart attack that occurred many years ago. He was previously unaware of a mild stroke until it was discovered on an MRI.    MEDICATIONS:  He is currently taking a diuretic ('fluid pill') for ankle swelling management.         July 2025:    History of Present Illness    CHIEF COMPLAINT:  Juan presents today for follow up    MUSCULOSKELETAL:  He reports bilateral knee discomfort without specifying pain severity, frequency, or specific characteristics. He states his knees "do not feel right."    VASCULAR:  He has a history of LLE swelling that resolved with compression stockings. He is seeking an additional pair for continued management. He also reports easy bruising with minimal trauma or slight contact to the skin.    CURRENT MEDICATIONS:  He is currently taking amlodipine, ASA, atorvastatin, Plavix 75 mg, HCTZ, Imdur, and metoprolol. He reports tolerating all medications well.    LABS:  LDL cholesterol is 30         PAST MEDICAL HISTORY:     Past Medical History:   Diagnosis Date    Coronary artery disease     Diabetes mellitus     High cholesterol     Hypertension     NSTEMI (non-ST elevated myocardial infarction) 07/25/2021    Prostate cancer        PAST SURGICAL HISTORY:     Past Surgical History:   Procedure Laterality Date    APPLICATION OF FULL-THICKNESS SKIN GRAFT (FTSG) TO UPPER EXTREMITY Right " 11/15/2018    Procedure: APPLICATION, GRAFT, SKIN, FULL-THICKNESS, TO UPPER EXTREMITY VS STSG WITH FROZEN SECTIONS;  Surgeon: Alan Arzola MD;  Location: [facility] OR;  Service: Plastics;  Laterality: Right;    EXCISION OF SQUAMOUS CELL CARCINOMA Right 11/15/2018    Procedure: EXCISION, CARCINOMA, SQUAMOUS CELL @ RIGHT HAND;  Surgeon: Alan Arzola MD;  Location: [facility] OR;  Service: Plastics;  Laterality: Right;  RN PREOP 11/13/2018--NEED H/P    HEMIARTHROPLASTY OF HIP Right 08/14/2022    Procedure: HEMIARTHROPLASTY, HIP;  Surgeon: Edis Villanueva MD;  Location: [facility] OR;  Service: Orthopedics;  Laterality: Right;    JOINT REPLACEMENT      LEFT HEART CATHETERIZATION Left 05/06/2019    Procedure: Left heart cath;  Surgeon: Rashad Nieto MD;  Location: [facility] CATH LAB;  Service: Cardiology;  Laterality: Left;    LEFT HEART CATHETERIZATION Left 07/26/2021    Procedure: Left heart cath, radial;  Surgeon: Rashad Nieto MD;  Location: [facility] CATH LAB;  Service: Cardiology;  Laterality: Left;    TONSILLECTOMY      TOTAL KNEE ARTHROPLASTY Right        ALLERGIES AND MEDICATION:   Review of patient's allergies indicates:  No Known Allergies     Medication List            Accurate as of July 11, 2025  4:07 PM. If you have any questions, ask your nurse or doctor.                CONTINUE taking these medications      acetaminophen 500 MG tablet  Commonly known as: TYLENOL  Take 1 tablet (500 mg total) by mouth every 6 (six) hours as needed for Pain.     albuterol 90 mcg/actuation inhaler  Commonly known as: PROVENTIL/VENTOLIN HFA     aluminum-magnesium hydroxide-simethicone 200-200-20 mg/5 mL Susp  Commonly known as: MAALOX ADVANCED  Take 30 mLs by mouth 4 (four) times daily before meals and nightly. for 7 days     amLODIPine 10 MG tablet  Commonly known as: NORVASC  Take 1 tablet (10 mg total) by mouth once daily.     aspirin 81 MG EC tablet  Commonly known as: ECOTRIN  Take 1 tablet (81 mg total) by mouth once daily.      atorvastatin 40 MG tablet  Commonly known as: LIPITOR  Take 1 tablet (40 mg total) by mouth once daily.     blood-glucose meter Misc     EScitalopram oxalate 10 MG tablet  Commonly known as: LEXAPRO     furosemide 20 MG tablet  Commonly known as: LASIX  Take 2 tablets (40 mg total) by mouth 2 (two) times a day.     hydroCHLOROthiazide 12.5 mg capsule  Commonly known as: MICROZIDE  Take 2 capsules (25 mg total) by mouth once daily.     isosorbide mononitrate 60 MG 24 hr tablet  Commonly known as: IMDUR  Take 1 tablet (60 mg total) by mouth once daily.     LANCETS & BLOOD GLUCOSE STRIPS MISC     LIDOcaine 5 %  Commonly known as: LIDODERM  Place 1 patch onto the skin once daily. Apply patch for 12 hours and then leave off for 12 hours     metFORMIN 1000 MG tablet  Commonly known as: GLUCOPHAGE  Take 1 tablet (1,000 mg total) by mouth 2 (two) times daily with meals.     metoprolol succinate 50 MG 24 hr tablet  Commonly known as: TOPROL-XL  Take 1 tablet (50 mg total) by mouth once daily.     nitroGLYCERIN 0.4 MG SL tablet  Commonly known as: NITROSTAT  Place 1 tablet (0.4 mg total) under the tongue every 5 (five) minutes as needed for Chest pain.     oxyCODONE 5 MG immediate release tablet  Commonly known as: ROXICODONE  Take 1 tablet (5 mg total) by mouth every 4 (four) hours as needed (breakthrough pain.).     simethicone 125 MG chewable tablet  Commonly known as: MYLICON  Take 1 tablet (125 mg total) by mouth every 6 (six) hours as needed for Flatulence.     thiamine 250 MG tablet            STOP taking these medications      clopidogreL 75 mg tablet  Commonly known as: PLAVIX  Stopped by: Rashad Nieto MD              SOCIAL HISTORY:     Social History     Socioeconomic History    Marital status:    Tobacco Use    Smoking status: Former     Current packs/day: 0.00     Types: Cigarettes     Quit date: 2016     Years since quittin.9    Smokeless tobacco: Never    Tobacco comments:     2016  "  Substance and Sexual Activity    Alcohol use: No    Drug use: No    Sexual activity: Not Currently     Social Drivers of Health     Financial Resource Strain: High Risk (4/13/2024)    Received from [facility] SDOH Screening     In the past year, have you been unable to get any of the following when you really needed them? choose all that apply.: Medicine or health care     In the past year, have you been unable to get any of the following when you really needed them? choose all that apply.: Clothing   Housing Stability: High Risk (4/13/2024)    Received from [facility] SDOH Screening     In the past year, have you been unable to get any of the following when you really needed them? choose all that apply.: Utilities (electric, gas, and water)       FAMILY HISTORY:     Family History   Problem Relation Name Age of Onset    Heart disease Mother      Stroke Father         REVIEW OF SYSTEMS:   Review of Systems   Constitutional: Negative.    HENT: Negative.     Eyes: Negative.    Respiratory: Negative.     Cardiovascular:  Positive for leg swelling.   Gastrointestinal: Negative.    Genitourinary: Negative.    Skin: Negative.    Neurological: Negative.    Endo/Heme/Allergies: Negative.        A 10 point review of systems was performed and all the pertinent positives have been mentioned. Rest of review of systems was negative.        PHYSICAL EXAM:     Vitals:    07/11/25 0831   BP: (!) 112/54   Pulse: 75    Body mass index is 27.92 kg/m².  Weight: 83.3 kg (183 lb 10.3 oz)   Height: 5' 8" (172.7 cm)     Physical Exam  Vitals and nursing note reviewed.   Constitutional:       Appearance: Normal appearance. He is well-developed.   HENT:      Head: Normocephalic and atraumatic.      Right Ear: Hearing normal.      Left Ear: Hearing normal.      Nose: Nose normal.   Eyes:      General: Lids are normal.      Conjunctiva/sclera: Conjunctivae normal.      Pupils: Pupils are equal, round, and reactive " to light.   Cardiovascular:      Rate and Rhythm: Normal rate and regular rhythm.      Pulses: Normal pulses.      Heart sounds: Normal heart sounds.   Pulmonary:      Effort: Pulmonary effort is normal.      Breath sounds: Normal breath sounds.   Abdominal:      Palpations: Abdomen is soft.      Tenderness: There is no abdominal tenderness.   Musculoskeletal:         General: No deformity.      Cervical back: Normal range of motion and neck supple.      Right lower leg: Edema present.      Left lower leg: Edema present.   Skin:     General: Skin is warm and dry.   Neurological:      Mental Status: He is alert and oriented to person, place, and time.   Psychiatric:         Speech: Speech normal.           DATA:     Laboratory:  CBC:  Recent Labs   Lab 09/20/23  1250 03/20/24  0935 06/13/25  0927   WBC 7.84 5.11 6.21   Hemoglobin 11.1 L 11.6 L  --    HGB  --   --  11.2 L   Hematocrit 33.7 L 35.4 L  --    HCT  --   --  33.8 L   Platelet Count  --   --  231   Platelets 267 282  --        CHEMISTRIES:  Recent Labs   Lab 08/07/24  0714 11/15/24  0729 05/09/25  1239 06/13/25  0927   Glucose 113 H 102 H  --  123 H   Sodium 135 133 L 135 139   Potassium 3.7 4.6 3.7 4.0   BUN  --   --  25.4 H 21   Blood Urea Nitrogen 14 15  --   --    Creatinine 1.06 0.91 1.32 1.1   Calcium 9.5 8.7 9.4 9.2       CARDIAC BIOMARKERS:  Recent Labs   Lab 01/05/23  0400 01/05/23  0632 02/13/23  0350   Troponin I 0.012 0.008 <0.006       COAGS:  Recent Labs   Lab 08/13/22  1118 08/13/22  1540   INR 1.0 1.0       LIPIDS/LFTS:  Recent Labs   Lab 11/15/24  0729 05/09/25  1239 06/13/25  0927   Cholesterol  --  79  --    Triglycerides  --  67  --    HDL  --  36 L  --    LDL Calculated  --  30  --    Non-HDL Cholesterol  --  43  --    AST 16 22 20   ALT 23 18 21       Hemoglobin A1C   Date Value Ref Range Status   05/09/2025 6.0 (H) 4.7 - 5.6 % Final   10/23/2024 5.5 4.0 - 5.6 % Final     Comment:     ADA Screening Guidelines:  5.7-6.4%  Consistent with  prediabetes  >or=6.5%  Consistent with diabetes    High levels of fetal hemoglobin interfere with the HbA1C  assay. Heterozygous hemoglobin variants (HbS, HgC, etc)do  not significantly interfere with this assay.   However, presence of multiple variants may affect accuracy.     08/17/2022 6.4 (H) 4.0 - 5.6 % Final     Comment:     ADA Screening Guidelines:  5.7-6.4%  Consistent with prediabetes  >or=6.5%  Consistent with diabetes    High levels of fetal hemoglobin interfere with the HbA1C  assay. Heterozygous hemoglobin variants (HbS, HgC, etc)do  not significantly interfere with this assay.   However, presence of multiple variants may affect accuracy.     05/07/2019 7.0 (H) 4.0 - 5.6 % Final     Comment:     ADA Screening Guidelines:  5.7-6.4%  Consistent with prediabetes  >or=6.5%  Consistent with diabetes  High levels of fetal hemoglobin interfere with the HbA1C  assay. Heterozygous hemoglobin variants (HbS, HgC, etc)do  not significantly interfere with this assay.   However, presence of multiple variants may affect accuracy.       Hemoglobin A1c   Date Value Ref Range Status   12/02/2021 6.7 (H) <5.7 % of total Hgb Final     Comment:     For someone without known diabetes, a hemoglobin A1c  value of 6.5% or greater indicates that they may have   diabetes and this should be confirmed with a follow-up   test.    For someone with known diabetes, a value <7% indicates   that their diabetes is well controlled and a value   greater than or equal to 7% indicates suboptimal   control. A1c targets should be individualized based on   duration of diabetes, age, comorbid conditions, and   other considerations.    Currently, no consensus exists regarding use of  hemoglobin A1c for diagnosis of diabetes for children.       07/23/2021 6.8 (H) <5.7 % of total Hgb Final     Comment:     For someone without known diabetes, a hemoglobin A1c  value of 6.5% or greater indicates that they may have   diabetes and this should be  confirmed with a follow-up   test.    For someone with known diabetes, a value <7% indicates   that their diabetes is well controlled and a value   greater than or equal to 7% indicates suboptimal   control. A1c targets should be individualized based on   duration of diabetes, age, comorbid conditions, and   other considerations.    Currently, no consensus exists regarding use of  hemoglobin A1c for diagnosis of diabetes for children.       03/16/2021 6.4 (H) <5.7 % of total Hgb Final     Comment:     For someone without known diabetes, a hemoglobin   A1c value between 5.7% and 6.4% is consistent with  prediabetes and should be confirmed with a   follow-up test.    For someone with known diabetes, a value <7%  indicates that their diabetes is well controlled. A1c  targets should be individualized based on duration of  diabetes, age, comorbid conditions, and other  considerations.    This assay result is consistent with an increased risk  of diabetes.    Currently, no consensus exists regarding use of  hemoglobin A1c for diagnosis of diabetes for children.     % HEMOGLOBIN A1C   Date Value Ref Range Status   11/15/2024 5.7 (H) <5.7 % of total Hgb Final     Comment:     For someone without known diabetes, a hemoglobin   A1c value between 5.7% and 6.4% is consistent with  prediabetes and should be confirmed with a   follow-up test.    For someone with known diabetes, a value <7%  indicates that their diabetes is well controlled. A1c  targets should be individualized based on duration of  diabetes, age, comorbid conditions, and other  considerations.    This assay result is consistent with an increased risk  of diabetes.    Currently, no consensus exists regarding use of  hemoglobin A1c for diagnosis of diabetes for children.   08/07/2024 5.9 (H) <5.7 % of total Hgb Final     Comment:     For someone without known diabetes, a hemoglobin   A1c value between 5.7% and 6.4% is consistent with  prediabetes and should be  confirmed with a   follow-up test.    For someone with known diabetes, a value <7%  indicates that their diabetes is well controlled. A1c  targets should be individualized based on duration of  diabetes, age, comorbid conditions, and other  considerations.    This assay result is consistent with an increased risk  of diabetes.    Currently, no consensus exists regarding use of  hemoglobin A1c for diagnosis of diabetes for children.      This test was performed on the Roche jolie c503 platform.  Effective 11/13/23, a change in test platforms from the  Abbott  to the Roche jolie c503 may have shifted  HbA1c results compared to historical results.  Based on laboratory validation testing conducted at  [facility], the Roche platform relative to the Abbott  platform had an average increase in HbA1c value of  < or = 0.3%. This difference is within accepted   variability established by the National Glycohemoglobin  Standardization Program. Note that not all individuals  will have had a shift in their results and direct  comparisons between historical and current results for  testing conducted on different platforms is not  recommended.       03/11/2024 6.5 (H) <5.7 % of total Hgb Final     Comment:     For someone without known diabetes, a hemoglobin A1c  value of 6.5% or greater indicates that they may have   diabetes and this should be confirmed with a follow-up   test.    For someone with known diabetes, a value <7% indicates   that their diabetes is well controlled and a value   greater than or equal to 7% indicates suboptimal   control. A1c targets should be individualized based on   duration of diabetes, age, comorbid conditions, and   other considerations.    Currently, no consensus exists regarding use of  hemoglobin A1c for diagnosis of diabetes for children.        This test was performed on the Roche jolie c503 platform.  Effective 11/13/23, a change in test platforms from the  Abbott  to the Roche  jolie c503 may have shifted  HbA1c results compared to historical results.  Based on laboratory validation testing conducted at  [facility], the Roche platform relative to the Abbott  platform had an average increase in HbA1c value of  < or = 0.3%. This difference is within accepted   variability established by the National Glycohemoglobin  Standardization Program. Note that not all individuals  will have had a shift in their results and direct  comparisons between historical and current results for  testing conducted on different platforms is not  recommended.           TSH  Recent Labs   Lab 10/23/24  1230 05/09/25  1239   TSH 2.088 1.04       The ASCVD Risk score (Guanako CHENG, et al., 2019) failed to calculate for the following reasons:    The 2019 ASCVD risk score is only valid for ages 40 to 79    Risk score cannot be calculated because patient has a medical history suggesting prior/existing ASCVD           Cardiovascular Testing:    EKG: (personally reviewed tracing)  May 2019:  Normal sinus rhythm, left axis deviation.  Abnormal EKG.    2D echocardiogram May 2019:  Normal left ventricular systolic function. The estimated ejection fraction is 65%  Normal LV diastolic function.  Mild left atrial enlargement.  Normal central venous pressure (3 mm Hg).  The estimated PA systolic pressure is 10 mm Hg       Coronary angiogram 6th May 2019:    No acute thrombotic lesion identified.  Coronary artery disease as described below  LVEDP: 12 mmHg    Carotid ultrasound May 28, 2019:    There is 20-39% right Internal Carotid Stenosis.  There is 20-39% left Internal Carotid Stenosis.     ABIs May 28, 2019:    Noncompressible LOW bilaterally.  Mildly decrease TBI bilaterally.  Normal PVR waveforms bilaterally.       Coronary Anatomy:  LM:  No significant stenosis  LAD:  Mid LAD 60-70% stenosis.  Distal LAD 50% stenosis.  IFR 0.92 (nonischemic).  Diagonal 1 is a tortuous vessel with mid 80% stenosis.  Will medically manage this  diagonal stenosis  LCx :  Mild nonobstructive CAD  RCA:  50% distal RCA/proximal PDA lesion.  IFR 1 (nonischemic)     Impression / Plan  Coronary artery disease as described above.  Continue medical management with aggressive risk factor modification.  Continue aspirin 81 mg daily. Change simvastatin to atorvastatin 80 mg daily.        ASSESSMENT AND PLAN     Patient Active Problem List   Diagnosis    Essential hypertension, benign    Type 2 diabetes mellitus    Hyperlipidemia    Body mass index 28.0-28.9, adult    History of prostate cancer    Orthostatic hypotension    Diabetic ulcer of toe of left foot associated with type 2 diabetes mellitus, with fat layer exposed    SCC (squamous cell carcinoma)    Coronary artery disease involving native coronary artery of native heart without angina pectoris    Unstable angina    Palpitations    Elevated troponin    NSTEMI (non-ST elevated myocardial infarction)    Hip fracture    Aortic root dilatation    Acute blood loss anemia    Diaphoresis     No orders of the defined types were placed in this encounter.        1.  Patient with coronary artery disease, now angina free.  Status post PCI distal RCA in July of 2021. Continue aspirin 81 mg daily indefinitely  Diagonal 1 disease being managed medically.  Continues to be angina free.  Bruising easily.  Continue aspirin, discontinue Plavix    2.  Hypertension:  Well controlled on current therapy.      3.  Cardiovascular screening with rest and stress ABIs and carotid ultrasound performed.  ABIs demonstrated noncompressible ABIs bilaterally, mildly decreased ABIs bilaterally with normal PVR waveforms bilaterally.  I got a peripheral ultrasound for further evaluation of his abnormal TBI.   ultrasound did not show any flow restriction in the right or the left lower extremity.    4.  Dyslipidemia:  Continue medical management with atorvastatin.       5. Bilateral pedal edema  Lasix as needed.  Compression stockings.    6. History  of TIA:  Aggressive risk factor modification    Fu in 6 m    Thank you very much for involving me in the care of your patient.  Please do not hesitate to contact me if there are any questions.      Rashad Nieto MD, [facility], [facility]  Interventional Cardiologist, [facility].       Visit today included increased complexity associated with the care of the episodic problem hypertension, dyslipidemia, coronary artery disease, history of TIA addressed and managing the longitudinal care of the patient due to the serious and/or complex managed problem(s)   Patient Active Problem List   Diagnosis    Essential hypertension, benign    Type 2 diabetes mellitus    Hyperlipidemia    Body mass index 28.0-28.9, adult    History of prostate cancer    Orthostatic hypotension    Diabetic ulcer of toe of left foot associated with type 2 diabetes mellitus, with fat layer exposed    SCC (squamous cell carcinoma)    Coronary artery disease involving native coronary artery of native heart without angina pectoris    Unstable angina    Palpitations    Elevated troponin    NSTEMI (non-ST elevated myocardial infarction)    Hip fracture    Aortic root dilatation    Acute blood loss anemia    Diaphoresis     .  This note was generated with the assistance of ambient listening technology. Verbal consent was obtained by the patient and accompanying visitor(s) for the recording of patient appointment to facilitate this note. I attest to having reviewed and edited the generated note for accuracy, though some syntax or spelling errors may persist. Please contact the author of this note for any clarification.    This note was dictated with the help of speech recognition software.  There might be un-intended errors and/or substitutions.

## (undated) DEVICE — SOL WATER STRL IRR 1000ML

## (undated) DEVICE — HEMOSTAT VASC BAND REG 24CM

## (undated) DEVICE — GUIDE LAUNCHER 6FR EBU 3.75

## (undated) DEVICE — TIP YANKAUERS BULB NO VENT

## (undated) DEVICE — PAD PREP 50/CA

## (undated) DEVICE — SUPPORT ULNA NERVE PROTECTOR

## (undated) DEVICE — SEE MEDLINE ITEM 152529

## (undated) DEVICE — WIRE GUIDE HI TRQ BAL

## (undated) DEVICE — CATH DXTERITY JR40 100CM 5FR

## (undated) DEVICE — CATH 6FR JL4

## (undated) DEVICE — CATH PTCA RX TREK 2.25X15MM

## (undated) DEVICE — DRESSING XEROFORM FOIL PK 1X8

## (undated) DEVICE — GLOVE BIOGEL SZ 8 1/2

## (undated) DEVICE — SEE L#120831

## (undated) DEVICE — SUT MONOCRYL 4-0 SH UND MON

## (undated) DEVICE — GUIDEWIRE VERRATA + JTIP 185CM

## (undated) DEVICE — ELECTRODE EDGE SYSTEM RTS

## (undated) DEVICE — DRAPE STERI U-SHAPED 47X51IN

## (undated) DEVICE — IMMOBILIZER KNEE LG 19 L

## (undated) DEVICE — GAUZE SPONGE 4X4 12PLY

## (undated) DEVICE — KIT HAND CONTROL HIGH PRESSUR

## (undated) DEVICE — UNDERGLOVES BIOGEL PI SIZE 8.5

## (undated) DEVICE — Device

## (undated) DEVICE — WIRE GUIDE SAFE-T-J .035 260CM

## (undated) DEVICE — GLOVE SURGICAL LATEX SZ 7

## (undated) DEVICE — BLADE DERMATOME 10/BOX

## (undated) DEVICE — DRAPE STERI INSTRUMENT 1018

## (undated) DEVICE — SPONGE DERMACEA GAUZE 4X4

## (undated) DEVICE — SYR 10CC LUER LOCK

## (undated) DEVICE — PAD DEFIB CADENCE ADULT R2

## (undated) DEVICE — CORD FOR BIPOLAR FORCEPS 12

## (undated) DEVICE — INTRODUCER CATH 5F 11CM

## (undated) DEVICE — PAD RADI FEMORAL

## (undated) DEVICE — SUT VICRYL PLUS 2-0 CT1 18

## (undated) DEVICE — KIT GLIDESHEATH SLEND 6FR 10CM

## (undated) DEVICE — FORCEP BIPOLAR

## (undated) DEVICE — PACK HEAD & NECK

## (undated) DEVICE — GUIDEWIRE RUNTHROUGH EF 180CM

## (undated) DEVICE — DRESSING TRANS 4X4 TEGADERM

## (undated) DEVICE — SEE MEDLINE ITEM 146292

## (undated) DEVICE — GLOVE SURG BIOGEL LATEX SZ 7.5

## (undated) DEVICE — KIT SYR REUSABLE

## (undated) DEVICE — POSITIONER IV ARMBOARD FOAM

## (undated) DEVICE — SUT ETHILON 3/0 18IN PS-1

## (undated) DEVICE — SEE MEDLINE ITEM 157117

## (undated) DEVICE — STAPLER SKIN REGULAR

## (undated) DEVICE — KIT MANIFOLD LOW PRESS TUBING

## (undated) DEVICE — INTRODUCER CATH 6F 11CM

## (undated) DEVICE — SEE MEDLINE ITEM 146373

## (undated) DEVICE — BLADE SAW OSC ME-92 COATED

## (undated) DEVICE — SEE MEDLINE ITEM 152186

## (undated) DEVICE — RETRIEVER SUTURE HEWSON DISP

## (undated) DEVICE — WIRE WHISPER MS 014 X 190

## (undated) DEVICE — PULSAVAC ZIMMER

## (undated) DEVICE — SUT STRATAFIX PDS PLS 45CM

## (undated) DEVICE — PACK CATH LAB

## (undated) DEVICE — SUT ETHIBOND EXCEL 2 V37 30

## (undated) DEVICE — ELECTRODE REM PLYHSV RETURN 9

## (undated) DEVICE — NDL HYPO REG 25G X 1 1/2

## (undated) DEVICE — SEE MEDLINE ITEM 146313

## (undated) DEVICE — SUT VICRYL 4-0 RB1 27IN UD

## (undated) DEVICE — SEE MEDLINE ITEM 157166

## (undated) DEVICE — DRAPE HIP TIBURON 87X115X134

## (undated) DEVICE — CATH DXTERITY JL35 100CM 5FR

## (undated) DEVICE — CATH ANGIO EXPO VENT PGTL 5FR

## (undated) DEVICE — SYS LABLNG CORECT MED 4 FLG

## (undated) DEVICE — GUIDEWIRE SAFE T J TIP 145X2.5

## (undated) DEVICE — PAD K-THERMIA 24IN X 60IN

## (undated) DEVICE — BLANKET UPPER BODY 78.7X29.9IN

## (undated) DEVICE — KIT PREP E-Z WET W/2-6

## (undated) DEVICE — VALVE CONTROL COPILOT

## (undated) DEVICE — SOL IRR NACL .9% 3000ML

## (undated) DEVICE — NDL 18GA X1 1/2 REG BEVEL

## (undated) DEVICE — POSITIONER HEAD DONUT 9IN FOAM

## (undated) DEVICE — SET CYSTO IRRIGATION UNIV SPIK

## (undated) DEVICE — SUT VICRYL PLUS CT-1 1 8-27IN

## (undated) DEVICE — NDL STRAIGHT 4CM LEIBINGER

## (undated) DEVICE — GUIDE LAUNCH 6FR AL 7.5

## (undated) DEVICE — CLOSURE SKIN STERI STRIP 1/2X4

## (undated) DEVICE — DEVICE PERCLOSE SUT CLSR 6FR

## (undated) DEVICE — CATH EMERGE MR 8 X 2.00

## (undated) DEVICE — SEE MEDLINE ITEM 152622

## (undated) DEVICE — OMNIPAQUE 350MG 150ML VIAL

## (undated) DEVICE — CATH EMERGE MR 15 X 2.00

## (undated) DEVICE — CATH 5FR JR4 100CM

## (undated) DEVICE — SEE MEDLINE ITEM 146420

## (undated) DEVICE — CATH NC QUANTUM APEX MR2.75X12

## (undated) DEVICE — SUT SILK 4-0 RB-1 30 BL BR

## (undated) DEVICE — NDL SPINAL 20GX3.5 HUB

## (undated) DEVICE — STOPCOCK DISCOFIX 3 WAY

## (undated) DEVICE — ELECTRODE BLADE TEFLON 6

## (undated) DEVICE — BLADE SURG CARBON STEEL SZ11

## (undated) DEVICE — NDL SAFETY 22G X 1.5 ECLIPSE

## (undated) DEVICE — KIT INTRODUCER MICROPUNCTR 4F

## (undated) DEVICE — BLADE SURG STAINLESS STEEL #15

## (undated) DEVICE — DRAPE INCISE IOBAN 2 23X33IN

## (undated) DEVICE — STAPLER SKIN PROXIMATE WIDE

## (undated) DEVICE — BLANKET LOWER BODY 55.9X40.2IN

## (undated) DEVICE — DRESSING TRANS 2X2 TEGADERM

## (undated) DEVICE — GUIDE LAUNCHER 6FR JR 4.0

## (undated) DEVICE — MESHER SKIN GRFT CARRIER 8IN

## (undated) DEVICE — SLING ORTHOPEDIC LARGE

## (undated) DEVICE — ELECTRODE NEEDLE 1IN

## (undated) DEVICE — APPLICATOR CHLORAPREP ORN 26ML

## (undated) DEVICE — CATH EMERGE MR 15 X 2.50

## (undated) DEVICE — COTTONBALL LG ST

## (undated) DEVICE — CONTAINER SPECIMEN STRL 4OZ

## (undated) DEVICE — STRIP STERI REIN CLSR 1/2X2IN

## (undated) DEVICE — SYR ONLY LUER LOCK 20CC

## (undated) DEVICE — DRESSING AQUACEL AG 3.5X10IN

## (undated) DEVICE — DRAPE PLASTIC U 60X72

## (undated) DEVICE — APPLICATOR STERILE 3IN

## (undated) DEVICE — SUT 3-0 VICRYL / SH (J416)

## (undated) DEVICE — KIT TOTAL HIP OPTIVAC

## (undated) DEVICE — OMNIPAQUE 350 200ML

## (undated) DEVICE — BANDAGE CONFORM 3IN STRL

## (undated) DEVICE — CLIPPER BLADE MOD 4406 (CAREF)

## (undated) DEVICE — SKINMARKER & RULER REGULAR X-F

## (undated) DEVICE — DRESSING N ADH OIL EMUL 3X8 3S

## (undated) DEVICE — GLOVE SURGICAL LATEX SZ 8

## (undated) DEVICE — SEE MEDLINE ITEM 154981

## (undated) DEVICE — PAD CAST SPECIALIST STRL 4

## (undated) DEVICE — PACK DRAPE UNIVERSAL CONVERTOR

## (undated) DEVICE — SEALER BIPOLAR TISSUE 6.0

## (undated) DEVICE — HOOD FLYTE PEELWY STERISHIELD